# Patient Record
Sex: FEMALE | Race: WHITE | NOT HISPANIC OR LATINO | Employment: FULL TIME | URBAN - METROPOLITAN AREA
[De-identification: names, ages, dates, MRNs, and addresses within clinical notes are randomized per-mention and may not be internally consistent; named-entity substitution may affect disease eponyms.]

---

## 2017-05-18 ENCOUNTER — HOSPITAL ENCOUNTER (OUTPATIENT)
Dept: INFUSION CENTER | Facility: HOSPITAL | Age: 47
Discharge: HOME | End: 2017-05-18
Payer: COMMERCIAL

## 2017-06-07 ENCOUNTER — HOSPITAL ENCOUNTER (OUTPATIENT)
Dept: INFUSION CENTER | Facility: HOSPITAL | Age: 47
Discharge: HOME | End: 2017-06-07
Payer: COMMERCIAL

## 2017-06-07 VITALS
HEART RATE: 88 BPM | WEIGHT: 109.79 LBS | TEMPERATURE: 97.6 F | BODY MASS INDEX: 19.45 KG/M2 | RESPIRATION RATE: 18 BRPM | SYSTOLIC BLOOD PRESSURE: 126 MMHG | HEIGHT: 63 IN | DIASTOLIC BLOOD PRESSURE: 82 MMHG

## 2017-06-07 PROCEDURE — 96365 THER/PROPH/DIAG IV INF INIT: CPT

## 2017-06-07 RX ORDER — SUMATRIPTAN 100 MG/1
100 TABLET, FILM COATED ORAL ONCE AS NEEDED
COMMUNITY
End: 2019-01-23 | Stop reason: SDUPTHER

## 2017-06-07 RX ORDER — ZOLEDRONIC ACID 5 MG/100ML
5 INJECTION, SOLUTION INTRAVENOUS ONCE
Status: COMPLETED | OUTPATIENT
Start: 2017-06-07 | End: 2017-06-07

## 2017-06-07 RX ADMIN — ZOLEDRONIC ACID 5 MG: 0.05 INJECTION, SOLUTION INTRAVENOUS at 13:54

## 2017-06-10 ENCOUNTER — HOSPITAL ENCOUNTER (EMERGENCY)
Facility: HOSPITAL | Age: 47
Discharge: HOME | End: 2017-06-10
Attending: EMERGENCY MEDICINE | Admitting: EMERGENCY MEDICINE
Payer: COMMERCIAL

## 2017-06-10 VITALS
OXYGEN SATURATION: 99 % | DIASTOLIC BLOOD PRESSURE: 69 MMHG | HEART RATE: 69 BPM | SYSTOLIC BLOOD PRESSURE: 141 MMHG | WEIGHT: 109 LBS | BODY MASS INDEX: 20.06 KG/M2 | HEIGHT: 62 IN | RESPIRATION RATE: 16 BRPM | TEMPERATURE: 97.9 F

## 2017-06-10 DIAGNOSIS — T07.XXXA INFECTED INSECT BITES OF MULTIPLE SITES: Primary | ICD-10-CM

## 2017-06-10 DIAGNOSIS — L08.9 INFECTED INSECT BITES OF MULTIPLE SITES: Primary | ICD-10-CM
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
## 2017-06-10 PROCEDURE — 99283 EMERGENCY DEPT VISIT LOW MDM: CPT

## 2017-06-10 RX ORDER — CEPHALEXIN 500 MG/1
500 CAPSULE ORAL 4 TIMES DAILY
Qty: 28 CAPSULE | Refills: 0 | Status: SHIPPED | OUTPATIENT
Start: 2017-06-10 | End: 2017-06-17

## 2017-07-12 ENCOUNTER — TRANSCRIBE ORDERS (OUTPATIENT)
Dept: ADMINISTRATIVE | Facility: HOSPITAL | Age: 47
End: 2017-07-12

## 2017-07-12 DIAGNOSIS — R07.89 OTHER CHEST PAIN: Primary | ICD-10-CM

## 2017-07-12 DIAGNOSIS — I15.9 SECONDARY HYPERTENSION: ICD-10-CM

## 2017-07-14 ENCOUNTER — HOSPITAL ENCOUNTER (OUTPATIENT)
Dept: NON INVASIVE DIAGNOSTICS | Facility: CLINIC | Age: 47
Discharge: HOME | End: 2017-07-14
Payer: COMMERCIAL

## 2017-08-09 ENCOUNTER — HOSPITAL ENCOUNTER (OUTPATIENT)
Dept: NON INVASIVE DIAGNOSTICS | Facility: CLINIC | Age: 47
Discharge: HOME | End: 2017-08-09
Payer: COMMERCIAL

## 2017-08-09 DIAGNOSIS — I15.9 SECONDARY HYPERTENSION: ICD-10-CM

## 2017-08-09 LAB
CHEST PAIN STATEMENT: NORMAL
MAX DIASTOLIC BP: 84 MMHG
MAX HEART RATE: 160 BPM
MAX PREDICTED HEART RATE: 175 BPM
MAX. SYSTOLIC BP: 198 MMHG
PROTOCOL NAME: NORMAL
TARGET HR FORMULA: NORMAL
TEST INDICATION: NORMAL
TIME IN EXERCISE PHASE: 631 S

## 2017-08-09 PROCEDURE — 93017 CV STRESS TEST TRACING ONLY: CPT

## 2017-10-25 ENCOUNTER — TRANSCRIBE ORDERS (OUTPATIENT)
Dept: ADMINISTRATIVE | Facility: HOSPITAL | Age: 47
End: 2017-10-25

## 2017-10-25 DIAGNOSIS — I70.1 ATHEROSCLEROSIS OF RENAL ARTERY (HCC): Primary | ICD-10-CM

## 2017-11-15 ENCOUNTER — HOSPITAL ENCOUNTER (OUTPATIENT)
Dept: NON INVASIVE DIAGNOSTICS | Facility: CLINIC | Age: 47
Discharge: HOME | End: 2017-11-15
Payer: COMMERCIAL

## 2017-11-15 DIAGNOSIS — I70.1 ATHEROSCLEROSIS OF RENAL ARTERY (HCC): ICD-10-CM

## 2017-11-15 PROCEDURE — 93975 VASCULAR STUDY: CPT

## 2018-05-11 ENCOUNTER — TRANSCRIBE ORDERS (OUTPATIENT)
Dept: ADMINISTRATIVE | Facility: HOSPITAL | Age: 48
End: 2018-05-11

## 2018-05-11 ENCOUNTER — HOSPITAL ENCOUNTER (OUTPATIENT)
Dept: CT IMAGING | Facility: HOSPITAL | Age: 48
Discharge: HOME | End: 2018-05-11
Attending: INTERNAL MEDICINE
Payer: COMMERCIAL

## 2018-05-11 DIAGNOSIS — I26.99 IATROGENIC PULMONARY EMBOLISM AND INFARCTION, INITIAL ENCOUNTER (HCC): Primary | ICD-10-CM

## 2018-05-11 DIAGNOSIS — R07.9 CHEST PAIN, UNSPECIFIED TYPE: ICD-10-CM

## 2018-05-11 DIAGNOSIS — T81.718A IATROGENIC PULMONARY EMBOLISM AND INFARCTION, INITIAL ENCOUNTER (HCC): ICD-10-CM

## 2018-05-11 DIAGNOSIS — R06.00 DYSPNEA ON EXERTION: ICD-10-CM

## 2018-05-11 DIAGNOSIS — I26.99 IATROGENIC PULMONARY EMBOLISM AND INFARCTION, INITIAL ENCOUNTER (HCC): ICD-10-CM

## 2018-05-11 DIAGNOSIS — T81.718A IATROGENIC PULMONARY EMBOLISM AND INFARCTION, INITIAL ENCOUNTER (HCC): Primary | ICD-10-CM

## 2018-05-11 PROCEDURE — 71275 CT ANGIOGRAPHY CHEST: CPT

## 2018-05-11 RX ADMIN — IOHEXOL 85 ML: 350 INJECTION, SOLUTION INTRAVENOUS at 17:22

## 2018-07-23 ENCOUNTER — HOSPITAL ENCOUNTER (OUTPATIENT)
Dept: NON INVASIVE DIAGNOSTICS | Facility: CLINIC | Age: 48
Discharge: HOME | End: 2018-07-23
Payer: COMMERCIAL

## 2018-07-23 DIAGNOSIS — R07.9 CHEST PAIN, UNSPECIFIED TYPE: ICD-10-CM

## 2018-07-23 DIAGNOSIS — R06.00 DYSPNEA ON EXERTION: ICD-10-CM

## 2018-07-23 PROCEDURE — 93351 STRESS TTE COMPLETE: CPT | Performed by: INTERNAL MEDICINE

## 2018-07-23 PROCEDURE — 93350 STRESS TTE ONLY: CPT

## 2018-07-24 LAB
CHEST PAIN STATEMENT: NORMAL
MAX DIASTOLIC BP: 88 MMHG
MAX HEART RATE: 181 BPM
MAX PREDICTED HEART RATE: 174 BPM
MAX. SYSTOLIC BP: 138 MMHG
PROTOCOL NAME: NORMAL
REASON FOR TERMINATION: NORMAL
TARGET HR FORMULA: NORMAL
TIME IN EXERCISE PHASE: NORMAL

## 2018-10-01 ENCOUNTER — OFFICE VISIT (OUTPATIENT)
Dept: NEUROLOGY | Facility: CLINIC | Age: 48
End: 2018-10-01
Payer: COMMERCIAL

## 2018-10-01 VITALS
WEIGHT: 104 LBS | HEART RATE: 63 BPM | DIASTOLIC BLOOD PRESSURE: 82 MMHG | BODY MASS INDEX: 19.14 KG/M2 | SYSTOLIC BLOOD PRESSURE: 148 MMHG | HEIGHT: 62 IN

## 2018-10-01 DIAGNOSIS — G40.209 PARTIAL SYMPTOMATIC EPILEPSY WITH COMPLEX PARTIAL SEIZURES, NOT INTRACTABLE, WITHOUT STATUS EPILEPTICUS (HCC): ICD-10-CM

## 2018-10-01 DIAGNOSIS — R51.9 NEW ONSET HEADACHE: ICD-10-CM

## 2018-10-01 DIAGNOSIS — M54.2 CERVICALGIA: ICD-10-CM

## 2018-10-01 DIAGNOSIS — G43.909 MIGRAINE WITHOUT STATUS MIGRAINOSUS, NOT INTRACTABLE, UNSPECIFIED MIGRAINE TYPE: Primary | ICD-10-CM

## 2018-10-01 PROCEDURE — 99205 OFFICE O/P NEW HI 60 MIN: CPT | Performed by: PSYCHIATRY & NEUROLOGY

## 2018-10-01 NOTE — PATIENT INSTRUCTIONS
PLAN:  1 - MRI brain w/wo contrast for new onset of headaches that are different from her prior migraine headaches, worsening for the past 6 months  2 - referral to physical therapy to help manage headaches and musculoskeletal strain  3 - limit the number of extra shifts so that it does not interfere with her sleep and stress  4 - consider referral to an employee assistant program to help with mindfulness and well being, minimize burnout  5 - continue with topiramate 200mg twice a day  6 - blood work CBC, CMP, topiramate level, magnesium level  7 - follow-up in 3 months
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
## 2018-10-01 NOTE — PROGRESS NOTES
Review of Systems    {LimROS-complete:40625}      Review of Systems   Constitutional: Negative  Negative for appetite change and fever  HENT: Negative for hearing loss, tinnitus, trouble swallowing and voice change  Eyes: Negative  Negative for photophobia and pain  Respiratory: Negative  Negative for shortness of breath  Cardiovascular: Negative  Negative for palpitations  Gastrointestinal: Negative  Negative for nausea and vomiting  Endocrine: Negative  Negative for cold intolerance and heat intolerance  Genitourinary: Negative  Negative for dysuria, frequency and urgency  Musculoskeletal: Negative  Negative for myalgias and neck pain  Skin: Negative  Negative for rash  Neurological: Positive for headaches  Negative for dizziness, tremors, seizures, syncope, facial asymmetry, speech difficulty, weakness, light-headedness and numbness  Hematological: Negative  Does not bruise/bleed easily  Psychiatric/Behavioral: Negative  Negative for confusion, hallucinations and sleep disturbance

## 2018-10-02 ENCOUNTER — TELEPHONE (OUTPATIENT)
Dept: NEUROLOGY | Facility: CLINIC | Age: 48
End: 2018-10-02

## 2018-10-02 NOTE — TELEPHONE ENCOUNTER
If her ear feels clogged I recommend that she sees her PCP for evaluation or an ENT doctor for visualization, would recommended hearing test because she had difficulty hearing finger rubs during the office visit  Bending down, may cause feeling lightheaded which is a common sensation  Will await MRI brain study to assess headache and if there is a relationship to the ear problem

## 2018-10-02 NOTE — TELEPHONE ENCOUNTER
Pt states that her right ear feels clogged, "I can't hardly hear anything"  "Could my headache affect my ear problem?"  Pt states that It's been going on for the past 2 months  When she bends down, she feels dizzy/lightheaded                [phone]

## 2018-10-17 ENCOUNTER — HOSPITAL ENCOUNTER (OUTPATIENT)
Dept: RADIOLOGY | Facility: HOSPITAL | Age: 48
Discharge: HOME | End: 2018-10-17
Attending: PSYCHIATRY & NEUROLOGY
Payer: COMMERCIAL

## 2018-10-17 ENCOUNTER — TELEPHONE (OUTPATIENT)
Dept: NEUROLOGY | Facility: CLINIC | Age: 48
End: 2018-10-17

## 2018-10-17 DIAGNOSIS — R51.9 NEW ONSET HEADACHE: ICD-10-CM

## 2018-10-17 LAB
ALBUMIN SERPL-MCNC: 4.5 G/DL (ref 3.6–5.1)
ALBUMIN/GLOB SERPL: 2 (CALC) (ref 1–2.5)
ALP SERPL-CCNC: 53 U/L (ref 33–115)
ALT SERPL-CCNC: 16 U/L (ref 6–29)
AST SERPL-CCNC: 16 U/L (ref 10–35)
BASOPHILS # BLD AUTO: 62 CELLS/UL (ref 0–200)
BASOPHILS NFR BLD AUTO: 1.2 %
BILIRUB SERPL-MCNC: 0.5 MG/DL (ref 0.2–1.2)
BUN SERPL-MCNC: 15 MG/DL (ref 7–25)
BUN/CREAT SERPL: NORMAL (CALC) (ref 6–22)
CALCIUM SERPL-MCNC: 9.3 MG/DL (ref 8.6–10.2)
CHLORIDE SERPL-SCNC: 109 MMOL/L (ref 98–110)
CO2 SERPL-SCNC: 26 MMOL/L (ref 20–32)
CREAT SERPL-MCNC: 0.85 MG/DL (ref 0.5–1.1)
EOSINOPHIL # BLD AUTO: 192 CELLS/UL (ref 15–500)
EOSINOPHIL NFR BLD AUTO: 3.7 %
ERYTHROCYTE [DISTWIDTH] IN BLOOD BY AUTOMATED COUNT: 12.7 % (ref 11–15)
GLOBULIN SER CALC-MCNC: 2.3 G/DL (CALC) (ref 1.9–3.7)
GLUCOSE SERPL-MCNC: 80 MG/DL (ref 65–99)
HCT VFR BLD AUTO: 43.1 % (ref 35–45)
HGB BLD-MCNC: 14.4 G/DL (ref 11.7–15.5)
LYMPHOCYTES # BLD AUTO: 2579 CELLS/UL (ref 850–3900)
LYMPHOCYTES NFR BLD AUTO: 49.6 %
MAGNESIUM RBC-MCNC: 5.4 MG/DL (ref 4–6.4)
MCH RBC QN AUTO: 30 PG (ref 27–33)
MCHC RBC AUTO-ENTMCNC: 33.4 G/DL (ref 32–36)
MCV RBC AUTO: 89.8 FL (ref 80–100)
MONOCYTES # BLD AUTO: 759 CELLS/UL (ref 200–950)
MONOCYTES NFR BLD AUTO: 14.6 %
NEUTROPHILS # BLD AUTO: 1607 CELLS/UL (ref 1500–7800)
NEUTROPHILS NFR BLD AUTO: 30.9 %
PLATELET # BLD AUTO: 280 THOUSAND/UL (ref 140–400)
PMV BLD REES-ECKER: 10.2 FL (ref 7.5–12.5)
POTASSIUM SERPL-SCNC: 4.3 MMOL/L (ref 3.5–5.3)
PROT SERPL-MCNC: 6.8 G/DL (ref 6.1–8.1)
RBC # BLD AUTO: 4.8 MILLION/UL (ref 3.8–5.1)
SL AMB EGFR AFRICAN AMERICAN: 95 ML/MIN/1.73M2
SL AMB EGFR NON AFRICAN AMERICAN: 82 ML/MIN/1.73M2
SODIUM SERPL-SCNC: 141 MMOL/L (ref 135–146)
TOPIRAMATE SERPL-MCNC: 11.5 MCG/ML
WBC # BLD AUTO: 5.2 THOUSAND/UL (ref 3.8–10.8)

## 2018-10-17 PROCEDURE — 70553 MRI BRAIN STEM W/O & W/DYE: CPT

## 2018-10-17 PROCEDURE — A9585 GADOBUTROL INJECTION: HCPCS | Performed by: PSYCHIATRY & NEUROLOGY

## 2018-10-17 RX ADMIN — GADOBUTROL 4 ML: 604.72 INJECTION INTRAVENOUS at 10:19

## 2018-10-17 NOTE — TELEPHONE ENCOUNTER
Pt made aware and verbalized understanding  Will be seeking alt treatment to help with HA  Requested to cancel 1/7/19 follow up

## 2018-10-17 NOTE — TELEPHONE ENCOUNTER
----- Message from Leeanne Wynne RN sent at 10/17/2018  2:34 PM EDT -----      ----- Message -----  From: Israel Flores MD  Sent: 10/17/2018  12:46 PM  To: Leeanne Wynne RN    There is no abnormality in the brain/head to account for new headaches over the right side of the head/frontal region  No neoplasm or abnormal blood flow

## 2018-10-18 ENCOUNTER — TELEPHONE (OUTPATIENT)
Dept: NEUROLOGY | Facility: CLINIC | Age: 48
End: 2018-10-18

## 2018-10-18 NOTE — TELEPHONE ENCOUNTER
Pt states that she had to cancel her appt with dr Allison Alvarado for jan as she would like to see a dr in [facility] office and she states that dr rosales will not be seeing pt at [facility] after jan  Ok to transfer care to dr Lisa Desai?   [phone]

## 2018-10-18 NOTE — TELEPHONE ENCOUNTER
----- Message from Khushi Almeida RN sent at 10/18/2018  8:17 AM EDT -----      ----- Message -----  From: Adalberto Reid MD  Sent: 10/18/2018   7:43 AM  To: Khushi Almeida RN    Blood work for CBC, CMP, magnesium, and topiramate level are normal

## 2018-11-20 ENCOUNTER — APPOINTMENT (EMERGENCY)
Dept: RADIOLOGY | Facility: HOSPITAL | Age: 48
End: 2018-11-20
Payer: COMMERCIAL

## 2018-11-20 ENCOUNTER — HOSPITAL ENCOUNTER (EMERGENCY)
Facility: HOSPITAL | Age: 48
Discharge: HOME | End: 2018-11-20
Attending: EMERGENCY MEDICINE | Admitting: EMERGENCY MEDICINE
Payer: COMMERCIAL

## 2018-11-20 VITALS
DIASTOLIC BLOOD PRESSURE: 74 MMHG | WEIGHT: 110 LBS | BODY MASS INDEX: 20.45 KG/M2 | SYSTOLIC BLOOD PRESSURE: 120 MMHG | TEMPERATURE: 97.4 F | RESPIRATION RATE: 18 BRPM | OXYGEN SATURATION: 96 % | HEART RATE: 72 BPM

## 2018-11-20 DIAGNOSIS — S29.012A STRAIN OF THORACIC PARASPINAL MUSCLES EXCLUDING T1 AND T2 LEVELS, INITIAL ENCOUNTER: Primary | ICD-10-CM

## 2018-11-20 DIAGNOSIS — M25.511 RIGHT SHOULDER PAIN: ICD-10-CM

## 2018-11-20 PROCEDURE — 99283 EMERGENCY DEPT VISIT LOW MDM: CPT

## 2018-11-20 PROCEDURE — 73030 X-RAY EXAM OF SHOULDER: CPT

## 2018-11-20 PROCEDURE — 72070 X-RAY EXAM THORAC SPINE 2VWS: CPT

## 2018-11-20 PROCEDURE — 96372 THER/PROPH/DIAG INJ SC/IM: CPT

## 2018-11-20 RX ORDER — KETOROLAC TROMETHAMINE 30 MG/ML
15 INJECTION, SOLUTION INTRAMUSCULAR; INTRAVENOUS ONCE
Status: COMPLETED | OUTPATIENT
Start: 2018-11-20 | End: 2018-11-20

## 2018-11-20 RX ORDER — NAPROXEN 500 MG/1
500 TABLET ORAL 2 TIMES DAILY WITH MEALS
Qty: 30 TABLET | Refills: 0 | Status: SHIPPED | OUTPATIENT
Start: 2018-11-20 | End: 2019-01-23

## 2018-11-20 RX ORDER — BACLOFEN 10 MG/1
10 TABLET ORAL 3 TIMES DAILY
Qty: 15 TABLET | Refills: 0 | Status: SHIPPED | OUTPATIENT
Start: 2018-11-20 | End: 2019-01-23 | Stop reason: SINTOL

## 2018-11-20 RX ADMIN — KETOROLAC TROMETHAMINE 15 MG: 30 INJECTION, SOLUTION INTRAMUSCULAR at 09:27

## 2018-11-20 NOTE — DISCHARGE INSTRUCTIONS
Muscle Strain   WHAT YOU NEED TO KNOW:   A muscle strain is a twist, pull, or tear of a muscle or tendon  A tendon is a strong elastic tissue that connects a muscle to a bone  Signs of a strained muscle include bruising and swelling over the area, pain with movement, and loss of strength  DISCHARGE INSTRUCTIONS:   Return to the emergency department if:   · You suddenly cannot feel or move your injured muscle  Contact your healthcare provider if:   · Your pain and swelling worsen or do not go away  · You have questions or concerns about your condition or care  Medicines:   · NSAIDs  help decrease swelling and pain or fever  This medicine is available with or without a doctor's order  NSAIDs can cause stomach bleeding or kidney problems in certain people  If you take blood thinner medicine, always ask your healthcare provider if NSAIDs are safe for you  Always read the medicine label and follow directions  · Muscle relaxers  help decrease pain and muscle spasms  · Take your medicine as directed  Contact your healthcare provider if you think your medicine is not helping or if you have side effects  Tell him of her if you are allergic to any medicine  Keep a list of the medicines, vitamins, and herbs you take  Include the amounts, and when and why you take them  Bring the list or the pill bottles to follow-up visits  Carry your medicine list with you in case of an emergency  Follow up with your healthcare provider as directed: Your healthcare provider may suggest that you have a follow-up visit before you go back to your usual activity  Write down your questions so you remember to ask them during your visits  Self-care:   · 3 to 7 days after the injury:  Use Rest, Ice, Compression, and Elevation (RICE) to help stop bruising and decrease pain and swelling  ¨ Rest:  Rest your muscle to allow your injury to heal  When the pain decreases, begin normal, slow movements   For mild and moderate muscle strains, you should rest your muscles for about 2 days  However, if you have a severe muscle strain, you should rest for 10 to 14 days  You may need to use crutches to walk if your muscle strain is in your legs or lower body  ¨ Ice:  Put an ice pack on the injured area  Put a towel between the ice pack and your skin  Do not put the ice pack directly on your skin  You can use a package of frozen peas instead of an ice pack  ¨ Compression:  You may need to wrap an elastic bandage around the area to decrease swelling  It should be tight enough for you to feel support  Do not wrap it too tightly  ¨ Elevation:  Keep the injured muscle raised above your heart if possible  For example if you have a strain of your lower leg muscle, lie down and prop your leg up on pillows  This helps decrease pain and swelling  · 3 to 21 days after the injury:  Start to slowly and regularly exercise your muscle  This will help it heal  If you feel pain, decrease how hard you are exercising  · 1 to 6 weeks after the injury:  Stretch the injured muscle  Hold the stretch for about 30 seconds  Do this 4 times a day  You may stretch the muscle until you feel a slight pull  Stop stretching if you feel pain  · 2 weeks to 6 months after the injury:  The goal of this phase is to return to the activity you were doing before the injury happened, without hurting the muscle again  · 3 weeks to 6 months after the injury:  Keep stretching and strengthening your muscles to avoid injury  Slowly increase the time and distance that you exercise  You may have signs and symptoms of muscle strain 6 months after the injury, even if you do things to help it heal  In this case, you may need surgery on the muscle  © 2017 [address] Information is for End User's use only and may not be sold, redistributed or otherwise used for commercial purposes   All illustrations and images included in CareNotes® are the copyrighted property of [employer]  or Awais Soria  The above information is an  only  It is not intended as medical advice for individual conditions or treatments  Talk to your doctor, nurse or pharmacist before following any medical regimen to see if it is safe and effective for you  Shoulder Pain, Ambulatory Care   GENERAL INFORMATION:   Shoulder pain  is a common problem and can affect your daily activities  Pain can be caused by a problem within your shoulder  Shoulder pain may also be caused by pain that spreads to your shoulder from another part of your body  Seek immediate care for the following symptoms:   · Severe pain    · Inability to move your arm or shoulder    · Numbness or tingling in your shoulder or arm  Treatment for shoulder pain  may include any of the following:  · Acetaminophen  decreases pain and fever  It is available without a doctor's order  Ask how much to take and how often to take it  Follow directions  Acetaminophen can cause liver damage if not taken correctly  · NSAIDs  help decrease swelling and pain or fever  This medicine is available with or without a doctor's order  NSAIDs can cause stomach bleeding or kidney problems in certain people  If you take blood thinner medicine, always ask your healthcare provider if NSAIDs are safe for you  Always read the medicine label and follow directions  · A steroid injection  may help decrease pain and swelling  · Surgery  may be needed for long-term pain and loss of function  Manage your symptoms:   · Apply ice  on your shoulder for 20 to 30 minutes every 2 hours or as directed  Use an ice pack, or put crushed ice in a plastic bag  Cover it with a towel  Ice helps prevent tissue damage and decreases swelling and pain  · Apply heat if ice does not help your symptoms  Apply heat on your shoulder for 20 to 30 minutes every 2 hours for as many days as directed   Heat helps decrease pain and muscle spasms  · Go to physical or occupational therapy as directed  A physical therapist teaches you exercises to help improve movement and strength, and to decrease pain  An occupational therapist teaches you skills to help with your daily activities  Prevent shoulder pain:   · Stretch and strengthen your shoulder  Use proper technique during exercises and sports  · Limit activities as directed  Try to avoid repeated overhead movements  Follow up with your healthcare provider or orthopedist as directed:  Write down your questions so you remember to ask them during your visits  CARE AGREEMENT:   You have the right to help plan your care  Learn about your health condition and how it may be treated  Discuss treatment options with your caregivers to decide what care you want to receive  You always have the right to refuse treatment  The above information is an  only  It is not intended as medical advice for individual conditions or treatments  Talk to your doctor, nurse or pharmacist before following any medical regimen to see if it is safe and effective for you  © 2014 [address] is for End User's use only and may not be sold, redistributed or otherwise used for commercial purposes  All illustrations and images included in CareNotes® are the copyrighted property of [employer]  or Awais Soria

## 2018-11-20 NOTE — ED PROVIDER NOTES
History  Chief Complaint   Patient presents with    Shoulder Pain     Patient reports she is a  and yesterday went to put her bag down and heard a "pop" in her back/right shoulder pain  Patient reports pain with movement  56-year-old female presenting with right-sided thoracic back pain and right shoulder pain starting last night  Patient states that she is getting home from work yesterday and leaning down to put her suitcase and lunch box the ground when she heard a 'pop' in her mid back  She reports that it took her about 15 min to stand up as she had pain  She spoke with her PCP last night who told her to take 600mg ibuprofen which she did with only minimal relief  She reports she woke up this morning with pain now radiating towards R shoulder  She states she did not take anything for pain this morning prior to arrival  She denies any numbness, tingling or previous injury  No trauma or injury  Denies any SOB, CP, abdominal pain, n/v/d, fevers, chills or sweats  Prior to Admission Medications   Prescriptions Last Dose Informant Patient Reported? Taking? B Complex Vitamins (VITAMIN B COMPLEX PO)   Yes No   Sig: Take 1 tablet by mouth daily  Calcium-Magnesium-Vitamin D (CITRACAL CALCIUM+D PO)   Yes No   Sig: Take 1 tablet by mouth daily  Cholecalciferol (VITAMIN D-3 PO)   Yes No   Sig: Take 2,000 Units by mouth daily  Cinnamon 500 MG capsule   Yes No   Sig: Take 1,000 mg by mouth daily  Omega-3 Fatty Acids (FISH OIL) 1,000 mg   Yes No   Sig: Take 1,000 mg by mouth daily  SUMAtriptan (IMITREX) 100 mg tablet   Yes No   Sig: Take 100 mg by mouth once as needed for migraine (takes 1/2 , if not resolved takes the other half )   cetirizine (ZYRTEC ALLERGY) 10 mg tablet   Yes No   Sig: Take 10 mg by mouth daily  cyanocobalamin (VITAMIN B-12) 1,000 mcg tablet   Yes No   Sig: Take 500 mcg by mouth daily     levothyroxine (SYNTHROID) 88 mcg tablet   Yes No   Sig: Take 88 mcg by mouth daily  montelukast (SINGULAIR) 10 mg tablet   Yes No   Sig: Take 10 mg by mouth daily at bedtime  topiramate (TOPAMAX) 200 MG tablet   No No   Sig: Take 1 tablet (200 mg total) by mouth 2 (two) times a day   traMADol (ULTRAM) 50 mg tablet   No No   Sig: Take 1 tablet by mouth every 6 (six) hours as needed for moderate pain      Facility-Administered Medications: None       Past Medical History:   Diagnosis Date    Arthritis     Asthma     Disease of thyroid gland     Osteoporosis     Seizure disorder (HCC)        Past Surgical History:   Procedure Laterality Date    APPENDECTOMY      FRACTURE SURGERY      HYSTERECTOMY      OTHER SURGICAL HISTORY      left foot, rt knee, tonselectomy       Family History   Problem Relation Age of Onset    Alcohol abuse Mother     Migraines Father     Alcohol abuse Maternal Uncle     Seizures Paternal Aunt      I have reviewed and agree with the history as documented  Social History   Substance Use Topics    Smoking status: Former Smoker    Smokeless tobacco: Never Used    Alcohol use Yes        Review of Systems   All other systems reviewed and are negative  Physical Exam  Physical Exam   Constitutional: She is oriented to person, place, and time  She appears well-developed and well-nourished  No distress  Lying down, appears uncomfortable   HENT:   Head: Normocephalic and atraumatic  Eyes: Conjunctivae are normal    EOM grossly intact   Neck: Normal range of motion  Neck supple  No JVD present  Cardiovascular: Normal rate  Pulmonary/Chest: Effort normal    Abdominal: Soft  Musculoskeletal:        Arms:  FROM RUE, radial and ulnar pulses intact RUE, no gross deformity or step off, no ecchymosis or erythema, steady gait, cap refill brisk, strength and sensation grossly intact throughout   Neurological: She is alert and oriented to person, place, and time  Skin: Skin is warm and dry  Capillary refill takes less than 2 seconds  Psychiatric: She has a normal mood and affect  Her behavior is normal    Nursing note and vitals reviewed  Vital Signs  ED Triage Vitals [11/20/18 0908]   Temperature Pulse Respirations Blood Pressure SpO2   (!) 97 4 °F (36 3 °C) 72 18 120/74 96 %      Temp Source Heart Rate Source Patient Position - Orthostatic VS BP Location FiO2 (%)   Tympanic Monitor Sitting Right arm --      Pain Score       8           Vitals:    11/20/18 0908   BP: 120/74   Pulse: 72   Patient Position - Orthostatic VS: Sitting       Visual Acuity      ED Medications  Medications   ketorolac (TORADOL) injection 15 mg (15 mg Intramuscular Given 11/20/18 5914)       Diagnostic Studies  Results Reviewed     None                 XR thoracic spine 2 views    (Results Pending)   XR shoulder 2+ views RIGHT    (Results Pending)              Procedures  Procedures       Phone Contacts  ED Phone Contact    ED Course                               MDM  Number of Diagnoses or Management Options  Diagnosis management comments: 79-year-old female presenting with thoracic back pain and right shoulder pain after him leaning down last night to drop off her bags and hearing a 'pop' in her back, x-ray of thoracic spine and right shoulder were negative for any acute osseous abnormalities, likely a muscle strain/muscle spasm, instructed to f/u with pcp and ortho as needed outpatient    All imaging discussed with patient, strict return to ED precautions discussed  Pt verbalizes understanding and agrees with plan  Pt is stable for discharge    Portions of the record may have been created with voice recognition software  Occasional wrong word or "sound a like" substitutions may have occurred due to the inherent limitations of voice recognition software  Read the chart carefully and recognize, using context, where substitutions have occurred        CritCare Time    Disposition  Final diagnoses:   Strain of thoracic paraspinal muscles excluding T1 and T2 levels, initial encounter   Right shoulder pain     Time reflects when diagnosis was documented in both MDM as applicable and the Disposition within this note     Time User Action Codes Description Comment    11/20/2018 10:17 AM Julius MANN Add [S29 012A] Strain of thoracic paraspinal muscles excluding T1 and T2 levels, initial encounter     11/20/2018 10:17 AM Julius MANN Add [M25 [id number]] Right shoulder pain       ED Disposition     ED Disposition Condition Comment    Discharge  [facility] discharge to home/self care  Condition at discharge: Good        Follow-up Information     Follow up With Specialties Details Why Contact Info Additional Information    Marizol Fiore MD Internal Medicine Schedule an appointment as soon as possible for a visit As needed [address] [facility]  [address] [facility] Orthopedic Surgery Schedule an appointment as soon as possible for a visit As needed [facility] [id number]  [phone] [address] [facility], [address] [place] [address], [id number]          Patient's Medications   Discharge Prescriptions    BACLOFEN 10 MG TABLET    Take 1 tablet (10 mg total) by mouth 3 (three) times a day       Start Date: 11/20/2018End Date: --       Order Dose: 10 mg       Quantity: 15 tablet    Refills: 0    NAPROXEN (NAPROSYN) 500 MG TABLET    Take 1 tablet (500 mg total) by mouth 2 (two) times a day with meals       Start Date: 11/20/2018End Date: --       Order Dose: 500 mg       Quantity: 30 tablet    Refills: 0     No discharge procedures on file      ED Provider  Electronically Signed by           Ashley Moody PA-C  11/20/18 5559

## 2019-01-23 ENCOUNTER — OFFICE VISIT (OUTPATIENT)
Dept: NEUROLOGY | Facility: CLINIC | Age: 49
End: 2019-01-23
Payer: COMMERCIAL

## 2019-01-23 VITALS
HEIGHT: 62 IN | BODY MASS INDEX: 19.41 KG/M2 | HEART RATE: 76 BPM | DIASTOLIC BLOOD PRESSURE: 74 MMHG | SYSTOLIC BLOOD PRESSURE: 118 MMHG | WEIGHT: 105.5 LBS

## 2019-01-23 DIAGNOSIS — G43.909 MIGRAINE WITHOUT STATUS MIGRAINOSUS, NOT INTRACTABLE, UNSPECIFIED MIGRAINE TYPE: Primary | ICD-10-CM

## 2019-01-23 DIAGNOSIS — G40.209 PARTIAL SYMPTOMATIC EPILEPSY WITH COMPLEX PARTIAL SEIZURES, NOT INTRACTABLE, WITHOUT STATUS EPILEPTICUS (HCC): ICD-10-CM

## 2019-01-23 PROCEDURE — 99214 OFFICE O/P EST MOD 30 MIN: CPT | Performed by: PSYCHIATRY & NEUROLOGY

## 2019-01-23 RX ORDER — SUMATRIPTAN 100 MG/1
100 TABLET, FILM COATED ORAL ONCE AS NEEDED
Qty: 2 TABLET | Refills: 2 | Status: SHIPPED | OUTPATIENT
Start: 2019-01-23 | End: 2019-07-22 | Stop reason: SDUPTHER

## 2019-01-23 NOTE — PROGRESS NOTES
Review of Systems   Constitutional: Negative  Negative for appetite change and fever  HENT: Positive for dental problem (TMJ)  Negative for hearing loss, tinnitus, trouble swallowing and voice change  Eyes: Negative  Negative for photophobia and pain  Respiratory: Negative  Negative for shortness of breath  Cardiovascular: Negative  Negative for palpitations  Gastrointestinal: Negative  Negative for nausea and vomiting  Endocrine: Negative  Negative for cold intolerance and heat intolerance  Genitourinary: Negative  Negative for dysuria, frequency and urgency  Musculoskeletal: Positive for neck stiffness  Negative for myalgias and neck pain  Skin: Negative  Negative for rash  Neurological: Positive for headaches (Left side)  Negative for dizziness, tremors, seizures, syncope, facial asymmetry, speech difficulty, weakness, light-headedness and numbness  Hematological: Negative  Does not bruise/bleed easily  Psychiatric/Behavioral: Negative  Negative for confusion, hallucinations and sleep disturbance

## 2019-01-23 NOTE — PROGRESS NOTES
[place] [address] [facility] [address] [facility]  Follow Up Visit    Impression/Plan    Ms Shelby Mclaughlin is a 54 y o  female with well controlled focal epilepsy  Headaches have improved since last visit  Recent MRI brain normal  Exam is normal      Patient Instructions   1  Continue topiramate 200 mg twice daily  2  Return in about 6 months  Diagnoses and all orders for this visit:    Migraine without status migrainosus, not intractable, unspecified migraine type  -     SUMAtriptan (IMITREX) 100 mg tablet; Take 1 tablet (100 mg total) by mouth once as needed for migraine (takes 1/2 , if not resolved takes the other half )  -     topiramate (TOPAMAX) 200 MG tablet; Take 1 tablet (200 mg total) by mouth 2 (two) times a day    Partial symptomatic epilepsy with complex partial seizures, not intractable, without status epilepticus (HCC)  -     topiramate (TOPAMAX) 200 MG tablet; Take 1 tablet (200 mg total) by mouth 2 (two) times a day        Subjective    Eual Lemming is returning to the [place] [address] [facility] for follow up regarding headaches  She was previously followed by Dr Abhi Landis  See his 10/1/2018 note for additional history  Interval Events:   Seizures since last visit: None    In December she stopped coffee and stopped glutten  Migraines involved cervical and bifrontal pain that was disabling  The last one of these was prior to 10/2018  She now gets headaches at times that involve the left temporal region  There are separate headaches involving periorbital region on the right  Current Medications include:  Topiramate 200 mg bid  Sumatriptan 100 mg prn  Medication side effects: None  Medication adherence: Yes    Event/Seizure semiology:  Episodes of heat behind neck and ringing in left ear  A minute to two later she would pass out and then have apparent convulsion  They were happening a few times per month  Occurred from [address] until 1999 when she started topiramate       Special Features  Status epilepticus: no  Self Injury Seizures: no  Precipitating Factors: none    Prior Evaluation:  MRI brain w/ and wo 10/17/2018: normal    History Reviewed: The following were reviewed and updated as appropriate: allergies, current medications, past family history, past medical history, past social history, past surgical history and problem list    Psychiatric History:  None    ROS:  Constitutional: Negative  Negative for appetite change and fever  HENT: Positive for dental problem (TMJ)  Negative for hearing loss, tinnitus, trouble swallowing and voice change  Eyes: Negative  Negative for photophobia and pain  Respiratory: Negative  Negative for shortness of breath  Cardiovascular: Negative  Negative for palpitations  Gastrointestinal: Negative  Negative for nausea and vomiting  Endocrine: Negative  Negative for cold intolerance and heat intolerance  Genitourinary: Negative  Negative for dysuria, frequency and urgency  Musculoskeletal: Positive for neck stiffness  Negative for myalgias and neck pain  Skin: Negative  Negative for rash  Neurological: Positive for headaches (Left side)  Negative for dizziness, tremors, seizures, syncope, facial asymmetry, speech difficulty, weakness, light-headedness and numbness  Hematological: Negative  Does not bruise/bleed easily  Psychiatric/Behavioral: Negative  Negative for confusion, hallucinations and sleep disturbance  ROS reviewed and updated as appropriate  Objective    /74 (BP Location: Left arm, Patient Position: Sitting, Cuff Size: Adult)   Pulse 76   Ht 5' 1 5" (1 562 m)   Wt 47 9 kg (105 lb 8 oz)   BMI 19 61 kg/m²      General Exam  No acute distress  Neurologic Exam  Mental Status:  Alert and oriented x 4  Language: normal fluency and comprehension  Cranial Nerves:  PERRL  VFFTC  EOMI, no nystagums  Face symmetric  No dysarthria  Motor:  No drift  Strength 5/5 throughout    Coordination: Finger to nose intact  DTRs: Normal and symmetric (biceps, patella)  Gait: Normal casual gait

## 2019-02-07 ENCOUNTER — TELEPHONE (OUTPATIENT)
Dept: NEUROLOGY | Facility: CLINIC | Age: 49
End: 2019-02-07

## 2019-02-07 NOTE — TELEPHONE ENCOUNTER
Patient called in stating she has been experiencing terrible pain in left shoulder and neck also radiates down into left buttock  Also having sharp pain above left eye comes on suddenly and lasts about 1-2mins  She states she has been using essential oils and last two nights took Tramadol 0 5 tab which did help some  She states she didn't take Sumatriptan as this was not her typical migraine symptoms  I informed patient that I would make Dr Shahana Haywood aware of the sharp pain above her eye but since symptoms have started only since she started with the left shoulder pain I would recommend she f/u with PCP  Patient states she did call PCP and he stated she has a lump in her neck which is fibrous tissue and that there is nothing he can do about it  She said he recommended that she could f/u with Dr Parker Anderson who she sees for her osteoporosis but she isn't seeing her until March  I informed patient that since this seems to musculoskeletal in nature I don't think that this is necessarily a neurological problem that Dr Shahana Haywood can help with  Please advise if you have any recommendations

## 2019-02-07 NOTE — TELEPHONE ENCOUNTER
Might also try a longer acting NSAID like naproxen (440 mg) if head/neck pain is recurring throughout the day  Take with full glass of water or meal  Up to every 8 hours, but no more than 2 doses in a day  Agree with talking to PCP  If headaches also has nausea, photophobia or other migrainous features then could try Imitrex even though not her typical headache

## 2019-02-08 NOTE — TELEPHONE ENCOUNTER
Patient made aware of below message and recommendation from Dr Joann Reid  Patient verbalized clear understanding

## 2019-02-27 ENCOUNTER — TRANSCRIBE ORDERS (OUTPATIENT)
Dept: ADMINISTRATIVE | Facility: HOSPITAL | Age: 49
End: 2019-02-27

## 2019-02-27 ENCOUNTER — HOSPITAL ENCOUNTER (OUTPATIENT)
Dept: RADIOLOGY | Facility: HOSPITAL | Age: 49
Discharge: HOME | End: 2019-02-27
Payer: COMMERCIAL

## 2019-02-27 DIAGNOSIS — N20.2 CALCULUS OF KIDNEY AND URETER: ICD-10-CM

## 2019-02-27 DIAGNOSIS — N20.2 CALCULUS OF KIDNEY AND URETER: Primary | ICD-10-CM

## 2019-02-27 PROCEDURE — 74176 CT ABD & PELVIS W/O CONTRAST: CPT

## 2019-06-06 ENCOUNTER — HOSPITAL ENCOUNTER (OUTPATIENT)
Dept: INFUSION CENTER | Facility: HOSPITAL | Age: 49
Discharge: HOME | End: 2019-06-06
Payer: COMMERCIAL

## 2019-06-06 VITALS
HEIGHT: 63 IN | HEART RATE: 69 BPM | BODY MASS INDEX: 18.96 KG/M2 | RESPIRATION RATE: 16 BRPM | SYSTOLIC BLOOD PRESSURE: 146 MMHG | DIASTOLIC BLOOD PRESSURE: 88 MMHG | WEIGHT: 107 LBS | TEMPERATURE: 98.9 F

## 2019-06-06 PROCEDURE — 96365 THER/PROPH/DIAG IV INF INIT: CPT

## 2019-06-06 RX ORDER — ZOLEDRONIC ACID 5 MG/100ML
5 INJECTION, SOLUTION INTRAVENOUS ONCE
Status: COMPLETED | OUTPATIENT
Start: 2019-06-06 | End: 2019-06-06

## 2019-06-06 RX ORDER — ZOLEDRONIC ACID 5 MG/100ML
5 INJECTION, SOLUTION INTRAVENOUS ONCE
Status: DISCONTINUED | OUTPATIENT
Start: 2019-06-06 | End: 2019-06-06

## 2019-06-06 RX ADMIN — ZOLEDRONIC ACID 5 MG: 0.05 INJECTION, SOLUTION INTRAVENOUS at 14:35

## 2019-06-06 NOTE — PLAN OF CARE
Problem: Potential for Falls  Goal: Patient will remain free of falls  Description  INTERVENTIONS:  - Assess patient frequently for physical needs  -  Identify cognitive and physical deficits and behaviors that affect risk of falls    -  [place] fall precautions as indicated by assessment   - Educate patient/family on patient safety including physical limitations  - Instruct patient to call for assistance with activity based on assessment  - Modify environment to reduce risk of injury  - Consider OT/PT consult to assist with strengthening/mobility  Outcome: Progressing

## 2019-06-06 NOTE — PROGRESS NOTES
Pt declined to stay for full 30 min observation    Discharged without complaints Double Island Pedicle Flap Text: The defect edges were debeveled with a #15 scalpel blade.  Given the location of the defect, shape of the defect and the proximity to free margins a double island pedicle advancement flap was deemed most appropriate.  Using a sterile surgical marker, an appropriate advancement flap was drawn incorporating the defect, outlining the appropriate donor tissue and placing the expected incisions within the relaxed skin tension lines where possible.    The area thus outlined was incised deep to adipose tissue with a #15 scalpel blade.  The skin margins were undermined to an appropriate distance in all directions around the primary defect and laterally outward around the island pedicle utilizing iris scissors.  There was minimal undermining beneath the pedicle flap.

## 2019-07-22 ENCOUNTER — OFFICE VISIT (OUTPATIENT)
Dept: NEUROLOGY | Facility: CLINIC | Age: 49
End: 2019-07-22
Payer: COMMERCIAL

## 2019-07-22 ENCOUNTER — TELEPHONE (OUTPATIENT)
Dept: NEUROLOGY | Facility: CLINIC | Age: 49
End: 2019-07-22

## 2019-07-22 VITALS
BODY MASS INDEX: 19.56 KG/M2 | HEIGHT: 62 IN | SYSTOLIC BLOOD PRESSURE: 130 MMHG | WEIGHT: 106.3 LBS | DIASTOLIC BLOOD PRESSURE: 80 MMHG

## 2019-07-22 DIAGNOSIS — G40.209 PARTIAL SYMPTOMATIC EPILEPSY WITH COMPLEX PARTIAL SEIZURES, NOT INTRACTABLE, WITHOUT STATUS EPILEPTICUS (HCC): Primary | ICD-10-CM

## 2019-07-22 DIAGNOSIS — G43.909 MIGRAINE WITHOUT STATUS MIGRAINOSUS, NOT INTRACTABLE, UNSPECIFIED MIGRAINE TYPE: ICD-10-CM

## 2019-07-22 DIAGNOSIS — N20.0 NEPHROLITHIASIS: ICD-10-CM

## 2019-07-22 PROCEDURE — 99214 OFFICE O/P EST MOD 30 MIN: CPT | Performed by: PSYCHIATRY & NEUROLOGY

## 2019-07-22 RX ORDER — SUMATRIPTAN 100 MG/1
100 TABLET, FILM COATED ORAL ONCE AS NEEDED
Qty: 2 TABLET | Refills: 2 | Status: SHIPPED | OUTPATIENT
Start: 2019-07-22 | End: 2022-04-28

## 2019-07-22 NOTE — PATIENT INSTRUCTIONS
1  Continue topiramate 200 mg twice daily  2  Let us know if you have seizures or if your headaches worsen  3  Return in about 6 months

## 2019-07-22 NOTE — TELEPHONE ENCOUNTER
Patient was seen by Dr eRza Rodgers today  Dr Reza Rodgers stated she does not need to have her FMLA paperwork filled out again  Her PCP has filled out a set of FMLA and that should be good enough for the patient  Elyssa, please disregard, check out will refund her payment

## 2019-07-22 NOTE — TELEPHONE ENCOUNTER
Patient came in today for an appointment with Dr Jose Manuel Redd  Patient asked to have FMLA form to be filled out by our office  When completed please call patient she would like to  original copies herself  Payment received  Form scanned to chart, original placed in Clinical bin

## 2019-07-22 NOTE — PROGRESS NOTES
Review of Systems   Constitutional: Negative  Negative for appetite change and fever  HENT: Negative  Negative for hearing loss, tinnitus, trouble swallowing and voice change  Eyes: Negative  Negative for photophobia and pain  Respiratory: Negative  Negative for shortness of breath  Cardiovascular: Negative  Negative for palpitations  Gastrointestinal: Positive for nausea  Negative for vomiting  Endocrine: Negative  Negative for cold intolerance and heat intolerance  Genitourinary: Positive for frequency  Negative for dysuria and urgency  Musculoskeletal: Positive for myalgias  Negative for neck pain  Patient states she has arthritis everywhere   Skin: Negative  Negative for rash  Neurological: Positive for dizziness, syncope (Once ), light-headedness and headaches (Have gotten worse )  Negative for tremors, seizures, facial asymmetry, speech difficulty, weakness and numbness  Hematological: Bruises/bleeds easily  Psychiatric/Behavioral: Positive for confusion (Has trouble getting thoughts together at times ) and sleep disturbance (Has trouble falling asleep)  Negative for hallucinations

## 2019-07-22 NOTE — PROGRESS NOTES
[place] [address] [facility]  Follow Up Visit    Impression/Plan    Ms Jenny Louise is a 54 y o  female with well controlled focal epilepsy  Her mild/moderate headaches have increased in frequency some in the last 2 weeks  Migraine headaches stable at about one per month or less  Exam is normal      There was likely nephrolithiasis in 2/2019  Discussed risk of nephrolithiasis related to topiramate  She has had excellent seizure control on topiramate and it has also helped her migraine headaches  She understands the risk of additional kidney stones and wishes to continue on topiramate unchanged  Patient Instructions   1  Continue topiramate 200 mg twice daily  2  Let us know if you have seizures or if your headaches worsen  3  Return in about 6 months  Diagnoses and all orders for this visit:    Partial symptomatic epilepsy with complex partial seizures, not intractable, without status epilepticus (HCC)  -     topiramate (TOPAMAX) 200 MG tablet; Take 1 tablet (200 mg total) by mouth 2 (two) times a day    Migraine without status migrainosus, not intractable, unspecified migraine type  -     topiramate (TOPAMAX) 200 MG tablet; Take 1 tablet (200 mg total) by mouth 2 (two) times a day  -     SUMAtriptan (IMITREX) 100 mg tablet; Take 1 tablet (100 mg total) by mouth once as needed for migraine (takes 1/2 , if not resolved takes the other half )    Nephrolithiasis        Subjective    Jeremiah Rule is returning to the [place] [address] [facility] for follow up regarding headaches  She was previously followed by Dr Joelyn Essex  See his 10/1/2018 note for additional history  Interval Events:   Seizures since last visit: None    Mild/moderate headaches are more frequent over the last 2 weeks  Migraine involves left temple headaches also associated with nausea and photo/phonophobia occur once per month or less  Sumatriptan 1/2 pill less than once per month   She has been working more shifts lately to build up credit to time off in late Aug      She ran over some flares on [address] late at night and then slammed on the breaks  If someone were behind her it would have caused an accident  She can't really explain why she did that  She was completely aware  No loss of time  She was very tired  She went to PCP for flank pain  Given strainer and collected 3 small stones like grains of salt on 2/27/2019 and had CT renal stone that same day  No stones seen on CT  Went to ED the next day and showed them the stones  Was started on Flomax  Saw urology on 3/18/2019 and Urology thought CT was done before stones passed and should have been seen on imaging if they were there  Flomax was stopped  No prior history of kidney stones  No evidence of stones since  Current Medications include:  Topiramate 200 mg bid  Sumatriptan 100 mg prn (half pill)  Medication side effects: None  Medication adherence: Yes    Event/Seizure semiology:  Episodes of heat behind neck and ringing in left ear  A minute to two later she would pass out and then have apparent convulsion  They were happening a few times per month  Occurred from [address] until 1999 when she started topiramate  Special Features  Status epilepticus: no  Self Injury Seizures: no  Precipitating Factors: none    Prior Evaluation:  MRI brain w/ and wo 10/17/2018: normal    History Reviewed: The following were reviewed and updated as appropriate: allergies, current medications, past family history, past medical history, past social history, past surgical history and problem list    Psychiatric History:  None    Social History:     ROS:  Constitutional: Negative  Negative for appetite change and fever  HENT: Negative  Negative for hearing loss, tinnitus, trouble swallowing and voice change  Eyes: Negative  Negative for photophobia and pain  Respiratory: Negative  Negative for shortness of breath  Cardiovascular: Negative  Negative for palpitations  Gastrointestinal: Positive for nausea  Negative for vomiting  Endocrine: Negative  Negative for cold intolerance and heat intolerance  Genitourinary: Positive for frequency  Negative for dysuria and urgency  Musculoskeletal: Positive for myalgias  Negative for neck pain  Patient states she has arthritis everywhere   Skin: Negative  Negative for rash  Neurological: Positive for dizziness, syncope (Once ), light-headedness and headaches (Have gotten worse )  Negative for tremors, seizures, facial asymmetry, speech difficulty, weakness and numbness  Hematological: Bruises/bleeds easily  Psychiatric/Behavioral: Positive for confusion (Has trouble getting thoughts together at times ) and sleep disturbance (Has trouble falling asleep)  Negative for hallucinations  ROS reviewed and updated as appropriate  Objective    /80 (BP Location: Right arm, Patient Position: Sitting, Cuff Size: Standard)   Ht 5' 2" (1 575 m)   Wt 48 2 kg (106 lb 4 8 oz)   BMI 19 44 kg/m²      General Exam  No acute distress  Neurologic Exam  Mental Status:  Alert and oriented x 4  Language: normal fluency and comprehension  Cranial Nerves:  PERRL  VFFTC  EOMI, no nystagums  Face symmetric  No dysarthria  Motor:  No drift  Strength 5/5 throughout  Coordination: Finger to nose intact  Gait: Normal casual gait

## 2019-08-26 ENCOUNTER — DOCUMENTATION (OUTPATIENT)
Dept: NEUROLOGY | Facility: CLINIC | Age: 49
End: 2019-08-26

## 2019-08-26 NOTE — PROGRESS NOTES
Received records request from [facility]  Faxed request to [facility]

## 2019-10-28 NOTE — TELEPHONE ENCOUNTER
Patient called the office and was directed to speak with myself due to her life insurance stating that she was lasy seen in 2004  Per pt's appt  History, was able to confirm her question that last visit was 7/22/2019 with Dr Cayetano Pozo  Pt  Wanted to know what her diagnosis was so I forwarded a in basket message to Dr Cayetano Pozo to call her

## 2019-11-14 ENCOUNTER — HOSPITAL ENCOUNTER (EMERGENCY)
Facility: HOSPITAL | Age: 49
Discharge: HOME | End: 2019-11-15
Attending: EMERGENCY MEDICINE
Payer: COMMERCIAL

## 2019-11-14 ENCOUNTER — APPOINTMENT (EMERGENCY)
Dept: RADIOLOGY | Facility: HOSPITAL | Age: 49
End: 2019-11-14
Payer: COMMERCIAL

## 2019-11-14 VITALS
BODY MASS INDEX: 20.53 KG/M2 | RESPIRATION RATE: 18 BRPM | HEIGHT: 62 IN | TEMPERATURE: 97.6 F | SYSTOLIC BLOOD PRESSURE: 160 MMHG | HEART RATE: 90 BPM | WEIGHT: 111.55 LBS | OXYGEN SATURATION: 99 % | DIASTOLIC BLOOD PRESSURE: 75 MMHG

## 2019-11-14 DIAGNOSIS — R10.84 GENERALIZED ABDOMINAL PAIN: Primary | ICD-10-CM

## 2019-11-14 DIAGNOSIS — R10.32 LEFT LOWER QUADRANT ABDOMINAL PAIN: ICD-10-CM

## 2019-11-14 DIAGNOSIS — R11.0 NAUSEA: ICD-10-CM

## 2019-11-14 LAB
ALBUMIN SERPL BCP-MCNC: 4.7 G/DL (ref 3.5–5)
ALP SERPL-CCNC: 48 U/L (ref 46–116)
ALT SERPL W P-5'-P-CCNC: 26 U/L (ref 12–78)
ANION GAP SERPL CALCULATED.3IONS-SCNC: 9 MMOL/L (ref 4–13)
AST SERPL W P-5'-P-CCNC: 14 U/L (ref 5–45)
BACTERIA UR QL AUTO: ABNORMAL /HPF
BASOPHILS # BLD AUTO: 0.04 THOUSANDS/ΜL (ref 0–0.1)
BASOPHILS NFR BLD AUTO: 1 % (ref 0–1)
BILIRUB SERPL-MCNC: 0.74 MG/DL (ref 0.2–1)
BILIRUB UR QL STRIP: NEGATIVE
BUN SERPL-MCNC: 14 MG/DL (ref 5–25)
CALCIUM SERPL-MCNC: 9.1 MG/DL (ref 8.3–10.1)
CHLORIDE SERPL-SCNC: 107 MMOL/L (ref 100–108)
CLARITY UR: CLEAR
CO2 SERPL-SCNC: 25 MMOL/L (ref 21–32)
COLOR UR: YELLOW
COLOR, POC: NORMAL
CREAT SERPL-MCNC: 0.85 MG/DL (ref 0.6–1.3)
EOSINOPHIL # BLD AUTO: 0.1 THOUSAND/ΜL (ref 0–0.61)
EOSINOPHIL NFR BLD AUTO: 2 % (ref 0–6)
ERYTHROCYTE [DISTWIDTH] IN BLOOD BY AUTOMATED COUNT: 12.6 % (ref 11.6–15.1)
EXT PREG TEST URINE: NEGATIVE
EXT. CONTROL ED NAV: NORMAL
GFR SERPL CREATININE-BSD FRML MDRD: 82 ML/MIN/1.73SQ M
GLUCOSE SERPL-MCNC: 81 MG/DL (ref 65–140)
GLUCOSE UR STRIP-MCNC: NEGATIVE MG/DL
HCT VFR BLD AUTO: 40.1 % (ref 34.8–46.1)
HGB BLD-MCNC: 13.3 G/DL (ref 11.5–15.4)
HGB UR QL STRIP.AUTO: ABNORMAL
HYALINE CASTS #/AREA URNS LPF: ABNORMAL /LPF
IMM GRANULOCYTES # BLD AUTO: 0.01 THOUSAND/UL (ref 0–0.2)
IMM GRANULOCYTES NFR BLD AUTO: 0 % (ref 0–2)
KETONES UR STRIP-MCNC: NEGATIVE MG/DL
LEUKOCYTE ESTERASE UR QL STRIP: ABNORMAL
LIPASE SERPL-CCNC: 205 U/L (ref 73–393)
LYMPHOCYTES # BLD AUTO: 3.06 THOUSANDS/ΜL (ref 0.6–4.47)
LYMPHOCYTES NFR BLD AUTO: 54 % (ref 14–44)
MCH RBC QN AUTO: 29.4 PG (ref 26.8–34.3)
MCHC RBC AUTO-ENTMCNC: 33.2 G/DL (ref 31.4–37.4)
MCV RBC AUTO: 89 FL (ref 82–98)
MONOCYTES # BLD AUTO: 0.78 THOUSAND/ΜL (ref 0.17–1.22)
MONOCYTES NFR BLD AUTO: 14 % (ref 4–12)
NEUTROPHILS # BLD AUTO: 1.65 THOUSANDS/ΜL (ref 1.85–7.62)
NEUTS SEG NFR BLD AUTO: 29 % (ref 43–75)
NITRITE UR QL STRIP: NEGATIVE
NON-SQ EPI CELLS URNS QL MICRO: ABNORMAL /HPF
NRBC BLD AUTO-RTO: 0 /100 WBCS
PH UR STRIP.AUTO: 7 [PH] (ref 4.5–8)
PLATELET # BLD AUTO: 224 THOUSANDS/UL (ref 149–390)
PMV BLD AUTO: 9.8 FL (ref 8.9–12.7)
POTASSIUM SERPL-SCNC: 3.5 MMOL/L (ref 3.5–5.3)
PROT SERPL-MCNC: 7.4 G/DL (ref 6.4–8.2)
PROT UR STRIP-MCNC: NEGATIVE MG/DL
RBC # BLD AUTO: 4.53 MILLION/UL (ref 3.81–5.12)
RBC #/AREA URNS AUTO: ABNORMAL /HPF
SODIUM SERPL-SCNC: 141 MMOL/L (ref 136–145)
SP GR UR STRIP.AUTO: 1.01 (ref 1–1.03)
TROPONIN I SERPL-MCNC: <0.02 NG/ML
UROBILINOGEN UR QL STRIP.AUTO: 0.2 E.U./DL
WBC # BLD AUTO: 5.64 THOUSAND/UL (ref 4.31–10.16)
WBC #/AREA URNS AUTO: ABNORMAL /HPF

## 2019-11-14 PROCEDURE — 83690 ASSAY OF LIPASE: CPT | Performed by: EMERGENCY MEDICINE

## 2019-11-14 PROCEDURE — 80053 COMPREHEN METABOLIC PANEL: CPT | Performed by: EMERGENCY MEDICINE

## 2019-11-14 PROCEDURE — 99285 EMERGENCY DEPT VISIT HI MDM: CPT | Performed by: EMERGENCY MEDICINE

## 2019-11-14 PROCEDURE — 36415 COLL VENOUS BLD VENIPUNCTURE: CPT | Performed by: EMERGENCY MEDICINE

## 2019-11-14 PROCEDURE — 81001 URINALYSIS AUTO W/SCOPE: CPT

## 2019-11-14 PROCEDURE — 74177 CT ABD & PELVIS W/CONTRAST: CPT

## 2019-11-14 PROCEDURE — 81025 URINE PREGNANCY TEST: CPT | Performed by: EMERGENCY MEDICINE

## 2019-11-14 PROCEDURE — 93005 ELECTROCARDIOGRAM TRACING: CPT

## 2019-11-14 PROCEDURE — 96374 THER/PROPH/DIAG INJ IV PUSH: CPT

## 2019-11-14 PROCEDURE — 96361 HYDRATE IV INFUSION ADD-ON: CPT

## 2019-11-14 PROCEDURE — 85025 COMPLETE CBC W/AUTO DIFF WBC: CPT | Performed by: EMERGENCY MEDICINE

## 2019-11-14 PROCEDURE — 84484 ASSAY OF TROPONIN QUANT: CPT | Performed by: EMERGENCY MEDICINE

## 2019-11-14 PROCEDURE — 99285 EMERGENCY DEPT VISIT HI MDM: CPT

## 2019-11-14 RX ORDER — MELATONIN
2000 DAILY
COMMUNITY
End: 2022-04-28

## 2019-11-14 RX ORDER — KETOROLAC TROMETHAMINE 30 MG/ML
15 INJECTION, SOLUTION INTRAMUSCULAR; INTRAVENOUS ONCE
Status: COMPLETED | OUTPATIENT
Start: 2019-11-14 | End: 2019-11-14

## 2019-11-14 RX ORDER — MAGNESIUM HYDROXIDE/ALUMINUM HYDROXICE/SIMETHICONE 120; 1200; 1200 MG/30ML; MG/30ML; MG/30ML
30 SUSPENSION ORAL ONCE
Status: COMPLETED | OUTPATIENT
Start: 2019-11-14 | End: 2019-11-14

## 2019-11-14 RX ORDER — SACCHAROMYCES BOULARDII 250 MG
250 CAPSULE ORAL 2 TIMES DAILY
COMMUNITY
End: 2021-10-18

## 2019-11-14 RX ORDER — LEVOTHYROXINE SODIUM 88 UG/1
88 TABLET ORAL DAILY
COMMUNITY
End: 2019-12-18

## 2019-11-14 RX ORDER — OMEPRAZOLE 20 MG/1
20 CAPSULE, DELAYED RELEASE ORAL DAILY
COMMUNITY
End: 2021-06-28 | Stop reason: ALTCHOICE

## 2019-11-14 RX ORDER — LIDOCAINE HYDROCHLORIDE 20 MG/ML
15 SOLUTION OROPHARYNGEAL ONCE
Status: COMPLETED | OUTPATIENT
Start: 2019-11-14 | End: 2019-11-14

## 2019-11-14 RX ORDER — ACETAMINOPHEN 325 MG/1
975 TABLET ORAL ONCE
Status: COMPLETED | OUTPATIENT
Start: 2019-11-14 | End: 2019-11-14

## 2019-11-14 RX ADMIN — ACETAMINOPHEN 975 MG: 325 TABLET ORAL at 22:46

## 2019-11-14 RX ADMIN — KETOROLAC TROMETHAMINE 15 MG: 30 INJECTION, SOLUTION INTRAMUSCULAR at 22:45

## 2019-11-14 RX ADMIN — ALUMINUM HYDROXIDE, MAGNESIUM HYDROXIDE, AND SIMETHICONE 30 ML: 200; 200; 20 SUSPENSION ORAL at 22:47

## 2019-11-14 RX ADMIN — IOHEXOL 100 ML: 350 INJECTION, SOLUTION INTRAVENOUS at 23:36

## 2019-11-14 RX ADMIN — SODIUM CHLORIDE 1000 ML: 0.9 INJECTION, SOLUTION INTRAVENOUS at 22:44

## 2019-11-14 RX ADMIN — LIDOCAINE HYDROCHLORIDE 15 ML: 20 SOLUTION ORAL; TOPICAL at 22:48

## 2019-11-15 LAB
ATRIAL RATE: 67 BPM
P AXIS: 58 DEGREES
PR INTERVAL: 128 MS
QRS AXIS: 85 DEGREES
QRSD INTERVAL: 72 MS
QT INTERVAL: 388 MS
QTC INTERVAL: 409 MS
T WAVE AXIS: 82 DEGREES
VENTRICULAR RATE: 67 BPM

## 2019-11-15 PROCEDURE — 93010 ELECTROCARDIOGRAM REPORT: CPT | Performed by: INTERNAL MEDICINE

## 2019-11-15 NOTE — DISCHARGE INSTRUCTIONS
Follow-up with your primary care physician and gastroenterologist for repeat evaluation regarding your abdominal pain, keep her appointments as scheduled  Take Tylenol and ibuprofen as needed for pain

## 2019-11-15 NOTE — ED PROVIDER NOTES
History  Chief Complaint   Patient presents with    Abdominal Pain     Pt reports L abdominal pain which radiates to back with N/V/D; pt states stools are dark, pt is scheduled for colonoscopy on Nov 22h      54year-old female appearing older than stated age presenting for evaluation of abdominal pain she says she has had chronically for over a year states that over the past few days it has been worsening  Patient follows with a gastroenterologist in the scheduled later in the month for an EGD and colonoscopy  Patient is concerned she has H pylori per her gastroenterologist states she has had diarrhea for the past few days as well denies any blood in her stool or blood in her urine she denies any dysuria no fevers or chills at home she has nausea but no vomiting she has decreased appetite over the past few days she also states she has palpitations at times but no chest pain or pressure or shortness of breath  History provided by:  Patient   used: No    Abdominal Pain   Pain location:  LLQ and RLQ  Pain quality: aching    Pain radiates to:  Does not radiate  Onset quality:  Gradual  Timing:  Constant  Progression:  Worsening  Chronicity:  Chronic  Relieved by:  Nothing  Worsened by:  Palpation and eating  Associated symptoms: anorexia and vomiting    Associated symptoms: no chest pain, no chills, no constipation, no diarrhea, no dysuria, no fever, no hematuria, no nausea, no shortness of breath and no sore throat        Prior to Admission Medications   Prescriptions Last Dose Informant Patient Reported? Taking?    cholecalciferol (VITAMIN D3) 1,000 units tablet 11/14/2019 at Unknown time Self Yes Yes   Sig: Take 5,000 Units by mouth daily   levothyroxine (SYNTHROID) 88 mcg tablet 11/14/2019 at Unknown time  Yes Yes   Sig: Take 88 mcg by mouth daily   omeprazole (PriLOSEC) 20 mg delayed release capsule More than a month at Unknown time Self Yes No   Sig: Take 20 mg by mouth daily saccharomyces boulardii (FLORASTOR) 250 mg capsule Not Taking at Unknown time  Yes No   Sig: Take 250 mg by mouth 2 (two) times a day   topiramate (TOPAMAX) 200 MG tablet 11/14/2019 at Unknown time  Yes Yes   Sig: Take 200 mg by mouth 2 (two) times a day      Facility-Administered Medications: None       Past Medical History:   Diagnosis Date    Arthritis     Asthma     Disease of thyroid gland     Hypoparathyroidism ([facility] )     Nephrolithiasis 7/22/2019    Osteoporosis     Partial symptompatic epilepsy     Seizure disorder ([facility] )        Past Surgical History:   Procedure Laterality Date    APPENDECTOMY      FRACTURE SURGERY      HYSTERECTOMY      OTHER SURGICAL HISTORY      left foot, rt knee, tonselectomy    SINUS SURGERY      TONSILLECTOMY         Family History   Problem Relation Age of Onset    Alcohol abuse Mother     Migraines Father     Alcohol abuse Maternal Uncle     Seizures Paternal Aunt      I have reviewed and agree with the history as documented  Social History     Tobacco Use    Smoking status: Never Smoker    Smokeless tobacco: Never Used   Substance Use Topics    Alcohol use: Not Currently    Drug use: Never        Review of Systems   Constitutional: Negative for chills and fever  HENT: Negative for sore throat  Eyes: Negative for visual disturbance  Respiratory: Negative for chest tightness, shortness of breath and wheezing  Cardiovascular: Positive for palpitations  Negative for chest pain  Gastrointestinal: Positive for abdominal pain, anorexia and vomiting  Negative for blood in stool, constipation, diarrhea and nausea  Genitourinary: Negative for dysuria and hematuria  Musculoskeletal: Negative for arthralgias and myalgias  Skin: Negative for color change  Neurological: Negative for light-headedness  Hematological: Negative for adenopathy  Psychiatric/Behavioral: Negative for agitation and behavioral problems     All other systems reviewed and are negative  Physical Exam  ED Triage Vitals [11/14/19 2144]   Temperature Pulse Respirations Blood Pressure SpO2   97 6 °F (36 4 °C) 81 18 170/98 98 %      Temp Source Heart Rate Source Patient Position - Orthostatic VS BP Location FiO2 (%)   Oral Monitor Sitting Right arm --      Pain Score       7             Orthostatic Vital Signs  Vitals:    11/14/19 2144 11/14/19 2330   BP: 170/98 160/75   Pulse: 81 90   Patient Position - Orthostatic VS: Sitting Lying       Physical Exam   Constitutional: She is oriented to person, place, and time  She appears well-developed and well-nourished  No distress  HENT:   Head: Normocephalic and atraumatic  Eyes: Conjunctivae and EOM are normal  No scleral icterus  Neck: Normal range of motion  Neck supple  Cardiovascular: Normal rate, regular rhythm and normal heart sounds  No murmur heard  Pulmonary/Chest: Effort normal and breath sounds normal  No respiratory distress  Abdominal: Soft  Bowel sounds are normal  There is tenderness in the right lower quadrant and left lower quadrant  Musculoskeletal: Normal range of motion  Neurological: She is alert and oriented to person, place, and time  Skin: Skin is warm and dry  Psychiatric: She has a normal mood and affect  Her behavior is normal    Nursing note and vitals reviewed        ED Medications  Medications   ketorolac (TORADOL) injection 15 mg (15 mg Intravenous Given 11/14/19 2245)   acetaminophen (TYLENOL) tablet 975 mg (975 mg Oral Given 11/14/19 2246)   sodium chloride 0 9 % bolus 1,000 mL (0 mL Intravenous Stopped 11/14/19 2329)   aluminum-magnesium hydroxide-simethicone (MYLANTA) 200-200-20 mg/5 mL oral suspension 30 mL (30 mL Oral Given 11/14/19 2247)   Lidocaine Viscous HCl (XYLOCAINE) 2 % mucosal solution 15 mL (15 mL Swish & Swallow Given 11/14/19 2248)   iohexol (OMNIPAQUE) 350 MG/ML injection (MULTI-DOSE) 100 mL (100 mL Intravenous Given 11/14/19 2336)       Diagnostic Studies  Results Reviewed Procedure Component Value Units Date/Time    Troponin I [[id number]]  (Normal) Collected:  11/14/19 2229    Lab Status:  Final result Specimen:  Blood from Arm, Right Updated:  11/14/19 2322     Troponin I <0 02 ng/mL     Comprehensive metabolic panel [[id number]] Collected:  11/14/19 2229    Lab Status:  Final result Specimen:  Blood from Arm, Right Updated:  11/14/19 2310     Sodium 141 mmol/L      Potassium 3 5 mmol/L      Chloride 107 mmol/L      CO2 25 mmol/L      ANION GAP 9 mmol/L      BUN 14 mg/dL      Creatinine 0 85 mg/dL      Glucose 81 mg/dL      Calcium 9 1 mg/dL      AST 14 U/L      ALT 26 U/L      Alkaline Phosphatase 48 U/L      Total Protein 7 4 g/dL      Albumin 4 7 g/dL      Total Bilirubin 0 74 mg/dL      eGFR 82 ml/min/1 73sq m     Narrative:       [place] guidelines for Chronic Kidney Disease (CKD):     Stage 1 with normal or high GFR (GFR > 90 mL/min/1 73 square meters)    Stage 2 Mild CKD (GFR = 60-89 mL/min/1 73 square meters)    Stage 3A Moderate CKD (GFR = 45-59 mL/min/1 73 square meters)    Stage 3B Moderate CKD (GFR = 30-44 mL/min/1 73 square meters)    Stage 4 Severe CKD (GFR = 15-29 mL/min/1 73 square meters)    Stage 5 End Stage CKD (GFR <15 mL/min/1 73 square meters)  Note: GFR calculation is accurate only with a steady state creatinine    Lipase [[id number]]  (Normal) Collected:  11/14/19 2229    Lab Status:  Final result Specimen:  Blood from Arm, Right Updated:  11/14/19 2310     Lipase 205 u/L     Urine Microscopic [[id number]]  (Abnormal) Collected:  11/14/19 2226    Lab Status:  Final result Specimen:  Urine, Clean Catch Updated:  11/14/19 2254     RBC, UA None Seen /hpf      WBC, UA 2-4 /hpf      Epithelial Cells None Seen /hpf      Bacteria, UA None Seen /hpf      Hyaline Casts, UA None Seen /lpf     CBC and differential [[id number]]  (Abnormal) Collected:  11/14/19 2229    Lab Status:  Final result Specimen:  Blood from Arm, Right Updated: 11/14/19 2248     WBC 5 64 Thousand/uL      RBC 4 53 Million/uL      Hemoglobin 13 3 g/dL      Hematocrit 40 1 %      MCV 89 fL      MCH 29 4 pg      MCHC 33 2 g/dL      RDW 12 6 %      MPV 9 8 fL      Platelets 644 Thousands/uL      nRBC 0 /100 WBCs      Neutrophils Relative 29 %      Immat GRANS % 0 %      Lymphocytes Relative 54 %      Monocytes Relative 14 %      Eosinophils Relative 2 %      Basophils Relative 1 %      Neutrophils Absolute 1 65 Thousands/µL      Immature Grans Absolute 0 01 Thousand/uL      Lymphocytes Absolute 3 06 Thousands/µL      Monocytes Absolute 0 78 Thousand/µL      Eosinophils Absolute 0 10 Thousand/µL      Basophils Absolute 0 04 Thousands/µL     POCT pregnancy, urine [[id number]]  (Normal) Resulted:  11/14/19 2230    Lab Status:  Final result Updated:  11/14/19 2230     EXT PREG TEST UR (Ref: Negative) Negative     Control Valid    POCT urinalysis dipstick [[id number]]  (Normal) Resulted:  11/14/19 2229    Lab Status:  Final result Specimen:  Urine Updated:  11/14/19 2230     Color, UA see chart    Urine Macroscopic, POC [[id number]]  (Abnormal) Collected:  11/14/19 2226    Lab Status:  Final result Specimen:  Urine Updated:  11/14/19 2227     Color, UA Yellow     Clarity, UA Clear     pH, UA 7 0     Leukocytes, UA Moderate     Nitrite, UA Negative     Protein, UA Negative mg/dl      Glucose, UA Negative mg/dl      Ketones, UA Negative mg/dl      Urobilinogen, UA 0 2 E U /dl      Bilirubin, UA Negative     Blood, UA Trace     Specific Tabiona, UA 1 010    Narrative:       CLINITEK RESULT                 CT abdomen pelvis with contrast   Final Result by Jey Winter DO (11/14 9682)      Fluid-filled loops of small bowel which may represent enteritis versus ileus  Normal-appearing proximal portion of the appendix  The distal tip is not discretely visualized  Clinical correlation is recommended                              Workstation performed: [id number] Procedures  Procedures        ED Course  ED Course as of Nov 17 1556   Thu Nov 14, 2019   2247 PREGNANCY TEST URINE: Negative             MDM  Number of Diagnoses or Management Options  Generalized abdominal pain: new and requires workup  Left lower quadrant abdominal pain: new and requires workup  Nausea: new and requires workup  Diagnosis management comments: 27-year-old female presenting with chronic abdominal pain worsening over the past few days, will obtain abdominal in cardiac laboratories due to patient's symptomatology as well as CT scan abdomen pelvis  Patient signed out to evening team, Dr Weston Leger  Patient's CT scan unremarkable for any acute pathology  Patient gave return precautions and follow-up information  Amount and/or Complexity of Data Reviewed  Clinical lab tests: ordered and reviewed  Tests in the radiology section of CPT®: ordered and reviewed  Tests in the medicine section of CPT®: ordered and reviewed  Review and summarize past medical records: yes        Disposition  Final diagnoses:   Generalized abdominal pain   Nausea   Left lower quadrant abdominal pain     Time reflects when diagnosis was documented in both MDM as applicable and the Disposition within this note     Time User Action Codes Description Comment    11/14/2019 11:18 PM Evelyn Murillo Add [R10 84] Generalized abdominal pain     11/14/2019 11:18 PM Dewaine Holstein [R11 0] Nausea     11/15/2019 12:03 AM Sherida Hammans Add [R10 32] Left lower quadrant abdominal pain       ED Disposition     ED Disposition Condition Date/Time Comment    Discharge Stable Fri Nov 15, 2019 12:03 AM Tammie Rowley discharge to home/self care              Follow-up Information     Follow up With Specialties Details Why Contact Info Additional Information    Yani Walton MD Internal Medicine Schedule an appointment as soon as possible for a visit   Juana LAWRENCE  [address] [place] [address]  [phone]       [facility] [place] Emergency Department Emergency Medicine  If symptoms worsen [address]  [phone]  ED, [address] [place] [address], [facility]   [phone]          Discharge Medication List as of 11/14/2019 11:19 PM      CONTINUE these medications which have NOT CHANGED    Details   cholecalciferol (VITAMIN D3) 1,000 units tablet Take 5,000 Units by mouth daily, Historical Med      levothyroxine (SYNTHROID) 88 mcg tablet Take 88 mcg by mouth daily, Historical Med      omeprazole (PriLOSEC) 20 mg delayed release capsule Take 20 mg by mouth daily, Historical Med      saccharomyces boulardii (FLORASTOR) 250 mg capsule Take 250 mg by mouth 2 (two) times a day, Historical Med      topiramate (TOPAMAX) 200 MG tablet Take 200 mg by mouth 2 (two) times a day, Historical Med           No discharge procedures on file  ED Provider  Attending physically available and evaluated Sophie Adams I managed the patient along with the ED Attending      Electronically Signed by         Florence Acuna MD  11/17/19 8728

## 2019-11-15 NOTE — ED ATTENDING ATTESTATION
11/14/2019  ICarito MD, saw and evaluated the patient  I have discussed the patient with the resident/non-physician practitioner and agree with the resident's/non-physician practitioner's findings, Plan of Care, and MDM as documented in the resident's/non-physician practitioner's note, except where noted  All available labs and Radiology studies were reviewed  I was present for key portions of any procedure(s) performed by the resident/non-physician practitioner and I was immediately available to provide assistance  At this point I agree with the current assessment done in the Emergency Department  I have conducted an independent evaluation of this patient a history and physical is as follows:    53 y/o F with 1 yr abd pain, has GI appt for scope on 11/25, now presenting with significantly worsening pain over past 3 days along with nausea and diarrhea  Has had some dark stool as well  No dysuria  On exam awake and alert in NAD  Heart RRR, no M/R/G  Lungs CTA/B  Abd soft/ ND  There is LUQ and LLQ tenderness with no rebound or guarding  Plan labs, imaging, treatment as indicated      ED Course         Critical Care Time  Procedures

## 2019-12-17 ENCOUNTER — ANESTHESIA EVENT (OUTPATIENT)
Dept: GASTROENTEROLOGY | Facility: HOSPITAL | Age: 49
End: 2019-12-17

## 2019-12-18 ENCOUNTER — ANESTHESIA (OUTPATIENT)
Dept: GASTROENTEROLOGY | Facility: HOSPITAL | Age: 49
End: 2019-12-18

## 2019-12-18 ENCOUNTER — HOSPITAL ENCOUNTER (OUTPATIENT)
Dept: GASTROENTEROLOGY | Facility: HOSPITAL | Age: 49
Setting detail: OUTPATIENT SURGERY
Discharge: HOME | End: 2019-12-18
Attending: INTERNAL MEDICINE | Admitting: INTERNAL MEDICINE
Payer: COMMERCIAL

## 2019-12-18 VITALS
BODY MASS INDEX: 20.43 KG/M2 | SYSTOLIC BLOOD PRESSURE: 121 MMHG | WEIGHT: 111 LBS | TEMPERATURE: 97.1 F | OXYGEN SATURATION: 99 % | DIASTOLIC BLOOD PRESSURE: 84 MMHG | RESPIRATION RATE: 18 BRPM | HEIGHT: 62 IN | HEART RATE: 87 BPM

## 2019-12-18 DIAGNOSIS — K62.5 RECTAL BLEED: ICD-10-CM

## 2019-12-18 PROCEDURE — 88305 TISSUE EXAM BY PATHOLOGIST: CPT | Performed by: PATHOLOGY

## 2019-12-18 RX ORDER — GLYCOPYRROLATE 0.2 MG/ML
INJECTION INTRAMUSCULAR; INTRAVENOUS AS NEEDED
Status: DISCONTINUED | OUTPATIENT
Start: 2019-12-18 | End: 2019-12-18 | Stop reason: SURG

## 2019-12-18 RX ORDER — LIDOCAINE HYDROCHLORIDE 10 MG/ML
INJECTION, SOLUTION EPIDURAL; INFILTRATION; INTRACAUDAL; PERINEURAL AS NEEDED
Status: DISCONTINUED | OUTPATIENT
Start: 2019-12-18 | End: 2019-12-18 | Stop reason: SURG

## 2019-12-18 RX ORDER — PROPOFOL 10 MG/ML
INJECTION, EMULSION INTRAVENOUS AS NEEDED
Status: DISCONTINUED | OUTPATIENT
Start: 2019-12-18 | End: 2019-12-18 | Stop reason: SURG

## 2019-12-18 RX ORDER — PROPOFOL 10 MG/ML
INJECTION, EMULSION INTRAVENOUS CONTINUOUS PRN
Status: DISCONTINUED | OUTPATIENT
Start: 2019-12-18 | End: 2019-12-18 | Stop reason: SURG

## 2019-12-18 RX ORDER — SODIUM CHLORIDE 9 MG/ML
INJECTION, SOLUTION INTRAVENOUS CONTINUOUS PRN
Status: DISCONTINUED | OUTPATIENT
Start: 2019-12-18 | End: 2019-12-18 | Stop reason: SURG

## 2019-12-18 RX ADMIN — PROPOFOL 50 MG: 10 INJECTION, EMULSION INTRAVENOUS at 09:35

## 2019-12-18 RX ADMIN — PROPOFOL 30 MG: 10 INJECTION, EMULSION INTRAVENOUS at 09:38

## 2019-12-18 RX ADMIN — LIDOCAINE HYDROCHLORIDE 50 MG: 10 INJECTION, SOLUTION EPIDURAL; INFILTRATION; INTRACAUDAL; PERINEURAL at 09:35

## 2019-12-18 RX ADMIN — GLYCOPYRROLATE 0.2 MG: 0.2 INJECTION, SOLUTION INTRAMUSCULAR; INTRAVENOUS at 09:35

## 2019-12-18 RX ADMIN — SODIUM CHLORIDE: 9 INJECTION, SOLUTION INTRAVENOUS at 09:00

## 2019-12-18 RX ADMIN — PROPOFOL 120 MCG/KG/MIN: 10 INJECTION, EMULSION INTRAVENOUS at 09:35

## 2019-12-18 NOTE — H&P
H&P EXAM - Outpatient Endoscopy   Hans Norman 54 y o  female MRN: [id number]    [facility] GI LAB PRE/POST   Encounter: [id number]        Impression:  Rectal bleed dyspepsia    Plan:  EGD colonoscopy    Chief Complaint:  Intermittent rectal bleeding and chronic dyspepsia    Physical Exam:  Alert   Chest:  Clear   Heart:  Regular

## 2019-12-18 NOTE — ANESTHESIA POSTPROCEDURE EVALUATION
Post-Op Assessment Note    CV Status:  Stable    Pain management: adequate     Mental Status:  Alert and awake   Hydration Status:  Euvolemic   PONV Controlled:  Controlled   Airway Patency:  Patent   Post Op Vitals Reviewed: Yes      Staff: CRNA           BP   109/71   Temp     Pulse  71   Resp   14   SpO2   %

## 2019-12-18 NOTE — ANESTHESIA PREPROCEDURE EVALUATION
Review of Systems/Medical History  Patient summary reviewed  Chart reviewed  No history of anesthetic complications     Cardiovascular  Negative cardio ROS Exercise tolerance (METS): >4,  No hypertension ,    Pulmonary  Asthma , seasonal/exercise induced ,        GI/Hepatic  Negative GI/hepatic ROS   Bowel prep       Kidney stones,        Endo/Other  History of thyroid disease , hypothyroidism,      GYN  Negative gynecology ROS   Hysterectomy,        Hematology  Negative hematology ROS      Musculoskeletal    Arthritis     Neurology  Seizures well controlled,  Headaches,   Comment: Left partial complex seizure, last seizure 20 years ago  Psychology   Negative psychology ROS              Physical Exam    Airway    Mallampati score: I  TM Distance: >3 FB  Neck ROM: full     Dental   No notable dental hx     Cardiovascular  Comment: Negative ROS, Rhythm: regular, Rate: normal, Cardiovascular exam normal    Pulmonary  Pulmonary exam normal Breath sounds clear to auscultation,     Other Findings        Anesthesia Plan  ASA Score- 2     Anesthesia Type- general with ASA Monitors  Additional Monitors:   Airway Plan:         Plan Factors-    Induction- intravenous  Postoperative Plan-     Informed Consent- Anesthetic plan and risks discussed with patient  I personally reviewed this patient with the CRNA  Discussed and agreed on the Anesthesia Plan with the CRNA  Tyree Madden

## 2020-01-06 ENCOUNTER — TELEPHONE (OUTPATIENT)
Dept: UROLOGY | Facility: MEDICAL CENTER | Age: 50
End: 2020-01-06

## 2020-01-06 NOTE — TELEPHONE ENCOUNTER
Can someone assist in scheduling her for the [place] location? She is looking for 1/14/2020 due to being a   Please assist and advise so I can reach out to her or maybe a nurse can reach out to her for scheduling

## 2020-01-06 NOTE — TELEPHONE ENCOUNTER
This is a new patient and having back flank pain and pain on left side by groin  Please call patient at [phone]  Patient is a  and said when she had bloodwork in November her red blood was 5 188          Complaint/diagnosis:  New pt and back flank pain and pain left side by groin   Insurance  [employer] open access  History of Cancer  No   Previous Urologist  [facility]   Outside testing/where  Urine at [facility]  what kind of test  Cat scan at [facility] 11/14/19  Records requested/where  no  Preferred Location   [place] either one in [place]

## 2020-01-14 ENCOUNTER — OFFICE VISIT (OUTPATIENT)
Dept: UROLOGY | Facility: CLINIC | Age: 50
End: 2020-01-14
Payer: COMMERCIAL

## 2020-01-14 VITALS
BODY MASS INDEX: 19.51 KG/M2 | DIASTOLIC BLOOD PRESSURE: 62 MMHG | HEIGHT: 62 IN | WEIGHT: 106 LBS | SYSTOLIC BLOOD PRESSURE: 110 MMHG | HEART RATE: 75 BPM

## 2020-01-14 DIAGNOSIS — R10.9 FLANK PAIN: Primary | ICD-10-CM

## 2020-01-14 LAB
SL AMB  POCT GLUCOSE, UA: NORMAL
SL AMB LEUKOCYTE ESTERASE,UA: NORMAL
SL AMB POCT BILIRUBIN,UA: NORMAL
SL AMB POCT BLOOD,UA: NORMAL
SL AMB POCT CLARITY,UA: CLEAR
SL AMB POCT COLOR,UA: YELLOW
SL AMB POCT KETONES,UA: NORMAL
SL AMB POCT NITRITE,UA: NORMAL
SL AMB POCT PH,UA: 5
SL AMB POCT SPECIFIC GRAVITY,UA: 1.01
SL AMB POCT URINE PROTEIN: NORMAL
SL AMB POCT UROBILINOGEN: NORMAL

## 2020-01-14 PROCEDURE — 81002 URINALYSIS NONAUTO W/O SCOPE: CPT | Performed by: PHYSICIAN ASSISTANT

## 2020-01-14 PROCEDURE — 99203 OFFICE O/P NEW LOW 30 MIN: CPT | Performed by: PHYSICIAN ASSISTANT

## 2020-01-14 NOTE — PROGRESS NOTES
UROLOGY CONSULTATION NOTE     Patient Identifiers: Duran Torres (MRN: [id number])  Service Requesting Consultation: Supriya Shelby MD  Service Providing Consultation:  Urology, Sepideh Patel PA-C  Consults  Date of Service: 1/14/2020    Reason for Consultation:   Flank pain    History of Present Illness:     Duran Torres is a 52 y o  Female who is a  is referred by Dr Belle Phillip for evaluation of flank pain  She saw  [facility] last year for the same complaint  Her CT scan does not show any stones and her urinalysis shows no signs of infection  She complains of pain in her low back on the left radiating to her left hip and groin  She went to the emergency room at [facility] in November and the results were the same  A CT scan shows no evidence of an obstructing stone  There is no hydronephrosis or mass  Urinalysis in the office today is clear  She is a nonsmoker and has 1 child  Past Medical, Past Surgical History:     Past Medical History:   Diagnosis Date    Arthritis     Asthma     Disease of thyroid gland     [facility]roidism [facility] [address] )     Nephrolithiasis 7/22/2019    Osteoporosis     Partial symptompatic epilepsy     Seizure disorder ([facility] [address] )    :    Past Surgical History:   Procedure Laterality Date    APPENDECTOMY      FRACTURE SURGERY      HYSTERECTOMY      OTHER SURGICAL HISTORY      left foot, rt knee, tonselectomy    SINUS SURGERY      TONSILLECTOMY     :    Medications, Allergies:     Current Outpatient Medications:     B Complex Vitamins (VITAMIN B COMPLEX PO), Take 1 tablet by mouth daily  , Disp: , Rfl:     Calcium-Magnesium-Vitamin D (CITRACAL CALCIUM+D PO), Take 1 tablet by mouth daily  , Disp: , Rfl:     cholecalciferol (VITAMIN D3) 1,000 units tablet, Take 5,000 Units by mouth daily, Disp: , Rfl:     levothyroxine (SYNTHROID) 88 mcg tablet, Take 88 mcg by mouth daily  , Disp: , Rfl:     omeprazole (PriLOSEC) 20 mg delayed release capsule, Take 20 mg by mouth daily, Disp: , Rfl:     saccharomyces boulardii (FLORASTOR) 250 mg capsule, Take 250 mg by mouth 2 (two) times a day, Disp: , Rfl:     SUMAtriptan (IMITREX) 100 mg tablet, Take 1 tablet (100 mg total) by mouth once as needed for migraine (takes 1/2 , if not resolved takes the other half ), Disp: 2 tablet, Rfl: 2    topiramate (TOPAMAX) 200 MG tablet, Take 1 tablet (200 mg total) by mouth 2 (two) times a day, Disp: 180 tablet, Rfl: 3    topiramate (TOPAMAX) 200 MG tablet, Take 200 mg by mouth 2 (two) times a day, Disp: , Rfl:     Allergies: Allergies   Allergen Reactions    Morphine Other (See Comments)    Codeine Itching    Codeine     Morphine And Related     Sulfa Antibiotics    :    Social and Family History:   Social History:   Social History     Tobacco Use    Smoking status: Never Smoker    Smokeless tobacco: Never Used   Substance Use Topics    Alcohol use: Not Currently    Drug use: Never     Comment: social        Social History     Tobacco Use   Smoking Status Never Smoker   Smokeless Tobacco Never Used       Family History:  Family History   Problem Relation Age of Onset    Alcohol abuse Mother     Migraines Father     Alcohol abuse Maternal Uncle     Seizures Paternal Aunt    :     Review of Systems:     General: Fever, chills, or night sweats: negative  Cardiac: Negative for chest pain  Pulmonary: Negative for shortness of breath  Gastrointestinal: Abdominal pain negative  Nausea, vomiting, or diarrhea negative,  Genitourinary: See HPI above  Patient does not have hematuria  All other systems queried were negative  Physical Exam:   General: Patient is pleasant and in NAD  Awake and alert  /62 (BP Location: Left arm, Patient Position: Sitting, Cuff Size: Adult)   Pulse 75   Ht 5' 2" (1 575 m)   Wt 48 1 kg (106 lb)   BMI 19 39 kg/m²   Cardiac: Peripheral edema: negative  Pulmonary: Non-labored breathing  Abdomen: Soft, non-tender, non-distended  No surgical scars  No masses, tenderness, hernias noted  Genitourinary: Negative CVA tenderness, negative suprapubic tenderness  Labs:     Lab Results   Component Value Date    HGB 13 3 11/14/2019    HCT 40 1 11/14/2019    WBC 5 64 11/14/2019     11/14/2019   ]    Lab Results   Component Value Date    K 3 5 11/14/2019     11/14/2019    CO2 25 11/14/2019    BUN 14 11/14/2019    CREATININE 0 85 11/14/2019    CALCIUM 9 1 11/14/2019   ]    Imaging:   I personally reviewed the images and report of the following studies, and reviewed them with the patient:  CT ABDOMEN AND PELVIS WITH IV CONTRAST     IMPRESSION:     Fluid-filled loops of small bowel which may represent enteritis versus ileus  Normal-appearing proximal portion of the appendix  The distal tip is not discretely visualized  Clinical correlation is recommended         ASSESSMENT:       1  Left flank and hip pain- most likely orthopedic    PLAN:     - I explained to the patient I do not see any urologic cause for her discomfort  - she seems satisfied with that explanation and will check with her family doctor and start at square one  Thank you for allowing me to participate in this patients care  Please do not hesitate to call with any additional questions    Daylin Arrington PA-C

## 2020-01-17 ENCOUNTER — TELEPHONE (OUTPATIENT)
Dept: NEUROLOGY | Facility: CLINIC | Age: 50
End: 2020-01-17

## 2020-01-21 ENCOUNTER — OFFICE VISIT (OUTPATIENT)
Dept: NEUROLOGY | Facility: CLINIC | Age: 50
End: 2020-01-21
Payer: COMMERCIAL

## 2020-01-21 VITALS
HEIGHT: 62 IN | HEART RATE: 66 BPM | BODY MASS INDEX: 19.06 KG/M2 | WEIGHT: 103.6 LBS | DIASTOLIC BLOOD PRESSURE: 76 MMHG | SYSTOLIC BLOOD PRESSURE: 110 MMHG

## 2020-01-21 DIAGNOSIS — G40.209 PARTIAL SYMPTOMATIC EPILEPSY WITH COMPLEX PARTIAL SEIZURES, NOT INTRACTABLE, WITHOUT STATUS EPILEPTICUS (HCC): Primary | ICD-10-CM

## 2020-01-21 DIAGNOSIS — M54.16 CHRONIC LEFT LUMBAR RADICULOPATHY: ICD-10-CM

## 2020-01-21 DIAGNOSIS — G43.909 MIGRAINE WITHOUT STATUS MIGRAINOSUS, NOT INTRACTABLE, UNSPECIFIED MIGRAINE TYPE: ICD-10-CM

## 2020-01-21 PROCEDURE — 99214 OFFICE O/P EST MOD 30 MIN: CPT | Performed by: PSYCHIATRY & NEUROLOGY

## 2020-01-21 NOTE — PROGRESS NOTES
Review of Systems   Constitutional: Positive for fatigue  Negative for appetite change and fever  HENT: Negative  Negative for hearing loss, tinnitus, trouble swallowing and voice change  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    No seizures   Eyes: Negative  Negative for photophobia and pain  Respiratory: Negative  Negative for shortness of breath  Cardiovascular: Negative  Negative for palpitations  Gastrointestinal: Negative  Negative for nausea and vomiting  Endocrine: Negative  Negative for cold intolerance and heat intolerance  Genitourinary: Negative  Negative for dysuria, frequency and urgency  Musculoskeletal: Negative  Negative for myalgias and neck pain  Skin: Negative  Negative for rash  Neurological: Negative  Negative for dizziness, tremors, seizures, syncope, facial asymmetry, speech difficulty, weakness, light-headedness, numbness and headaches  Hematological: Negative  Does not bruise/bleed easily  Psychiatric/Behavioral: Positive for sleep disturbance  Negative for confusion and hallucinations  The patient is nervous/anxious

## 2020-01-21 NOTE — PATIENT INSTRUCTIONS
1  Continue topiramate 200 mg twice daily  2  Schedule physical therapy  Talk to your Primary Care Provider if your back or hip pain worsens  3  Return in about one year

## 2020-01-22 ENCOUNTER — EVALUATION (OUTPATIENT)
Dept: PHYSICAL THERAPY | Age: 50
End: 2020-01-22
Payer: COMMERCIAL

## 2020-01-22 DIAGNOSIS — M54.16 CHRONIC LEFT LUMBAR RADICULOPATHY: Primary | ICD-10-CM

## 2020-01-22 PROCEDURE — 97161 PT EVAL LOW COMPLEX 20 MIN: CPT | Performed by: PHYSICAL THERAPIST

## 2020-01-22 NOTE — PROGRESS NOTES
PT Evaluation     Today's date: 2020  Patient name: Fiorella Baer  : 1970  MRN: [id number]  Referring provider: Kenney Mckenzie MD  Dx:   Encounter Diagnosis     ICD-10-CM    1  Chronic left lumbar radiculopathy M54 16 Ambulatory referral to Physical Therapy                  Assessment  Assessment details: Pt presents to PT with pain in L hip and lumbar spine  Pt has L foot pain that has altered gait and is likely contributing to pain proximally  On evaluation, pt has pain with all movements, both passive and active, and was unable to tolerate specific testing  Pt's pain appears consistent with lateral musculature pain as opposed to hip joint pain  Pt will benefit from skilled PT in order to improve LE strength and ROM and establish a HEP     Impairments: abnormal coordination, abnormal gait, abnormal muscle firing, abnormal muscle tone, abnormal or restricted ROM, abnormal movement, activity intolerance, impaired physical strength, lacks appropriate home exercise program, pain with function and poor body mechanics    Goals  In 4 weeks pt will:  -Be independent with phase I of HEP  -Improve LE strength by 1/2 grade  -Improve lumbar ROM by 25%    By discharge pt will:  -Be independent with Phase II of HEP  -Improve LE strength by 1 grade  -Demonstrate full active lumbar ROM      Plan  Patient would benefit from: skilled physical therapy  Planned modality interventions: TENS, thermotherapy: hydrocollator packs and cryotherapy  Planned therapy interventions: abdominal trunk stabilization, joint mobilization, manual therapy, neuromuscular re-education, balance, patient education, therapeutic activities, therapeutic exercise, home exercise program, functional ROM exercises, body mechanics training and balance/weight bearing training  Frequency: 1x week  Duration in weeks: 4  Plan of Care beginning date: 2020  Plan of Care expiration date: 2020        Subjective Evaluation    History of Present Illness  Mechanism of injury: Pt reports to pt with cc of pain in L hip with ADLs, including working as   Pt has extensive medical history including seizures as well as chronic lumbar pain which she received injections for roughly 10 years ago  Pt's hip pain began roughly 2 years ago, around the time she developed foot pain and had to change her gait as a result of pain  Pain  Current pain rating: 3  At best pain rating: 3  At worst pain ratin    Patient Goals  Patient goals for therapy: decreased pain, improved balance, increased motion, increased strength, independence with ADLs/IADLs and return to sport/leisure activities          Objective     Active Range of Motion     Additional Active Range of Motion Details  Lumbar ROM as % of normal ROM    Flex:75  Ext:25  R ROT:75  L ROT:75      Strength/Myotome Testing     Left Hip   Planes of Motion   Flexion: 3+  Abduction: 3+    Right Hip   Planes of Motion   Flexion: 4  Abduction: 3+    Left Knee   Extension: 4    Right Knee   Extension: 4    Tests     Left Hip   Positive ANTELMO and FADIR  Negative piriformis and SI compression                Precautions: History of siezures      Manual                                                                                   Exercise Diary                                                                                                                                                                                                                                                                                      Modalities

## 2020-01-23 ENCOUNTER — OFFICE VISIT (OUTPATIENT)
Dept: PHYSICAL THERAPY | Age: 50
End: 2020-01-23
Payer: COMMERCIAL

## 2020-01-23 DIAGNOSIS — M54.16 CHRONIC LEFT LUMBAR RADICULOPATHY: Primary | ICD-10-CM

## 2020-01-23 PROCEDURE — 97110 THERAPEUTIC EXERCISES: CPT | Performed by: PHYSICAL THERAPIST

## 2020-01-23 PROCEDURE — 97140 MANUAL THERAPY 1/> REGIONS: CPT | Performed by: PHYSICAL THERAPIST

## 2020-01-23 PROCEDURE — 97112 NEUROMUSCULAR REEDUCATION: CPT | Performed by: PHYSICAL THERAPIST

## 2020-01-23 NOTE — PROGRESS NOTES
Daily Note     Today's date: 2020  Patient name: Kerline Lyle  : 1970  MRN: [id number]  Referring provider: Chente Mullen MD  Dx:   Encounter Diagnosis     ICD-10-CM    1  Chronic left lumbar radiculopathy M54 16            Pt called and stated she will not be returning to PT after speaking with insurance company  Subjective: Pt reports pain in lateral hip      Objective: See treatment diary below      Assessment: Tolerated treatment fair  Patient would benefit from continued PT and pt had less pain with ambulation following padding in L shoe  Plan: Continue per plan of care  Progress treatment as tolerated         Precautions: History of siezures      Manual              Lateral quad STM ALLEN                                                                    Exercise Diary              TM 10'            TA contraction 5"x20            Single leg fall out x10 ea                                                                                                                                                                                                                                             Modalities

## 2020-01-23 NOTE — LETTER
2020    Alvarez Randall, [address] [place] [address]    Patient: Jese Abad   YOB: 1970   Date of Visit: 2020     Encounter Diagnosis     ICD-10-CM    1  Chronic left lumbar radiculopathy M54 16        Dear Dr Taj Barton: Thank you for your recent referral of Jese Abad  Please review the attached evaluation summary from Cinthya's recent visit  Please verify that you agree with the plan of care by signing the attached order  If you have any questions or concerns, please do not hesitate to call  I sincerely appreciate the opportunity to share in the care of one of your patients and hope to have another opportunity to work with you in the near future  Sincerely,    Marianela Rosario PT      Referring Provider:      I certify that I have read the below Plan of Care and certify the need for these services furnished under this plan of treatment while under my care  Alvarez Randall MD  [address] [place] [address]  VIA In Basket          Daily Note     Today's date: 2020  Patient name: Jese Abad  : 1970  MRN: [id number]  Referring provider: Alexey Lawrence MD  Dx:   Encounter Diagnosis     ICD-10-CM    1  Chronic left lumbar radiculopathy M54 16            Pt called and stated she will not be returning to PT after speaking with insurance company  Subjective: Pt reports pain in lateral hip      Objective: See treatment diary below      Assessment: Tolerated treatment fair  Patient would benefit from continued PT and pt had less pain with ambulation following padding in L shoe  Plan: Continue per plan of care  Progress treatment as tolerated         Precautions: History of siezures      Manual              Lateral quad STM ALLEN                                                                    Exercise Diary              TM 10'            TA contraction 5"x20            Single leg fall out x10 ea                                                                                                                                                                                                                                             Modalities

## 2020-02-05 ENCOUNTER — TRANSCRIBE ORDERS (OUTPATIENT)
Dept: PHYSICAL THERAPY | Age: 50
End: 2020-02-05

## 2020-02-19 ENCOUNTER — TRANSCRIBE ORDERS (OUTPATIENT)
Dept: PHYSICAL THERAPY | Age: 50
End: 2020-02-19

## 2020-02-19 DIAGNOSIS — M54.16 LUMBAR RADICULOPATHY: Primary | ICD-10-CM

## 2020-03-23 ENCOUNTER — NURSE TRIAGE (OUTPATIENT)
Dept: OTHER | Facility: OTHER | Age: 50
End: 2020-03-23

## 2020-03-24 NOTE — TELEPHONE ENCOUNTER
Regarding: Shortness of breath/cough [place]  ----- Message from Chencho Ware sent at 3/23/2020  8:24 PM EDT -----  "My temperature is 99 5    I have a dry cough and some shortness of breath  "

## 2020-03-24 NOTE — TELEPHONE ENCOUNTER
Reason for Disposition   Cough    Additional Information   Negative: Severe difficulty breathing (e g , struggling for each breath, speaks in single words)   Negative: Bluish (or gray) lips or face now   Negative: [1] Rapid onset of cough AND [2] has hives   Negative: Coughing started suddenly after medicine, an allergic food or bee sting   Negative: [1] Difficulty breathing AND [2] exposure to flames, smoke, or fumes   Negative: [1] Stridor AND [2] difficulty breathing   Negative: Sounds like a life-threatening emergency to the triager   Negative: Choked on object of food that could be caught in the throat   Negative: Chest pain is main symptom   Negative: [1] Previous asthma attacks AND [2] this feels like asthma attack   Negative: Cough lasts > 3 weeks   Negative: Wet (productive) cough (i e , white-yellow, yellow, green, or ibrahima colored sputum)   Negative: Chest pain  (Exception: MILD central chest pain, present only when coughing)   Negative: Difficulty breathing   Negative: Patient sounds very sick or weak to the triager   Negative: [1] Coughed up blood AND [2] > 1 tablespoon (15 ml) (Exception: blood-tinged sputum)   Negative: Fever > 103 F (39 4 C)   Negative: [1] Fever > 101 F (38 3 C) AND [2] age > 61   Negative: [1] Fever > 100 0 F (37 8 C) AND [2] bedridden (e g , nursing home patient, CVA, chronic illness, recovering from surgery)   Negative: [1] Fever > 100 0 F (37 8 C) AND [2] diabetes mellitus or weak immune system (e g , HIV positive, cancer chemo, splenectomy, organ transplant, chronic steroids)   Negative:  Wheezing is present   Negative: [1] Ankle swelling AND [2] swelling is increasing   Negative: SEVERE coughing spells (e g , whooping sound after coughing, vomiting after coughing)   Negative: [1] Continuous (nonstop) coughing interferes with work or school AND [2] no improvement using cough treatment per protocol   Negative: Fever present > 3 days (72 hours)   Negative: [1] Fever returns after gone for over 24 hours AND [2] symptoms worse or not improved   Negative: [1] Using nasal washes and pain medicine > 24 hours AND [2] sinus pain (around cheekbone or eye) persists   Negative: Earache is present   Negative: Cough has been present for > 3 weeks   Negative: [1] Nasal discharge AND [2] present > 10 days   Negative: [1] Coughed up blood-tinged sputum AND [2] more than once   Negative: [1] Patient also has allergy symptoms (e g , itchy eyes, clear nasal discharge, postnasal drip) AND [2] they are acting up   Negative: Taking an ACE Inhibitor medication  (e g , benazepril/LOTENSIN, captopril/CAPOTEN, enalapril/VASOTEC, lisinopril/ZESTRIL)   Negative: Exposure to TB (Tuberculosis)   Negative: Cough with cold symptoms (e g , runny nose, postnasal drip, throat clearing)    Answer Assessment - Initial Assessment Questions  1  ONSET: "When did the cough begin?"       Today, after a yoga class that involved a lot of breathing exercizing    2  SEVERITY:   "How bad is the cough today?"   Pt stated that the cough has improved with resting    3  RESPIRATORY DISTRESS: "Describe your breathing "   Pt denies sob at this time        4  FEVER:   "Do you have a fever?"   No  98 9 now     5  HEMOPTYSIS:   No    6  TREATMENT:   "What have you done so far to treat the cough?"   Rest    7  CARDIAC HISTORY:   "Do you have any history of heart disease?"  No    bp was elevated at her last office visit on 3/10/2020  PCP aware    8  LUNG HISTORY:   "Do you have any history of lung disease?"  No    9  PE RISK FACTORS:   "Do you have a history of blood clots?"   No  recent major surgery - endoscopy, upper and lower in dec    10  OTHER SYMPTOMS:   "Do you have any other symptoms? Throat irritation    11  PREGNANCY:   hysto 2004    12  TRAVEL:   "Have you traveled out of the country in the last month?"   No  Pt is a  for [employer]   Pt stated that she has flown with people who have traveled recently to [address] [place] [address] stated that she was sick 2/26/2020 - 3/2/2020 w/ tracheitis   Feels that the yoga class / breathing exercizes likely caused her to cough more than expected,    Protocols used: COUGH - ACUTE NON-PRODUCTIVE-ADULT-

## 2020-04-08 ENCOUNTER — TELEMEDICINE (OUTPATIENT)
Dept: GASTROENTEROLOGY | Facility: AMBULARY SURGERY CENTER | Age: 50
End: 2020-04-08
Payer: COMMERCIAL

## 2020-04-08 DIAGNOSIS — R19.7 DIARRHEA, UNSPECIFIED TYPE: Primary | ICD-10-CM

## 2020-04-08 PROCEDURE — 99213 OFFICE O/P EST LOW 20 MIN: CPT | Performed by: INTERNAL MEDICINE

## 2020-04-22 ENCOUNTER — TELEPHONE (OUTPATIENT)
Dept: NEUROLOGY | Facility: CLINIC | Age: 50
End: 2020-04-22

## 2020-09-14 ENCOUNTER — TELEPHONE (OUTPATIENT)
Dept: NEUROLOGY | Facility: CLINIC | Age: 50
End: 2020-09-14

## 2020-09-14 NOTE — TELEPHONE ENCOUNTER
Patient tearful on the phone  She stated she works for [employer] and received a notice that she will be furloughed as of Sep 30  She is requesting to speak to Dr Deb Gruber  She stated that if she is furloughed she doesn't know if she'll have any insurance coverage and she needs to have a back up plan if she cannot afford her medication soon       [phone]

## 2020-09-15 NOTE — TELEPHONE ENCOUNTER
Both her topiramate and sumatriptan appear to be available using a [employer] discount for $10-$15 per month  I'm hopeful that she will not have a problem maintaining a supply of her medication even if she does lose her insurance

## 2020-09-16 NOTE — TELEPHONE ENCOUNTER
Detailed message left for patient informing her of previous  Requested a call back if further questions

## 2020-10-16 ENCOUNTER — TELEPHONE (OUTPATIENT)
Dept: INTERNAL MEDICINE CLINIC | Facility: CLINIC | Age: 50
End: 2020-10-16

## 2020-10-20 ENCOUNTER — TELEMEDICINE (OUTPATIENT)
Dept: INTERNAL MEDICINE CLINIC | Facility: CLINIC | Age: 50
End: 2020-10-20
Payer: COMMERCIAL

## 2020-10-20 DIAGNOSIS — M54.2 CERVICALGIA: ICD-10-CM

## 2020-10-20 DIAGNOSIS — M54.42 ACUTE LEFT-SIDED LOW BACK PAIN WITH LEFT-SIDED SCIATICA: Primary | ICD-10-CM

## 2020-10-20 DIAGNOSIS — G40.209 PARTIAL SYMPTOMATIC EPILEPSY WITH COMPLEX PARTIAL SEIZURES, NOT INTRACTABLE, WITHOUT STATUS EPILEPTICUS (HCC): ICD-10-CM

## 2020-10-20 PROCEDURE — 99213 OFFICE O/P EST LOW 20 MIN: CPT | Performed by: INTERNAL MEDICINE

## 2020-10-20 RX ORDER — METAXALONE 800 MG/1
800 TABLET ORAL 3 TIMES DAILY PRN
Qty: 15 TABLET | Refills: 0 | Status: SHIPPED | OUTPATIENT
Start: 2020-10-20 | End: 2021-04-23 | Stop reason: SDUPTHER

## 2020-11-29 ENCOUNTER — APPOINTMENT (EMERGENCY)
Dept: RADIOLOGY | Facility: HOSPITAL | Age: 50
End: 2020-11-29
Payer: COMMERCIAL

## 2020-11-29 ENCOUNTER — HOSPITAL ENCOUNTER (EMERGENCY)
Facility: HOSPITAL | Age: 50
Discharge: HOME | End: 2020-11-29
Attending: EMERGENCY MEDICINE
Payer: COMMERCIAL

## 2020-11-29 VITALS
OXYGEN SATURATION: 96 % | HEIGHT: 62 IN | WEIGHT: 110 LBS | DIASTOLIC BLOOD PRESSURE: 80 MMHG | BODY MASS INDEX: 20.24 KG/M2 | RESPIRATION RATE: 18 BRPM | SYSTOLIC BLOOD PRESSURE: 160 MMHG | HEART RATE: 90 BPM | TEMPERATURE: 98 F

## 2020-11-29 DIAGNOSIS — S29.019A THORACIC MYOFASCIAL STRAIN, INITIAL ENCOUNTER: Primary | ICD-10-CM

## 2020-11-29 PROCEDURE — 99284 EMERGENCY DEPT VISIT MOD MDM: CPT | Performed by: EMERGENCY MEDICINE

## 2020-11-29 PROCEDURE — 99283 EMERGENCY DEPT VISIT LOW MDM: CPT

## 2020-11-29 PROCEDURE — 72070 X-RAY EXAM THORAC SPINE 2VWS: CPT

## 2020-11-29 RX ORDER — TRAMADOL HYDROCHLORIDE 50 MG/1
50 TABLET ORAL ONCE
Status: COMPLETED | OUTPATIENT
Start: 2020-11-29 | End: 2020-11-29

## 2020-11-29 RX ORDER — TRAMADOL HYDROCHLORIDE 50 MG/1
50 TABLET ORAL EVERY 6 HOURS PRN
Qty: 15 TABLET | Refills: 0 | Status: SHIPPED | OUTPATIENT
Start: 2020-11-29 | End: 2021-10-18

## 2020-11-29 RX ADMIN — TRAMADOL HYDROCHLORIDE 50 MG: 50 TABLET ORAL at 13:16

## 2020-12-01 ENCOUNTER — TELEPHONE (OUTPATIENT)
Dept: INTERNAL MEDICINE CLINIC | Facility: CLINIC | Age: 50
End: 2020-12-01

## 2020-12-08 ENCOUNTER — OFFICE VISIT (OUTPATIENT)
Dept: INTERNAL MEDICINE CLINIC | Facility: CLINIC | Age: 50
End: 2020-12-08
Payer: COMMERCIAL

## 2020-12-08 VITALS
HEIGHT: 62 IN | TEMPERATURE: 97.8 F | WEIGHT: 108 LBS | DIASTOLIC BLOOD PRESSURE: 95 MMHG | OXYGEN SATURATION: 98 % | SYSTOLIC BLOOD PRESSURE: 140 MMHG | BODY MASS INDEX: 19.88 KG/M2 | HEART RATE: 75 BPM

## 2020-12-08 DIAGNOSIS — N20.0 NEPHROLITHIASIS: ICD-10-CM

## 2020-12-08 DIAGNOSIS — N10 ACUTE PYELONEPHRITIS: ICD-10-CM

## 2020-12-08 DIAGNOSIS — M54.50 ACUTE RIGHT-SIDED LOW BACK PAIN WITHOUT SCIATICA: Primary | ICD-10-CM

## 2020-12-08 PROCEDURE — 3008F BODY MASS INDEX DOCD: CPT | Performed by: INTERNAL MEDICINE

## 2020-12-08 PROCEDURE — 1036F TOBACCO NON-USER: CPT | Performed by: INTERNAL MEDICINE

## 2020-12-08 PROCEDURE — 99213 OFFICE O/P EST LOW 20 MIN: CPT | Performed by: INTERNAL MEDICINE

## 2020-12-08 RX ORDER — CEPHALEXIN 500 MG/1
500 CAPSULE ORAL EVERY 6 HOURS SCHEDULED
Qty: 20 CAPSULE | Refills: 0 | Status: SHIPPED | OUTPATIENT
Start: 2020-12-08 | End: 2020-12-13

## 2020-12-10 LAB — COLOR UR: NORMAL

## 2020-12-15 DIAGNOSIS — N20.0 NEPHROLITHIASIS: Primary | ICD-10-CM

## 2020-12-15 DIAGNOSIS — M54.50 ACUTE RIGHT-SIDED LOW BACK PAIN WITHOUT SCIATICA: ICD-10-CM

## 2020-12-23 ENCOUNTER — TELEPHONE (OUTPATIENT)
Dept: ADMINISTRATIVE | Facility: OTHER | Age: 50
End: 2020-12-23

## 2020-12-23 ENCOUNTER — OFFICE VISIT (OUTPATIENT)
Dept: INTERNAL MEDICINE CLINIC | Facility: CLINIC | Age: 50
End: 2020-12-23
Payer: COMMERCIAL

## 2020-12-23 ENCOUNTER — TELEPHONE (OUTPATIENT)
Dept: INTERNAL MEDICINE CLINIC | Facility: CLINIC | Age: 50
End: 2020-12-23

## 2020-12-23 VITALS
RESPIRATION RATE: 14 BRPM | WEIGHT: 112.2 LBS | BODY MASS INDEX: 20.65 KG/M2 | OXYGEN SATURATION: 97 % | HEIGHT: 62 IN | HEART RATE: 84 BPM | DIASTOLIC BLOOD PRESSURE: 98 MMHG | SYSTOLIC BLOOD PRESSURE: 132 MMHG

## 2020-12-23 DIAGNOSIS — E03.9 HYPOTHYROIDISM, UNSPECIFIED TYPE: ICD-10-CM

## 2020-12-23 DIAGNOSIS — R10.9 FLANK PAIN: Primary | ICD-10-CM

## 2020-12-23 DIAGNOSIS — G43.909 MIGRAINE WITHOUT STATUS MIGRAINOSUS, NOT INTRACTABLE, UNSPECIFIED MIGRAINE TYPE: ICD-10-CM

## 2020-12-23 DIAGNOSIS — M54.16 CHRONIC LEFT LUMBAR RADICULOPATHY: ICD-10-CM

## 2020-12-23 DIAGNOSIS — R00.2 PALPITATIONS: Primary | ICD-10-CM

## 2020-12-23 DIAGNOSIS — N20.0 NEPHROLITHIASIS: ICD-10-CM

## 2020-12-23 DIAGNOSIS — R53.83 FATIGUE, UNSPECIFIED TYPE: ICD-10-CM

## 2020-12-23 DIAGNOSIS — G40.209 PARTIAL SYMPTOMATIC EPILEPSY WITH COMPLEX PARTIAL SEIZURES, NOT INTRACTABLE, WITHOUT STATUS EPILEPTICUS (HCC): ICD-10-CM

## 2020-12-23 DIAGNOSIS — Z13.6 SCREENING FOR CARDIOVASCULAR CONDITION: ICD-10-CM

## 2020-12-23 LAB
BILIRUB UR QL STRIP: NEGATIVE
CLARITY UR: CLEAR
COLOR UR: YELLOW
GLUCOSE UR STRIP-MCNC: NEGATIVE MG/DL
HGB UR QL STRIP.AUTO: NEGATIVE
KETONES UR STRIP-MCNC: NEGATIVE MG/DL
LEUKOCYTE ESTERASE UR QL STRIP: NEGATIVE
NITRITE UR QL STRIP: NEGATIVE
PH UR STRIP.AUTO: 7 [PH]
PROT UR STRIP-MCNC: NEGATIVE MG/DL
SL AMB  POCT GLUCOSE, UA: NORMAL
SL AMB LEUKOCYTE ESTERASE,UA: NORMAL
SL AMB POCT BILIRUBIN,UA: NORMAL
SL AMB POCT BLOOD,UA: NORMAL
SL AMB POCT CLARITY,UA: CLEAR
SL AMB POCT COLOR,UA: NORMAL
SL AMB POCT KETONES,UA: NORMAL
SL AMB POCT NITRITE,UA: NORMAL
SL AMB POCT PH,UA: 7
SL AMB POCT SPECIFIC GRAVITY,UA: 1.01
SL AMB POCT URINE PROTEIN: NORMAL
SL AMB POCT UROBILINOGEN: 3.5
SP GR UR STRIP.AUTO: 1.01 (ref 1–1.03)
UROBILINOGEN UR QL STRIP.AUTO: 0.2 E.U./DL

## 2020-12-23 PROCEDURE — 1036F TOBACCO NON-USER: CPT | Performed by: INTERNAL MEDICINE

## 2020-12-23 PROCEDURE — 99204 OFFICE O/P NEW MOD 45 MIN: CPT | Performed by: INTERNAL MEDICINE

## 2020-12-23 PROCEDURE — 3008F BODY MASS INDEX DOCD: CPT | Performed by: INTERNAL MEDICINE

## 2020-12-23 PROCEDURE — 87086 URINE CULTURE/COLONY COUNT: CPT | Performed by: INTERNAL MEDICINE

## 2020-12-23 PROCEDURE — 81003 URINALYSIS AUTO W/O SCOPE: CPT | Performed by: INTERNAL MEDICINE

## 2020-12-23 PROCEDURE — 81002 URINALYSIS NONAUTO W/O SCOPE: CPT | Performed by: INTERNAL MEDICINE

## 2020-12-23 RX ORDER — ZINC GLUCONATE 50 MG
50 TABLET ORAL DAILY PRN
COMMUNITY
End: 2022-04-28

## 2020-12-23 RX ORDER — DIPHENOXYLATE HYDROCHLORIDE AND ATROPINE SULFATE 2.5; .025 MG/1; MG/1
1 TABLET ORAL DAILY
COMMUNITY
End: 2021-10-08

## 2020-12-24 ENCOUNTER — HOSPITAL ENCOUNTER (OUTPATIENT)
Dept: RADIOLOGY | Facility: HOSPITAL | Age: 50
Discharge: HOME | End: 2020-12-24
Payer: COMMERCIAL

## 2020-12-24 DIAGNOSIS — R10.9 FLANK PAIN: ICD-10-CM

## 2020-12-24 PROCEDURE — 74176 CT ABD & PELVIS W/O CONTRAST: CPT

## 2020-12-24 PROCEDURE — G1004 CDSM NDSC: HCPCS

## 2020-12-25 LAB — BACTERIA UR CULT: NORMAL

## 2020-12-26 PROBLEM — Z13.6 SCREENING FOR CARDIOVASCULAR CONDITION: Status: ACTIVE | Noted: 2020-12-26

## 2020-12-26 PROBLEM — R10.9 FLANK PAIN: Status: ACTIVE | Noted: 2020-12-26

## 2020-12-26 PROBLEM — R53.83 FATIGUE: Status: ACTIVE | Noted: 2020-12-26

## 2020-12-30 LAB
25(OH)D3 SERPL-MCNC: 40 NG/ML (ref 30–100)
ALBUMIN SERPL-MCNC: 4.7 G/DL (ref 3.6–5.1)
ALBUMIN/GLOB SERPL: 1.9 (CALC) (ref 1–2.5)
ALP SERPL-CCNC: 56 U/L (ref 31–125)
ALT SERPL-CCNC: 16 U/L (ref 6–29)
AST SERPL-CCNC: 16 U/L (ref 10–35)
BASOPHILS # BLD AUTO: 39 CELLS/UL (ref 0–200)
BASOPHILS NFR BLD AUTO: 0.8 %
BILIRUB SERPL-MCNC: 0.8 MG/DL (ref 0.2–1.2)
BUN SERPL-MCNC: 10 MG/DL (ref 7–25)
BUN/CREAT SERPL: NORMAL (CALC) (ref 6–22)
CALCIUM SERPL-MCNC: 9.5 MG/DL (ref 8.6–10.2)
CHLORIDE SERPL-SCNC: 107 MMOL/L (ref 98–110)
CHOLEST SERPL-MCNC: 273 MG/DL
CHOLEST/HDLC SERPL: 3.7 (CALC)
CO2 SERPL-SCNC: 26 MMOL/L (ref 20–32)
CREAT SERPL-MCNC: 0.85 MG/DL (ref 0.5–1.1)
EOSINOPHIL # BLD AUTO: 132 CELLS/UL (ref 15–500)
EOSINOPHIL NFR BLD AUTO: 2.7 %
ERYTHROCYTE [DISTWIDTH] IN BLOOD BY AUTOMATED COUNT: 12.8 % (ref 11–15)
GLOBULIN SER CALC-MCNC: 2.5 G/DL (CALC) (ref 1.9–3.7)
GLUCOSE SERPL-MCNC: 89 MG/DL (ref 65–99)
HCT VFR BLD AUTO: 45.5 % (ref 35–45)
HDLC SERPL-MCNC: 73 MG/DL
HGB BLD-MCNC: 15 G/DL (ref 11.7–15.5)
LDLC SERPL CALC-MCNC: 172 MG/DL (CALC)
LYMPHOCYTES # BLD AUTO: 2626 CELLS/UL (ref 850–3900)
LYMPHOCYTES NFR BLD AUTO: 53.6 %
MCH RBC QN AUTO: 29.9 PG (ref 27–33)
MCHC RBC AUTO-ENTMCNC: 33 G/DL (ref 32–36)
MCV RBC AUTO: 90.8 FL (ref 80–100)
MONOCYTES # BLD AUTO: 632 CELLS/UL (ref 200–950)
MONOCYTES NFR BLD AUTO: 12.9 %
NEUTROPHILS # BLD AUTO: 1470 CELLS/UL (ref 1500–7800)
NEUTROPHILS NFR BLD AUTO: 30 %
NONHDLC SERPL-MCNC: 200 MG/DL (CALC)
PLATELET # BLD AUTO: 274 THOUSAND/UL (ref 140–400)
PMV BLD REES-ECKER: 10 FL (ref 7.5–12.5)
POTASSIUM SERPL-SCNC: 4 MMOL/L (ref 3.5–5.3)
PROT SERPL-MCNC: 7.2 G/DL (ref 6.1–8.1)
RBC # BLD AUTO: 5.01 MILLION/UL (ref 3.8–5.1)
SL AMB EGFR AFRICAN AMERICAN: 94 ML/MIN/1.73M2
SL AMB EGFR NON AFRICAN AMERICAN: 81 ML/MIN/1.73M2
SODIUM SERPL-SCNC: 140 MMOL/L (ref 135–146)
TRIGL SERPL-MCNC: 140 MG/DL
TSH SERPL-ACNC: 0.32 MIU/L
VIT B12 SERPL-MCNC: 996 PG/ML (ref 200–1100)
WBC # BLD AUTO: 4.9 THOUSAND/UL (ref 3.8–10.8)

## 2021-01-03 DIAGNOSIS — Z13.0 SCREENING, ANEMIA, DEFICIENCY, IRON: Primary | ICD-10-CM

## 2021-01-05 ENCOUNTER — TELEPHONE (OUTPATIENT)
Dept: INTERNAL MEDICINE CLINIC | Facility: CLINIC | Age: 51
End: 2021-01-05

## 2021-01-18 ENCOUNTER — TELEPHONE (OUTPATIENT)
Dept: NEUROLOGY | Facility: CLINIC | Age: 51
End: 2021-01-18

## 2021-01-22 DIAGNOSIS — G40.209 PARTIAL SYMPTOMATIC EPILEPSY WITH COMPLEX PARTIAL SEIZURES, NOT INTRACTABLE, WITHOUT STATUS EPILEPTICUS (HCC): ICD-10-CM

## 2021-01-22 DIAGNOSIS — G43.909 MIGRAINE WITHOUT STATUS MIGRAINOSUS, NOT INTRACTABLE, UNSPECIFIED MIGRAINE TYPE: ICD-10-CM

## 2021-01-26 ENCOUNTER — TELEPHONE (OUTPATIENT)
Dept: INTERNAL MEDICINE CLINIC | Facility: CLINIC | Age: 51
End: 2021-01-26

## 2021-01-27 DIAGNOSIS — Z13.1 SCREENING FOR DIABETES MELLITUS: Primary | ICD-10-CM

## 2021-03-01 ENCOUNTER — OFFICE VISIT (OUTPATIENT)
Dept: INTERNAL MEDICINE CLINIC | Facility: CLINIC | Age: 51
End: 2021-03-01
Payer: COMMERCIAL

## 2021-03-01 DIAGNOSIS — J01.10 ACUTE NON-RECURRENT FRONTAL SINUSITIS: ICD-10-CM

## 2021-03-01 DIAGNOSIS — E78.49 OTHER HYPERLIPIDEMIA: ICD-10-CM

## 2021-03-01 DIAGNOSIS — E03.9 HYPOTHYROIDISM, UNSPECIFIED TYPE: ICD-10-CM

## 2021-03-01 DIAGNOSIS — Z13.1 SCREENING FOR DIABETES MELLITUS: ICD-10-CM

## 2021-03-01 DIAGNOSIS — Z12.4 SCREENING FOR CERVICAL CANCER: ICD-10-CM

## 2021-03-01 DIAGNOSIS — Z13.6 SCREENING FOR CARDIOVASCULAR CONDITION: ICD-10-CM

## 2021-03-01 DIAGNOSIS — R79.89 ABNORMAL CBC: ICD-10-CM

## 2021-03-01 DIAGNOSIS — J01.10 ACUTE NON-RECURRENT FRONTAL SINUSITIS: Primary | ICD-10-CM

## 2021-03-01 PROBLEM — E55.9 VITAMIN D DEFICIENCY: Status: ACTIVE | Noted: 2021-03-01

## 2021-03-01 PROCEDURE — 3725F SCREEN DEPRESSION PERFORMED: CPT | Performed by: INTERNAL MEDICINE

## 2021-03-01 PROCEDURE — U0003 INFECTIOUS AGENT DETECTION BY NUCLEIC ACID (DNA OR RNA); SEVERE ACUTE RESPIRATORY SYNDROME CORONAVIRUS 2 (SARS-COV-2) (CORONAVIRUS DISEASE [COVID-19]), AMPLIFIED PROBE TECHNIQUE, MAKING USE OF HIGH THROUGHPUT TECHNOLOGIES AS DESCRIBED BY CMS-2020-01-R: HCPCS | Performed by: INTERNAL MEDICINE

## 2021-03-01 PROCEDURE — 99214 OFFICE O/P EST MOD 30 MIN: CPT | Performed by: INTERNAL MEDICINE

## 2021-03-01 PROCEDURE — U0005 INFEC AGEN DETEC AMPLI PROBE: HCPCS | Performed by: INTERNAL MEDICINE

## 2021-03-01 PROCEDURE — 1036F TOBACCO NON-USER: CPT | Performed by: INTERNAL MEDICINE

## 2021-03-01 RX ORDER — MOMETASONE FUROATE 50 UG/1
2 SPRAY, METERED NASAL DAILY
Qty: 1 ACT | Refills: 0 | Status: SHIPPED | OUTPATIENT
Start: 2021-03-01 | End: 2021-03-23

## 2021-03-01 RX ORDER — AMOXICILLIN 500 MG/1
500 CAPSULE ORAL EVERY 8 HOURS SCHEDULED
Qty: 30 CAPSULE | Refills: 0 | Status: SHIPPED | OUTPATIENT
Start: 2021-03-01 | End: 2021-03-11

## 2021-03-01 RX ORDER — FLUCONAZOLE 150 MG/1
150 TABLET ORAL ONCE
Qty: 1 TABLET | Refills: 0 | Status: SHIPPED | OUTPATIENT
Start: 2021-03-01 | End: 2021-03-01

## 2021-03-01 NOTE — PROGRESS NOTES
Virtual Regular Visit      Assessment/Plan:    Problem List Items Addressed This Visit        Endocrine    Hypothyroidism      Slightly low TSH will recheck a 3rd generation TSH may need to consider reducing the dose         Relevant Orders    TSH, 3rd generation       Respiratory    Acute non-recurrent frontal sinusitis - Primary      Patient would like to 1st tried nasal spray if symptoms not improved then she will start the antibiotics Rx for amoxicillin 500 mg t i d  times 10 days, Nasonex 2 sprays each nostril once a day for next 7-10 days discontinue if any nasal bleeding plenty of fluids, rest low likelihood of COVID but will check  Nasal swab quarantine until results available         Relevant Medications    amoxicillin (AMOXIL) 500 mg capsule    mometasone (NASONEX) 50 mcg/act nasal spray    fluconazole (DIFLUCAN) 150 mg tablet    Other Relevant Orders    Novel Coronavirus (Covid-19),PCR [facility]       Other    Screening for cardiovascular condition    Relevant Orders    Basic metabolic panel    Lipid Panel with Direct LDL reflex    Other hyperlipidemia      Recommend a low-cholesterol diet check a lipid profile           Other Visit Diagnoses     Screening for HIV (human immunodeficiency virus)        Screening for cervical cancer        Screening for diabetes mellitus        Relevant Orders    Hemoglobin A1C    Abnormal CBC        Relevant Orders    CBC (Includes Diff/Plt) (Refl)         RTO in 4 months call if any problems       Reason for visit is   Chief Complaint   Patient presents with    Virtual Brief Visit     sinus congestion, post nasal drip    Virtual Regular Visit        Encounter provider Sana Moran DO    Provider located at [address] [place] [id number]      Recent Visits  No visits were found meeting these conditions     Showing recent visits within past 7 days and meeting all other requirements     Today's Visits  Date Type Provider Dept   03/01/21 Office Visit Isabelle Fowler, [address] today's visits and meeting all other requirements     Future Appointments  No visits were found meeting these conditions  Showing future appointments within next 150 days and meeting all other requirements        The patient was identified by name and date of birth  Dot Bunch was informed that this is a telemedicine visit and that the visit is being conducted through [facility] and patient was informed that this is a secure, HIPAA-compliant platform  She agrees to proceed     My office door was closed  No one else was in the room  She acknowledged consent and understanding of privacy and security of the video platform  The patient has agreed to participate and understands they can discontinue the visit at any time  Phone call  Patient is aware this is a billable service  Subjective  Dot Bunch is a 54 y o  female  Follow-up examination  HPI  54-year old female coming in for a follow up office visit regarding sinusitis, hypothyroidism, abnormal CBC and hyperlipidemia; The patient reports me compliant taking medications without untoward side effects the  The patient is here to review his medical condition, update me on the medical condition and the patient reports me no hospitalizations and no ER visits   Here to review laboratories Worse at night breath stick , nasal congestion , sneezing  Daily blows the nose continual , clear , yesterday ha sinus above the eyes  Stomach upset yesterday,  Decrease appetite ,  No cough just started the flonase (slight blood)    Past Medical History:   Diagnosis Date    Arthritis     Asthma     Disease of thyroid gland     Hypoparathyroidism ([facility] [address] )     Nephrolithiasis 7/22/2019    Osteoporosis     Partial symptompatic epilepsy     Seizure disorder [facility])        Past Surgical History:   Procedure Laterality Date    APPENDECTOMY      FRACTURE SURGERY  HYSTERECTOMY      OTHER SURGICAL HISTORY      left foot, rt knee, tonselectomy    SINUS SURGERY      TONSILLECTOMY         Current Outpatient Medications   Medication Sig Dispense Refill    B Complex Vitamins (VITAMIN B COMPLEX PO) Take 1 tablet by mouth daily   Calcium-Magnesium-Vitamin D (CITRACAL CALCIUM+D PO) Take 1 tablet by mouth daily   cholecalciferol (VITAMIN D3) 1,000 units tablet Take 2,000 Units by mouth daily       levothyroxine (SYNTHROID) 88 mcg tablet Take 88 mcg by mouth daily   metaxalone (SKELAXIN) 800 mg tablet Take 1 tablet (800 mg total) by mouth 3 (three) times a day as needed for muscle spasms 15 tablet 0    multivitamin (THERAGRAN) TABS Take 1 tablet by mouth daily      omeprazole (PriLOSEC) 20 mg delayed release capsule Take 20 mg by mouth daily      saccharomyces boulardii (FLORASTOR) 250 mg capsule Take 250 mg by mouth 2 (two) times a day      SUMAtriptan (IMITREX) 100 mg tablet Take 1 tablet (100 mg total) by mouth once as needed for migraine (takes 1/2 , if not resolved takes the other half ) 2 tablet 2    topiramate (TOPAMAX) 200 MG tablet TAKE 1 TABLET TWICE A  tablet 2    traMADol (ULTRAM) 50 mg tablet Take 1 tablet (50 mg total) by mouth every 6 (six) hours as needed for moderate pain 15 tablet 0    zinc gluconate 50 mg tablet Take 50 mg by mouth daily as needed      amoxicillin (AMOXIL) 500 mg capsule Take 1 capsule (500 mg total) by mouth every 8 (eight) hours for 10 days 30 capsule 0    fluconazole (DIFLUCAN) 150 mg tablet Take 1 tablet (150 mg total) by mouth once for 1 dose 1 tablet 0    mometasone (NASONEX) 50 mcg/act nasal spray 2 sprays into each nostril daily 1 Act 0     No current facility-administered medications for this visit           Allergies   Allergen Reactions    Morphine Other (See Comments)    Codeine     Morphine And Related     Sulfa Antibiotics     Codeine Itching       Review of Systems   Constitutional: Negative for chills and fever  HENT: Positive for congestion, postnasal drip and sinus pain  Respiratory: Negative for cough  Cardiovascular: Negative for chest pain  Video Exam    There were no vitals filed for this visit  Physical Exam     I spent 25 minutes with patient today in which greater than 50% of the time was spent in counseling/coordination of care regarding Treatment of sinusitis, review of laboratories and tests,      VIRTUAL VISIT DISCLAIMER    Pau Orourke acknowledges that she has consented to an online visit or consultation  She understands that the online visit is based solely on information provided by her, and that, in the absence of a face-to-face physical evaluation by the physician, the diagnosis she receives is both limited and provisional in terms of accuracy and completeness  This is not intended to replace a full medical face-to-face evaluation by the physician  aPu Orourke understands and accepts these terms

## 2021-03-02 LAB — SARS-COV-2 RNA RESP QL NAA+PROBE: NEGATIVE

## 2021-03-02 NOTE — ASSESSMENT & PLAN NOTE
Patient would like to 1st tried nasal spray if symptoms not improved then she will start the antibiotics Rx for amoxicillin 500 mg t i d  times 10 days, Nasonex 2 sprays each nostril once a day for next 7-10 days discontinue if any nasal bleeding plenty of fluids, rest low likelihood of COVID but will check  Nasal swab quarantine until results available

## 2021-03-09 ENCOUNTER — TELEPHONE (OUTPATIENT)
Dept: INTERNAL MEDICINE CLINIC | Facility: CLINIC | Age: 51
End: 2021-03-09

## 2021-03-09 NOTE — TELEPHONE ENCOUNTER
Pt calling in to let you know she went and had her blood work done this AM at 730am - 7:28pm was her last sip of water and she was up 7 times until she got up 7am to urinate, she kept a log  Then between 730am and 930am with 2 cups of water and a half of cup of coffee she went 4 times    says she goes non-stop all the time - no discomfort when she goes   pale yellow to clear urine  Goes through 2 1/2 gallons of water 3 days, wondering if the lab work she just had will show why she urinates so much?  Please advise, ty

## 2021-03-10 DIAGNOSIS — R79.89 LOW TSH LEVEL: Primary | ICD-10-CM

## 2021-03-10 DIAGNOSIS — R35.0 URINE FREQUENCY: Primary | ICD-10-CM

## 2021-03-10 LAB
BASOPHILS # BLD AUTO: 52 CELLS/UL (ref 0–200)
BASOPHILS NFR BLD AUTO: 1 %
BUN SERPL-MCNC: 15 MG/DL (ref 7–25)
BUN/CREAT SERPL: NORMAL (CALC) (ref 6–22)
CALCIUM SERPL-MCNC: 9.7 MG/DL (ref 8.6–10.2)
CHLORIDE SERPL-SCNC: 104 MMOL/L (ref 98–110)
CHOLEST SERPL-MCNC: 266 MG/DL
CHOLEST/HDLC SERPL: 3.5 (CALC)
CO2 SERPL-SCNC: 29 MMOL/L (ref 20–32)
CREAT SERPL-MCNC: 0.78 MG/DL (ref 0.5–1.1)
EOSINOPHIL # BLD AUTO: 109 CELLS/UL (ref 15–500)
EOSINOPHIL NFR BLD AUTO: 2.1 %
ERYTHROCYTE [DISTWIDTH] IN BLOOD BY AUTOMATED COUNT: 12.3 % (ref 11–15)
GLUCOSE SERPL-MCNC: 95 MG/DL (ref 65–99)
HBA1C MFR BLD: 4.8 % OF TOTAL HGB
HCT VFR BLD AUTO: 46.8 % (ref 35–45)
HDLC SERPL-MCNC: 75 MG/DL
HGB BLD-MCNC: 15.6 G/DL (ref 11.7–15.5)
LDLC SERPL CALC-MCNC: 171 MG/DL (CALC)
LYMPHOCYTES # BLD AUTO: 2610 CELLS/UL (ref 850–3900)
LYMPHOCYTES NFR BLD AUTO: 50.2 %
MCH RBC QN AUTO: 29.9 PG (ref 27–33)
MCHC RBC AUTO-ENTMCNC: 33.3 G/DL (ref 32–36)
MCV RBC AUTO: 89.7 FL (ref 80–100)
MONOCYTES # BLD AUTO: 822 CELLS/UL (ref 200–950)
MONOCYTES NFR BLD AUTO: 15.8 %
NEUTROPHILS # BLD AUTO: 1607 CELLS/UL (ref 1500–7800)
NEUTROPHILS NFR BLD AUTO: 30.9 %
NONHDLC SERPL-MCNC: 191 MG/DL (CALC)
PLATELET # BLD AUTO: 281 THOUSAND/UL (ref 140–400)
PMV BLD REES-ECKER: 9.9 FL (ref 7.5–12.5)
POTASSIUM SERPL-SCNC: 4.2 MMOL/L (ref 3.5–5.3)
RBC # BLD AUTO: 5.22 MILLION/UL (ref 3.8–5.1)
SL AMB EGFR AFRICAN AMERICAN: 103 ML/MIN/1.73M2
SL AMB EGFR NON AFRICAN AMERICAN: 89 ML/MIN/1.73M2
SODIUM SERPL-SCNC: 139 MMOL/L (ref 135–146)
TRIGL SERPL-MCNC: 89 MG/DL
TSH SERPL-ACNC: 0.12 MIU/L
WBC # BLD AUTO: 5.2 THOUSAND/UL (ref 3.8–10.8)

## 2021-03-10 NOTE — TELEPHONE ENCOUNTER
Spoke to patient and advised  She would like to have a UA done with the additional thyroid studies  I will add to chart

## 2021-03-10 NOTE — TELEPHONE ENCOUNTER
I do not think so, next step please have patient see urology Dr Dian Lowry,  As she had a urinalysis No complaints

## 2021-03-10 NOTE — TELEPHONE ENCOUNTER
It does show a low TSH please have the patient recheck ultrasensitive TSH, free T4, T SI, order is in the chart set up a follow-up in the next week with me or nurse  Practitioner Shaniqua

## 2021-03-15 DIAGNOSIS — E03.9 HYPOTHYROIDISM, UNSPECIFIED TYPE: Primary | ICD-10-CM

## 2021-03-15 LAB
APPEARANCE UR: CLEAR
BACTERIA UR QL AUTO: NORMAL /HPF
BILIRUB UR QL STRIP: NEGATIVE
COLOR UR: YELLOW
FERRITIN SERPL-MCNC: 30 NG/ML (ref 16–232)
GLUCOSE UR QL STRIP: NEGATIVE
HGB UR QL STRIP: NEGATIVE
HYALINE CASTS #/AREA URNS LPF: NORMAL /LPF
IRON SERPL-MCNC: 114 MCG/DL (ref 40–190)
KETONES UR QL STRIP: NEGATIVE
LEUKOCYTE ESTERASE UR QL STRIP: NEGATIVE
NITRITE UR QL STRIP: NEGATIVE
PH UR STRIP: 7.5 [PH] (ref 5–8)
PROT UR QL STRIP: NEGATIVE
RBC #/AREA URNS HPF: NORMAL /HPF
SP GR UR STRIP: 1.01 (ref 1–1.03)
SQUAMOUS #/AREA URNS HPF: NORMAL /HPF
T4 FREE SERPL-MCNC: 1.2 NG/DL (ref 0.8–1.8)
TSH SERPL-ACNC: 0.09 MIU/L
TSI SER-ACNC: <89 % BASELINE
WBC #/AREA URNS HPF: NORMAL /HPF

## 2021-03-23 DIAGNOSIS — J01.10 ACUTE NON-RECURRENT FRONTAL SINUSITIS: ICD-10-CM

## 2021-03-23 RX ORDER — MOMETASONE FUROATE 50 UG/1
SPRAY, METERED NASAL
Qty: 1 G | Refills: 1 | Status: SHIPPED | OUTPATIENT
Start: 2021-03-23 | End: 2021-04-07

## 2021-04-06 DIAGNOSIS — E03.9 HYPOTHYROIDISM, UNSPECIFIED TYPE: Primary | ICD-10-CM

## 2021-04-06 RX ORDER — LEVOTHYROXINE SODIUM 75 MCG
75 TABLET ORAL DAILY
Qty: 30 TABLET | Refills: 0 | Status: SHIPPED | OUTPATIENT
Start: 2021-04-06 | End: 2021-04-25 | Stop reason: SDUPTHER

## 2021-04-06 NOTE — PROGRESS NOTES
Patient's Lab: [facility]    NP O[facility]- Menopausal with Recurrent UTI's with Blood in Urine    Recent Labs: 3/11/21 UA and Cultures: Normal  Last yearly 7/2010 [facility]  HX: Hyperthyroid    On-set of symptoms: Since December 2020 when she went to the ED    What are your symptoms today?: Just a slight headache believed to be allergies    Have you used any vaginal creams lately?: No    Not sexually active since 2012 only with toys that she threw out out of fear that it was exacerbating her problems    Do you currently have blood in your urine?: Not to her knowledge    Anything that makes is better or worse?: Haven't found anything yet     - Date/Year of LMP: 2004 when she had her hysterectomy    Pt  States she drinks plenty of water about 3 gallons  To the point where she feels cramping because she thinks she may even be drinking too much  Vaginal burning after shower  She uses ivory soap to clean       Patient does not have a cervix but is interested in labs for STI's    Patient also reports "pimple like" protrusions on her labia from time to time

## 2021-04-07 DIAGNOSIS — J01.10 ACUTE NON-RECURRENT FRONTAL SINUSITIS: ICD-10-CM

## 2021-04-07 RX ORDER — MOMETASONE FUROATE 50 UG/1
SPRAY, METERED NASAL
Qty: 1 ACT | Refills: 1 | Status: SHIPPED | OUTPATIENT
Start: 2021-04-07 | End: 2021-05-02

## 2021-04-09 ENCOUNTER — OFFICE VISIT (OUTPATIENT)
Dept: OBGYN CLINIC | Facility: CLINIC | Age: 51
End: 2021-04-09
Payer: COMMERCIAL

## 2021-04-09 VITALS — SYSTOLIC BLOOD PRESSURE: 120 MMHG | BODY MASS INDEX: 21.29 KG/M2 | WEIGHT: 116.4 LBS | DIASTOLIC BLOOD PRESSURE: 80 MMHG

## 2021-04-09 DIAGNOSIS — R35.0 URINARY FREQUENCY: Primary | ICD-10-CM

## 2021-04-09 DIAGNOSIS — N95.2 VAGINAL ATROPHY: ICD-10-CM

## 2021-04-09 PROCEDURE — 99202 OFFICE O/P NEW SF 15 MIN: CPT | Performed by: NURSE PRACTITIONER

## 2021-04-09 RX ORDER — FLUCONAZOLE 150 MG/1
TABLET ORAL
COMMUNITY
Start: 2021-03-01 | End: 2021-10-18

## 2021-04-09 RX ORDER — CETIRIZINE HYDROCHLORIDE 10 MG/1
10 TABLET ORAL DAILY
COMMUNITY
End: 2021-11-18

## 2021-04-09 RX ORDER — LEVOTHYROXINE SODIUM 0.07 MG/1
75 TABLET ORAL
COMMUNITY
End: 2021-10-01

## 2021-04-09 RX ORDER — ESTRADIOL 0.1 MG/G
CREAM VAGINAL
Qty: 42.5 G | Refills: 1 | Status: SHIPPED | OUTPATIENT
Start: 2021-04-09 | End: 2021-10-08

## 2021-04-09 RX ORDER — METHOCARBAMOL 500 MG/1
TABLET, FILM COATED ORAL
COMMUNITY
End: 2021-06-28 | Stop reason: ALTCHOICE

## 2021-04-09 RX ORDER — LIDOCAINE 4 G/G
PATCH TOPICAL
COMMUNITY
End: 2021-11-18

## 2021-04-09 NOTE — PATIENT INSTRUCTIONS
Instructed on vulvar care  Bathe daily with white dove bar soap  Rinse well and pat dry  Discontinue use of conditioner on vulva  Use estrace as prescribed  Discussed benefits, risks and alternatives  Return to office in 6 months for vaginal atrophy evaluation and 2 months for annual exam    Masturbation, in particular with a vibrator is fine to continue  Avoid excessive pressure to avoid trauma to tissue  It does keep the vagina patent  Continue adequate hydration  Complete UA CS  Activate mychart

## 2021-04-09 NOTE — PROGRESS NOTES
Assessment/Plan:  Instructed on vulvar care  Bathe daily with white dove bar soap  Rinse well and pat dry  Discontinue use of conditioner on vulva  Use estrace as prescribed  Discussed benefits, risks and alternatives  Return to office in 6 months for vaginal atrophy evaluation and 2 months for annual exam    Masturbation, in particular with a vibrator is fine to continue  Avoid excessive pressure to avoid trauma to tissue  It does keep the vagina patent  Continue adequate hydration  Complete UA CS  Activate mychart  Diagnoses and all orders for this visit:    Urinary frequency  -     Urinalysis with microscopic; Future  -     Urine culture; Future    Vaginal atrophy  -     estradiol (ESTRACE) 0 1 mg/g vaginal cream; Use 0 5 gm daily x 2 weeks then 0 5 gm twice weekly    Other orders  -     Diclofenac Sodium (Voltaren) 1 %; Voltaren 1 % topical gel   Apply 2 gram to affected area(s) by topical route 4 times per day  -     fluconazole (DIFLUCAN) 150 mg tablet; TAKE 1 TABLET BY MOUTH AS ONE DOSE  -     Lidocaine 4 % PTCH; Aspercreme (lidocaine) 4 % topical patch   as needed  -     methocarbamol (ROBAXIN) 500 mg tablet; methocarbamol 500 mg tablet  -     levothyroxine (Synthroid) 88 mcg tablet; Synthroid 88 mcg tablet   TAKE 1 TABLET DAILY BRAND SYNTHROID ONLY  -     cetirizine (ZyrTEC) 10 mg tablet; Take 10 mg by mouth daily          Subjective:      Patient ID: Willy Lennox is a 54 y o  female  Willy Lennox is a 54 y o  female who is here today a a new pateint for a problem visit  Last gyn exam was 2011 in another health system  Here due to frequent UTI's and vulvar irritation  C/o 2 diagnosed UTI's in the last year but suspects she's had many others  C/o frequent uretrhal burning with her first void post showering  She bathes with ivory bar soap and denies scrubbing vulva  She uses hair conditioner to shave her vulva  Denies urinary urgency, dysuria but does admit to frequency  Drinks at least 64 oz of water daily  Hysterectomy in 2004 for benign reasons  Miroslava Greer is not sexually active with a partner and had  masturbated once montlhy until recently  She discontinued due to thinking it was compounding her health concerns          The following portions of the patient's history were reviewed and updated as appropriate: allergies, current medications, past family history, past medical history, past social history, past surgical history and problem list     Review of Systems   Constitutional: Negative  Gastrointestinal: Negative for abdominal pain, constipation, diarrhea, nausea and vomiting  Genitourinary: Positive for frequency  Negative for decreased urine volume, difficulty urinating, dysuria, enuresis, genital sores, pelvic pain, urgency, vaginal bleeding, vaginal discharge and vaginal pain  Musculoskeletal: Negative for arthralgias, back pain and myalgias  Skin: Negative  Hematological: Negative for adenopathy  Psychiatric/Behavioral: Negative  All other systems reviewed and are negative  Objective:      /80 (BP Location: Right arm, Patient Position: Sitting, Cuff Size: Standard)   Wt 52 8 kg (116 lb 6 4 oz)   BMI 21 29 kg/m²          Physical Exam  Vitals signs and nursing note reviewed  Constitutional:       Appearance: Normal appearance  She is well-developed  Genitourinary:     Exam position: Lithotomy position  Labia:         Right: No rash, tenderness, lesion or injury  Left: No rash, tenderness, lesion or injury  Urethra: No prolapse, urethral pain, urethral swelling or urethral lesion  Vagina: No signs of injury and foreign body  Erythema and tenderness present  No vaginal discharge, bleeding, lesions or prolapsed vaginal walls  Uterus: Absent  Adnexa:         Right: No mass, tenderness or fullness  Left: No mass, tenderness or fullness  Rectum: No external hemorrhoid        Comments: Significant vulvovaginal atrophy  Discomfort with pelvic exam including digital    Urethra exposed  Musculoskeletal: Normal range of motion  Lymphadenopathy:      Lower Body: No right inguinal adenopathy  No left inguinal adenopathy  Skin:     General: Skin is warm and dry  Neurological:      Mental Status: She is alert and oriented to person, place, and time  Psychiatric:         Mood and Affect: Mood normal          Thought Content:  Thought content normal

## 2021-04-10 ENCOUNTER — IMMUNIZATIONS (OUTPATIENT)
Dept: FAMILY MEDICINE CLINIC | Facility: HOSPITAL | Age: 51
End: 2021-04-10

## 2021-04-10 DIAGNOSIS — Z23 ENCOUNTER FOR IMMUNIZATION: Primary | ICD-10-CM

## 2021-04-10 PROCEDURE — 91300 SARS-COV-2 / COVID-19 MRNA VACCINE (PFIZER-BIONTECH) 30 MCG: CPT

## 2021-04-10 PROCEDURE — 0001A SARS-COV-2 / COVID-19 MRNA VACCINE (PFIZER-BIONTECH) 30 MCG: CPT

## 2021-04-13 ENCOUNTER — TELEPHONE (OUTPATIENT)
Dept: NEUROLOGY | Facility: CLINIC | Age: 51
End: 2021-04-13

## 2021-04-13 NOTE — TELEPHONE ENCOUNTER
ADD ON - spoke with patient offered sooner appt - pt is scheduled with Dr Joann Reid in [facility] 4/14 @ 1p - insurance is still in force

## 2021-04-14 ENCOUNTER — OFFICE VISIT (OUTPATIENT)
Dept: NEUROLOGY | Facility: CLINIC | Age: 51
End: 2021-04-14
Payer: COMMERCIAL

## 2021-04-14 VITALS
WEIGHT: 117 LBS | HEIGHT: 62 IN | DIASTOLIC BLOOD PRESSURE: 90 MMHG | SYSTOLIC BLOOD PRESSURE: 140 MMHG | HEART RATE: 72 BPM | BODY MASS INDEX: 21.53 KG/M2

## 2021-04-14 DIAGNOSIS — G43.909 MIGRAINE WITHOUT STATUS MIGRAINOSUS, NOT INTRACTABLE, UNSPECIFIED MIGRAINE TYPE: ICD-10-CM

## 2021-04-14 DIAGNOSIS — M54.30 SCIATICA: ICD-10-CM

## 2021-04-14 DIAGNOSIS — G40.209 PARTIAL SYMPTOMATIC EPILEPSY WITH COMPLEX PARTIAL SEIZURES, NOT INTRACTABLE, WITHOUT STATUS EPILEPTICUS (HCC): Primary | ICD-10-CM

## 2021-04-14 LAB
APPEARANCE UR: CLEAR
BACTERIA UR QL AUTO: NORMAL /HPF
BILIRUB UR QL STRIP: NEGATIVE
COLOR UR: YELLOW
GLUCOSE UR QL STRIP: NEGATIVE
HGB UR QL STRIP: NEGATIVE
HYALINE CASTS #/AREA URNS LPF: NORMAL /LPF
KETONES UR QL STRIP: NEGATIVE
LEUKOCYTE ESTERASE UR QL STRIP: NEGATIVE
NITRITE UR QL STRIP: NEGATIVE
PH UR STRIP: 7 [PH] (ref 5–8)
PROT UR QL STRIP: NEGATIVE
RBC #/AREA URNS HPF: NORMAL /HPF
SP GR UR STRIP: 1.01 (ref 1–1.03)
SQUAMOUS #/AREA URNS HPF: NORMAL /HPF
TSH SERPL-ACNC: 1.2 MIU/L
WBC #/AREA URNS HPF: NORMAL /HPF

## 2021-04-14 PROCEDURE — 99214 OFFICE O/P EST MOD 30 MIN: CPT | Performed by: PSYCHIATRY & NEUROLOGY

## 2021-04-14 NOTE — PROGRESS NOTES
[place] [address] [facility]  Follow Up Visit    Impression/Plan    Ms Miguel Angel Neumann is a 54 y o  female with well controlled focal epilepsy  Her mild/moderate headaches have improved  Exam is normal       There was likely nephrolithiasis in 2/2019  Previously we discussed risk of nephrolithiasis related to topiramate  She has had excellent seizure control on topiramate and it has also helped her migraine headaches  She understands the risk of additional kidney stones and wishes to continue on topiramate unchanged  There is left leg pain, sometimes radiates from the lumbar region  L/T MRI done in 7/2020  No weakness, sensory changes or reflex asymmetry on exam  Discussed retrying PT  Miagraines well controlled  Patient Instructions   1  Continue topiramate 200 mg twice daily  2  Consider restarting PT  3  Return in one year to see [facility] [address] [place] [address] and all orders for this visit:    Partial symptomatic epilepsy with complex partial seizures, not intractable, without status epilepticus ([address] )    Migraine without status migrainosus, not intractable, unspecified migraine type    Sciatica        Subjective    Yolette Swannors is returning to the [place] [address] [facility] for follow up  Interval Events:   Seizures since last visit: None    No events concerning for seizure  Got her first vaccine dose  Returning to work in May  Has left ankle and foot aching and cramping  Also sometimes pain goes from lower back/hip and radiates all the way down the leg  Sometimes right as well  T spine MRI at [facility] in 7/2020 showed broad based disc protrusion at T12/L1 without compression of conus medullaris  L spine also done, but she doesn't have the report today  She did 2 days of PT, but the sessions didn't go well so she didn't go back       Current Medications include:  Topiramate 200 mg bid  Sumatriptan 100 mg prn (half pill)  Medication side effects: None  Medication adherence: Yes     Event/Seizure semiology:  Episodes of heat behind neck and ringing in left ear  A minute to two later she would pass out and then have apparent convulsion  They were happening a few times per month  Occurred from [address] until [address] when she started topiramate       Special Features  Status epilepticus: no  Self Injury Seizures: no  Precipitating Factors: none     Prior Evaluation:  MRI brain w/ and wo 10/17/2018: normal     History Reviewed: The following were reviewed and updated as appropriate: allergies, current medications, past family history, past medical history, past social history, past surgical history and problem list     Psychiatric History:  None     Social History:     ROS:  Constitutional: Negative  Negative for appetite change and fever  HENT: Negative  Negative for hearing loss, tinnitus, trouble swallowing and voice change  Eyes: Negative  Negative for photophobia and pain  Respiratory: Negative  Negative for shortness of breath  Cardiovascular: Negative  Negative for palpitations  Gastrointestinal: Negative  Negative for nausea and vomiting  Endocrine: Negative  Negative for cold intolerance  Genitourinary: Negative  Negative for dysuria, frequency and urgency  Musculoskeletal: Negative  Negative for myalgias and neck pain  Skin: Negative  Negative for rash  Neurological: Positive for numbness (Feet)  Negative for dizziness, tremors, seizures, syncope, facial asymmetry, speech difficulty, weakness, light-headedness and headaches  Hematological: Negative  Does not bruise/bleed easily  Psychiatric/Behavioral: Negative  Negative for confusion, hallucinations and sleep disturbance  ROS reviewed and updated as appropriate          Objective    /90 (BP Location: Left arm, Patient Position: Sitting, Cuff Size: Standard)   Pulse 72   Ht 5' 2" (1 575 m)   Wt 53 1 kg (117 lb)   BMI 21 40 kg/m²      General Exam  No acute distress  Neurologic Exam  Mental Status:  Alert and oriented x 3  Language: normal fluency and comprehension  Cranial Nerves:  No dysarthria  Motor: Strength 5/5 throughout  5/5 dorsiflexion, eversion, inversion in BLEs  DTRs: Normal and symmetric ( patella, achilles)  Sensation: proprioception, vibration and light touch intact in the lowers  Gait: Normal casual gait  Normal Romberg

## 2021-04-14 NOTE — PATIENT INSTRUCTIONS
1  Continue topiramate 200 mg twice daily  2  Consider restarting PT  3  Return in one year to see [facility], [address] [place] [address]

## 2021-04-14 NOTE — PROGRESS NOTES
Review of Systems   Constitutional: Negative  Negative for appetite change and fever  HENT: Negative  Negative for hearing loss, tinnitus, trouble swallowing and voice change  Eyes: Negative  Negative for photophobia and pain  Respiratory: Negative  Negative for shortness of breath  Cardiovascular: Negative  Negative for palpitations  Gastrointestinal: Negative  Negative for nausea and vomiting  Endocrine: Negative  Negative for cold intolerance  Genitourinary: Negative  Negative for dysuria, frequency and urgency  Musculoskeletal: Negative  Negative for myalgias and neck pain  Skin: Negative  Negative for rash  Neurological: Positive for numbness (Feet)  Negative for dizziness, tremors, seizures, syncope, facial asymmetry, speech difficulty, weakness, light-headedness and headaches  Hematological: Negative  Does not bruise/bleed easily  Psychiatric/Behavioral: Negative  Negative for confusion, hallucinations and sleep disturbance

## 2021-04-15 ENCOUNTER — OFFICE VISIT (OUTPATIENT)
Dept: INTERNAL MEDICINE CLINIC | Facility: CLINIC | Age: 51
End: 2021-04-15
Payer: COMMERCIAL

## 2021-04-15 ENCOUNTER — TELEPHONE (OUTPATIENT)
Dept: OBGYN CLINIC | Facility: CLINIC | Age: 51
End: 2021-04-15

## 2021-04-15 VITALS
SYSTOLIC BLOOD PRESSURE: 124 MMHG | WEIGHT: 117.2 LBS | HEIGHT: 62 IN | HEART RATE: 70 BPM | RESPIRATION RATE: 16 BRPM | OXYGEN SATURATION: 97 % | BODY MASS INDEX: 21.57 KG/M2 | DIASTOLIC BLOOD PRESSURE: 82 MMHG

## 2021-04-15 DIAGNOSIS — B19.20 HEPATITIS C VIRUS INFECTION WITHOUT HEPATIC COMA, UNSPECIFIED CHRONICITY: ICD-10-CM

## 2021-04-15 DIAGNOSIS — Z12.4 SCREENING FOR CERVICAL CANCER: ICD-10-CM

## 2021-04-15 DIAGNOSIS — Z00.00 ANNUAL PHYSICAL EXAM: Primary | ICD-10-CM

## 2021-04-15 DIAGNOSIS — Z11.4 SCREENING FOR HIV (HUMAN IMMUNODEFICIENCY VIRUS): ICD-10-CM

## 2021-04-15 DIAGNOSIS — R25.2 MUSCLE CRAMPS: ICD-10-CM

## 2021-04-15 DIAGNOSIS — R35.0 FREQUENCY OF URINATION: ICD-10-CM

## 2021-04-15 PROCEDURE — 99386 PREV VISIT NEW AGE 40-64: CPT | Performed by: INTERNAL MEDICINE

## 2021-04-15 PROCEDURE — 99214 OFFICE O/P EST MOD 30 MIN: CPT | Performed by: INTERNAL MEDICINE

## 2021-04-15 RX ORDER — OMEGA-3-ACID ETHYL ESTERS 1 G/1
2 CAPSULE, LIQUID FILLED ORAL DAILY
Qty: 90 CAPSULE | Refills: 0 | Status: CANCELLED | OUTPATIENT
Start: 2021-04-15

## 2021-04-15 NOTE — PROGRESS NOTES
Assessment/Plan:    Muscle cramps  Patient has a sesmoid bone growth over the first right metatarsal  She has also been having worsening cramps  · PT consult   · Foot specialist      Annual physical exam  Overall, patient is doing well  Has been exercising and eating a healthy diet  No concerns raised on annual physical portion of the visit  Frequency of urination  Will obtain urine osm, serum osm and urine sodium  Patient doesn't have any symptoms  Hepatitis C virus infection without hepatic coma  Patient has a history of Hep C vaccine that has been treated  · Patient will obtain immunization records from her previous PCP  · Will obtain hepatitis panel  · Will obtain U/s and AFP for [place] [address]  screening in patient with h/o hep c      Diagnoses and all orders for this visit:    Annual physical exam    Screening for HIV (human immunodeficiency virus)  -     HIV 1/2 Antigen/Antibody (4th Generation) w Reflex [facility]; Future    Screening for cervical cancer    Muscle cramps  -     Ambulatory referral to Physical Therapy; Future  -     Ambulatory referral to Orthopedic Surgery; Future    Frequency of urination  -     Osmolality-"If this is regarding a toxic alcohol, STOP  Test is not routinely indicated  Please consult medical  on call for further guidance "; Future  -     Osmolality, urine  -     Sodium, urine, random    Hepatitis C virus infection without hepatic coma, unspecified chronicity  -     Hepatitis C RNA, quantitative, PCR; Future  -     Chronic Hepatitis Panel; Future  -     US liver; Future  -     AFP tumor marker; Future    Other orders  -     Cancel: HIV 1/2 Antigen/Antibody (4th Generation) w Reflex [facility]; Future  -     Cancel: Ambulatory referral to Obstetrics / Gynecology; Future  -     Cancel: omega-3-acid ethyl esters (LOVAZA) 1 g capsule; Take 2 capsules (2 g total) by mouth daily        Subjective:      Patient ID: Mayda Barth is a 54 y o  female      Here today for annual physical and for complaint of urinary frequency  Patient has been having increased urinary frequency over the past 2 years ( 16-17 per day)  She states that she usually has to wake up several times during the night to use the bathroom  Patient has been drinking about 1 gallon of water every day  Urinary Frequency   This is a chronic problem  The current episode started more than 1 year ago  The problem occurs every urination  The problem has been unchanged  The quality of the pain is described as burning  The pain is at a severity of 10/10  There has been no fever  Associated symptoms include frequency  Her past medical history is significant for kidney stones  The following portions of the patient's history were reviewed and updated as appropriate: allergies, current medications, past family history, past medical history, past social history, past surgical history and problem list           Objective:      /82   Pulse 70   Resp 16   Ht 5' 2" (1 575 m)   Wt 53 2 kg (117 lb 3 2 oz)   SpO2 97%   BMI 21 44 kg/m²        Physical Exam  Vitals signs reviewed  Constitutional:       General: She is not in acute distress  Appearance: Normal appearance  HENT:      Head: Normocephalic and atraumatic  Nose: No congestion or rhinorrhea  Mouth/Throat:      Mouth: Mucous membranes are moist       Pharynx: Oropharynx is clear  No oropharyngeal exudate or posterior oropharyngeal erythema  Eyes:      Conjunctiva/sclera: Conjunctivae normal       Pupils: Pupils are equal, round, and reactive to light  Cardiovascular:      Rate and Rhythm: Normal rate and regular rhythm  Pulses: Normal pulses  Heart sounds: Normal heart sounds  No murmur  No gallop  Pulmonary:      Effort: Pulmonary effort is normal  No respiratory distress  Breath sounds: No wheezing or rales  Abdominal:      General: Abdomen is flat  Bowel sounds are normal  There is no distension  Palpations: Abdomen is soft  Tenderness: There is no abdominal tenderness  Musculoskeletal:         General: No swelling, tenderness or deformity  Right lower leg: No edema  Left lower leg: No edema  Skin:     General: Skin is warm and dry  Capillary Refill: Capillary refill takes less than 2 seconds  Coloration: Skin is not pale  Findings: No bruising, erythema or rash  Neurological:      General: No focal deficit present  Mental Status: She is alert and oriented to person, place, and time  Cranial Nerves: No cranial nerve deficit  Motor: No weakness  Psychiatric:         Mood and Affect: Mood normal          Behavior: Behavior normal          Nutrition Assessment and Intervention:     Online resources such as NutritionFacts  [employer], plantstrong com, pcrm  org, or similar provided to patient    Nutrition-related book recommendations provided to patient    Nutrition-related movie recommendations provided to patient    Nutrition patient handout reviewed with patient

## 2021-04-15 NOTE — TELEPHONE ENCOUNTER
----- Message from Bryn Martini, [address] Johnson sent at 4/15/2021 11:29 AM EDT -----  Urinalysis and urine culture results reviewed and within normal limits  No infection

## 2021-04-15 NOTE — TELEPHONE ENCOUNTER
Spoke to patient and made her aware that her recent UA and cultures came back normal and if she is experiencing any further issues to give us a call

## 2021-04-15 NOTE — PROGRESS NOTES
[facility] [place] [facility]    NAME: Mayda Barth  AGE: 54 y o  SEX: female  : 1970     DATE: 4/15/2021     Assessment and Plan:     Problem List Items Addressed This Visit     None      Visit Diagnoses     Muscle cramps    -  Primary    Screening for HIV (human immunodeficiency virus)        Screening for cervical cancer        Annual physical exam        Frequency of urination              Immunizations and preventive care screenings were discussed with patient today  Appropriate education was printed on patient's after visit summary  Counseling:  Alcohol/drug use: discussed moderation in alcohol intake, the recommendations for healthy alcohol use, and avoidance of illicit drug use  Dental Health: discussed importance of regular tooth brushing, flossing, and dental visits  Injury prevention: discussed safety/seat belts, safety helmets, smoke detectors, carbon dioxide detectors, and smoking near bedding or upholstery  · Exercise: the importance of regular exercise/physical activity was discussed  Recommend exercise 3-5 times per week for at least 30 minutes  No follow-ups on file  Chief Complaint:     Chief Complaint   Patient presents with    Annual Exam      History of Present Illness:     Adult Annual Physical   Patient here for a comprehensive physical exam  The patient reports problems - urinanry frequency, muscle spasms  Diet and Physical Activity  · Diet/Nutrition: limited junk food  · Exercise: no formal exercise  Depression Screening  PHQ-9 Depression Screening    PHQ-9:   Frequency of the following problems over the past two weeks:           General Health  · Sleep: gets 7-8 hours of sleep on average  · Hearing: normal - bilateral   · Vision: goes for regular eye exams  · Dental: regular dental visits         /GYN Health  · Patient is: postmenopausal/ hysterectomy   · Last menstrual period: 2004  · Contraceptive method: hysterectomy   Review of Systems:     Review of Systems   Constitutional: Negative for activity change, appetite change and chills  HENT: Negative for rhinorrhea, sinus pain and sore throat  Eyes: Negative for visual disturbance  Respiratory: Negative for cough, chest tightness and shortness of breath  Cardiovascular: Negative for chest pain and palpitations  Gastrointestinal: Negative for abdominal distention and abdominal pain  Endocrine: Negative for polyphagia  Genitourinary: Positive for frequency  Negative for dysuria, enuresis and menstrual problem  Musculoskeletal: Negative for joint swelling  Skin: Negative for pallor and rash  Neurological: Negative for dizziness, facial asymmetry, light-headedness and headaches  Psychiatric/Behavioral: Negative         Past Medical History:     Past Medical History:   Diagnosis Date    Arthritis     Asthma     Disease of thyroid gland     Hypoparathyroidism ([facility] [address] )     Nephrolithiasis 7/22/2019    Osteoporosis     Partial symptompatic epilepsy     Seizure disorder [facility])       Past Surgical History:     Past Surgical History:   Procedure Laterality Date    APPENDECTOMY      FRACTURE SURGERY      HYSTERECTOMY      OTHER SURGICAL HISTORY      left foot, rt knee, tonselectomy    SINUS SURGERY      TONSILLECTOMY        Social History:        Social History     Socioeconomic History    Marital status: Single     Spouse name: None    Number of children: None    Years of education: None    Highest education level: None   Occupational History    None   Social Needs    Financial resource strain: None    Food insecurity     Worry: None     Inability: None    Transportation needs     Medical: None     Non-medical: None   Tobacco Use    Smoking status: Never Smoker    Smokeless tobacco: Never Used   Substance and Sexual Activity    Alcohol use: Not Currently    Drug use: Not Currently     Types: Cocaine Comment: social    Sexual activity: Not Currently   Lifestyle    Physical activity     Days per week: None     Minutes per session: None    Stress: None   Relationships    Social connections     Talks on phone: None     Gets together: None     Attends Jew service: None     Active member of club or organization: None     Attends meetings of clubs or organizations: None     Relationship status: None    Intimate partner violence     Fear of current or ex partner: None     Emotionally abused: None     Physically abused: None     Forced sexual activity: None   Other Topics Concern    None   Social History Narrative    ** Merged History Encounter **           Family History:     Family History   Problem Relation Age of Onset    Alcohol abuse Mother     Migraines Father     Alcohol abuse Maternal Uncle     Seizures Paternal Aunt       Current Medications:     Current Outpatient Medications   Medication Sig Dispense Refill    B Complex Vitamins (VITAMIN B COMPLEX PO) Take 1 tablet by mouth daily   Calcium-Magnesium-Vitamin D (CITRACAL CALCIUM+D PO) Take 1 tablet by mouth daily   cetirizine (ZyrTEC) 10 mg tablet Take 10 mg by mouth daily      cholecalciferol (VITAMIN D3) 1,000 units tablet Take 2,000 Units by mouth daily       Diclofenac Sodium (Voltaren) 1 % Voltaren 1 % topical gel   Apply 2 gram to affected area(s) by topical route 4 times per day      estradiol (ESTRACE) 0 1 mg/g vaginal cream Use 0 5 gm daily x 2 weeks then 0 5 gm twice weekly 42 5 g 1    fluconazole (DIFLUCAN) 150 mg tablet TAKE 1 TABLET BY MOUTH AS ONE DOSE      levothyroxine (SYNTHROID) 88 mcg tablet Take 88 mcg by mouth daily   levothyroxine (Synthroid) 88 mcg tablet Synthroid 88 mcg tablet   TAKE 1 TABLET DAILY BRAND SYNTHROID ONLY        Lidocaine 4 % PTCH Aspercreme (lidocaine) 4 % topical patch   as needed      metaxalone (SKELAXIN) 800 mg tablet Take 1 tablet (800 mg total) by mouth 3 (three) times a day as needed for muscle spasms (Patient taking differently: Take 800 mg by mouth 3 (three) times a day as needed for muscle spasms Takes a half as needed at a time) 15 tablet 0    methocarbamol (ROBAXIN) 500 mg tablet methocarbamol 500 mg tablet      mometasone (NASONEX) 50 mcg/act nasal spray SPRAY 2 SPRAYS INTO EACH NOSTRIL EVERY DAY 1 Act 1    multivitamin (THERAGRAN) TABS Take 1 tablet by mouth daily      omeprazole (PriLOSEC) 20 mg delayed release capsule Take 20 mg by mouth daily      saccharomyces boulardii (FLORASTOR) 250 mg capsule Take 250 mg by mouth 2 (two) times a day      SUMAtriptan (IMITREX) 100 mg tablet Take 1 tablet (100 mg total) by mouth once as needed for migraine (takes 1/2 , if not resolved takes the other half ) 2 tablet 2    Synthroid 75 MCG tablet Take 1 tablet (75 mcg total) by mouth daily 30 tablet 0    topiramate (TOPAMAX) 200 MG tablet TAKE 1 TABLET TWICE A  tablet 2    traMADol (ULTRAM) 50 mg tablet Take 1 tablet (50 mg total) by mouth every 6 (six) hours as needed for moderate pain (Patient taking differently: Take 50 mg by mouth every 6 (six) hours as needed for moderate pain Patient takes a half as needed) 15 tablet 0    zinc gluconate 50 mg tablet Take 50 mg by mouth daily as needed       No current facility-administered medications for this visit  Allergies: Allergies   Allergen Reactions    Morphine Other (See Comments)    Codeine     Morphine And Related     Sulfa Antibiotics     Codeine Itching      Physical Exam:     /82   Pulse 70   Resp 16   Ht 5' 2" (1 575 m)   Wt 53 2 kg (117 lb 3 2 oz)   SpO2 97%   BMI 21 44 kg/m²     Physical Exam  Vitals signs and nursing note reviewed  Constitutional:       General: She is not in acute distress  Appearance: She is well-developed  HENT:      Head: Normocephalic and atraumatic  Eyes:      Conjunctiva/sclera: Conjunctivae normal    Neck:      Musculoskeletal: Neck supple  Cardiovascular:      Rate and Rhythm: Normal rate and regular rhythm  Heart sounds: No murmur  Pulmonary:      Effort: Pulmonary effort is normal  No respiratory distress  Breath sounds: Normal breath sounds  Abdominal:      Palpations: Abdomen is soft  Tenderness: There is no abdominal tenderness  Skin:     General: Skin is warm and dry  Neurological:      General: No focal deficit present  Mental Status: She is alert and oriented to person, place, and time     Psychiatric:         Mood and Affect: Mood normal          Behavior: Behavior normal           MD Marlon LaroseH[id number]

## 2021-04-16 NOTE — ASSESSMENT & PLAN NOTE
Patient has a sesmoid bone growth over the first right metatarsal  She has also been having worsening cramps       · PT consult   · Foot specialist

## 2021-04-16 NOTE — ASSESSMENT & PLAN NOTE
Overall, patient is doing well  Has been exercising and eating a healthy diet  No concerns raised on annual physical portion of the visit

## 2021-04-16 NOTE — ASSESSMENT & PLAN NOTE
Patient has a history of Hep C vaccine that has been treated       · Patient will obtain immunization records from her previous PCP  · Will obtain hepatitis panel  · Will obtain U/s and AFP for [facility] [address]  screening in patient with h/o hep c

## 2021-04-21 ENCOUNTER — HOSPITAL ENCOUNTER (OUTPATIENT)
Dept: RADIOLOGY | Facility: HOSPITAL | Age: 51
Discharge: HOME | End: 2021-04-21
Payer: COMMERCIAL

## 2021-04-21 ENCOUNTER — APPOINTMENT (OUTPATIENT)
Dept: LAB | Facility: HOSPITAL | Age: 51
End: 2021-04-21
Payer: COMMERCIAL

## 2021-04-21 ENCOUNTER — TRANSCRIBE ORDERS (OUTPATIENT)
Dept: LAB | Facility: HOSPITAL | Age: 51
End: 2021-04-21

## 2021-04-21 DIAGNOSIS — B19.20 HEPATITIS C VIRUS INFECTION WITHOUT HEPATIC COMA, UNSPECIFIED CHRONICITY: ICD-10-CM

## 2021-04-21 DIAGNOSIS — R79.89 LOW TSH LEVEL: ICD-10-CM

## 2021-04-21 DIAGNOSIS — R35.0 FREQUENCY OF URINATION: ICD-10-CM

## 2021-04-21 DIAGNOSIS — E03.9 HYPOTHYROIDISM, UNSPECIFIED TYPE: ICD-10-CM

## 2021-04-21 DIAGNOSIS — Z13.1 SCREENING FOR DIABETES MELLITUS: ICD-10-CM

## 2021-04-21 DIAGNOSIS — Z13.0 SCREENING, ANEMIA, DEFICIENCY, IRON: ICD-10-CM

## 2021-04-21 DIAGNOSIS — Z11.4 SCREENING FOR HIV (HUMAN IMMUNODEFICIENCY VIRUS): ICD-10-CM

## 2021-04-21 DIAGNOSIS — R79.89 ABNORMAL CBC: ICD-10-CM

## 2021-04-21 DIAGNOSIS — Z13.6 SCREENING FOR CARDIOVASCULAR CONDITION: ICD-10-CM

## 2021-04-21 DIAGNOSIS — R35.0 URINARY FREQUENCY: ICD-10-CM

## 2021-04-21 DIAGNOSIS — R53.83 FATIGUE, UNSPECIFIED TYPE: ICD-10-CM

## 2021-04-21 LAB
AFP-TM SERPL-MCNC: 3.2 NG/ML (ref 0.5–8)
HBV CORE AB SER QL: ABNORMAL
HBV CORE IGM SER QL: ABNORMAL
HBV SURFACE AG SER QL: ABNORMAL
HCV AB SER QL: ABNORMAL
OSMOLALITY UR/SERPL-RTO: 291 MMOL/KG (ref 282–298)
OSMOLALITY UR: 660 MMOL/KG
SODIUM 24H UR-SCNC: 31 MOL/L

## 2021-04-21 PROCEDURE — 83930 ASSAY OF BLOOD OSMOLALITY: CPT

## 2021-04-21 PROCEDURE — 83935 ASSAY OF URINE OSMOLALITY: CPT | Performed by: INTERNAL MEDICINE

## 2021-04-21 PROCEDURE — 86803 HEPATITIS C AB TEST: CPT

## 2021-04-21 PROCEDURE — 84300 ASSAY OF URINE SODIUM: CPT | Performed by: INTERNAL MEDICINE

## 2021-04-21 PROCEDURE — 87389 HIV-1 AG W/HIV-1&-2 AB AG IA: CPT

## 2021-04-21 PROCEDURE — 82105 ALPHA-FETOPROTEIN SERUM: CPT

## 2021-04-21 PROCEDURE — 36415 COLL VENOUS BLD VENIPUNCTURE: CPT

## 2021-04-21 PROCEDURE — 86704 HEP B CORE ANTIBODY TOTAL: CPT

## 2021-04-21 PROCEDURE — 86705 HEP B CORE ANTIBODY IGM: CPT

## 2021-04-21 PROCEDURE — 76705 ECHO EXAM OF ABDOMEN: CPT

## 2021-04-21 PROCEDURE — 87522 HEPATITIS C REVRS TRNSCRPJ: CPT

## 2021-04-21 PROCEDURE — 87340 HEPATITIS B SURFACE AG IA: CPT

## 2021-04-22 LAB
HCV RNA SERPL NAA+PROBE-ACNC: NORMAL IU/ML
HIV 1+2 AB+HIV1 P24 AG SERPL QL IA: NORMAL
TEST INFORMATION: NORMAL

## 2021-04-23 ENCOUNTER — OFFICE VISIT (OUTPATIENT)
Dept: INTERNAL MEDICINE CLINIC | Facility: CLINIC | Age: 51
End: 2021-04-23
Payer: COMMERCIAL

## 2021-04-23 VITALS
OXYGEN SATURATION: 99 % | HEART RATE: 85 BPM | DIASTOLIC BLOOD PRESSURE: 78 MMHG | WEIGHT: 117 LBS | TEMPERATURE: 97.8 F | BODY MASS INDEX: 21.53 KG/M2 | HEIGHT: 62 IN | SYSTOLIC BLOOD PRESSURE: 128 MMHG

## 2021-04-23 DIAGNOSIS — M54.42 ACUTE LEFT-SIDED LOW BACK PAIN WITH LEFT-SIDED SCIATICA: ICD-10-CM

## 2021-04-23 DIAGNOSIS — R06.83 SNORING: ICD-10-CM

## 2021-04-23 DIAGNOSIS — Z12.4 SCREENING FOR CERVICAL CANCER: Primary | ICD-10-CM

## 2021-04-23 DIAGNOSIS — B19.20 HEPATITIS C VIRUS INFECTION WITHOUT HEPATIC COMA, UNSPECIFIED CHRONICITY: ICD-10-CM

## 2021-04-23 PROCEDURE — 3008F BODY MASS INDEX DOCD: CPT | Performed by: INTERNAL MEDICINE

## 2021-04-23 PROCEDURE — 1036F TOBACCO NON-USER: CPT | Performed by: INTERNAL MEDICINE

## 2021-04-23 PROCEDURE — 99213 OFFICE O/P EST LOW 20 MIN: CPT | Performed by: INTERNAL MEDICINE

## 2021-04-23 RX ORDER — METAXALONE 800 MG/1
800 TABLET ORAL DAILY PRN
Qty: 15 TABLET | Refills: 0 | Status: SHIPPED | OUTPATIENT
Start: 2021-04-23 | End: 2021-10-08

## 2021-04-23 NOTE — PROGRESS NOTES
Assessment/Plan:    Hepatitis C virus infection without hepatic coma    Hep C antibody is positive but her hepatitis C viral load RNA is negative this indicates a previous infection that was treated today I did counseled patient about these findings and have reassured the patient  Snoring    Suspect mild sleep apnea I have asked her to record her sleeping also will have the patient go for a home sleep study         Problem List Items Addressed This Visit        Digestive    Hepatitis C virus infection without hepatic coma       Hep C antibody is positive but her hepatitis C viral load RNA is negative this indicates a previous infection that was treated today I did counseled patient about these findings and have reassured the patient  Relevant Orders    Hepatitis A antibody, IgM    Hep B Surface Ab, Ql       Other    Screening for cervical cancer - Primary    Snoring       Suspect mild sleep apnea I have asked her to record her sleeping also will have the patient go for a home sleep study         Relevant Orders    Home Study      Other Visit Diagnoses     Acute left-sided low back pain with left-sided sciatica        Relevant Medications    metaxalone (SKELAXIN) 800 mg tablet            Subjective:      Patient ID: Ariela Wood is a 54 y o  female  HPI  54-year old female coming in for a follow up office visit regarding history of hepatitis-C, snoring; The patient reports me compliant taking medications without untoward side effects the  The patient is here to review his medical condition, update me on the medical condition and the patient reports me no hospitalizations and no ER visits   She is here to review her laboratories in detail she is very concerned because of the hepatitis C antibody positive but her viral load is negative she has had previous treatment in the past with interferon today we did  her in detail that these laboratories means that she had a past infection and currently she is negative  She also reports m2 times woke 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     The following portions of the patient's history were reviewed and updated as appropriate: allergies, current medications, past family history, past medical history, past social history, past surgical history and problem list     Review of Systems   Constitutional: Positive for fatigue  Negative for activity change, appetite change and unexpected weight change  Snoring   Respiratory: Negative for cough and shortness of breath  Cardiovascular: Negative for chest pain  Gastrointestinal: Negative for abdominal pain, diarrhea, nausea and vomiting  Objective:    No follow-ups on file  Procedure: Us Liver    Result Date: 4/24/2021  Narrative: RIGHT UPPER QUADRANT ULTRASOUND INDICATION:     B19 20: Unspecified viral hepatitis C without hepatic coma  COMPARISON:  Renal stone CT 12/24/2020  CT abdomen and pelvis 11/14/2019 TECHNIQUE:   Real-time ultrasound of the right upper quadrant was performed with a curvilinear transducer with both volumetric sweeps and still imaging techniques  FINDINGS: PANCREAS:  Visualized portions of the pancreas are within normal limits  AORTA AND IVC:  Visualized portions are normal for patient age  LIVER: Size:  Within normal range  The liver measures 14 2 cm in the midclavicular line  Contour:  Surface contour is smooth  Parenchyma:  Echogenicity and echotexture are within normal limits  No evidence of suspicious mass  Limited imaging of the main portal vein shows it to be patent and hepatopetal   BILIARY: 5 mm polyp without vascularity or comet tail artifact    No intrahepatic biliary dilatation  CBD measures 5 mm  No choledocholithiasis  KIDNEY: Right kidney measures 9 6 cm  Within normal limits  ASCITES:   None  Impression: Gallbladder polyp   Workstation performed: [id number]         Allergies   Allergen Reactions    Morphine Other (See Comments)    Codeine     Morphine And Related     Sulfa Antibiotics     Codeine Itching       Past Medical History:   Diagnosis Date    Arthritis     Asthma     Disease of thyroid gland     [facility]ar[facility]roidism [facility] [address] [facility]     Nephrolithiasis 7/22/2019    Osteoporosis     Partial symptompatic epilepsy     Seizure disorder ([facility] [address] )      Past Surgical History:   Procedure Laterality Date    APPENDECTOMY      FRACTURE SURGERY      HYSTERECTOMY      OTHER SURGICAL HISTORY      left foot, rt knee, tonselectomy    SINUS SURGERY      TONSILLECTOMY       Current Outpatient Medications on File Prior to Visit   Medication Sig Dispense Refill    B Complex Vitamins (VITAMIN B COMPLEX PO) Take 1 tablet by mouth daily   Calcium-Magnesium-Vitamin D (CITRACAL CALCIUM+D PO) Take 1 tablet by mouth daily        cetirizine (ZyrTEC) 10 mg tablet Take 10 mg by mouth daily      cholecalciferol (VITAMIN D3) 1,000 units tablet Take 2,000 Units by mouth daily       estradiol (ESTRACE) 0 1 mg/g vaginal cream Use 0 5 gm daily x 2 weeks then 0 5 gm twice weekly 42 5 g 1    Lidocaine 4 % PTCH Aspercreme (lidocaine) 4 % topical patch   as needed      mometasone (NASONEX) 50 mcg/act nasal spray SPRAY 2 SPRAYS INTO EACH NOSTRIL EVERY DAY 1 Act 1    multivitamin (THERAGRAN) TABS Take 1 tablet by mouth daily      SUMAtriptan (IMITREX) 100 mg tablet Take 1 tablet (100 mg total) by mouth once as needed for migraine (takes 1/2 , if not resolved takes the other half ) 2 tablet 2    Synthroid 75 MCG tablet Take 1 tablet (75 mcg total) by mouth daily 30 tablet 0    topiramate (TOPAMAX) 200 MG tablet TAKE 1 TABLET TWICE A  tablet 2    zinc gluconate 50 mg tablet Take 50 mg by mouth daily as needed      Diclofenac Sodium (Voltaren) 1 % Voltaren 1 % topical gel   Apply 2 gram to affected area(s) by topical route 4 times per day      fluconazole (DIFLUCAN) 150 mg tablet TAKE 1 TABLET BY MOUTH AS ONE DOSE      levothyroxine (SYNTHROID) 88 mcg tablet Take 88 mcg by mouth daily   levothyroxine (Synthroid) 88 mcg tablet 75 mcg       methocarbamol (ROBAXIN) 500 mg tablet methocarbamol 500 mg tablet      omeprazole (PriLOSEC) 20 mg delayed release capsule Take 20 mg by mouth daily      saccharomyces boulardii (FLORASTOR) 250 mg capsule Take 250 mg by mouth 2 (two) times a day      traMADol (ULTRAM) 50 mg tablet Take 1 tablet (50 mg total) by mouth every 6 (six) hours as needed for moderate pain (Patient not taking: Reported on 4/23/2021) 15 tablet 0     No current facility-administered medications on file prior to visit        Family History   Problem Relation Age of Onset    Alcohol abuse Mother     Migraines Father     Alcohol abuse Maternal Uncle     Seizures Paternal Aunt      Social History     Socioeconomic History    Marital status: Single     Spouse name: Not on file    Number of children: Not on file    Years of education: Not on file    Highest education level: Not on file   Occupational History    Not on file   Social Needs    Financial resource strain: Not on file    Food insecurity     Worry: Not on file     Inability: Not on file   [employer] needs     Medical: Not on file     Non-medical: Not on file   Tobacco Use    Smoking status: Never Smoker    Smokeless tobacco: Never Used   Substance and Sexual Activity    Alcohol use: Not Currently    Drug use: Not Currently     Types: Cocaine     Comment: social    Sexual activity: Not Currently   Lifestyle    Physical activity     Days per week: Not on file     Minutes per session: Not on file    Stress: Not on file   Relationships    Social connections     Talks on phone: Not on file     Gets together: Not on file     Attends Congregational service: Not on file     Active member of club or organization: Not on file     Attends meetings of clubs or organizations: Not on file     Relationship status: Not on file    Intimate partner violence     Fear of current or ex partner: Not on file     Emotionally abused: Not on file     Physically abused: Not on file     Forced sexual activity: Not on file   Other Topics Concern    Not on file   Social History Narrative    ** Merged History Encounter **          Vitals:    04/23/21 1540   BP: 128/78   Pulse: 85   Temp: 97 8 °F (36 6 °C)   TempSrc: Temporal   SpO2: 99%   Weight: 53 1 kg (117 lb)   Height: 5' 2" (1 575 m)     Results for orders placed or performed in visit on 04/21/21   Osmolality-"If this is regarding a toxic alcohol, STOP  Test is not routinely indicated  Please consult medical  on call for further guidance "   Result Value Ref Range    Osmolality Serum 291 282 - 298 mmol/KG   HIV 1/2 Antigen/Antibody (4th Generation) w Reflex [facility]   Result Value Ref Range    HIV-1/HIV-2 Ab Non-Reactive Non-Reactive   Hepatitis C RNA, quantitative, PCR   Result Value Ref Range    HCV PCR Quantitative HCV Not Detected IU/mL    Test Information Comment    Chronic Hepatitis Panel   Result Value Ref Range    Hepatitis B Surface Ag Non-reactive Non-reactive, NonReactive - Confirmed    Hepatitis C Ab High Reactive (A) Non-reactive    Hep B C IgM Non-reactive Non-reactive    Hep B Core Total Ab Non-reactive Non-reactive   AFP tumor marker   Result Value Ref Range    AFP TUMOR MARKER 3 2 0 5 - 8 ng/mL     Weight (last 2 days)     Date/Time   Weight    04/23/21 1540   53 1 (117)            Body mass index is 21 4 kg/m²  BP      Temp      Pulse     Resp      SpO2        Vitals:    04/23/21 1540   Weight: 53 1 kg (117 lb)     Vitals:    04/23/21 1540   Weight: 53 1 kg (117 lb)       /78   Pulse 85   Temp 97 8 °F (36 6 °C) (Temporal)   Ht 5' 2" (1 575 m)   Wt 53 1 kg (117 lb)   SpO2 99%   BMI 21 40 kg/m²          Physical Exam  Constitutional:       Appearance: She is well-developed  HENT:      Head: Normocephalic  Eyes:      General: No scleral icterus  Right eye: No discharge           Left eye: No discharge  Conjunctiva/sclera: Conjunctivae normal       Pupils: Pupils are equal, round, and reactive to light  Neck:      Musculoskeletal: Neck supple  Cardiovascular:      Rate and Rhythm: Normal rate and regular rhythm  Heart sounds: Normal heart sounds  No murmur  No friction rub  No gallop  Pulmonary:      Effort: No respiratory distress  Breath sounds: Normal breath sounds  No wheezing or rales  Abdominal:      General: Bowel sounds are normal  There is no distension  Palpations: Abdomen is soft  There is no mass  Tenderness: There is no abdominal tenderness  There is no guarding or rebound  Musculoskeletal:         General: No deformity  Lymphadenopathy:      Cervical: No cervical adenopathy  Neurological:      Mental Status: She is alert        Coordination: Coordination normal

## 2021-04-25 DIAGNOSIS — E03.9 HYPOTHYROIDISM, UNSPECIFIED TYPE: ICD-10-CM

## 2021-04-25 PROBLEM — R06.83 SNORING: Status: ACTIVE | Noted: 2021-04-25

## 2021-04-25 NOTE — ASSESSMENT & PLAN NOTE
Hep C antibody is positive but her hepatitis C viral load RNA is negative this indicates a previous infection that was treated today I did counseled patient about these findings and have reassured the patient

## 2021-04-25 NOTE — ASSESSMENT & PLAN NOTE
Suspect mild sleep apnea I have asked her to record her sleeping also will have the patient go for a home sleep study

## 2021-04-26 DIAGNOSIS — K82.4 GALLBLADDER POLYP: Primary | ICD-10-CM

## 2021-04-26 RX ORDER — LEVOTHYROXINE SODIUM 75 MCG
75 TABLET ORAL DAILY
Qty: 30 TABLET | Refills: 0 | Status: SHIPPED | OUTPATIENT
Start: 2021-04-26 | End: 2021-05-02

## 2021-04-27 ENCOUNTER — TELEPHONE (OUTPATIENT)
Dept: GASTROENTEROLOGY | Facility: AMBULARY SURGERY CENTER | Age: 51
End: 2021-04-27

## 2021-04-27 NOTE — TELEPHONE ENCOUNTER
Patients GI provider:  Dr Melgoza Right    Number to return call: (   [phone]    Reason for call: Pt calling to get a sooner  appt w/ dr Valente Burgos for gallbladder polyp    Scheduled procedure/appointment date if applicable: Apt/procedure  NA

## 2021-04-27 NOTE — TELEPHONE ENCOUNTER
Spoke with patient in regards to scheduling an appointment with Dr Radha Burgos sooner in July  Scheduled patient for July with Dr Radha Burgos in [facility]  Patient wanted me to send a message to Dr Radha Burgos in regards to her gallbladder polyp  She has seen her family doctor and they have changed her diet here and there  Patient would like Dr Kanwal Calderon opinion on her issue  Patient also stated that she has to wait until July for in office due to starting new jobs      Please advise

## 2021-04-27 NOTE — TELEPHONE ENCOUNTER
Please advise, her ultrasound from 6 days ago showed a 5 mm gallbladder polyp; I would reassure her that gallbladder polyps generally do not cause symptoms, and hers is relatively small - we may recommend surveillance in 6 months to year to ensure stability of size, but otherwise it has no concerning features  Will of course still see her in the office to discuss her condition and her symptoms, and answer any questions she has  Thank you

## 2021-04-27 NOTE — TELEPHONE ENCOUNTER
We haven't seen her for a year, we'll need to see her to examine and discuss in more detail, I would not be comfortable starting new medications or treatments before doing so  If the nausea is persistent she can see/discuss with her PCP about prescribing an antiemetic such as zofran if needed    Thanks

## 2021-04-27 NOTE — TELEPHONE ENCOUNTER
Spoke with pt, aware gallbladder polyp should not cause symptoms  Pt states in the morning after she takes her thyroid medication she is extremely nauseous  Pt also states her abdominal pain has reduced since changing her diet to help her cholesterol but still has not gone away  Please advise  Thank you!

## 2021-04-28 DIAGNOSIS — R10.13 DYSPEPSIA: Primary | ICD-10-CM

## 2021-04-28 RX ORDER — FAMOTIDINE 20 MG/1
20 TABLET, FILM COATED ORAL 2 TIMES DAILY
Qty: 60 TABLET | Refills: 5 | Status: SHIPPED | OUTPATIENT
Start: 2021-04-28 | End: 2022-04-28

## 2021-04-28 NOTE — TELEPHONE ENCOUNTER
Spoke with pt, I advised her to talk to her pcp about antiemetic medications  She stated her pcp wanted her to see GI about her symptoms  Pt said she has to watch everything she eats because she gets nauseous  Can someone call her directly  Please advise  Thank you!

## 2021-04-28 NOTE — TELEPHONE ENCOUNTER
I spoke with patient for 17 minutes about her symptoms and medical history, discussed about her previous endoscopic examinations, recent ultrasound, history of thyroid issues  Would recommend starting Pepcid 20 mg once or twice daily and continuing this for at least the next 3 or 4 weeks, she can let us know after that point if this is not helpful for her symptoms, we can adjust therapy if needed at that time; otherwise if there is improvement she can continue this until her upcoming office visit where we can discuss her symptoms and any potential further workup

## 2021-05-01 DIAGNOSIS — E03.9 HYPOTHYROIDISM, UNSPECIFIED TYPE: ICD-10-CM

## 2021-05-01 DIAGNOSIS — J01.10 ACUTE NON-RECURRENT FRONTAL SINUSITIS: ICD-10-CM

## 2021-05-02 ENCOUNTER — IMMUNIZATIONS (OUTPATIENT)
Dept: FAMILY MEDICINE CLINIC | Facility: HOSPITAL | Age: 51
End: 2021-05-02

## 2021-05-02 DIAGNOSIS — Z23 ENCOUNTER FOR IMMUNIZATION: Primary | ICD-10-CM

## 2021-05-02 DIAGNOSIS — J01.10 ACUTE NON-RECURRENT FRONTAL SINUSITIS: ICD-10-CM

## 2021-05-02 PROCEDURE — 0002A SARS-COV-2 / COVID-19 MRNA VACCINE (PFIZER-BIONTECH) 30 MCG: CPT

## 2021-05-02 PROCEDURE — 91300 SARS-COV-2 / COVID-19 MRNA VACCINE (PFIZER-BIONTECH) 30 MCG: CPT

## 2021-05-02 RX ORDER — MOMETASONE FUROATE 50 UG/1
SPRAY, METERED NASAL
Qty: 1 G | Refills: 1 | Status: SHIPPED | OUTPATIENT
Start: 2021-05-02 | End: 2021-05-03

## 2021-05-02 RX ORDER — LEVOTHYROXINE SODIUM 75 MCG
TABLET ORAL
Qty: 30 TABLET | Refills: 0 | Status: SHIPPED | OUTPATIENT
Start: 2021-05-02 | End: 2021-05-24

## 2021-05-03 DIAGNOSIS — J01.10 ACUTE NON-RECURRENT FRONTAL SINUSITIS: ICD-10-CM

## 2021-05-03 RX ORDER — MOMETASONE FUROATE 50 UG/1
SPRAY, METERED NASAL
Qty: 3 G | Refills: 1 | Status: SHIPPED | OUTPATIENT
Start: 2021-05-03 | End: 2021-10-08

## 2021-05-03 RX ORDER — MOMETASONE FUROATE 50 UG/1
SPRAY, METERED NASAL
Qty: 1 G | Refills: 1 | Status: SHIPPED | OUTPATIENT
Start: 2021-05-03 | End: 2021-05-03

## 2021-05-24 DIAGNOSIS — E03.9 HYPOTHYROIDISM, UNSPECIFIED TYPE: ICD-10-CM

## 2021-05-24 RX ORDER — LEVOTHYROXINE SODIUM 75 MCG
TABLET ORAL
Qty: 30 TABLET | Refills: 0 | Status: SHIPPED | OUTPATIENT
Start: 2021-05-24 | End: 2021-06-25

## 2021-05-26 ENCOUNTER — TELEPHONE (OUTPATIENT)
Dept: INTERNAL MEDICINE CLINIC | Facility: CLINIC | Age: 51
End: 2021-05-26

## 2021-05-26 NOTE — TELEPHONE ENCOUNTER
Pt calling in stating every time while she is brushing her teeth then brushES her tongue she full on vomits    isn't sure if you want her to follow up with gastro about this?  Says it might be attributed to her working 7 days a week since 4/28 - also says it takes her a long time to eat  (tried eating 2 hardboiled egg whites and a yogurt) Please advise, ty

## 2021-06-03 ENCOUNTER — TELEPHONE (OUTPATIENT)
Dept: SLEEP CENTER | Facility: CLINIC | Age: 51
End: 2021-06-03

## 2021-06-03 NOTE — TELEPHONE ENCOUNTER
----- Message from Saad Alvarado MD sent at 5/7/2021  9:29 AM EDT -----  approved  ----- Message -----  From: Asia Townsend  Sent: 5/7/2021   8:27 AM EDT  To: Sleep Medicine [facility] Provider    This sleep study needs approval      If approved please sign and return to clerical pool  If denied please include reasons why  Also provide alternative testing if warranted  Please sign and return to clerical pool

## 2021-06-07 ENCOUNTER — TELEPHONE (OUTPATIENT)
Dept: INTERNAL MEDICINE CLINIC | Facility: CLINIC | Age: 51
End: 2021-06-07

## 2021-06-07 NOTE — TELEPHONE ENCOUNTER
Pt called, asking for a doctors note she did not go to work due to her stomach issues May 30 & May 31st  Would like a note to excuse her  Please advise

## 2021-06-21 ENCOUNTER — TELEPHONE (OUTPATIENT)
Dept: INTERNAL MEDICINE CLINIC | Facility: CLINIC | Age: 51
End: 2021-06-21

## 2021-06-21 NOTE — TELEPHONE ENCOUNTER
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

## 2021-06-25 DIAGNOSIS — E03.9 HYPOTHYROIDISM, UNSPECIFIED TYPE: ICD-10-CM

## 2021-06-25 LAB
25(OH)D3 SERPL-MCNC: 43 NG/ML (ref 30–100)
ALBUMIN SERPL-MCNC: 4.4 G/DL (ref 3.6–5.1)
ALBUMIN/GLOB SERPL: 2 (CALC) (ref 1–2.5)
ALP SERPL-CCNC: 54 U/L (ref 31–125)
ALT SERPL-CCNC: 17 U/L (ref 6–29)
AST SERPL-CCNC: 19 U/L (ref 10–35)
BASOPHILS # BLD AUTO: 61 CELLS/UL (ref 0–200)
BASOPHILS NFR BLD AUTO: 1.2 %
BILIRUB SERPL-MCNC: 0.5 MG/DL (ref 0.2–1.2)
BUN SERPL-MCNC: 13 MG/DL (ref 7–25)
BUN/CREAT SERPL: NORMAL (CALC) (ref 6–22)
CALCIUM SERPL-MCNC: 9.5 MG/DL (ref 8.6–10.2)
CHLORIDE SERPL-SCNC: 106 MMOL/L (ref 98–110)
CHOLEST SERPL-MCNC: 214 MG/DL
CHOLEST/HDLC SERPL: 3.3 (CALC)
CO2 SERPL-SCNC: 24 MMOL/L (ref 20–32)
CREAT SERPL-MCNC: 0.79 MG/DL (ref 0.5–1.1)
EOSINOPHIL # BLD AUTO: 184 CELLS/UL (ref 15–500)
EOSINOPHIL NFR BLD AUTO: 3.6 %
ERYTHROCYTE [DISTWIDTH] IN BLOOD BY AUTOMATED COUNT: 12.7 % (ref 11–15)
FERRITIN SERPL-MCNC: 24 NG/ML (ref 16–232)
GLOBULIN SER CALC-MCNC: 2.2 G/DL (CALC) (ref 1.9–3.7)
GLUCOSE SERPL-MCNC: 84 MG/DL (ref 65–99)
HAV IGM SERPL QL IA: NORMAL
HBA1C MFR BLD: 4.7 % OF TOTAL HGB
HBV SURFACE AB SER QL IA: REACTIVE
HCT VFR BLD AUTO: 42.9 % (ref 35–45)
HDLC SERPL-MCNC: 64 MG/DL
HGB BLD-MCNC: 14.6 G/DL (ref 11.7–15.5)
IRON SERPL-MCNC: 75 MCG/DL (ref 40–190)
LDLC SERPL CALC-MCNC: 131 MG/DL (CALC)
LYMPHOCYTES # BLD AUTO: 1994 CELLS/UL (ref 850–3900)
LYMPHOCYTES NFR BLD AUTO: 39.1 %
MCH RBC QN AUTO: 30.7 PG (ref 27–33)
MCHC RBC AUTO-ENTMCNC: 34 G/DL (ref 32–36)
MCV RBC AUTO: 90.1 FL (ref 80–100)
MONOCYTES # BLD AUTO: 867 CELLS/UL (ref 200–950)
MONOCYTES NFR BLD AUTO: 17 %
NEUTROPHILS # BLD AUTO: 1994 CELLS/UL (ref 1500–7800)
NEUTROPHILS NFR BLD AUTO: 39.1 %
NONHDLC SERPL-MCNC: 150 MG/DL (CALC)
PLATELET # BLD AUTO: 256 THOUSAND/UL (ref 140–400)
PMV BLD REES-ECKER: 10.1 FL (ref 7.5–12.5)
POTASSIUM SERPL-SCNC: 4.5 MMOL/L (ref 3.5–5.3)
PROT SERPL-MCNC: 6.6 G/DL (ref 6.1–8.1)
RBC # BLD AUTO: 4.76 MILLION/UL (ref 3.8–5.1)
SL AMB EGFR AFRICAN AMERICAN: 102 ML/MIN/1.73M2
SL AMB EGFR NON AFRICAN AMERICAN: 88 ML/MIN/1.73M2
SODIUM SERPL-SCNC: 138 MMOL/L (ref 135–146)
T4 FREE SERPL-MCNC: 1.3 NG/DL (ref 0.8–1.8)
TRIGL SERPL-MCNC: 93 MG/DL
TSH SERPL-ACNC: 0.73 MIU/L
TSI SER-ACNC: <89 % BASELINE
VIT B12 SERPL-MCNC: 595 PG/ML (ref 200–1100)
WBC # BLD AUTO: 5.1 THOUSAND/UL (ref 3.8–10.8)

## 2021-06-25 RX ORDER — LEVOTHYROXINE SODIUM 75 MCG
TABLET ORAL
Qty: 30 TABLET | Refills: 0 | Status: SHIPPED | OUTPATIENT
Start: 2021-06-25 | End: 2021-06-28

## 2021-06-28 DIAGNOSIS — E03.9 HYPOTHYROIDISM, UNSPECIFIED TYPE: ICD-10-CM

## 2021-06-28 RX ORDER — LEVOTHYROXINE SODIUM 75 MCG
TABLET ORAL
Qty: 90 TABLET | Refills: 1 | Status: SHIPPED | OUTPATIENT
Start: 2021-06-28 | End: 2021-09-29

## 2021-07-01 ENCOUNTER — OFFICE VISIT (OUTPATIENT)
Dept: INTERNAL MEDICINE CLINIC | Facility: CLINIC | Age: 51
End: 2021-07-01
Payer: COMMERCIAL

## 2021-07-01 VITALS
WEIGHT: 106.8 LBS | BODY MASS INDEX: 19.65 KG/M2 | HEIGHT: 62 IN | HEART RATE: 58 BPM | SYSTOLIC BLOOD PRESSURE: 119 MMHG | OXYGEN SATURATION: 99 % | DIASTOLIC BLOOD PRESSURE: 72 MMHG | RESPIRATION RATE: 16 BRPM

## 2021-07-01 DIAGNOSIS — R10.13 EPIGASTRIC PAIN: ICD-10-CM

## 2021-07-01 DIAGNOSIS — M77.42 METATARSALGIA OF LEFT FOOT: Primary | ICD-10-CM

## 2021-07-01 DIAGNOSIS — K90.9 STEATORRHEA: ICD-10-CM

## 2021-07-01 PROCEDURE — 3008F BODY MASS INDEX DOCD: CPT | Performed by: INTERNAL MEDICINE

## 2021-07-01 PROCEDURE — 99214 OFFICE O/P EST MOD 30 MIN: CPT | Performed by: INTERNAL MEDICINE

## 2021-07-01 PROCEDURE — 1036F TOBACCO NON-USER: CPT | Performed by: INTERNAL MEDICINE

## 2021-07-01 NOTE — LETTER
July 1, 2021     Patient: Kaylynn Odom   YOB: 1970   Date of Visit: 7/1/2021       To Whom it May Concern:    Mumtaz Luevano is under my professional care  She was seen in my office on 7/1/2021  If you have any questions or concerns, please don't hesitate to call           Sincerely,          Massiel Cesar MD        CC: No Recipients

## 2021-07-01 NOTE — PROGRESS NOTES
Assessment/Plan:    Metatarsalgia of left foot  History of recurrent left foot pain  Pain  below the metatarsal head the 1st toe  Patient has been inverting the foot to avoid the pressure in this area  There is no history of trauma, however history of prior foot surgery  Patient has also noted a calf spasms, however not tender to palpation, Wells criteria 0  Mild improvement with OTC pain medications, multiple changes of footwear  Possible etiologies:  Metatarsalgia of left foot versus stress fracture    Plan  X-ray of the foot  Podiatry ambulatory referral provided      Epigastric pain  History of epigastric pain  Previously on Pepcid treatment with partial relief of symptoms  Patient keeps a diet avoiding irritative foods  Previously evaluated by GI, EGD normal as was normal biopsy    Plan  GI evaluation      Steatorrhea  · Multiple episodes of greasy stools difficult to flush  · History of gallbladder polyp (<1cm) recently found of ultrasound of liver seems to be a new finding when compared with previous CT scan of abdomen  · (-) weight loss , diarrhea (-) alcohol consumption, pancreatitis  · Patient denied any consumption of high fatty foods  · Associated symptoms epigastric pain  · Possible etiologies bill acid deficiency, celiac disease, giardiasis    Plan  Fecal fat ordered  GI ambulatory referral      Diagnoses and all orders for this visit:    Metatarsalgia of left foot  -     XR foot 3+ vw left; Future  -     Ambulatory referral to Podiatry; Future    Steatorrhea  -     Ambulatory referral to Gastroenterology; Future  -     Fecal fat, qualitative; Future    Epigastric pain  -     Ambulatory referral to Gastroenterology; Future    Other orders  -     Cancel: Ambulatory referral to Obstetrics / Gynecology; Future        Subjective:      Patient ID: Nancy Cochran is a 54 y o  female  Patient is a pleasant 53-year-old lady comes to the office for evaluation of left foot pain    Patient mentioned symptomatology has been going on for more than a year, particularly expressing plantar pain that has been making difficult all to ambulate in a daily basis despite changing footwear, pain is described as achy, 9/10 in intensity associated with some calf spasms, minimal to no improvement with OTC pain medications  Patient denied any history of trauma, however she has had foot surgery in the past   During my encounter patient also endorses significant epigastralgia well as multiple episodes of steatorrhea shortly after any consumption of foods  Patient denied any  diarrhea, fever chills, alcohol consumption  The following portions of the patient's history were reviewed and updated as appropriate: allergies, current medications, past family history, past medical history, past social history, past surgical history and problem list     Review of Systems   Gastrointestinal:        Epigastric pain, steatorrhea   Musculoskeletal: Positive for arthralgias (Left foot pain)  All other systems reviewed and are negative  Objective:      /72   Pulse 58   Resp 16   Ht 5' 2" (1 575 m)   Wt 48 4 kg (106 lb 12 8 oz)   SpO2 99%   BMI 19 53 kg/m²        Physical Exam  Vitals and nursing note reviewed  Constitutional:       General: She is not in acute distress  Appearance: She is normal weight  She is not toxic-appearing  HENT:      Head: Normocephalic and atraumatic  Right Ear: Tympanic membrane normal  There is no impacted cerumen  Left Ear: Tympanic membrane normal  There is no impacted cerumen  Nose: Nose normal  No congestion or rhinorrhea  Mouth/Throat:      Mouth: Mucous membranes are moist       Pharynx: Oropharynx is clear  No oropharyngeal exudate or posterior oropharyngeal erythema  Eyes:      General:         Right eye: No discharge  Left eye: No discharge  Extraocular Movements: Extraocular movements intact        Conjunctiva/sclera: Conjunctivae normal  Pupils: Pupils are equal, round, and reactive to light  Cardiovascular:      Rate and Rhythm: Normal rate  Pulses: Normal pulses  Heart sounds: No murmur heard  No gallop  Pulmonary:      Effort: Pulmonary effort is normal  No respiratory distress  Breath sounds: No wheezing or rales  Chest:      Chest wall: No tenderness  Abdominal:      General: Bowel sounds are normal  There is no distension  Palpations: Abdomen is soft  Tenderness: There is abdominal tenderness (Epigastric)  Musculoskeletal:         General: No swelling or tenderness  Normal range of motion  Cervical back: Normal range of motion  No rigidity  Comments: Left foot:  Integrity of the skin is intact, pes valgus?, no changes in coloration, distal pulses preserved, capillary refill 2-3, range of motion is preserved, pain elicited with the palpation below the head of the 1st metatarsal toe, Achillis tendon reflex preserved  Lymphadenopathy:      Cervical: No cervical adenopathy  Skin:     General: Skin is warm  Neurological:      Mental Status: She is alert and oriented to person, place, and time  Psychiatric:         Mood and Affect: Mood normal          Behavior: Behavior normal          Thought Content:  Thought content normal          Judgment: Judgment normal

## 2021-07-01 NOTE — ASSESSMENT & PLAN NOTE
· Multiple episodes of greasy stools difficult to flush  · History of gallbladder polyp (<1cm) recently found of ultrasound of liver seems to be a new finding when compared with previous CT scan of abdomen  · (-) weight loss , diarrhea (-) alcohol consumption, pancreatitis  · Patient denied any consumption of high fatty foods  · Associated symptoms epigastric pain  · Possible etiologies bill acid deficiency, celiac disease, giardiasis    Plan  Fecal fat ordered  GI ambulatory referral

## 2021-07-01 NOTE — ASSESSMENT & PLAN NOTE
History of recurrent left foot pain  Pain  below the metatarsal head the 1st toe  Patient has been inverting the foot to avoid the pressure in this area  There is no history of trauma, however history of prior foot surgery  Patient has also noted a calf spasms, however not tender to palpation, Wells criteria 0  Mild improvement with OTC pain medications, multiple changes of footwear  Possible etiologies:  Metatarsalgia of left foot versus stress fracture    Plan  X-ray of the foot  Podiatry ambulatory referral provided

## 2021-07-12 ENCOUNTER — HOSPITAL ENCOUNTER (OUTPATIENT)
Dept: RADIOLOGY | Facility: HOSPITAL | Age: 51
Discharge: HOME | End: 2021-07-12
Payer: COMMERCIAL

## 2021-07-12 DIAGNOSIS — M77.42 METATARSALGIA OF LEFT FOOT: ICD-10-CM

## 2021-07-12 PROCEDURE — 73630 X-RAY EXAM OF FOOT: CPT

## 2021-07-12 NOTE — TELEPHONE ENCOUNTER
Call returned to patient, scheduled 1/14/2020 per her request at [facility]  Caller: Behzad Guzman    Relationship: Self    Best call back number:[phone]    What is the best time to reach you: ANY TIME    Who are you requesting to speak with (clinical staff, provider,  specific staff member): DR BERNABE    Do you know the name of the person who called: SELF    What was the call regarding: PATIENT CALLED CHECKING TO SEE IF DR BERNABE RECEIVED PAPERWORK FROM THE  GROUP FOR SOME SPECIAL SHOES FOR HIS FEET WITH NEUROPATHY? ALSO HIS NEUROPATHY ON LEFT FOOT IS GETTING WORSE AND THE PAIN IS INCREASING AND WOULD LIKE TO TALK TO DR BERNABE ABOUT IT. ALSO HE WOULD LIKE TO KNOW IF THERE IS A SPECIALIST FOR THE NEUROPATHY AND WHO DR BERNABE WOULD RECOMMEND.     Do you require a callback: YES

## 2021-07-14 ENCOUNTER — TELEPHONE (OUTPATIENT)
Dept: INTERNAL MEDICINE CLINIC | Facility: CLINIC | Age: 51
End: 2021-07-14

## 2021-07-14 NOTE — TELEPHONE ENCOUNTER
Evangelista pt:    Patient called in for her results of xray?  I called the reading room they completed the report would you be able to review so we may give patient her results

## 2021-07-26 ENCOUNTER — TELEPHONE (OUTPATIENT)
Dept: INTERNAL MEDICINE CLINIC | Facility: CLINIC | Age: 51
End: 2021-07-26

## 2021-07-26 ENCOUNTER — DOCUMENTATION (OUTPATIENT)
Dept: INTERNAL MEDICINE CLINIC | Facility: CLINIC | Age: 51
End: 2021-07-26

## 2021-07-26 NOTE — TELEPHONE ENCOUNTER
Lm for patient to call back  Following up about question from billing department for 3/1/21 sinus infection virtual   (initially was f/u but was changed to same day virtual)

## 2021-08-06 ENCOUNTER — TELEPHONE (OUTPATIENT)
Dept: OBGYN CLINIC | Facility: HOSPITAL | Age: 51
End: 2021-08-06

## 2021-08-06 ENCOUNTER — OFFICE VISIT (OUTPATIENT)
Dept: OBGYN CLINIC | Facility: CLINIC | Age: 51
End: 2021-08-06
Payer: COMMERCIAL

## 2021-08-06 VITALS
DIASTOLIC BLOOD PRESSURE: 96 MMHG | SYSTOLIC BLOOD PRESSURE: 164 MMHG | HEART RATE: 66 BPM | BODY MASS INDEX: 19.14 KG/M2 | HEIGHT: 62 IN | WEIGHT: 104 LBS

## 2021-08-06 DIAGNOSIS — M89.9 SESAMOID PAIN: Primary | ICD-10-CM

## 2021-08-06 DIAGNOSIS — M20.22 HALLUX RIGIDUS OF LEFT FOOT: ICD-10-CM

## 2021-08-06 PROCEDURE — 3008F BODY MASS INDEX DOCD: CPT | Performed by: ORTHOPAEDIC SURGERY

## 2021-08-06 PROCEDURE — 1036F TOBACCO NON-USER: CPT | Performed by: ORTHOPAEDIC SURGERY

## 2021-08-06 PROCEDURE — 99203 OFFICE O/P NEW LOW 30 MIN: CPT | Performed by: ORTHOPAEDIC SURGERY

## 2021-08-06 NOTE — TELEPHONE ENCOUNTER
Patient will need a note for work that she will need to wear a sneaker type supportive shoe that will fit her orthotic  Patient would like to drop off [facility] paperwork for as she is a  for [employer]

## 2021-08-06 NOTE — TELEPHONE ENCOUNTER
Patient is confused about PT RX  She states that no PT was discussed at appt  She states orthoptics was only discussed and if she needs separate PT she would like a call back from the Doctor directly

## 2021-08-06 NOTE — PATIENT INSTRUCTIONS
You have two problems  You have a painful tibial sesamoid bone with an overlying callus and you also have arthritis in your big toe MTP joint  The treatment for both of these things includes supportive and cushioned shoes  The treatment for the sesamoid includes custom orthotics  You may require an MTP fusion if the nonoperative treatments fail and at the same time we may need to remove you tibial sesamoid bone  [employer], [employer], [employer] are good brands but I recommend going to a dedicate shoe store (not [employer] or [employer] ) At these types of stores, they have experts that can fit you for shoes appropriate for your foot problem  [employer]  [address] [place] EDWINA [place] Precious Warner 76 [facility], [address]    's 30 [facility], [address] [place], [address] [place] [address]    Daniel shoes   [address]  [facility] 36, 1600 [facility] [place]  [address]     [employer]  1101 [facility] [address], [facility] [place], [address]  [address] [place] [address], [address] [place] [address]    [employer]  [place], [place]      Hallux Rigidus     What is hallux rigidus? Hallux rigidus is arthritis of the joint at the base of the big toe  It is the most common arthritic condition of the foot, affecting 1 in 40 people over the age of 48  Hallux rigidus tends to affect females more often than males and typically develops in adults over the age of 27  What are the symptoms of hallux rigidus? Most patients present with a complaint of pain in the big toe joint while active, especially when pushing off to walk  Often, there also is swelling around the big toe joint or difficulty moving and bending the toe   A bump, like a bunion (hallux valgus) or bone spur, can develop on top of the big toe joint and be aggravated by rubbing against the inside of a shoe  What causes hallux rigidus? The cause of hallux rigidus is not known; however, several risks factors have been identified  Risk factors include a long or elevated first foot bone (metatarsal) or other differences in foot anatomy, prior injury to the big toe, and family history  These can lead to excessive wear of the joint, which in turn leads to arthritis  Anatomy   The big toe joint is called the hallux metatarsal phalangeal or MTP joint  This joint connects the head of the first foot bone (metatarsal) with the base of the first toe bone (proximal phalanx) and the two tiny bones (sesamoids) underneath the metatarsal  Usually the greatest area of wear is at the top of the joint  Location of MTP joint      How is hallux rigidus diagnosed? In many cases, the diagnosis of hallux rigidus can be made by physical examination alone  Your doctor examine the MTP joint to see how much you are able to move and where the pain occurs  The doctor also will check your foot for evidence of bone spurs  X-rays may be performed to identify the extent of joint degeneration and to show the location and size of bone spurs  These X-rays are best taken with you standing and putting weight on your foot  MRI and CT scans are rarely necessary  How is hallux rigidus treated? Non-surgical Treatment  Non-surgical management is always the first line treatment for hallux rigidus  A physician may suggest pain relievers and anti-inflammatory medicines and ice or heat packs to reduce pain  Platelet-rich plasma injections and similar injections into the joint are promising but vary in effectiveness  Changes in footwear may also be suggested  Avoiding thin-soled shoes or higher-heeled shoes can minimize the compression at the top of the joint  Shoes with a stiff sole, curved sole (rocker bottom), or both may also minimize joint pain   You may also benefit from shoe inserts as well as arch supports (orthoses) that limit motion at the MTP joint  A list of shoe stores in the area is included below where you can be fit for quality supportive shoes by an expert  We recommend a [employer] orthotic (Pictured below and available on [employer]  com typing in the name to the left )       Although these treatments may help decrease the symptoms, they do not stop the condition from progressing  Surgical Treatment   If pain persists after the above non-surgical treatments, surgical treatments will be considered  The type of surgery would be determined by the extent of arthritis and deformity of the toe  Bone Spur Removal (Cheilectomy)  For mild to moderate damage, removing some bone and the bone spur on top of the metatarsal and proximal phalanx can be sufficient  This procedure is called a cheilectomy  Removing the bone spur allows more room for the toe to bend up and alleviates pain caused when pushing off the toe  This procedure also can be combined with other bone cuts that change the position of the big toe and further relieve pressure at the top of the joint  The advantages of this procedure are that it maintains stability and motion, and preserves the joint itself  Cheilectomy     Joint Fusion (Arthrodesis)  Advanced stages of hallux rigidus with severe joint damage are often treated by "welding" the big toe joint  This procedure is called arthrodesis or joint fusion  In this procedure, the damaged cartilage is removed and the two bones are fixed together with screws and/or plates to enable them to grow together  The main advantage of this procedure is that it is a permanent correction to reduce pain  The major disadvantage is that it restricts movement of the big toe, although most patients can still be active       Joint Resurfacing (Interpositional Arthroplasty)  For the patient with moderate to severe hallux rigidus who wants to avoid loss of motion, an interpositional arthroplasty may be an option  This procedure consists of taking away some of the damaged bone (similar to a cheilectomy) and placing a spacer between the two bones to minimize contact on either side of the joint  Interpositional arthroplasty is primarily performed in two ways  In one technique, a piece of soft tissue is used as the spacer in an attempt to resurface the joint  This soft tissue can come from your foot, another part of your body, or even prepared cadaver tissue  The operation does preserve some motion but is not as predictable for pain relief  Alternatively, another technique has been developed that uses a synthetic cartilage implant plug made out of polyvinyl alcohol as the spacer  The advantages of this procedure are that it requires less bone to be removed and it is much easier to convert to fusion if it fails  It also has shown to be as effective as fusion in relieving pain, while preserving motion of the joint  This is a newer procedure; however, current studies have demonstrated good results that appear likely to hold up over time  Joint Replacement (Arthroplasty)  Arthroplasty, or replacing one or both sides of the joint with metal or plastic parts, is intriguing due to the success of these procedures in the knee and hip  While there are studies that support this technique with particular implants, orthopaedic surgeons are cautious to recommend it at this time due to reports of higher complication rates and unpredictable short- and long-term results  Researchers continue to study this technique and types of implants  Consult with a foot and ankle orthopaedic surgeon for more information  How long is recovery after surgery? Recovery depends upon the type of surgery performed  For cheilectomy and interpositional arthroplasty, most surgeons recommend wearing a hard-soled sandal and allowing weightbearing as tolerated for about two weeks before a gradual return to normal footwear   For a fusion or procedure that cuts the bone, the foot may be immobilized with a cast for four to eight weeks, and limited weightbearing may be allowed with crutches for two to three months  You can expect some foot swelling, stiffness, and aching for several months after the procedure, depending on your level of activity  After recovery, most patients are able to exercise, run,and wear most shoes comfortably  Patients may still find stiff-soled, rocker bottom shoes more comfortable for exercise  What are the potential complications? When surgery is warranted, the typical risks of operation apply, including scarring, infection, and failure to relieve symptoms  However, these risks are very infrequent with the above procedures unless there are other factors such as cigarette use or a poor immune system  Frequently Asked Questions  What type of activity is allowed after fusion surgery? Most patients are able to return to their usual level of activity, although running and jumping will be more difficult for some patients  Wearing a heel higher than one and a half inches may be less comfortable  Stores which Specialize in [address] [place] [address] are good brands but I recommend going to a dedicate shoe store (not [facility] [place] or [employer] ) At these types of stores, they have experts that can fit you for shoes appropriate for your foot problem  [facility]  2700 [facility], [address]  [facility], 1600 [facility]  1100 [facility], [facility]5 [place]     [employer]  1101 [facility] [address], [place], [address] [place] [address] [place] [address]

## 2021-08-06 NOTE — TELEPHONE ENCOUNTER
We can provide her with a note that permits her to wear a sneaker while at work, but no disability paperwork necessary  This is a condition that has been present for years so not something that would require FMLA unless she were to need a surgery  As far as I am aware, flight attendants already wear sneakers  But the note is in

## 2021-08-06 NOTE — PROGRESS NOTES
VERONICA Gann  Attending, Orthopaedic Surgery  Foot and [address] [facility] [address] [facility]      ORTHOPAEDIC FOOT AND ANKLE CLINIC VISIT     Assessment:     Encounter Diagnoses   Name Primary?  Sesamoid pain Yes    Hallux rigidus of left foot             Plan:   · The patient verbalized understanding of exam findings and treatment plan  We engaged in the shared decision-making process and treatment options were discussed at length with the patient  Surgical and conservative management discussed today along with risks and benefits  · The patient has hallux rigidus as well as tibial sesamoiditis with associated callus  · WBAT LLE  · Provided recommendations for shoe stores to be fitted for supportive shoes  · Provided referral for custom orthotics  · NSAIDs and Tylenol as needed   · Follow up in 3 months   Return in about 3 months (around 11/6/2021)  History of Present Illness:   Chief Complaint:   Chief Complaint   Patient presents with    Left Foot - Pain     Lorna Schuler is a 54 y o  female who is being seen for left foot pain, Patient has a history of previous bunion correction at [facility] a few years ago, sine that time she has had persistent pain to the fall of her foot overlying the 1st and second metatarsal heads minimal radiating and described as sharp and moderate to severe  Patient denies numbness, tingling or radicular pain  Denies history of neuropathy  Patient does not smoke, does not have diabetes and does not take blood thinners  Patient denies family history of anesthesia complications and has not had any complications with anesthesia       Pain/symptom timing:  Worse during the day when active  Pain/symptom context:  Worse with activites and work  Pain/symptom modifying factors:  Rest makes better, activities make worse  Pain/symptom associated signs/symptoms: none    Prior treatment   · NSAIDsYes   · Injections No   · Bracing/Orthotics No    · Physical Therapy No     Orthopedic Surgical History:   As below/above    Past Medical, Surgical and Social History:  Past Medical History:  has a past medical history of Arthritis, Asthma, Disease of thyroid gland, Hypoparathyroidism ([facility] [address] ), Nephrolithiasis (7/22/2019), Osteoporosis, Partial symptompatic epilepsy, and Seizure disorder ([facility] )  Problem List: does not have any pertinent problems on file  Past Surgical History:  has a past surgical history that includes Appendectomy; Other surgical history; Tonsillectomy; Hysterectomy; Fracture surgery; and Sinus surgery  Family History: family history includes Alcohol abuse in her maternal uncle and mother; Migraines in her father; Seizures in her paternal aunt  Social History:  reports that she has never smoked  She has never used smokeless tobacco  She reports previous alcohol use  She reports previous drug use  Drug: Cocaine  Current Medications: has a current medication list which includes the following prescription(s): b complex vitamins, calcium-magnesium-vitamin d, cetirizine, cholecalciferol, diclofenac sodium, estradiol, famotidine, fluconazole, levothyroxine, levothyroxine, lidocaine, metaxalone, mometasone, multivitamin, saccharomyces boulardii, sumatriptan, synthroid, topiramate, tramadol, and zinc gluconate  Allergies: is allergic to morphine, codeine, morphine and related, sulfa antibiotics, and codeine       Review of Systems:  General- denies fever/chills  HEENT- denies hearing loss or sore throat  Eyes- denies eye pain or visual disturbances, denies red eyes  Respiratory- denies cough or SOB  Cardio- denies chest pain or palpitations  GI- denies abdominal pain  Endocrine- denies urinary frequency  Urinary- denies pain with urination  Musculoskeletal- Negative except noted above  Skin- denies rashes or wounds  Neurological- denies dizziness or headache  Psychiatric- denies anxiety or difficulty concentrating    Physical Exam:   There were no vitals taken for this visit  General/Constitutional: No apparent distress: well-nourished and well developed  Eyes: normal ocular motion  Cardio: RRR, Normal S1S2, No m/r/g  Lymphatic: No appreciable lymphadenopathy  Respiratory: Non-labored breathing, CTA b/l no w/c/r  Vascular: No edema, swelling or tenderness, except as noted in detailed exam   Integumentary: No impressive skin lesions present, except as noted in detailed exam   Neuro: No ataxia or tremors noted  Psych: Normal mood and affect, oriented to person, place and time  Appropriate affect  Musculoskeletal: Normal, except as noted in detailed exam and in HPI  Examination    Left    Gait Normal and Antalgic   Musculoskeletal Tender to palpation at the 1st and second metatarsal heads    Skin Normal with well healed incisions, callus overlying the 1st metatarsal head  Nails Normal    Range of Motion  20 degrees dorsiflexion, 40 degrees plantarflexion  Subtalar motion: normal    Stability Stable    Muscle Strength 5/5 tibialis anterior  5/5 gastrocnemius-soleus  5/5 posterior tibialis  5/5 peroneal/eversion strength  5/5 EHL  5/5 FHL    Neurologic Normal    Sensation Intact to light touch throughout sural, saphenous, superficial peroneal, deep peroneal and medial/lateral plantar nerve distributions  [facility] [address] 07 filament (10g) testing  deferred  Cardiovascular Brisk capillary refill < 2 seconds,intact DP and PT pulses    Special Tests None      Imaging Studies:   3 views of the left foot were taken, reviewed and interpreted independently that demonstrate proper position of her implanted hardware, there is some arthritic changes to the 1st MTP joint  Reviewed by me personally  Thurmond Jones Lachman, MD  Foot & Ankle Surgery   Department of [address] [place] [address]      I personally performed the service  Thurmond Jones Lachman, MD

## 2021-08-06 NOTE — TELEPHONE ENCOUNTER
Dr Lachman    Patient states her OVN do not match what was discussed in the office  She's very confused about who she is supposed to see for the orthotics, she said its a gentleman but doesn't have any info  She said she was never told she has to go to PT either  She is very frustrated because there was a lot not explained to her during her visit  During the call she figured it out  No callback is needed

## 2021-08-06 NOTE — TELEPHONE ENCOUNTER
The PT order is for the orthotic   The physical therapist is the person who makes and fits the orthotic

## 2021-08-12 ENCOUNTER — TELEPHONE (OUTPATIENT)
Dept: INTERNAL MEDICINE CLINIC | Facility: CLINIC | Age: 51
End: 2021-08-12

## 2021-08-13 ENCOUNTER — OFFICE VISIT (OUTPATIENT)
Dept: GASTROENTEROLOGY | Facility: CLINIC | Age: 51
End: 2021-08-13
Payer: COMMERCIAL

## 2021-08-13 VITALS
BODY MASS INDEX: 19.88 KG/M2 | HEIGHT: 62 IN | HEART RATE: 79 BPM | DIASTOLIC BLOOD PRESSURE: 72 MMHG | WEIGHT: 108 LBS | SYSTOLIC BLOOD PRESSURE: 124 MMHG | TEMPERATURE: 97.6 F

## 2021-08-13 DIAGNOSIS — R19.7 DIARRHEA, UNSPECIFIED TYPE: Primary | ICD-10-CM

## 2021-08-13 PROCEDURE — 1036F TOBACCO NON-USER: CPT | Performed by: INTERNAL MEDICINE

## 2021-08-13 PROCEDURE — 3008F BODY MASS INDEX DOCD: CPT | Performed by: INTERNAL MEDICINE

## 2021-08-13 PROCEDURE — 99213 OFFICE O/P EST LOW 20 MIN: CPT | Performed by: INTERNAL MEDICINE

## 2021-08-13 NOTE — LETTER
August 13, 2021     Jovanni Adri Lawson [facility] [address]    Patient: Lalitha Davis   YOB: 1970   Date of Visit: 8/13/2021       Dear Dr Sandra Arellano: Thank you for referring Stephen Fernandez to me for evaluation  Below are my notes for this consultation  If you have questions, please do not hesitate to call me  I look forward to following your patient along with you  Sincerely,        Hardik Casey MD        CC: No Recipients  Hardik Casey MD  8/13/2021  4:54 PM  Sign when Signing Visit  [facility]) [facility]  Lalitha Davis 54 y o  female MRN: [id number]            Assessment & Plan:     42-year-old female with mildly irregular stools somewhat unformed, reflux  1  GERD:  Relatively well controlled, patient does not have celiac but she does better on a gluten free diet   -continue famotidine once a day, take 2nd dose as needed   - discussed with her that she seems to be restricted by her diet, would recommend liberalizing her diet as tolerated    2  Unformed stools: Had endoscopy colonoscopy 2019 relatively unremarkable   - recommended trial of fiber, she notes that she became constipated with a trial of Metamucil  -can consider other fiber supplements, generally speaking unformed stools if she is having daily  Bowel movements can be monitored   - will have the patient follow up in 1 year as needed          Angelica Tirado was seen today for abdominal pain and soft stools  Diagnoses and all orders for this visit:    Diarrhea, unspecified type            _____________________________________________________________        CC: Follow-up    HPI:  Lalitha Davis is a 54 y  o female who is here for  Follow-up  This is a 42-year-old female, seen about a year ago when she initially presented with loose stools which occurred after likely infectious enteritis, she has a history of somewhat soft stools, occasional dyspeptic symptoms and occasional reflux  Recently call with epigastric pain was recommended to take famotidine 20 milligrams twice daily, she acknowledges she only takes this once a day  She has tried to modify her diet, restricting fats, spicy foods  She takes a 2nd dose only if needed  Her bowel movements are fairly regular, she does note that there is somewhat unformed at times but denies any melena or rectal bleeding  Recently had a salad had red pizza noticed red stools for 2 days afterwards  She notes occasional right lower quadrant left lower quadrant pain, this occurred once when getting up to urinate, notes that most of this is her joints  ROS:  The remainder of the ROS was negative except for the pertinent positives mentioned in HPI  Allergies: Morphine, Codeine, Morphine and related, Sulfa antibiotics, and Codeine    Medications:   Current Outpatient Medications:     B Complex Vitamins (VITAMIN B COMPLEX PO), Take 1 tablet by mouth daily  , Disp: , Rfl:     Calcium-Magnesium-Vitamin D (CITRACAL CALCIUM+D PO), Take 1 tablet by mouth daily  , Disp: , Rfl:     cetirizine (ZyrTEC) 10 mg tablet, Take 10 mg by mouth daily, Disp: , Rfl:     cholecalciferol (VITAMIN D3) 1,000 units tablet, Take 2,000 Units by mouth daily , Disp: , Rfl:     Diclofenac Sodium (Voltaren) 1 %, Voltaren 1 % topical gel  Apply 2 gram to affected area(s) by topical route 4 times per day, Disp: , Rfl:     ELDERBERRY PO, Take by mouth Daily every morning, Disp: , Rfl:     famotidine (PEPCID) 20 mg tablet, Take 1 tablet (20 mg total) by mouth 2 (two) times a day (Patient taking differently: Take 20 mg by mouth daily ), Disp: 60 tablet, Rfl: 5    fluconazole (DIFLUCAN) 150 mg tablet, As needed, Disp: , Rfl:     Lidocaine 4 % PTCH, Aspercreme (lidocaine) 4 % topical patch  as needed, Disp: , Rfl:     metaxalone (SKELAXIN) 800 mg tablet, Take 1 tablet (800 mg total) by mouth daily as needed for muscle spasms, Disp: 15 tablet, Rfl: 0    mometasone (NASONEX) 50 mcg/act nasal spray, SPRAY 2 SPRAYS INTO EACH NOSTRIL EVERY DAY, Disp: 3 g, Rfl: 1    Multiple Vitamins-Minerals (ELDERCAPS PO), Take by mouth, Disp: , Rfl:     saccharomyces boulardii (FLORASTOR) 250 mg capsule, Take 250 mg by mouth 2 (two) times a day Align in the morning, Disp: , Rfl:     SUMAtriptan (IMITREX) 100 mg tablet, Take 1 tablet (100 mg total) by mouth once as needed for migraine (takes 1/2 , if not resolved takes the other half ), Disp: 2 tablet, Rfl: 2    Synthroid 75 MCG tablet, TAKE 1 TABLET BY MOUTH EVERY DAY, Disp: 90 tablet, Rfl: 1    topiramate (TOPAMAX) 200 MG tablet, TAKE 1 TABLET TWICE A DAY, Disp: 180 tablet, Rfl: 2    zinc gluconate 50 mg tablet, Take 50 mg by mouth daily as needed, Disp: , Rfl:     estradiol (ESTRACE) 0 1 mg/g vaginal cream, Use 0 5 gm daily x 2 weeks then 0 5 gm twice weekly (Patient not taking: Reported on 8/13/2021), Disp: 42 5 g, Rfl: 1    levothyroxine (SYNTHROID) 88 mcg tablet, Take 88 mcg by mouth daily   (Patient not taking: Reported on 8/13/2021), Disp: , Rfl:     levothyroxine (Synthroid) 88 mcg tablet, 75 mcg  (Patient not taking: Reported on 8/13/2021), Disp: , Rfl:     multivitamin (THERAGRAN) TABS, Take 1 tablet by mouth daily (Patient not taking: Reported on 8/13/2021), Disp: , Rfl:     traMADol (ULTRAM) 50 mg tablet, Take 1 tablet (50 mg total) by mouth every 6 (six) hours as needed for moderate pain (Patient not taking: Reported on 4/23/2021), Disp: 15 tablet, Rfl: 0    Past Medical History:   Diagnosis Date    Arthritis     Asthma     Disease of thyroid gland     [facility]opar[facility]roidism ([facility] [address] )     Nephrolithiasis 7/22/2019    Osteoporosis     Partial symptompatic epilepsy     Seizure disorder ([facility] [address] )        Past Surgical History:   Procedure Laterality Date    APPENDECTOMY      FRACTURE SURGERY      HYSTERECTOMY      OTHER SURGICAL HISTORY      left foot, rt knee, tonselectomy    SINUS SURGERY      TONSILLECTOMY         Family History   Problem Relation Age of Onset    Alcohol abuse Mother     RUSSELL disease Mother    Sergio Whitfield Boles's esophagus Mother     Migraines Father     Pancreatic cancer Father     Alcohol abuse Maternal Uncle     Seizures Paternal Aunt         reports that she has never smoked  She has never used smokeless tobacco  She reports previous alcohol use  She reports previous drug use  Drug: Cocaine        Physical Exam:    /72 (BP Location: Right arm, Patient Position: Sitting, Cuff Size: Standard)   Pulse 79   Temp 97 6 °F (36 4 °C)   Ht 5' 2" (1 575 m)   Wt 49 kg (108 lb)   BMI 19 75 kg/m²     Gen: wn/wd, NAD , healthy-appearing female  HEENT: anicteric, MMM, no cervical LAD  CVS: RRR, no m/r/g  CHEST: CTA b/l  ABD: +BS, soft, NT,ND, no hepatosplenomegaly  EXT: no c/c/e  NEURO: aaox3  SKIN: NO rashes

## 2021-08-13 NOTE — PROGRESS NOTES
[facility]  Lauren Bee 54 y o  female MRN: [id number]            Assessment & Plan:     29-year-old female with mildly irregular stools somewhat unformed, reflux  1  GERD:  Relatively well controlled, patient does not have celiac but she does better on a gluten free diet   -continue famotidine once a day, take 2nd dose as needed   - discussed with her that she seems to be restricted by her diet, would recommend liberalizing her diet as tolerated    2  Unformed stools: Had endoscopy colonoscopy 2019 relatively unremarkable   - recommended trial of fiber, she notes that she became constipated with a trial of Metamucil  -can consider other fiber supplements, generally speaking unformed stools if she is having daily  Bowel movements can be monitored   - will have the patient follow up in 1 year as needed          Juan Lang was seen today for abdominal pain and soft stools  Diagnoses and all orders for this visit:    Diarrhea, unspecified type            _____________________________________________________________        CC: Follow-up    HPI:  Lauren Bee is a 54 y  o female who is here for  Follow-up  This is a 29-year-old female, seen about a year ago when she initially presented with loose stools which occurred after likely infectious enteritis, she has a history of somewhat soft stools, occasional dyspeptic symptoms and occasional reflux  Recently call with epigastric pain was recommended to take famotidine 20 milligrams twice daily, she acknowledges she only takes this once a day  She has tried to modify her diet, restricting fats, spicy foods  She takes a 2nd dose only if needed  Her bowel movements are fairly regular, she does note that there is somewhat unformed at times but denies any melena or rectal bleeding  Recently had a salad had red pizza noticed red stools for 2 days afterwards        She notes occasional right lower quadrant left lower quadrant pain, this occurred once when getting up to urinate, notes that most of this is her joints  ROS:  The remainder of the ROS was negative except for the pertinent positives mentioned in HPI  Allergies: Morphine, Codeine, Morphine and related, Sulfa antibiotics, and Codeine    Medications:   Current Outpatient Medications:     B Complex Vitamins (VITAMIN B COMPLEX PO), Take 1 tablet by mouth daily  , Disp: , Rfl:     Calcium-Magnesium-Vitamin D (CITRACAL CALCIUM+D PO), Take 1 tablet by mouth daily  , Disp: , Rfl:     cetirizine (ZyrTEC) 10 mg tablet, Take 10 mg by mouth daily, Disp: , Rfl:     cholecalciferol (VITAMIN D3) 1,000 units tablet, Take 2,000 Units by mouth daily , Disp: , Rfl:     Diclofenac Sodium (Voltaren) 1 %, Voltaren 1 % topical gel  Apply 2 gram to affected area(s) by topical route 4 times per day, Disp: , Rfl:     ELDERBERRY PO, Take by mouth Daily every morning, Disp: , Rfl:     famotidine (PEPCID) 20 mg tablet, Take 1 tablet (20 mg total) by mouth 2 (two) times a day (Patient taking differently: Take 20 mg by mouth daily ), Disp: 60 tablet, Rfl: 5    fluconazole (DIFLUCAN) 150 mg tablet, As needed, Disp: , Rfl:     Lidocaine 4 % PTCH, Aspercreme (lidocaine) 4 % topical patch  as needed, Disp: , Rfl:     metaxalone (SKELAXIN) 800 mg tablet, Take 1 tablet (800 mg total) by mouth daily as needed for muscle spasms, Disp: 15 tablet, Rfl: 0    mometasone (NASONEX) 50 mcg/act nasal spray, SPRAY 2 SPRAYS INTO EACH NOSTRIL EVERY DAY, Disp: 3 g, Rfl: 1    Multiple Vitamins-Minerals (ELDERCAPS PO), Take by mouth, Disp: , Rfl:     saccharomyces boulardii (FLORASTOR) 250 mg capsule, Take 250 mg by mouth 2 (two) times a day Align in the morning, Disp: , Rfl:     SUMAtriptan (IMITREX) 100 mg tablet, Take 1 tablet (100 mg total) by mouth once as needed for migraine (takes 1/2 , if not resolved takes the other half ), Disp: 2 tablet, Rfl: 2    Synthroid 75 MCG tablet, TAKE 1 TABLET BY MOUTH EVERY DAY, Disp: 90 tablet, Rfl: 1    topiramate (TOPAMAX) 200 MG tablet, TAKE 1 TABLET TWICE A DAY, Disp: 180 tablet, Rfl: 2    zinc gluconate 50 mg tablet, Take 50 mg by mouth daily as needed, Disp: , Rfl:     estradiol (ESTRACE) 0 1 mg/g vaginal cream, Use 0 5 gm daily x 2 weeks then 0 5 gm twice weekly (Patient not taking: Reported on 8/13/2021), Disp: 42 5 g, Rfl: 1    levothyroxine (SYNTHROID) 88 mcg tablet, Take 88 mcg by mouth daily  (Patient not taking: Reported on 8/13/2021), Disp: , Rfl:     levothyroxine (Synthroid) 88 mcg tablet, 75 mcg  (Patient not taking: Reported on 8/13/2021), Disp: , Rfl:     multivitamin (THERAGRAN) TABS, Take 1 tablet by mouth daily (Patient not taking: Reported on 8/13/2021), Disp: , Rfl:     traMADol (ULTRAM) 50 mg tablet, Take 1 tablet (50 mg total) by mouth every 6 (six) hours as needed for moderate pain (Patient not taking: Reported on 4/23/2021), Disp: 15 tablet, Rfl: 0    Past Medical History:   Diagnosis Date    Arthritis     Asthma     Disease of thyroid gland     [facility]oparathyroidism ([facility] [address] )     Nephrolithiasis 7/22/2019    Osteoporosis     Partial symptompatic epilepsy     Seizure disorder (HCC)        Past Surgical History:   Procedure Laterality Date    APPENDECTOMY      FRACTURE SURGERY      HYSTERECTOMY      OTHER SURGICAL HISTORY      left foot, rt knee, tonselectomy    SINUS SURGERY      TONSILLECTOMY         Family History   Problem Relation Age of Onset    Alcohol abuse Mother     RUSSELL disease Mother     Boles's esophagus Mother     Migraines Father     Pancreatic cancer Father     Alcohol abuse Maternal Uncle     Seizures Paternal Aunt         reports that she has never smoked  She has never used smokeless tobacco  She reports previous alcohol use  She reports previous drug use  Drug: Cocaine        Physical Exam:    /72 (BP Location: Right arm, Patient Position: Sitting, Cuff Size: Standard)   Pulse 79   Temp 97 6 °F (36 4 °C)   Ht 5' 2" (1 575 m)   Wt 49 kg (108 lb)   BMI 19 75 kg/m²     Gen: wn/wd, NAD , healthy-appearing female  HEENT: anicteric, MMM, no cervical LAD  CVS: RRR, no m/r/g  CHEST: CTA b/l  ABD: +BS, soft, NT,ND, no hepatosplenomegaly  EXT: no c/c/e  NEURO: aaox3  SKIN: NO rashes

## 2021-09-15 ENCOUNTER — EVALUATION (OUTPATIENT)
Dept: PHYSICAL THERAPY | Facility: CLINIC | Age: 51
End: 2021-09-15
Payer: COMMERCIAL

## 2021-09-15 ENCOUNTER — TELEPHONE (OUTPATIENT)
Dept: OBGYN CLINIC | Facility: CLINIC | Age: 51
End: 2021-09-15

## 2021-09-15 DIAGNOSIS — M20.22 HALLUX RIGIDUS OF LEFT FOOT: ICD-10-CM

## 2021-09-15 DIAGNOSIS — M25.80 SESAMOIDITIS: Primary | ICD-10-CM

## 2021-09-15 PROCEDURE — 97760 ORTHOTIC MGMT&TRAING 1ST ENC: CPT | Performed by: PHYSICAL THERAPIST

## 2021-09-15 PROCEDURE — 97161 PT EVAL LOW COMPLEX 20 MIN: CPT | Performed by: PHYSICAL THERAPIST

## 2021-09-15 NOTE — TELEPHONE ENCOUNTER
Dr/Lachman,       She called in wanting to advise she had her appt with PT today for her orthotics  They stated it going to take about 2-4 weeks for her to received orthotics  Patient wants to know if she should push out her follow appt in Nov 11/5 or should she keep it?        #: [phone]

## 2021-09-15 NOTE — PROGRESS NOTES
PT Evaluation     Today's date: 9/15/2021  Patient name: Ciara Vora  : 1970  MRN: [id number]  Referring provider: Bernarda Haywood MD  Dx:   Encounter Diagnosis     ICD-10-CM    1  Sesamoiditis  M25 80    2  Hallux rigidus of left foot  M20 22                   Assessment/Plan  Pt should benefit from custom orthotics  Goals:  Fit orthotics  IP in break in period  Assure good shoe fit    Return for one more visit in 2-4 weeks to fit orthotics  Subjective Evaluation    History of Present Illness  Mechanism of injury: Pt presents for custom orthotics due to chronic L foot pain  She states that she has been limping for about 2 yrs due to pain in ball of foot    Typical daily footwear: sneakers  Significant difficulty walking barefoot  Hx of bunionectomy L  Pain  At worst pain ratin    Treatments  Previous treatment: chiropractic        Objective  Antalgic gait  taniya prominence noted plantar aspect of 1st MTP  Spurring noted dorsum of R 1st MTP jt    Stands with L forefoot supinated, avoiding 1st MTP pressure on floor  Antalgic gait    R pes planus  Avoids full WB L; unable to assess RCSP    Ankle PROM wnl, tight gastroc  Hallux DF 60 deg L    TTP plantar aspect of L 1st MTP, MLA     Scanned for custom orthotics     Precautions: none      Manuals                                                                 Neuro Re-Ed                                                                                                        Ther Ex                                                                                                                     Ther Activity                                       Gait Training                                       Modalities

## 2021-09-29 DIAGNOSIS — E03.9 HYPOTHYROIDISM, UNSPECIFIED TYPE: ICD-10-CM

## 2021-09-29 RX ORDER — LEVOTHYROXINE SODIUM 75 MCG
TABLET ORAL
Qty: 90 TABLET | Refills: 1 | Status: SHIPPED | OUTPATIENT
Start: 2021-09-29 | End: 2021-10-01

## 2021-09-30 LAB — TSH SERPL-ACNC: 3.77 MIU/L

## 2021-10-01 ENCOUNTER — PATIENT OUTREACH (OUTPATIENT)
Dept: INTERNAL MEDICINE CLINIC | Facility: CLINIC | Age: 51
End: 2021-10-01

## 2021-10-01 ENCOUNTER — OFFICE VISIT (OUTPATIENT)
Dept: INTERNAL MEDICINE CLINIC | Facility: CLINIC | Age: 51
End: 2021-10-01
Payer: COMMERCIAL

## 2021-10-01 VITALS
HEIGHT: 62 IN | OXYGEN SATURATION: 98 % | WEIGHT: 112.2 LBS | DIASTOLIC BLOOD PRESSURE: 88 MMHG | BODY MASS INDEX: 20.65 KG/M2 | SYSTOLIC BLOOD PRESSURE: 138 MMHG | HEART RATE: 62 BPM

## 2021-10-01 DIAGNOSIS — R53.83 OTHER FATIGUE: Primary | ICD-10-CM

## 2021-10-01 DIAGNOSIS — Z60.9 HIGH RISK SOCIAL SITUATION: ICD-10-CM

## 2021-10-01 DIAGNOSIS — E03.9 HYPOTHYROIDISM, UNSPECIFIED TYPE: ICD-10-CM

## 2021-10-01 DIAGNOSIS — Z78.9 NEED FOR FOLLOW-UP BY SOCIAL WORKER: Primary | ICD-10-CM

## 2021-10-01 PROCEDURE — 99214 OFFICE O/P EST MOD 30 MIN: CPT | Performed by: INTERNAL MEDICINE

## 2021-10-01 RX ORDER — LEVOTHYROXINE SODIUM 0.07 MG/1
TABLET ORAL
COMMUNITY
End: 2022-03-25

## 2021-10-01 SDOH — SOCIAL STABILITY - SOCIAL INSECURITY: PROBLEM RELATED TO SOCIAL ENVIRONMENT, UNSPECIFIED: Z60.9

## 2021-10-03 PROBLEM — Z60.9 HIGH RISK SOCIAL SITUATION: Status: ACTIVE | Noted: 2021-10-03

## 2021-10-05 ENCOUNTER — TELEPHONE (OUTPATIENT)
Dept: OBGYN CLINIC | Facility: HOSPITAL | Age: 51
End: 2021-10-05

## 2021-10-06 ENCOUNTER — TELEPHONE (OUTPATIENT)
Dept: INTERNAL MEDICINE CLINIC | Facility: CLINIC | Age: 51
End: 2021-10-06

## 2021-10-07 ENCOUNTER — TELEPHONE (OUTPATIENT)
Dept: NEUROLOGY | Facility: CLINIC | Age: 51
End: 2021-10-07

## 2021-10-12 ENCOUNTER — TELEPHONE (OUTPATIENT)
Dept: NEUROLOGY | Facility: CLINIC | Age: 51
End: 2021-10-12

## 2021-10-18 ENCOUNTER — TELEPHONE (OUTPATIENT)
Dept: INTERNAL MEDICINE CLINIC | Facility: CLINIC | Age: 51
End: 2021-10-18

## 2021-10-18 ENCOUNTER — HOSPITAL ENCOUNTER (OUTPATIENT)
Dept: CT IMAGING | Facility: HOSPITAL | Age: 51
Discharge: HOME | End: 2021-10-18
Payer: COMMERCIAL

## 2021-10-18 ENCOUNTER — OFFICE VISIT (OUTPATIENT)
Dept: INTERNAL MEDICINE CLINIC | Facility: CLINIC | Age: 51
End: 2021-10-18
Payer: COMMERCIAL

## 2021-10-18 VITALS
BODY MASS INDEX: 20.54 KG/M2 | WEIGHT: 111.6 LBS | SYSTOLIC BLOOD PRESSURE: 144 MMHG | OXYGEN SATURATION: 100 % | HEART RATE: 75 BPM | HEIGHT: 62 IN | DIASTOLIC BLOOD PRESSURE: 98 MMHG

## 2021-10-18 DIAGNOSIS — R10.9 LEFT FLANK PAIN: ICD-10-CM

## 2021-10-18 DIAGNOSIS — R10.9 LEFT FLANK PAIN: Primary | ICD-10-CM

## 2021-10-18 LAB
BILIRUB UR QL STRIP: NEGATIVE
CLARITY UR: CLEAR
COLOR UR: YELLOW
GLUCOSE UR STRIP-MCNC: NEGATIVE MG/DL
HGB UR QL STRIP.AUTO: NEGATIVE
KETONES UR STRIP-MCNC: NEGATIVE MG/DL
LEUKOCYTE ESTERASE UR QL STRIP: NEGATIVE
NITRITE UR QL STRIP: NEGATIVE
PH UR STRIP.AUTO: 7 [PH]
PROT UR STRIP-MCNC: NEGATIVE MG/DL
SL AMB  POCT GLUCOSE, UA: ABNORMAL
SL AMB LEUKOCYTE ESTERASE,UA: ABNORMAL
SL AMB POCT BILIRUBIN,UA: ABNORMAL
SL AMB POCT BLOOD,UA: ABNORMAL
SL AMB POCT CLARITY,UA: CLEAR
SL AMB POCT COLOR,UA: YELLOW
SL AMB POCT KETONES,UA: ABNORMAL
SL AMB POCT NITRITE,UA: ABNORMAL
SL AMB POCT PH,UA: 7.5
SL AMB POCT SPECIFIC GRAVITY,UA: 1.01
SL AMB POCT URINE PROTEIN: ABNORMAL
SL AMB POCT UROBILINOGEN: ABNORMAL
SP GR UR STRIP.AUTO: 1.01 (ref 1–1.03)
UROBILINOGEN UR QL STRIP.AUTO: 0.2 E.U./DL

## 2021-10-18 PROCEDURE — 74176 CT ABD & PELVIS W/O CONTRAST: CPT

## 2021-10-18 PROCEDURE — 81002 URINALYSIS NONAUTO W/O SCOPE: CPT | Performed by: NURSE PRACTITIONER

## 2021-10-18 PROCEDURE — 99213 OFFICE O/P EST LOW 20 MIN: CPT | Performed by: NURSE PRACTITIONER

## 2021-10-18 PROCEDURE — 81003 URINALYSIS AUTO W/O SCOPE: CPT | Performed by: NURSE PRACTITIONER

## 2021-10-19 LAB
25(OH)D3 SERPL-MCNC: 35 NG/ML (ref 30–100)
ANA SER QL IF: NEGATIVE
B BURGDOR AB SER IA-ACNC: <0.9 INDEX
BASOPHILS # BLD AUTO: 72 CELLS/UL (ref 0–200)
BASOPHILS NFR BLD AUTO: 1.8 %
EOSINOPHIL # BLD AUTO: 200 CELLS/UL (ref 15–500)
EOSINOPHIL NFR BLD AUTO: 5 %
ERYTHROCYTE [DISTWIDTH] IN BLOOD BY AUTOMATED COUNT: 12.6 % (ref 11–15)
FERRITIN SERPL-MCNC: 45 NG/ML (ref 16–232)
HCT VFR BLD AUTO: 43.4 % (ref 35–45)
HGB BLD-MCNC: 14.3 G/DL (ref 11.7–15.5)
LYMPHOCYTES # BLD AUTO: 1720 CELLS/UL (ref 850–3900)
LYMPHOCYTES NFR BLD AUTO: 43 %
MCH RBC QN AUTO: 29.5 PG (ref 27–33)
MCHC RBC AUTO-ENTMCNC: 32.9 G/DL (ref 32–36)
MCV RBC AUTO: 89.7 FL (ref 80–100)
MONOCYTES # BLD AUTO: 632 CELLS/UL (ref 200–950)
MONOCYTES NFR BLD AUTO: 15.8 %
NEUTROPHILS # BLD AUTO: 1376 CELLS/UL (ref 1500–7800)
NEUTROPHILS NFR BLD AUTO: 34.4 %
PLATELET # BLD AUTO: 287 THOUSAND/UL (ref 140–400)
PMV BLD REES-ECKER: 9.9 FL (ref 7.5–12.5)
RBC # BLD AUTO: 4.84 MILLION/UL (ref 3.8–5.1)
VIT B12 SERPL-MCNC: 637 PG/ML (ref 200–1100)
WBC # BLD AUTO: 4 THOUSAND/UL (ref 3.8–10.8)

## 2021-10-21 ENCOUNTER — TELEPHONE (OUTPATIENT)
Dept: OTHER | Facility: OTHER | Age: 51
End: 2021-10-21

## 2021-10-22 ENCOUNTER — PATIENT OUTREACH (OUTPATIENT)
Dept: INTERNAL MEDICINE CLINIC | Facility: CLINIC | Age: 51
End: 2021-10-22

## 2021-10-22 ENCOUNTER — OFFICE VISIT (OUTPATIENT)
Dept: UROLOGY | Facility: AMBULATORY SURGERY CENTER | Age: 51
End: 2021-10-22
Payer: COMMERCIAL

## 2021-10-22 VITALS
BODY MASS INDEX: 20.24 KG/M2 | SYSTOLIC BLOOD PRESSURE: 112 MMHG | HEART RATE: 63 BPM | DIASTOLIC BLOOD PRESSURE: 70 MMHG | WEIGHT: 110 LBS | HEIGHT: 62 IN

## 2021-10-22 DIAGNOSIS — R10.9 LEFT FLANK PAIN: ICD-10-CM

## 2021-10-22 DIAGNOSIS — R35.0 URINARY FREQUENCY: Primary | ICD-10-CM

## 2021-10-22 DIAGNOSIS — R10.9 FLANK PAIN: ICD-10-CM

## 2021-10-22 DIAGNOSIS — R39.9 UTI SYMPTOMS: ICD-10-CM

## 2021-10-22 LAB
BACTERIA UR QL AUTO: ABNORMAL /HPF
BASOPHILS # BLD AUTO: 48 CELLS/UL (ref 0–200)
BASOPHILS NFR BLD AUTO: 1 %
BILIRUB UR QL STRIP: NEGATIVE
CLARITY UR: CLEAR
COLOR UR: YELLOW
EOSINOPHIL # BLD AUTO: 230 CELLS/UL (ref 15–500)
EOSINOPHIL NFR BLD AUTO: 4.8 %
ERYTHROCYTE [DISTWIDTH] IN BLOOD BY AUTOMATED COUNT: 12.8 % (ref 11–15)
GLUCOSE UR STRIP-MCNC: NEGATIVE MG/DL
HCT VFR BLD AUTO: 42.9 % (ref 35–45)
HGB BLD-MCNC: 14.3 G/DL (ref 11.7–15.5)
HGB UR QL STRIP.AUTO: ABNORMAL
HYALINE CASTS #/AREA URNS LPF: ABNORMAL /LPF
KETONES UR STRIP-MCNC: NEGATIVE MG/DL
LEUKOCYTE ESTERASE UR QL STRIP: NEGATIVE
LYMPHOCYTES # BLD AUTO: 2093 CELLS/UL (ref 850–3900)
LYMPHOCYTES NFR BLD AUTO: 43.6 %
MCH RBC QN AUTO: 30.4 PG (ref 27–33)
MCHC RBC AUTO-ENTMCNC: 33.3 G/DL (ref 32–36)
MCV RBC AUTO: 91.1 FL (ref 80–100)
MONOCYTES # BLD AUTO: 845 CELLS/UL (ref 200–950)
MONOCYTES NFR BLD AUTO: 17.6 %
NEUTROPHILS # BLD AUTO: 1584 CELLS/UL (ref 1500–7800)
NEUTROPHILS NFR BLD AUTO: 33 %
NITRITE UR QL STRIP: NEGATIVE
NON-SQ EPI CELLS URNS QL MICRO: ABNORMAL /HPF
PH UR STRIP.AUTO: 6.5 [PH]
PLATELET # BLD AUTO: 270 THOUSAND/UL (ref 140–400)
PMV BLD REES-ECKER: 10.1 FL (ref 7.5–12.5)
POST-VOID RESIDUAL VOLUME, ML POC: 289 ML
PROT UR STRIP-MCNC: NEGATIVE MG/DL
RBC # BLD AUTO: 4.71 MILLION/UL (ref 3.8–5.1)
RBC #/AREA URNS AUTO: ABNORMAL /HPF
SP GR UR STRIP.AUTO: 1 (ref 1–1.03)
UROBILINOGEN UR QL STRIP.AUTO: 0.2 E.U./DL
WBC # BLD AUTO: 4.8 THOUSAND/UL (ref 3.8–10.8)
WBC #/AREA URNS AUTO: ABNORMAL /HPF

## 2021-10-22 PROCEDURE — 51798 US URINE CAPACITY MEASURE: CPT | Performed by: NURSE PRACTITIONER

## 2021-10-22 PROCEDURE — 1036F TOBACCO NON-USER: CPT | Performed by: NURSE PRACTITIONER

## 2021-10-22 PROCEDURE — 99213 OFFICE O/P EST LOW 20 MIN: CPT | Performed by: NURSE PRACTITIONER

## 2021-10-22 PROCEDURE — 87086 URINE CULTURE/COLONY COUNT: CPT | Performed by: NURSE PRACTITIONER

## 2021-10-22 PROCEDURE — 3008F BODY MASS INDEX DOCD: CPT | Performed by: NURSE PRACTITIONER

## 2021-10-22 PROCEDURE — 81001 URINALYSIS AUTO W/SCOPE: CPT | Performed by: NURSE PRACTITIONER

## 2021-10-22 RX ORDER — CEPHALEXIN 500 MG/1
500 CAPSULE ORAL EVERY 12 HOURS SCHEDULED
Qty: 10 CAPSULE | Refills: 0 | Status: SHIPPED | OUTPATIENT
Start: 2021-10-22 | End: 2021-10-27

## 2021-10-23 LAB — BACTERIA UR CULT: NORMAL

## 2021-10-25 ENCOUNTER — TELEPHONE (OUTPATIENT)
Dept: UROLOGY | Facility: AMBULATORY SURGERY CENTER | Age: 51
End: 2021-10-25

## 2021-10-26 ENCOUNTER — HOSPITAL ENCOUNTER (OUTPATIENT)
Dept: RADIOLOGY | Age: 51
Discharge: HOME | End: 2021-10-26
Payer: COMMERCIAL

## 2021-10-26 DIAGNOSIS — R39.9 UTI SYMPTOMS: ICD-10-CM

## 2021-10-26 PROCEDURE — 76770 US EXAM ABDO BACK WALL COMP: CPT

## 2021-11-02 ENCOUNTER — OFFICE VISIT (OUTPATIENT)
Dept: PHYSICAL THERAPY | Facility: CLINIC | Age: 51
End: 2021-11-02
Payer: COMMERCIAL

## 2021-11-02 DIAGNOSIS — M25.80 SESAMOIDITIS: Primary | ICD-10-CM

## 2021-11-02 DIAGNOSIS — M20.22 HALLUX RIGIDUS OF LEFT FOOT: ICD-10-CM

## 2021-11-02 PROCEDURE — L3010 FOOT LONGITUDINAL ARCH SUPPO: HCPCS | Performed by: PHYSICAL THERAPIST

## 2021-11-08 ENCOUNTER — PATIENT OUTREACH (OUTPATIENT)
Dept: INTERNAL MEDICINE CLINIC | Facility: CLINIC | Age: 51
End: 2021-11-08

## 2021-11-09 ENCOUNTER — SOCIAL WORK (OUTPATIENT)
Dept: BEHAVIORAL/MENTAL HEALTH CLINIC | Facility: CLINIC | Age: 51
End: 2021-11-09
Payer: COMMERCIAL

## 2021-11-09 DIAGNOSIS — F43.9 STRESS: Primary | ICD-10-CM

## 2021-11-09 PROCEDURE — 90834 PSYTX W PT 45 MINUTES: CPT | Performed by: SOCIAL WORKER

## 2021-11-16 ENCOUNTER — PATIENT OUTREACH (OUTPATIENT)
Dept: INTERNAL MEDICINE CLINIC | Facility: CLINIC | Age: 51
End: 2021-11-16

## 2021-11-18 ENCOUNTER — OFFICE VISIT (OUTPATIENT)
Dept: INTERNAL MEDICINE CLINIC | Facility: CLINIC | Age: 51
End: 2021-11-18
Payer: COMMERCIAL

## 2021-11-18 VITALS
BODY MASS INDEX: 20.59 KG/M2 | OXYGEN SATURATION: 99 % | SYSTOLIC BLOOD PRESSURE: 138 MMHG | DIASTOLIC BLOOD PRESSURE: 88 MMHG | HEART RATE: 72 BPM | WEIGHT: 112.6 LBS

## 2021-11-18 DIAGNOSIS — R30.0 DYSURIA: ICD-10-CM

## 2021-11-18 DIAGNOSIS — R35.0 FREQUENT URINATION: Primary | ICD-10-CM

## 2021-11-18 DIAGNOSIS — Z12.4 SCREENING FOR CERVICAL CANCER: ICD-10-CM

## 2021-11-18 LAB
BILIRUB UR QL STRIP: NEGATIVE
CLARITY UR: CLEAR
COLOR UR: YELLOW
GLUCOSE UR STRIP-MCNC: NEGATIVE MG/DL
HGB UR QL STRIP.AUTO: NEGATIVE
KETONES UR STRIP-MCNC: NEGATIVE MG/DL
LEUKOCYTE ESTERASE UR QL STRIP: NEGATIVE
NITRITE UR QL STRIP: NEGATIVE
PH UR STRIP.AUTO: 7.5 [PH]
PROT UR STRIP-MCNC: NEGATIVE MG/DL
SP GR UR STRIP.AUTO: 1.01 (ref 1–1.03)
UROBILINOGEN UR QL STRIP.AUTO: 0.2 E.U./DL

## 2021-11-18 PROCEDURE — 81002 URINALYSIS NONAUTO W/O SCOPE: CPT | Performed by: NURSE PRACTITIONER

## 2021-11-18 PROCEDURE — 81003 URINALYSIS AUTO W/O SCOPE: CPT | Performed by: NURSE PRACTITIONER

## 2021-11-18 PROCEDURE — 99213 OFFICE O/P EST LOW 20 MIN: CPT | Performed by: NURSE PRACTITIONER

## 2021-11-18 RX ORDER — NITROFURANTOIN 25; 75 MG/1; MG/1
100 CAPSULE ORAL 2 TIMES DAILY
Qty: 10 CAPSULE | Refills: 0 | Status: SHIPPED | OUTPATIENT
Start: 2021-11-18 | End: 2021-11-23

## 2021-11-19 ENCOUNTER — PATIENT OUTREACH (OUTPATIENT)
Dept: INTERNAL MEDICINE CLINIC | Facility: CLINIC | Age: 51
End: 2021-11-19

## 2021-11-22 ENCOUNTER — PATIENT OUTREACH (OUTPATIENT)
Dept: INTERNAL MEDICINE CLINIC | Facility: CLINIC | Age: 51
End: 2021-11-22

## 2021-11-23 ENCOUNTER — PATIENT OUTREACH (OUTPATIENT)
Dept: INTERNAL MEDICINE CLINIC | Facility: CLINIC | Age: 51
End: 2021-11-23

## 2021-11-26 ENCOUNTER — PATIENT OUTREACH (OUTPATIENT)
Dept: INTERNAL MEDICINE CLINIC | Facility: CLINIC | Age: 51
End: 2021-11-26

## 2021-11-26 ENCOUNTER — TELEPHONE (OUTPATIENT)
Dept: INTERNAL MEDICINE CLINIC | Facility: CLINIC | Age: 51
End: 2021-11-26

## 2021-11-26 PROBLEM — R30.0 DYSURIA: Status: ACTIVE | Noted: 2021-11-26

## 2021-11-26 LAB
SL AMB  POCT GLUCOSE, UA: NORMAL
SL AMB LEUKOCYTE ESTERASE,UA: NORMAL
SL AMB POCT BILIRUBIN,UA: NORMAL
SL AMB POCT BLOOD,UA: NORMAL
SL AMB POCT CLARITY,UA: CLEAR
SL AMB POCT COLOR,UA: YELLOW
SL AMB POCT KETONES,UA: NORMAL
SL AMB POCT NITRITE,UA: NORMAL
SL AMB POCT PH,UA: NORMAL
SL AMB POCT SPECIFIC GRAVITY,UA: NORMAL
SL AMB POCT URINE PROTEIN: NORMAL
SL AMB POCT UROBILINOGEN: NORMAL

## 2021-12-01 DIAGNOSIS — G40.209 PARTIAL SYMPTOMATIC EPILEPSY WITH COMPLEX PARTIAL SEIZURES, NOT INTRACTABLE, WITHOUT STATUS EPILEPTICUS (HCC): ICD-10-CM

## 2021-12-01 DIAGNOSIS — G43.909 MIGRAINE WITHOUT STATUS MIGRAINOSUS, NOT INTRACTABLE, UNSPECIFIED MIGRAINE TYPE: ICD-10-CM

## 2021-12-06 ENCOUNTER — PATIENT OUTREACH (OUTPATIENT)
Dept: INTERNAL MEDICINE CLINIC | Facility: CLINIC | Age: 51
End: 2021-12-06

## 2021-12-06 ENCOUNTER — APPOINTMENT (EMERGENCY)
Dept: RADIOLOGY | Facility: HOSPITAL | Age: 51
End: 2021-12-06
Payer: OTHER MISCELLANEOUS

## 2021-12-06 ENCOUNTER — HOSPITAL ENCOUNTER (EMERGENCY)
Facility: HOSPITAL | Age: 51
Discharge: HOME | End: 2021-12-06
Attending: EMERGENCY MEDICINE | Admitting: EMERGENCY MEDICINE
Payer: OTHER MISCELLANEOUS

## 2021-12-06 VITALS
OXYGEN SATURATION: 96 % | TEMPERATURE: 98.6 F | WEIGHT: 114.64 LBS | HEART RATE: 88 BPM | SYSTOLIC BLOOD PRESSURE: 177 MMHG | BODY MASS INDEX: 21.1 KG/M2 | RESPIRATION RATE: 16 BRPM | DIASTOLIC BLOOD PRESSURE: 99 MMHG | HEIGHT: 62 IN

## 2021-12-06 DIAGNOSIS — M25.531 RIGHT WRIST PAIN: Primary | ICD-10-CM

## 2021-12-06 DIAGNOSIS — S63.501A SPRAIN OF RIGHT WRIST, INITIAL ENCOUNTER: ICD-10-CM

## 2021-12-06 PROCEDURE — 99283 EMERGENCY DEPT VISIT LOW MDM: CPT

## 2021-12-06 PROCEDURE — 99284 EMERGENCY DEPT VISIT MOD MDM: CPT | Performed by: EMERGENCY MEDICINE

## 2021-12-06 PROCEDURE — 73110 X-RAY EXAM OF WRIST: CPT

## 2021-12-06 RX ORDER — ACETAMINOPHEN 325 MG/1
650 TABLET ORAL ONCE
Status: COMPLETED | OUTPATIENT
Start: 2021-12-06 | End: 2021-12-06

## 2021-12-06 RX ADMIN — ACETAMINOPHEN 650 MG: 325 TABLET, FILM COATED ORAL at 15:00

## 2021-12-09 ENCOUNTER — PATIENT OUTREACH (OUTPATIENT)
Dept: INTERNAL MEDICINE CLINIC | Facility: CLINIC | Age: 51
End: 2021-12-09

## 2021-12-12 ENCOUNTER — TELEPHONE (OUTPATIENT)
Dept: OBGYN CLINIC | Facility: HOSPITAL | Age: 51
End: 2021-12-12

## 2021-12-15 ENCOUNTER — OFFICE VISIT (OUTPATIENT)
Dept: OBGYN CLINIC | Facility: HOSPITAL | Age: 51
End: 2021-12-15
Attending: EMERGENCY MEDICINE
Payer: OTHER MISCELLANEOUS

## 2021-12-15 VITALS
SYSTOLIC BLOOD PRESSURE: 138 MMHG | HEART RATE: 71 BPM | HEIGHT: 62 IN | BODY MASS INDEX: 20.61 KG/M2 | DIASTOLIC BLOOD PRESSURE: 93 MMHG | WEIGHT: 112 LBS

## 2021-12-15 DIAGNOSIS — R29.898 RIGHT HAND WEAKNESS: ICD-10-CM

## 2021-12-15 DIAGNOSIS — M25.531 RIGHT WRIST PAIN: ICD-10-CM

## 2021-12-15 DIAGNOSIS — S63.501A WRIST SPRAIN, RIGHT, INITIAL ENCOUNTER: Primary | ICD-10-CM

## 2021-12-15 PROCEDURE — 99213 OFFICE O/P EST LOW 20 MIN: CPT | Performed by: PHYSICIAN ASSISTANT

## 2021-12-15 PROCEDURE — 3008F BODY MASS INDEX DOCD: CPT | Performed by: NURSE PRACTITIONER

## 2021-12-16 ENCOUNTER — TELEPHONE (OUTPATIENT)
Dept: OBGYN CLINIC | Facility: HOSPITAL | Age: 51
End: 2021-12-16

## 2021-12-17 ENCOUNTER — PATIENT OUTREACH (OUTPATIENT)
Dept: INTERNAL MEDICINE CLINIC | Facility: CLINIC | Age: 51
End: 2021-12-17

## 2021-12-23 ENCOUNTER — PATIENT OUTREACH (OUTPATIENT)
Dept: INTERNAL MEDICINE CLINIC | Facility: CLINIC | Age: 51
End: 2021-12-23

## 2022-01-07 ENCOUNTER — TELEPHONE (OUTPATIENT)
Dept: OTHER | Facility: OTHER | Age: 52
End: 2022-01-07

## 2022-01-07 ENCOUNTER — TELEPHONE (OUTPATIENT)
Dept: NEUROLOGY | Facility: CLINIC | Age: 52
End: 2022-01-07

## 2022-01-07 ENCOUNTER — OFFICE VISIT (OUTPATIENT)
Dept: OBGYN CLINIC | Facility: OTHER | Age: 52
End: 2022-01-07
Payer: OTHER MISCELLANEOUS

## 2022-01-07 VITALS
DIASTOLIC BLOOD PRESSURE: 91 MMHG | HEART RATE: 68 BPM | WEIGHT: 116 LBS | SYSTOLIC BLOOD PRESSURE: 123 MMHG | HEIGHT: 62 IN | BODY MASS INDEX: 21.35 KG/M2

## 2022-01-07 DIAGNOSIS — M25.531 PAIN IN RIGHT WRIST: Primary | ICD-10-CM

## 2022-01-07 PROCEDURE — 99213 OFFICE O/P EST LOW 20 MIN: CPT | Performed by: ORTHOPAEDIC SURGERY

## 2022-01-07 NOTE — TELEPHONE ENCOUNTER
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

## 2022-01-07 NOTE — TELEPHONE ENCOUNTER
Patient called this morning unable to make her 8am appointment due to snow and not being plowed out yet  Patient has appointment due to [facility] comp, and wanted to see when she could next be seen

## 2022-01-07 NOTE — PROGRESS NOTES
Chief Complaint: right wrist pain    HPI:    Michael Loya is a 54year old Female who presents today for new evaluation and treatment of right wrist pain referred by Brain Ropp MARILIN    Injury related: Yes, fall on an outstretched hand on 12/1/2021    Description of symptoms:  Patient presents with ongoing right wrist pain after fall to an outstretched hand  Date of injury 12/01/2021  She has an [employer] for noted a lined and states while walking out of the employee balls she had a fall landing onto her right hand  She does not recall tripping for slipping  She states she began having dorsal wrist pain thereafter  She was seen by [facility] and had x-rays which showed no acute fracture and she was placed in a wrist splint  She states pain persisted so she went to [facility] for further evaluation, there she was referred to Dr Jovana Fong, she had another x-ray which showed no fracture and she was placed in a wrist brace and advised to follow-up with sports medicine  Summary of treatment to-date:  Physical therapy with mild improvement  Bracing: wrist brace    I have personally reviewed pertinent films in PACS      Patient Active Problem List   Diagnosis    Hypothyroidism    Migraine headache    Partial symptomatic epilepsy with complex partial seizures, not intractable, without status epilepticus ([facility] [address] )    New onset headache    Cervicalgia    Nephrolithiasis    Chronic left lumbar radiculopathy    Diarrhea    Seizures (HCC)    Left flank pain    Fatigue    Screening for cardiovascular condition    Other hyperlipidemia    Vitamin D deficiency    Acute non-recurrent frontal sinusitis    Frequent urination    Hepatitis C virus infection without hepatic coma    Annual physical exam    Muscle cramps    Screening for HIV (human immunodeficiency virus)    Screening for cervical cancer    Snoring    Metatarsalgia of left foot    Epigastric pain    Steatorrhea    High risk social situation    Dysuria        Current Outpatient Medications on File Prior to Visit   Medication Sig Dispense Refill    ELDERBERRY PO Take by mouth Daily every morning      famotidine (PEPCID) 20 mg tablet Take 1 tablet (20 mg total) by mouth 2 (two) times a day (Patient taking differently: Take 20 mg by mouth daily ) 60 tablet 5    levothyroxine (Synthroid) 75 mcg tablet Synthroid 75 mcg tablet   TAKE 1 TABLET BY MOUTH EVERY DAY      topiramate (TOPAMAX) 200 MG tablet Take 1 tablet by mouth twice per day  180 tablet 3    Calcium-Magnesium-Vitamin D (CITRACAL CALCIUM+D PO) Take 1 tablet by mouth daily  (Patient not taking: Reported on 11/18/2021 )      cholecalciferol (VITAMIN D3) 1,000 units tablet Take 2,000 Units by mouth daily  (Patient not taking: Reported on 11/18/2021 )      SUMAtriptan (IMITREX) 100 mg tablet Take 1 tablet (100 mg total) by mouth once as needed for migraine (takes 1/2 , if not resolved takes the other half ) (Patient not taking: Reported on 10/18/2021) 2 tablet 2    zinc gluconate 50 mg tablet Take 50 mg by mouth daily as needed (Patient not taking: Reported on 11/18/2021 )       No current facility-administered medications on file prior to visit  Allergies   Allergen Reactions    Morphine Other (See Comments)    Codeine Hives    Morphine And Related     Codeine Itching    Sulfa Antibiotics Rash        Tobacco Use: Low Risk     Smoking Tobacco Use: Never Smoker    Smokeless Tobacco Use: Never Used        Social Determinants of Health     Tobacco Use: Low Risk     Smoking Tobacco Use: Never Smoker    Smokeless Tobacco Use: Never Used   Alcohol Use: Not on file   Financial Resource Strain: Not on file   Food Insecurity: No Food Insecurity    Worried About Running Out of Food in the Last Year: Never true    Mindy of Food in the Last Year: Never true   Transportation Needs: No Transportation Needs    Lack of Transportation (Medical):  No    Lack of Transportation (Non-Medical): No   Physical Activity: Not on file   Stress: Not on file   Social Connections: Not on file   Intimate Partner Violence: Not on file   Depression: Not at risk    PHQ-2 Score: 0   Housing Stability: Not on file               Review of Systems   Constitutional: Negative for chills and fever  HENT: Negative for ear pain and sore throat  Eyes: Negative for pain and visual disturbance  Respiratory: Negative for cough and shortness of breath  Cardiovascular: Negative for chest pain and palpitations  Gastrointestinal: Negative for abdominal pain and vomiting  Genitourinary: Negative for dysuria and hematuria  Musculoskeletal: Negative for arthralgias and back pain  Skin: Negative for color change and rash  Neurological: Negative for seizures and syncope  All other systems reviewed and are negative  Body mass index is 21 22 kg/m²  Physical Exam  Vitals and nursing note reviewed  Constitutional:       General: She is not in acute distress  Appearance: She is well-developed  HENT:      Head: Normocephalic and atraumatic  Eyes:      Conjunctiva/sclera: Conjunctivae normal    Cardiovascular:      Rate and Rhythm: Normal rate and regular rhythm  Heart sounds: No murmur heard  Pulmonary:      Effort: Pulmonary effort is normal  No respiratory distress  Breath sounds: Normal breath sounds  Abdominal:      Palpations: Abdomen is soft  Tenderness: There is no abdominal tenderness  Musculoskeletal:      Cervical back: Neck supple  Skin:     General: Skin is warm and dry  Neurological:      Mental Status: She is alert  Ortho Exam:    Body part: right wrist    Inspection:  No deformity or swelling noted  Palpation:  Mild tenderness to palpation at volar scaphoid tubercle  Nontender over distal radioulnar joint  Range of motion:  Full wrist extension, volar flexion reproduces dorsal wrist pain    There is increased discomfort with resisted wrist volar flexion which reproduces patient's symptoms  Palpable click and volar surface of metacarpal phalangeal joint of the thumb with wrist flexion and extension  Special Tests:  Negative piano key sign  No discomfort on scapholunate Yeboah test   Negative TFCC load and right  Negative Tinel's test    Distal Neurovascular Status: Intact, Yes    Procedures       Assessment:     Diagnosis ICD-10-CM Associated Orders   1  Pain in right wrist  M25 531 MRI wrist right wo contrast        Plan:    Discussed clinical findings and exam with Eddie Biswas  We also reviewed the radiologic imaging  She does appear to have a trigger thumb on exam but this does not appear to be the cause of her wrist pain  Differentials include wrist flexor tendinitis versus occult scaphoid fracture  Will obtain an MRI for further evaluation of her injury  She may return to work with a wrist brace  Will follow up with her after MRI is obtained  Follow-Up:    After MRI        Portions of the record may have been created with voice recognition software  Occasional wrong word or "sound alike" substitutions may have occurred due to the inherent limitations of voice recognition software  Please review the chart carefully and recognize, using context, where substitutions/typographical errors may have occurred

## 2022-01-07 NOTE — LETTER
January 7, 2022     Patient: Mayda Barth   YOB: 1970   Date of Visit: 1/7/2022       To Whom it May Concern:    Susan Segura is under my professional care  She was seen in my office on 1/7/2022  She may return to work with wrist brace on  If you have any questions or concerns, please don't hesitate to call  Sincerely,          Eve Fine MD        CC: Barak Louise

## 2022-01-20 ENCOUNTER — PATIENT OUTREACH (OUTPATIENT)
Dept: INTERNAL MEDICINE CLINIC | Facility: CLINIC | Age: 52
End: 2022-01-20

## 2022-01-20 NOTE — PROGRESS NOTES
Outreached patient to check status  Patient stated she received notification through [address] stating she is due for Annual Well Visit with PCP on 4/15/2022  Patient has PCP appt scheduled for 4/8/2022  Encouraged patient to call PCP office to confirm date of appt/insurance coverage for Annual Well Visit  Patient stated she is doing well  She has upcoming Ortho appt on 1/25 to discuss wrist and results from xrays  Patient would like to return to work but states certain tasks still give her discomfort  Patient will discuss this with Ortho  Patient has been avoiding her neighbor, which is working for her  Patient had appt with Dr May Chavis; has cut out meat and carbonated drinks from diet  Since this change, her overactive bladder issues have resolved  Patient has been eating nuts and plant-based meat occasionally  No other needs at this time  Will route to [facility] and close case at this time

## 2022-01-25 ENCOUNTER — TELEPHONE (OUTPATIENT)
Dept: OBGYN CLINIC | Facility: HOSPITAL | Age: 52
End: 2022-01-25

## 2022-01-25 ENCOUNTER — OFFICE VISIT (OUTPATIENT)
Dept: OBGYN CLINIC | Facility: OTHER | Age: 52
End: 2022-01-25
Payer: OTHER MISCELLANEOUS

## 2022-01-25 VITALS
BODY MASS INDEX: 21.94 KG/M2 | WEIGHT: 119.2 LBS | HEART RATE: 86 BPM | SYSTOLIC BLOOD PRESSURE: 134 MMHG | HEIGHT: 62 IN | DIASTOLIC BLOOD PRESSURE: 85 MMHG

## 2022-01-25 DIAGNOSIS — S63.501D WRIST SPRAIN, RIGHT, SUBSEQUENT ENCOUNTER: Primary | ICD-10-CM

## 2022-01-25 PROCEDURE — 99213 OFFICE O/P EST LOW 20 MIN: CPT | Performed by: ORTHOPAEDIC SURGERY

## 2022-01-25 NOTE — TELEPHONE ENCOUNTER
Steven Kramer is calling from [place] for the [address] from today's visit    Faxed, as requested to [phone]

## 2022-01-25 NOTE — LETTER
January 25, 2022     Patient: Yolette Mi   YOB: 1970   Date of Visit: 1/25/2022       To Whom it May Concern:    Kindra Foots is under my professional care  She was seen in my office on 1/25/2022  She may return to work on 01/25/2022 without restriction       If you have any questions or concerns, please don't hesitate to call  Sincerely,          Ryan Espana MD        CC: Raul Gomez   [facility]

## 2022-01-25 NOTE — PROGRESS NOTES
Chief Complaint: right wrist pain follow up    HPI:    Miroslava Greer is a 50year old Female who presents today for follow-up of right wrist pain    Description of symptoms:  Patient was earlier seen in this regard on 1/7/2022  Has a history of fall on right outstretched hand on 12/1/2021  Had persistent pain of the right wrist on clinical assessment despite physical therapy and wearing a wrist brace  Hence, an MRI of the right wrist was obtained  This was performed externally on 1/13/2022 and has not revealed any acute osseous or soft tissue injury of the right wrist     Today, the patient reports that her right wrist pain has significantly improved and she only has some mild intermittent discomfort in a generalized fashion in the right wrist after doing repetitive wrist motion  Denies any new injury  Patient Active Problem List   Diagnosis    Hypothyroidism    Migraine headache    Partial symptomatic epilepsy with complex partial seizures, not intractable, without status epilepticus ([facility] )    New onset headache    Cervicalgia    Nephrolithiasis    Chronic left lumbar radiculopathy    Diarrhea    Seizures (HCC)    Left flank pain    Fatigue    Screening for cardiovascular condition    Other hyperlipidemia    Vitamin D deficiency    Acute non-recurrent frontal sinusitis    Frequent urination    Hepatitis C virus infection without hepatic coma    Annual physical exam    Muscle cramps    Screening for HIV (human immunodeficiency virus)    Screening for cervical cancer    Snoring    Metatarsalgia of left foot    Epigastric pain    Steatorrhea    High risk social situation    Dysuria        Current Outpatient Medications on File Prior to Visit   Medication Sig Dispense Refill    Calcium-Magnesium-Vitamin D (CITRACAL CALCIUM+D PO) Take 1 tablet by mouth daily   (Patient not taking: Reported on 11/18/2021 )      cholecalciferol (VITAMIN D3) 1,000 units tablet Take 2,000 Units by mouth daily  (Patient not taking: Reported on 11/18/2021 )      ELDERBERRY PO Take by mouth Daily every morning      famotidine (PEPCID) 20 mg tablet Take 1 tablet (20 mg total) by mouth 2 (two) times a day (Patient taking differently: Take 20 mg by mouth daily ) 60 tablet 5    levothyroxine (Synthroid) 75 mcg tablet Synthroid 75 mcg tablet   TAKE 1 TABLET BY MOUTH EVERY DAY      SUMAtriptan (IMITREX) 100 mg tablet Take 1 tablet (100 mg total) by mouth once as needed for migraine (takes 1/2 , if not resolved takes the other half ) (Patient not taking: Reported on 10/18/2021) 2 tablet 2    topiramate (TOPAMAX) 200 MG tablet Take 1 tablet by mouth twice per day  180 tablet 3    zinc gluconate 50 mg tablet Take 50 mg by mouth daily as needed (Patient not taking: Reported on 11/18/2021 )       No current facility-administered medications on file prior to visit  Allergies   Allergen Reactions    Morphine Other (See Comments)    Codeine Hives    Morphine And Related     Codeine Itching    Sulfa Antibiotics Rash        Tobacco Use: Low Risk     Smoking Tobacco Use: Never Smoker    Smokeless Tobacco Use: Never Used        Social Determinants of Health     Tobacco Use: Low Risk     Smoking Tobacco Use: Never Smoker    Smokeless Tobacco Use: Never Used   Alcohol Use: Not on file   Financial Resource Strain: Not on file   Food Insecurity: No Food Insecurity    Worried About Running Out of Food in the Last Year: Never true    Mindy of Food in the Last Year: Never true   Transportation Needs: No Transportation Needs    Lack of Transportation (Medical): No    Lack of Transportation (Non-Medical): No   Physical Activity: Not on file   Stress: Not on file   Social Connections: Not on file   Intimate Partner Violence: Not on file   Depression: Not at risk    PHQ-2 Score: 0   Housing Stability: Not on file               Review of Systems   Constitutional: Negative  HENT: Negative  Eyes: Negative  Respiratory: Negative  Cardiovascular: Negative  Gastrointestinal: Negative  Endocrine: Negative  Genitourinary: Negative  Skin: Negative  Allergic/Immunologic: Negative  Neurological: Negative  Hematological: Negative  Psychiatric/Behavioral: Negative  There is no height or weight on file to calculate BMI  Physical Exam  Vitals and nursing note reviewed  Constitutional:       General: She is not in acute distress  Appearance: She is well-developed  HENT:      Head: Normocephalic and atraumatic  Eyes:      Conjunctiva/sclera: Conjunctivae normal    Cardiovascular:      Rate and Rhythm: Normal rate and regular rhythm  Heart sounds: No murmur heard  Pulmonary:      Effort: Pulmonary effort is normal  No respiratory distress  Breath sounds: Normal breath sounds  Abdominal:      Palpations: Abdomen is soft  Tenderness: There is no abdominal tenderness  Musculoskeletal:      Cervical back: Neck supple  Skin:     General: Skin is warm and dry  Neurological:      Mental Status: She is alert  Ortho Exam:     Body part: right wrist    Inspection:  No swelling or deformity  No atrophy  Palpation:  No areas of bony or joint line tenderness  Range of motion:  Full range of right wrist motion in all directions without discomfort      Special Tests:  Negative TFCC compression test   Negative piano key sign  No discomfort over the anatomical snuffbox  Negative Finkelstein's  Good right hand  strength  Distal Neurovascular Status: Intact, Yes    Procedures       Assessment:     Diagnosis ICD-10-CM Associated Orders   1  Wrist sprain, right, subsequent encounter  S63 [id number]         Plan:    Explained my current clinical findings and reviewed right wrist MRI findings with Radha Storey today  This does not reveal any acute osseous or soft tissue injury    I have advised her to continue with home-based right wrist range of motion and strengthening exercises  She may return back to all her work duties at this time  Follow-up as needed  Portions of the record may have been created with voice recognition software  Occasional wrong word or "sound alike" substitutions may have occurred due to the inherent limitations of voice recognition software  Please review the chart carefully and recognize, using context, where substitutions/typographical errors may have occurred

## 2022-02-03 ENCOUNTER — TELEPHONE (OUTPATIENT)
Dept: NEUROLOGY | Facility: CLINIC | Age: 52
End: 2022-02-03

## 2022-03-14 ENCOUNTER — TELEPHONE (OUTPATIENT)
Dept: INTERNAL MEDICINE CLINIC | Facility: CLINIC | Age: 52
End: 2022-03-14

## 2022-03-14 NOTE — TELEPHONE ENCOUNTER
Patient is going to have labs done tomorrow morning and asked if you would order TSH, 3rd generation, T4, free? She noticed weight gain and increased tiredness  She has many changes going on, she quit eating meat in December       Once orders in she asked that we fax to [address] [place] [address] at [facility]   ([phone])

## 2022-03-21 LAB
T4 FREE SERPL-MCNC: 1.2 NG/DL (ref 0.8–1.8)
TSH SERPL-ACNC: 2.06 MIU/L

## 2022-03-25 DIAGNOSIS — E03.9 HYPOTHYROIDISM, UNSPECIFIED: ICD-10-CM

## 2022-03-25 RX ORDER — LEVOTHYROXINE SODIUM 75 MCG
TABLET ORAL
Qty: 90 TABLET | Refills: 1 | Status: SHIPPED | OUTPATIENT
Start: 2022-03-25 | End: 2022-04-28

## 2022-04-02 ENCOUNTER — RA CDI HCC (OUTPATIENT)
Dept: OTHER | Facility: HOSPITAL | Age: 52
End: 2022-04-02

## 2022-04-03 NOTE — PROGRESS NOTES
[facility]  coding opportunities       Chart reviewed, no opportunity found: CHART REVIEWED, NO OPPORTUNITY FOUND        Patients Insurance        Commercial Insurance: [employer]

## 2022-04-08 ENCOUNTER — OFFICE VISIT (OUTPATIENT)
Dept: INTERNAL MEDICINE CLINIC | Facility: CLINIC | Age: 52
End: 2022-04-08
Payer: COMMERCIAL

## 2022-04-08 VITALS
BODY MASS INDEX: 21.77 KG/M2 | OXYGEN SATURATION: 98 % | SYSTOLIC BLOOD PRESSURE: 132 MMHG | HEART RATE: 66 BPM | DIASTOLIC BLOOD PRESSURE: 90 MMHG | WEIGHT: 119 LBS | TEMPERATURE: 97.2 F

## 2022-04-08 DIAGNOSIS — R23.8 EASY BRUISING: ICD-10-CM

## 2022-04-08 DIAGNOSIS — Z12.31 ENCOUNTER FOR SCREENING MAMMOGRAM FOR BREAST CANCER: ICD-10-CM

## 2022-04-08 DIAGNOSIS — G43.909 MIGRAINE WITHOUT STATUS MIGRAINOSUS, NOT INTRACTABLE, UNSPECIFIED MIGRAINE TYPE: ICD-10-CM

## 2022-04-08 DIAGNOSIS — Z12.11 SCREENING FOR MALIGNANT NEOPLASM OF COLON: ICD-10-CM

## 2022-04-08 DIAGNOSIS — R07.89 XIPHOIDALGIA: ICD-10-CM

## 2022-04-08 DIAGNOSIS — E78.49 OTHER HYPERLIPIDEMIA: ICD-10-CM

## 2022-04-08 DIAGNOSIS — E03.9 HYPOTHYROIDISM, UNSPECIFIED TYPE: Primary | ICD-10-CM

## 2022-04-08 DIAGNOSIS — I87.2 VENOUS INSUFFICIENCY: ICD-10-CM

## 2022-04-08 DIAGNOSIS — Z12.4 SCREENING FOR CERVICAL CANCER: ICD-10-CM

## 2022-04-08 PROBLEM — R23.3 EASY BRUISING: Status: ACTIVE | Noted: 2022-04-08

## 2022-04-08 PROCEDURE — 99214 OFFICE O/P EST MOD 30 MIN: CPT | Performed by: INTERNAL MEDICINE

## 2022-04-08 PROCEDURE — 3725F SCREEN DEPRESSION PERFORMED: CPT | Performed by: INTERNAL MEDICINE

## 2022-04-08 NOTE — ASSESSMENT & PLAN NOTE
1 migraine will be talking to neuro and they will be completeing fmla and on  The topamax , patient continue follow-up with Neurology

## 2022-04-08 NOTE — ASSESSMENT & PLAN NOTE
Using synthroid; Hypothyroidism controlled the patient is currently euthyroid I will be ordering a TSH prior to the next office visit and the patient will continue with current medical regiment; we will continue to monitor the patient's progress

## 2022-04-08 NOTE — PROGRESS NOTES
Assessment/Plan:    Hypothyroidism  Using synthroid; Hypothyroidism controlled the patient is currently euthyroid I will be ordering a TSH prior to the next office visit and the patient will continue with current medical regiment; we will continue to monitor the patient's progress  Migraine headache  1 migraine will be talking to neuro and they will be completeing fmla and on  The topamax , patient continue follow-up with Neurology    Other hyperlipidemia  Hyperlipidemia-low-cholesterol diet continue monitor lipids    Easy bruising  Leg bruising and cramping spontaneous bruising will check venous reflux study, CBC PT/APTT patient to get a prescription for compression stockings through her workplace    Xiphoidalgia  Warm compress, stretching, rest symptoms are minimal if any change worse or symptoms not marina please notify me         Problem List Items Addressed This Visit        Endocrine    Hypothyroidism - Primary     Using synthroid; Hypothyroidism controlled the patient is currently euthyroid I will be ordering a TSH prior to the next office visit and the patient will continue with current medical regiment; we will continue to monitor the patient's progress           Relevant Orders    TSH, 3rd generation       Cardiovascular and Mediastinum    Migraine headache     1 migraine will be talking to neuro and they will be completeing fmla and on  The topamax , patient continue follow-up with Neurology         Venous insufficiency    Relevant Orders    VAS reflux lower limb venous duplex study with reflux assessment, complete bilateral       Musculoskeletal and Integument    Xiphoidalgia     Warm compress, stretching, rest symptoms are minimal if any change worse or symptoms not marina please notify me            Other    Other hyperlipidemia     Hyperlipidemia-low-cholesterol diet continue monitor lipids         Relevant Orders    Lipid Panel with Direct LDL reflex    Screening for cervical cancer    Relevant Orders    Ambulatory referral to Obstetrics / Gynecology    Easy bruising     Leg bruising and cramping spontaneous bruising will check venous reflux study, CBC PT/APTT patient to get a prescription for compression stockings through her workplace         Relevant Orders    CBC (Includes Diff/Plt) (Refl)    Protime-INR    APTT      Other Visit Diagnoses     Encounter for screening mammogram for breast cancer        Relevant Orders    Mammo screening bilateral w 3d & cad    Screening for malignant neoplasm of colon        Relevant Orders    Ambulatory Referral to Gastroenterology          RTO in 3-4 months call if any problems work no provided viral sinusitis resolved  Subjective:      Patient ID: Danielle Vicente is a 50 y o  female  HPI 57-year old female coming in for a follow up office visit regarding easy bruising, venous insufficiency, migraine, hyperlipidemia, hypothyroidism and viral sinusitis; The patient reports me compliant taking medications without untoward side effects the  The patient is here to review his medical condition, update me on the medical condition and the patient reports me no hospitalizations and no ER visits  Needs a work note had a sinus infection earlier in the week that did resolve with over-the-counter medicines 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    she is now on a balance diet ,  Left side sinus pressure st, needs a work note  She using the oils and dutera drops and mucinex and sx improved  The pt report stress and not sleeping lead to triggering migraine the next day  Also reports me mild pain at the level of the xiphoid worse with rotation twisting and turning she does do heavy lifting during the day her symptoms are mild   the patient me painful bruising intermittent bilateral lower extremities that she notices no injury or the day eventually go away but the 1 on the lateral aspect of her right lower extremity continues to reoccur    The following portions of the patient's history were reviewed and updated as appropriate: allergies, current medications, past family history, past medical history, past social history, past surgical history and problem list     Review of Systems   Constitutional: Negative for activity change, appetite change and unexpected weight change  HENT: Negative for congestion and postnasal drip  Eyes: Negative for visual disturbance  Respiratory: Negative for cough and shortness of breath  Cardiovascular: Negative for chest pain  Gastrointestinal: Negative for abdominal pain, diarrhea, nausea and vomiting  Neurological: Negative for dizziness, light-headedness and headaches  Hematological: Negative for adenopathy  Objective:    No follow-ups on file  No results found  Allergies   Allergen Reactions    Morphine Other (See Comments)    Codeine Hives    Morphine And Related     Codeine Itching    Sulfa Antibiotics Rash       Past Medical History:   Diagnosis Date    Arthritis     Asthma     Disease of thyroid gland     [facility]     Nephrolithiasis 7/22/2019    Osteoporosis     Partial symptompatic epilepsy     [facility]      Past Surgical History:   Procedure Laterality Date    APPENDECTOMY      FRACTURE SURGERY      HYSTERECTOMY      OTHER SURGICAL HISTORY      left foot, rt knee, tonselectomy    SINUS SURGERY      TONSILLECTOMY       Current Outpatient Medications on File Prior to Visit   Medication Sig Dispense Refill    ELDERBERRY PO Take by mouth Daily every morning      Synthroid 75 MCG tablet TAKE 1 TABLET BY MOUTH EVERY DAY 90 tablet 1    topiramate (TOPAMAX) 200 MG tablet Take 1 tablet by mouth twice per day  180 tablet 3    Calcium-Magnesium-Vitamin D (CITRACAL CALCIUM+D PO) Take 1 tablet by mouth daily   (Patient not taking: Reported on 11/18/2021 )      cholecalciferol (VITAMIN D3) 1,000 units tablet Take 2,000 Units by mouth daily  (Patient not taking: Reported on 11/18/2021 )      famotidine (PEPCID) 20 mg tablet Take 1 tablet (20 mg total) by mouth 2 (two) times a day (Patient not taking: Reported on 4/8/2022 ) 60 tablet 5    SUMAtriptan (IMITREX) 100 mg tablet Take 1 tablet (100 mg total) by mouth once as needed for migraine (takes 1/2 , if not resolved takes the other half ) (Patient not taking: Reported on 10/18/2021) 2 tablet 2    zinc gluconate 50 mg tablet Take 50 mg by mouth daily as needed (Patient not taking: Reported on 11/18/2021 )       No current facility-administered medications on file prior to visit  Family History   Problem Relation Age of Onset    Alcohol abuse Mother     RUSSELL disease Mother    [facility]'s esophagus Mother     Migraines Father     Pancreatic cancer Father     Alcohol abuse Maternal Uncle     Seizures Paternal Aunt      Social History     Socioeconomic History    Marital status: Single     Spouse name: Not on file    Number of children: Not on file    Years of education: Not on file    Highest education level: Not on file   Occupational History    Not on file   Tobacco Use    Smoking status: Never Smoker    Smokeless tobacco: Never Used   Vaping Use    Vaping Use: Never used   Substance and Sexual Activity    Alcohol use: Not Currently    Drug use: Not Currently     Comment: social    Sexual activity: Not Currently   Other Topics Concern    Not on file   Social History Narrative    ** Merged History Encounter **          Social Determinants of Health     Financial Resource Strain: Not on file   Food Insecurity: No Food Insecurity    Worried About Running Out of Food in the Last Year: Never true    Mindy of Food in the Last Year: Never true   Transportation Needs: No Transportation Needs    Lack of Transportation (Medical): No    Lack of Transportation (Non-Medical):  No   Physical Activity: Not on file   Stress: Not on file   Social Connections: Not on file Intimate Partner Violence: Not on file   Housing Stability: Not on file     Vitals:    04/08/22 0932   BP: 132/90   BP Location: Left arm   Patient Position: Sitting   Cuff Size: Adult   Pulse: 66   Temp: (!) 97 2 °F (36 2 °C)   SpO2: 98%   Weight: 54 kg (119 lb)     Results for orders placed or performed in visit on 03/21/22   T4, free   Result Value Ref Range    Free t4 1 2 0 8 - 1 8 ng/dL   TSH, 3rd generation   Result Value Ref Range    TSH 2 06 mIU/L     Weight (last 2 days)     Date/Time Weight    04/08/22 0932 54 (119)        Body mass index is 21 77 kg/m²  /90 (04/08/22 0932)    Temp (!) 97 2 °F (36 2 °C) (04/08/22 0932)    Pulse 66 (04/08/22 0932)   Resp      SpO2 98 % (04/08/22 0932)      Vitals:    04/08/22 0932   Weight: 54 kg (119 lb)     Vitals:    04/08/22 0932   Weight: 54 kg (119 lb)       /90 (BP Location: Left arm, Patient Position: Sitting, Cuff Size: Adult)   Pulse 66   Temp (!) 97 2 °F (36 2 °C)   Wt 54 kg (119 lb)   SpO2 98%   BMI 21 77 kg/m²          Physical Exam  Constitutional:       Appearance: She is well-developed  HENT:      Head: Normocephalic  Eyes:      General: No scleral icterus  Right eye: No discharge  Left eye: No discharge  Conjunctiva/sclera: Conjunctivae normal       Pupils: Pupils are equal, round, and reactive to light  Cardiovascular:      Rate and Rhythm: Normal rate and regular rhythm  Heart sounds: Normal heart sounds  No murmur heard  No friction rub  No gallop  Pulmonary:      Effort: No respiratory distress  Breath sounds: Normal breath sounds  No wheezing or rales  Abdominal:      General: Bowel sounds are normal  There is no distension  Palpations: Abdomen is soft  There is no mass  Tenderness: There is no abdominal tenderness  There is no guarding or rebound  Musculoskeletal:         General: No deformity  Cervical back: Neck supple     Lymphadenopathy:      Cervical: No cervical adenopathy  Neurological:      Mental Status: She is alert        Coordination: Coordination normal        [facility] in the room during exam mild tenderness over the xiphoid reproducible minimal bruising bilateral legs spider veins noted S pulses 2/2

## 2022-04-08 NOTE — LETTER
[facility] [place] [address]  Dept: [phone]    April 8, 2022    Patient: Lina Tsang  YOB: 1970    Lina Tsang was seen and evaluated at our [facility]  Was out of work 4/7 return 4/9 for medical reason    Sincerely,    Femi Cassidy DO

## 2022-04-08 NOTE — ASSESSMENT & PLAN NOTE
Warm compress, stretching, rest symptoms are minimal if any change worse or symptoms not marina please notify me

## 2022-04-08 NOTE — ASSESSMENT & PLAN NOTE
Leg bruising and cramping spontaneous bruising will check venous reflux study, CBC PT/APTT patient to get a prescription for compression stockings through her workplace

## 2022-04-12 ENCOUNTER — TELEPHONE (OUTPATIENT)
Dept: INTERNAL MEDICINE CLINIC | Facility: CLINIC | Age: 52
End: 2022-04-12

## 2022-04-12 NOTE — TELEPHONE ENCOUNTER
Patient is unable to see Shaniqua tomorrow, she did schedule a virtual on Thursday  The patient has a loss of appetite  She did not eat dinner last night  She did drink a glass of pickle juice  Today she drank 1/2 a glass of pickle juice and was able to eat a cup of vegetable juice

## 2022-04-12 NOTE — TELEPHONE ENCOUNTER
Pt calling in stating she was in to see you 4/8 and told you how she wasn't feel well but was feeling better when she came in to see you - she's calling in today stating she doesn't feel well again and feels that it's sinus related  She feels a pain in her eye, and a pain in her back   States she doesn't do well with pills which is why she didn't ask for anything on the 8th but wants to know your recommendations

## 2022-04-13 NOTE — TELEPHONE ENCOUNTER
Spoke with pt and advised  Confirmed understanding  She states she had to work today because she is unable to miss any more work  She also mentioned she has a headache near her left temple and she is dehydrated  She will start drinking more fluids once home as she is driving there now  She also said that the back of her neck has been hurting for 1 5 weeks and it sometimes feels like it "pops" when she turns her head certain ways  She is unsure if this is what is causing the headache  She states she has a hx of migraines  She says her headache is slightly worse today, but she believes it is because she didn't sleep well last night  Reminded pt of Dr Naidu's instructions that she should go to ER if her symptoms worsen  Pt confirmed that she understood these instructions

## 2022-04-13 NOTE — TELEPHONE ENCOUNTER
Called pt and was sent immediately to voicemail  LM with Dr Naidu's instructions and asked pt to CB to confirm she received message

## 2022-04-14 ENCOUNTER — TELEMEDICINE (OUTPATIENT)
Dept: INTERNAL MEDICINE CLINIC | Facility: CLINIC | Age: 52
End: 2022-04-14
Payer: COMMERCIAL

## 2022-04-14 DIAGNOSIS — J30.89 ENVIRONMENTAL AND SEASONAL ALLERGIES: Primary | ICD-10-CM

## 2022-04-14 PROCEDURE — 99213 OFFICE O/P EST LOW 20 MIN: CPT | Performed by: INTERNAL MEDICINE

## 2022-04-14 RX ORDER — FEXOFENADINE HCL 180 MG/1
180 TABLET ORAL DAILY
Qty: 15 TABLET | Refills: 0 | Status: SHIPPED | OUTPATIENT
Start: 2022-04-14 | End: 2022-06-17

## 2022-04-15 NOTE — ASSESSMENT & PLAN NOTE
-history of frontal headaches pressure type, postnasal drip, mild sore throat  -denies any fever chills GI disturbances  -currently on therapy with Zyrtec plus Mucinex plus essential oils achieving slowly positive clinical response    Plan  Recommendations of continue oral hydration  Tylenol p r n    Allegra initiated

## 2022-04-15 NOTE — PROGRESS NOTES
Assessment/Plan:    Acute non-recurrent frontal sinusitis  -history of frontal headaches pressure type, postnasal drip, mild sore throat  -denies any fever chills GI disturbances  -currently on therapy with Zyrtec plus Mucinex plus essential oils achieving slowly positive clinical response    Plan  Recommendations of continue oral hydration  Tylenol p r n  Allegra initiated       Diagnoses and all orders for this visit:    Environmental and seasonal allergies  -     fexofenadine (ALLEGRA) 180 MG tablet; Take 1 tablet (180 mg total) by mouth daily for 15 days        Subjective:      Patient ID: Alexx Warren is a 50 y o  female  Mrs Rosetta Mon is a pleasant 82-year-old female with past medical history hypothyroidism call to the office for evaluation of headaches,  sinus pressure, sore throat, postnasal drip of approximately 1 week ago  Mrs Galileo Dow  attributes improvement of her symptoms to her current therapy with essential oils, however she continues to exhibit postnasal drip and rinorrhea despite treatment therapy with zyrtec and mucinex      The following portions of the patient's history were reviewed and updated as appropriate: allergies, current medications, past family history, past medical history, past social history, past surgical history and problem list     Review of Systems   HENT: Positive for postnasal drip and rhinorrhea  All other systems reviewed and are negative  Objective: There were no vitals taken for this visit  Physical Exam  Constitutional:       General: She is not in acute distress  Appearance: Normal appearance  She is not ill-appearing or toxic-appearing  Neurological:      Mental Status: She is alert  rest of physical exam limited due to the nature of the encounter  Nutrition Assessment and Intervention:     Reviewed food recall journal      Other interventions: Recommendations plan based diet provided      Physical Activity Assessment and Intervention:    Activity journal reviewed      Other interventions: Recommendation of sustaining at physical activity to 30 minutes daily for an overall positive cardiovascular health provided

## 2022-04-18 ENCOUNTER — HOSPITAL ENCOUNTER (OUTPATIENT)
Dept: NON INVASIVE DIAGNOSTICS | Facility: HOSPITAL | Age: 52
Discharge: HOME | End: 2022-04-18
Payer: COMMERCIAL

## 2022-04-18 DIAGNOSIS — I87.2 VENOUS INSUFFICIENCY: ICD-10-CM

## 2022-04-18 LAB
APTT PPP: 25 SEC (ref 23–32)
BASOPHILS # BLD AUTO: 51 CELLS/UL (ref 0–200)
BASOPHILS NFR BLD AUTO: 1.3 %
CHOLEST SERPL-MCNC: 226 MG/DL
CHOLEST/HDLC SERPL: 3.1 (CALC)
EOSINOPHIL # BLD AUTO: 148 CELLS/UL (ref 15–500)
EOSINOPHIL NFR BLD AUTO: 3.8 %
ERYTHROCYTE [DISTWIDTH] IN BLOOD BY AUTOMATED COUNT: 12.4 % (ref 11–15)
HCT VFR BLD AUTO: 44.3 % (ref 35–45)
HDLC SERPL-MCNC: 72 MG/DL
HGB BLD-MCNC: 15.1 G/DL (ref 11.7–15.5)
INR PPP: 1
LDLC SERPL CALC-MCNC: 140 MG/DL (CALC)
LYMPHOCYTES # BLD AUTO: 1638 CELLS/UL (ref 850–3900)
LYMPHOCYTES NFR BLD AUTO: 42 %
MCH RBC QN AUTO: 30.1 PG (ref 27–33)
MCHC RBC AUTO-ENTMCNC: 34.1 G/DL (ref 32–36)
MCV RBC AUTO: 88.4 FL (ref 80–100)
MONOCYTES # BLD AUTO: 686 CELLS/UL (ref 200–950)
MONOCYTES NFR BLD AUTO: 17.6 %
NEUTROPHILS # BLD AUTO: 1377 CELLS/UL (ref 1500–7800)
NEUTROPHILS NFR BLD AUTO: 35.3 %
NONHDLC SERPL-MCNC: 154 MG/DL (CALC)
PLATELET # BLD AUTO: 302 THOUSAND/UL (ref 140–400)
PMV BLD REES-ECKER: 10 FL (ref 7.5–12.5)
PROTHROMBIN TIME: 9.8 SEC (ref 9–11.5)
RBC # BLD AUTO: 5.01 MILLION/UL (ref 3.8–5.1)
TRIGL SERPL-MCNC: 58 MG/DL
TSH SERPL-ACNC: 0.34 MIU/L
WBC # BLD AUTO: 3.9 THOUSAND/UL (ref 3.8–10.8)

## 2022-04-18 PROCEDURE — 93970 EXTREMITY STUDY: CPT

## 2022-04-19 PROCEDURE — 93970 EXTREMITY STUDY: CPT | Performed by: SURGERY

## 2022-04-20 ENCOUNTER — TELEPHONE (OUTPATIENT)
Dept: INTERNAL MEDICINE CLINIC | Facility: CLINIC | Age: 52
End: 2022-04-20

## 2022-04-20 ENCOUNTER — TELEPHONE (OUTPATIENT)
Dept: HEMATOLOGY ONCOLOGY | Facility: CLINIC | Age: 52
End: 2022-04-20

## 2022-04-20 DIAGNOSIS — E03.9 HYPOTHYROIDISM, UNSPECIFIED TYPE: Primary | ICD-10-CM

## 2022-04-20 RX ORDER — LEVOTHYROXINE SODIUM 50 MCG
50 TABLET ORAL DAILY
Qty: 90 TABLET | Refills: 1 | Status: SHIPPED | OUTPATIENT
Start: 2022-04-20

## 2022-04-20 NOTE — TELEPHONE ENCOUNTER
New Patient Intake Form   Patient Details:    Britton Mendoza  1970    Appointment Information   Who is calling to schedule? Patient   If not self, what is the caller's name? Please put name of [facility] nurse as well  DID YOU CONFIRM INSURANCE WITH PATIENT? yes   Referring provider Mace Gitelman, DO   What is the diagnosis? Venous insufficiency     Is there a confirmed tissue diagnosis? No   Is there a biopsy ordered or pending? Please specify dates   n/a     Is patient aware of diagnosis? Yes   Have you had any imaging or labs done? If yes, where? (If imaging done outside of [facility], please remind patient to bring a disk ) Yes  04/18     If imaging done at outside facility, did you instruct patient to obtain discs and bring to visit? n/a   Have you been seen by another Oncologist/Hematologist?  If so, who and where? no   Are the records in [facility] or Care Everywhere? yes   Does the patient have records at another facility/hospital? no   If yes, Name of facility, city and state where facility is located  Did you instruct patient to have records faxed to [facility] and provide rightfax number? n/a   Preferred [place]   Is the patient willing to be seen by another provider? (This is for breast patients only) n/a     Did you send new patient paperwork?   Email or mail? no   Miscellaneous Information: appt made for 04/28

## 2022-04-20 NOTE — TELEPHONE ENCOUNTER
Patient wants Dr Maximilian Sheth to know that she only has 75 mcg of the Synthroid not the decreased amount of 50 mcg tablet  Please order new script      Pharmacy: [id number] [address]   [facility], [address] [place] [id number]

## 2022-04-22 ENCOUNTER — TELEPHONE (OUTPATIENT)
Dept: INTERNAL MEDICINE CLINIC | Facility: CLINIC | Age: 52
End: 2022-04-22

## 2022-04-22 NOTE — TELEPHONE ENCOUNTER
Patient called in she is trying to get [facility] and they need to know what size she should be wearing and if they should be stockings up to her knee or full stockings? Please clarify so we can let patient know then [facility] will fax orders to be signed

## 2022-04-25 ENCOUNTER — TELEPHONE (OUTPATIENT)
Dept: INTERNAL MEDICINE CLINIC | Facility: CLINIC | Age: 52
End: 2022-04-25

## 2022-04-25 ENCOUNTER — HOSPITAL ENCOUNTER (EMERGENCY)
Facility: HOSPITAL | Age: 52
Discharge: HOME | End: 2022-04-25
Attending: EMERGENCY MEDICINE | Admitting: EMERGENCY MEDICINE
Payer: COMMERCIAL

## 2022-04-25 VITALS
HEART RATE: 71 BPM | DIASTOLIC BLOOD PRESSURE: 91 MMHG | TEMPERATURE: 98.1 F | OXYGEN SATURATION: 100 % | RESPIRATION RATE: 18 BRPM | SYSTOLIC BLOOD PRESSURE: 160 MMHG

## 2022-04-25 DIAGNOSIS — G44.209 TENSION HEADACHE: Primary | ICD-10-CM

## 2022-04-25 PROCEDURE — 99284 EMERGENCY DEPT VISIT MOD MDM: CPT | Performed by: EMERGENCY MEDICINE

## 2022-04-25 PROCEDURE — 99283 EMERGENCY DEPT VISIT LOW MDM: CPT

## 2022-04-25 PROCEDURE — 96372 THER/PROPH/DIAG INJ SC/IM: CPT

## 2022-04-25 RX ORDER — KETOROLAC TROMETHAMINE 30 MG/ML
15 INJECTION, SOLUTION INTRAMUSCULAR; INTRAVENOUS ONCE
Status: COMPLETED | OUTPATIENT
Start: 2022-04-25 | End: 2022-04-25

## 2022-04-25 RX ADMIN — KETOROLAC TROMETHAMINE 15 MG: 30 INJECTION, SOLUTION INTRAMUSCULAR at 21:16

## 2022-04-25 NOTE — TELEPHONE ENCOUNTER
This phone call was triaged to me  Pt states that she has been having migraines in addition to shortness of breath, as well as heart palpitations  She states her migraine started Sunday night after she finished work  Pt denies nausea/vomiting  The pt mentioned she works as a  and upon opening and closing cabinet doors as well as overhead compartments, she developed some shortness of breath  Pt says her SOB is mostly with exertion and she is not having difficulty breathing currently  Pt states she has had a very sore neck as well  She rates this pain a 7/10  Pt also states she is having visual disturbances as she believes her peripheral vision is not as wide as usual      Advised pt she should go immediately to the ER to be evaluated  Pt was agreeable  Pt informed me she was currently driving  I advised pt she should pull over and call 9-1-1 to take her to the ER  Advised pt she should not be driving due to the visual disturbances  Pt states she wants to try calling a friend to pick her up first before calling 9-1-1  Advised pt again she should call for an ambulance  Confirmed understanding

## 2022-04-25 NOTE — Clinical Note
Miguelendy Saab was seen and treated in our emergency department on 4/25/2022  Diagnosis: tension headache    Brian Krishnan  may return to work on return date  She may return on this date: 05/02/2022         If you have any questions or concerns, please don't hesitate to call        Theron Gale MD    ______________________________           _______________          _______________  [facility]                              Date                                Time

## 2022-04-25 NOTE — Clinical Note
Waldemar Riedel was seen and treated in our emergency department on 4/25/2022  Diagnosis: tension headache    Erin Thomas  may return to work on return date  She may return on this date: 05/02/2022         If you have any questions or concerns, please don't hesitate to call        Manuel Biggs MD    ______________________________           _______________          _______________  [facility]                              Date                                Time

## 2022-04-26 NOTE — DISCHARGE INSTRUCTIONS
You were seen in the ED for tension headache  Return to the ED for any worsening symptoms or new symptoms  Follow up with your primary care doctor as soon as possible

## 2022-04-26 NOTE — ED ATTENDING ATTESTATION
4/25/2022  I, Ashlie Cameron MD, saw and evaluated the patient  I have discussed the patient with the resident/non-physician practitioner and agree with the resident's/non-physician practitioner's findings, Plan of Care, and MDM as documented in the resident's/non-physician practitioner's note, except where noted  All available labs and Radiology studies were reviewed  I was present for key portions of any procedure(s) performed by the resident/non-physician practitioner and I was immediately available to provide assistance  At this point I agree with the current assessment done in the Emergency Department  I have conducted an independent evaluation of this patient a history and physical is as follows:   The patient has a 3 day history of right-sided neck pain that seems worse if she turns her head to the left or if she flexes her neck she noticed it was gradual in onset 3 days ago while she was at work he does a lot a lifting overhead  The patient has no complaints of visual changes no focal weakness no difficulty with speech no difficulty with gait  Patient occasionally gets left-sided migraine headaches  No fever no chills  EXAM:   Const:   well appearing   NAD     HEENT:  NCAT    sclera anicteric conjunctiva pink   throat clear, MMM    Neck:   supple  no meningismus tenderness is noted over the right paracervical musculature  pain seems worse if she flexes her neck no midline tenderness no jvd   no bruits    Lungs:   clear  CW non-tender   No creiptation  Heart:   RRR no m/g/r  Normal pulses  Abd:   soft nt nd pos bs   Ext:    normal nontender  No edema  Neruo:   CN 2 -12 intact  motor intact 5/5 sensory intact cerebellar intact       Gait normal    IMPRESSION:  Musculoskeletal neck pain  PLAN:  Symptomatic treatment  Work note    ED Course         Critical Care Time  Procedures

## 2022-04-26 NOTE — ED PROVIDER NOTES
History  Chief Complaint   Patient presents with    Neck Pain     neck pain and headache for 3 days  c/o lightheadedness  having palpitations with movement for the last couple of days  hasn't been able to fill her thyroid medicine  53-year-old female patient with history of seizure disorder on Topamax, migraines, hypothyroid presenting with neck and head ache onset a month ago worsening in the past couple days  Patient states that she has had a left frontal headache that has been intermittent  States 3-4 days ago she had some stiffness in her neck  States she is able to still move her neck however has intermittent pain when she rotates  Patient states that she sometimes feels a pop in her neck  States pain worsens throughout the day  Denies chest pain, abdominal pain, nausea, vomiting, diarrhea, photophobia, urinary symptoms  Patient states that this feels different from when she had viral meningitis and migraines  Patient has been taking Tylenol with improvement of symptoms  Prior to Admission Medications   Prescriptions Last Dose Informant Patient Reported? Taking? Calcium-Magnesium-Vitamin D (CITRACAL CALCIUM+D PO)  Self Yes No   Sig: Take 1 tablet by mouth daily     Patient not taking: Reported on 11/18/2021    ELDERBERRY PO  Self Yes No   Sig: Take by mouth Daily every morning   SUMAtriptan (IMITREX) 100 mg tablet  Self No No   Sig: Take 1 tablet (100 mg total) by mouth once as needed for migraine (takes 1/2 , if not resolved takes the other half )   Patient not taking: Reported on 10/18/2021   Synthroid 50 MCG tablet   No No   Sig: Take 1 tablet (50 mcg total) by mouth daily   Synthroid 75 MCG tablet   No No   Sig: TAKE 1 TABLET BY MOUTH EVERY DAY   cholecalciferol (VITAMIN D3) 1,000 units tablet  Self Yes No   Sig: Take 2,000 Units by mouth daily    Patient not taking: Reported on 11/18/2021    famotidine (PEPCID) 20 mg tablet  Self No No   Sig: Take 1 tablet (20 mg total) by mouth 2 (two) times a day   Patient not taking: Reported on 4/8/2022    fexofenadine (ALLEGRA) 180 MG tablet   No No   Sig: Take 1 tablet (180 mg total) by mouth daily for 15 days   topiramate (TOPAMAX) 200 MG tablet   No No   Sig: Take 1 tablet by mouth twice per day  zinc gluconate 50 mg tablet  Self Yes No   Sig: Take 50 mg by mouth daily as needed   Patient not taking: Reported on 11/18/2021       Facility-Administered Medications: None       Past Medical History:   Diagnosis Date    Arthritis     Asthma     Disease of thyroid gland     Hypoparathyroidism ([address] )     Nephrolithiasis 7/22/2019    Osteoporosis     Partial symptompatic epilepsy     Seizure disorder (HCC)        Past Surgical History:   Procedure Laterality Date    APPENDECTOMY      FRACTURE SURGERY      HYSTERECTOMY      OTHER SURGICAL HISTORY      left foot, rt knee, tonselectomy    SINUS SURGERY      TONSILLECTOMY         Family History   Problem Relation Age of Onset    Alcohol abuse Mother     RUSSELL disease Mother    [facility] Boles's esophagus Mother     Migraines Father     Pancreatic cancer Father     Alcohol abuse Maternal Uncle     Seizures Paternal Aunt      I have reviewed and agree with the history as documented  E-Cigarette/Vaping    E-Cigarette Use Never User      E-Cigarette/Vaping Substances    Nicotine No     THC No     CBD No     Flavoring No      Social History     Tobacco Use    Smoking status: Never Smoker    Smokeless tobacco: Never Used   Vaping Use    Vaping Use: Never used   Substance Use Topics    Alcohol use: Not Currently    Drug use: Not Currently     Comment: social        Review of Systems   Constitutional: Negative for chills, fatigue and fever  HENT: Negative for congestion, rhinorrhea and sore throat  Eyes: Negative for pain and visual disturbance  Respiratory: Negative for cough, chest tightness and shortness of breath  Cardiovascular: Negative for chest pain and leg swelling  Gastrointestinal: Negative for abdominal distention, abdominal pain, diarrhea, nausea and vomiting  Genitourinary: Negative for difficulty urinating and dysuria  Musculoskeletal: Positive for neck pain  Negative for arthralgias, back pain and myalgias  Skin: Negative for rash and wound  Neurological: Positive for headaches  Negative for dizziness and weakness  All other systems reviewed and are negative  Physical Exam  ED Triage Vitals [04/25/22 1742]   Temperature Pulse Respirations Blood Pressure SpO2   97 8 °F (36 6 °C) 79 20 (!) 182/121 98 %      Temp Source Heart Rate Source Patient Position - Orthostatic VS BP Location FiO2 (%)   Temporal Monitor Sitting Left arm --      Pain Score       6             Orthostatic Vital Signs  Vitals:    04/25/22 1742 04/25/22 2013   BP: (!) 182/121 160/91   Pulse: 79 71   Patient Position - Orthostatic VS: Sitting Lying       Physical Exam  Vitals reviewed  Constitutional:       Appearance: Normal appearance  HENT:      Head: Normocephalic and atraumatic  Nose: Nose normal       Mouth/Throat:      Mouth: Mucous membranes are moist       Pharynx: Oropharynx is clear  Eyes:      Extraocular Movements: Extraocular movements intact  Conjunctiva/sclera: Conjunctivae normal    Cardiovascular:      Rate and Rhythm: Normal rate and regular rhythm  Pulses: Normal pulses  Heart sounds: Normal heart sounds  Pulmonary:      Effort: Pulmonary effort is normal       Breath sounds: Normal breath sounds  Abdominal:      General: Bowel sounds are normal       Palpations: Abdomen is soft  Tenderness: There is no abdominal tenderness  Musculoskeletal:         General: Normal range of motion  Cervical back: Normal range of motion  No rigidity or tenderness  Skin:     General: Skin is warm and dry  Neurological:      General: No focal deficit present  Mental Status: She is alert and oriented to person, place, and time   Mental status is at baseline  Cranial Nerves: No cranial nerve deficit  Sensory: No sensory deficit  Motor: No weakness  Coordination: Coordination normal          ED Medications  Medications   ketorolac (TORADOL) injection 15 mg (15 mg Intramuscular Given 4/25/22 2116)       Diagnostic Studies  Results Reviewed     None                 No orders to display         Procedures  Procedures      ED Course                                       MDM  Number of Diagnoses or Management Options  Tension headache  Diagnosis management comments: 82-year-old female patient with history of seizures, migraines presenting with neck pain and headache onset a month worsening the past 3 days  Patient states that her neck feels stiff  On exam, patient does not have any neck rigidity, no nuchal rigidity  Patient has no tenderness to neck  Likely musculoskeletal/stress related  Patient was treated with Toradol with improvement of her symptoms  Stable for discharge with return precautions given  Follow up with PCP  Disposition  Final diagnoses:   Tension headache     Time reflects when diagnosis was documented in both MDM as applicable and the Disposition within this note     Time User Action Codes Description Comment    4/25/2022  9:18 PM Zeusradha Busbybrii [facility] Add [Z90 572] Tension headache       ED Disposition     ED Disposition Condition Date/Time Comment    Discharge Stable Mon Apr 25, 2022  9:18 PM Twyla Montanez discharge to home/self care  Follow-up Information     Follow up With Specialties Details Why [address]  [facility], DO Internal Medicine Schedule an appointment as soon as possible for a visit   [id number]  [phone]            Discharge Medication List as of 4/25/2022  9:19 PM      CONTINUE these medications which have NOT CHANGED    Details   Calcium-Magnesium-Vitamin D (CITRACAL CALCIUM+D PO) Take 1 tablet by mouth daily  , Historical Med cholecalciferol (VITAMIN D3) 1,000 units tablet Take 2,000 Units by mouth daily , Historical Med      ELDERBERRY PO Take by mouth Daily every morning, Historical Med      famotidine (PEPCID) 20 mg tablet Take 1 tablet (20 mg total) by mouth 2 (two) times a day, Starting Wed 4/28/2021, Normal      fexofenadine (ALLEGRA) 180 MG tablet Take 1 tablet (180 mg total) by mouth daily for 15 days, Starting Thu 4/14/2022, Until Fri 4/29/2022, Normal      SUMAtriptan (IMITREX) 100 mg tablet Take 1 tablet (100 mg total) by mouth once as needed for migraine (takes 1/2 , if not resolved takes the other half ), Starting Mon 7/22/2019, Normal      !! Synthroid 50 MCG tablet Take 1 tablet (50 mcg total) by mouth daily, Starting Wed 4/20/2022, Normal      !! Synthroid 75 MCG tablet TAKE 1 TABLET BY MOUTH EVERY DAY, Normal      topiramate (TOPAMAX) 200 MG tablet Take 1 tablet by mouth twice per day , Normal      zinc gluconate 50 mg tablet Take 50 mg by mouth daily as needed, Historical Med       !! - Potential duplicate medications found  Please discuss with provider  No discharge procedures on file  PDMP Review     None           ED Provider  Attending physically available and evaluated Hannah Timbo  I managed the patient along with the ED Attending      Electronically Signed by         Bozena Richard MD  04/26/22 3310

## 2022-04-27 ENCOUNTER — TELEPHONE (OUTPATIENT)
Dept: HEMATOLOGY ONCOLOGY | Facility: CLINIC | Age: 52
End: 2022-04-27

## 2022-04-27 NOTE — TELEPHONE ENCOUNTER
Appointment Confirmation (to confirm pre existing appointments - ONLY)     Appointment with  Dr Nemesio Gomes   Appointment date & time 4/28/22 at 9:40   Location [place]   Patient verbilized Understanding Yes

## 2022-04-28 ENCOUNTER — CONSULT (OUTPATIENT)
Dept: HEMATOLOGY ONCOLOGY | Facility: CLINIC | Age: 52
End: 2022-04-28
Payer: COMMERCIAL

## 2022-04-28 VITALS
OXYGEN SATURATION: 97 % | WEIGHT: 121 LBS | TEMPERATURE: 97 F | HEART RATE: 68 BPM | RESPIRATION RATE: 17 BRPM | SYSTOLIC BLOOD PRESSURE: 122 MMHG | BODY MASS INDEX: 22.26 KG/M2 | HEIGHT: 62 IN | DIASTOLIC BLOOD PRESSURE: 82 MMHG

## 2022-04-28 DIAGNOSIS — R53.83 FATIGUE, UNSPECIFIED TYPE: Primary | ICD-10-CM

## 2022-04-28 PROBLEM — R19.7 DIARRHEA: Status: RESOLVED | Noted: 2020-04-08 | Resolved: 2022-04-28

## 2022-04-28 PROBLEM — K90.9 STEATORRHEA: Status: RESOLVED | Noted: 2021-07-01 | Resolved: 2022-04-28

## 2022-04-28 PROCEDURE — 1036F TOBACCO NON-USER: CPT | Performed by: INTERNAL MEDICINE

## 2022-04-28 PROCEDURE — 99204 OFFICE O/P NEW MOD 45 MIN: CPT | Performed by: INTERNAL MEDICINE

## 2022-04-28 PROCEDURE — 3008F BODY MASS INDEX DOCD: CPT | Performed by: INTERNAL MEDICINE
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
## 2022-05-05 ENCOUNTER — TELEPHONE (OUTPATIENT)
Dept: NEUROLOGY | Facility: CLINIC | Age: 52
End: 2022-05-05

## 2022-05-05 NOTE — TELEPHONE ENCOUNTER
Patient calling to notify she has worsening symptoms of  left foot cramping, vomiting, fever, back and neck pain  Patient actually vomiting while on phone  Notes BP was very high when last evaluated in ED on 4/25/2022 and now symptoms are far worse than before  Advised patient to return to ED if that is the case as symptoms noted are not related to seizure activity

## 2022-05-06 ENCOUNTER — TELEPHONE (OUTPATIENT)
Dept: INTERNAL MEDICINE CLINIC | Facility: CLINIC | Age: 52
End: 2022-05-06

## 2022-05-06 NOTE — TELEPHONE ENCOUNTER
[facility]  She tested positive for [facility]  They did call the patient and gave her instructions on how to proceed  Patient just wanted to let you know        YANETH

## 2022-05-16 ENCOUNTER — TELEPHONE (OUTPATIENT)
Dept: INTERNAL MEDICINE CLINIC | Facility: CLINIC | Age: 52
End: 2022-05-16

## 2022-05-16 NOTE — TELEPHONE ENCOUNTER
Patient is still getting over Covid 19 and is still coughing & needs a new work note stating that she can't go back to work yet  Patient is feeling better except for her cough  Patient doesn't want to go back to work with a cough    Would you like to do a Virtual?    Please Advise  cb- [phone]

## 2022-05-20 ENCOUNTER — TELEPHONE (OUTPATIENT)
Dept: NEUROLOGY | Facility: CLINIC | Age: 52
End: 2022-05-20

## 2022-05-26 ENCOUNTER — TELEPHONE (OUTPATIENT)
Dept: VASCULAR SURGERY | Facility: CLINIC | Age: 52
End: 2022-05-26

## 2022-05-26 NOTE — TELEPHONE ENCOUNTER
6/6/22 OV with Dr Maris Sy cancelled due to provider not being avail  , left message to possibly offer an OV with Dr Epi Pina on 6/1/22

## 2022-06-03 NOTE — TELEPHONE ENCOUNTER
Pt called back and left a message on the clinical line  It was regarding rescheduling this appt  Routed to the Call Center to contact pt

## 2022-06-09 ENCOUNTER — TELEPHONE (OUTPATIENT)
Dept: NEUROLOGY | Facility: CLINIC | Age: 52
End: 2022-06-09

## 2022-06-09 DIAGNOSIS — G43.909 MIGRAINE WITHOUT STATUS MIGRAINOSUS, NOT INTRACTABLE, UNSPECIFIED MIGRAINE TYPE: Primary | ICD-10-CM

## 2022-06-09 RX ORDER — PROCHLORPERAZINE MALEATE 10 MG
TABLET ORAL
Qty: 20 TABLET | Refills: 3 | Status: SHIPPED | OUTPATIENT
Start: 2022-06-09

## 2022-06-09 NOTE — TELEPHONE ENCOUNTER
pt left vm on Tuesday at 2:26pm stating that she is out of her migraine med  she had imitrex 100mg and this works but wanted to talk to dr Alcala Danger as when she takes imitrex, it knocks her out for pretty much the whole day  states that she had maxalt before but that really didn't help and asking for other options to try  H[address] and Left a message on pt's answering machine for a call back    Any recommendations at this time?

## 2022-06-09 NOTE — TELEPHONE ENCOUNTER
I think she take sumatriptan 50 mg prn (half a 100 mg pill) and that has historically knocked her out  Could try prochlorperazine 10 mg prn as an alterative  Rx sent

## 2022-06-17 ENCOUNTER — OFFICE VISIT (OUTPATIENT)
Dept: INTERNAL MEDICINE CLINIC | Facility: CLINIC | Age: 52
End: 2022-06-17
Payer: COMMERCIAL

## 2022-06-17 VITALS
TEMPERATURE: 97.8 F | HEIGHT: 62 IN | RESPIRATION RATE: 14 BRPM | SYSTOLIC BLOOD PRESSURE: 132 MMHG | DIASTOLIC BLOOD PRESSURE: 84 MMHG | OXYGEN SATURATION: 98 % | BODY MASS INDEX: 21.71 KG/M2 | HEART RATE: 68 BPM | WEIGHT: 118 LBS

## 2022-06-17 DIAGNOSIS — R30.0 DYSURIA: Primary | ICD-10-CM

## 2022-06-17 LAB
BACTERIA UR QL AUTO: ABNORMAL /HPF
BILIRUB UR QL STRIP: NEGATIVE
CLARITY UR: CLEAR
COLOR UR: ABNORMAL
GLUCOSE UR STRIP-MCNC: NEGATIVE MG/DL
HGB UR QL STRIP.AUTO: NEGATIVE
KETONES UR STRIP-MCNC: NEGATIVE MG/DL
LEUKOCYTE ESTERASE UR QL STRIP: ABNORMAL
MUCOUS THREADS UR QL AUTO: ABNORMAL
NITRITE UR QL STRIP: NEGATIVE
NON-SQ EPI CELLS URNS QL MICRO: ABNORMAL /HPF
PH UR STRIP.AUTO: 7.5 [PH]
PROT UR STRIP-MCNC: NEGATIVE MG/DL
RBC #/AREA URNS AUTO: ABNORMAL /HPF
SL AMB  POCT GLUCOSE, UA: ABNORMAL
SL AMB LEUKOCYTE ESTERASE,UA: ABNORMAL
SL AMB POCT BILIRUBIN,UA: ABNORMAL
SL AMB POCT BLOOD,UA: ABNORMAL
SL AMB POCT CLARITY,UA: ABNORMAL
SL AMB POCT COLOR,UA: YELLOW
SL AMB POCT KETONES,UA: ABNORMAL
SL AMB POCT NITRITE,UA: ABNORMAL
SL AMB POCT PH,UA: 7.5
SL AMB POCT SPECIFIC GRAVITY,UA: 1.01
SL AMB POCT URINE PROTEIN: ABNORMAL
SL AMB POCT UROBILINOGEN: ABNORMAL
SP GR UR STRIP.AUTO: 1.01 (ref 1–1.03)
UROBILINOGEN UR STRIP-ACNC: <2 MG/DL
WBC #/AREA URNS AUTO: ABNORMAL /HPF

## 2022-06-17 PROCEDURE — 87086 URINE CULTURE/COLONY COUNT: CPT | Performed by: NURSE PRACTITIONER

## 2022-06-17 PROCEDURE — 99213 OFFICE O/P EST LOW 20 MIN: CPT | Performed by: NURSE PRACTITIONER

## 2022-06-17 PROCEDURE — 81001 URINALYSIS AUTO W/SCOPE: CPT | Performed by: NURSE PRACTITIONER

## 2022-06-17 PROCEDURE — 3008F BODY MASS INDEX DOCD: CPT | Performed by: INTERNAL MEDICINE

## 2022-06-17 PROCEDURE — 87077 CULTURE AEROBIC IDENTIFY: CPT | Performed by: NURSE PRACTITIONER

## 2022-06-17 PROCEDURE — 81002 URINALYSIS NONAUTO W/O SCOPE: CPT | Performed by: NURSE PRACTITIONER

## 2022-06-17 PROCEDURE — 87186 SC STD MICRODIL/AGAR DIL: CPT | Performed by: NURSE PRACTITIONER

## 2022-06-17 RX ORDER — NITROFURANTOIN 25; 75 MG/1; MG/1
100 CAPSULE ORAL 2 TIMES DAILY
Qty: 10 CAPSULE | Refills: 0 | Status: SHIPPED | OUTPATIENT
Start: 2022-06-17 | End: 2022-06-22

## 2022-06-17 NOTE — PROGRESS NOTES
Assessment/Plan:    Dysuria  Trace leuk and cloudy urine  Start antibiotics  Urine culture sent       Diagnoses and all orders for this visit:    Dysuria  -     POCT urine dip  -     nitrofurantoin (MACROBID) 100 mg capsule; Take 1 capsule (100 mg total) by mouth 2 (two) times a day for 5 days  -     UA w Reflex to Microscopic w Reflex to Culture - Clinic Collect          Subjective:      Patient ID: Mallorie Mayers is a 50 y o  female  Patient co burning during urination and low abdominal pressure  No fever or chills  No blood in the urine  She is prone to UTI      The following portions of the patient's history were reviewed and updated as appropriate: allergies, current medications, past family history, past medical history, past social history, past surgical history and problem list     Review of Systems   Constitutional: Negative  HENT: Negative  Eyes: Negative  Respiratory: Negative  Cardiovascular: Negative  Gastrointestinal: Negative  Genitourinary: Positive for dysuria, frequency and urgency  Musculoskeletal: Negative  Neurological: Negative  Objective:      /84   Pulse 68   Temp 97 8 °F (36 6 °C)   Resp 14   Ht 5' 2" (1 575 m)   Wt 53 5 kg (118 lb)   SpO2 98%   BMI 21 58 kg/m²          Physical Exam  Vitals and nursing note reviewed  Constitutional:       Appearance: She is well-developed  HENT:      Head: Normocephalic and atraumatic  Right Ear: External ear normal       Left Ear: External ear normal       Nose: Nose normal    Eyes:      Pupils: Pupils are equal, round, and reactive to light  Musculoskeletal:         General: Normal range of motion  Cervical back: Neck supple  Skin:     General: Skin is warm and dry  Neurological:      Mental Status: She is alert and oriented to person, place, and time

## 2022-06-20 LAB — BACTERIA UR CULT: ABNORMAL

## 2022-06-27 ENCOUNTER — OFFICE VISIT (OUTPATIENT)
Dept: URGENT CARE | Age: 52
End: 2022-06-27
Payer: COMMERCIAL

## 2022-06-27 ENCOUNTER — TELEMEDICINE (OUTPATIENT)
Dept: INTERNAL MEDICINE CLINIC | Facility: CLINIC | Age: 52
End: 2022-06-27
Payer: COMMERCIAL

## 2022-06-27 ENCOUNTER — TELEPHONE (OUTPATIENT)
Dept: INTERNAL MEDICINE CLINIC | Facility: CLINIC | Age: 52
End: 2022-06-27

## 2022-06-27 ENCOUNTER — APPOINTMENT (OUTPATIENT)
Dept: RADIOLOGY | Age: 52
End: 2022-06-27
Payer: COMMERCIAL

## 2022-06-27 VITALS
HEART RATE: 80 BPM | DIASTOLIC BLOOD PRESSURE: 84 MMHG | SYSTOLIC BLOOD PRESSURE: 146 MMHG | RESPIRATION RATE: 20 BRPM | TEMPERATURE: 97.7 F | OXYGEN SATURATION: 99 %

## 2022-06-27 DIAGNOSIS — R06.2 WHEEZING: ICD-10-CM

## 2022-06-27 DIAGNOSIS — H66.91 RIGHT OTITIS MEDIA, UNSPECIFIED OTITIS MEDIA TYPE: ICD-10-CM

## 2022-06-27 DIAGNOSIS — E03.9 HYPOTHYROIDISM, UNSPECIFIED TYPE: ICD-10-CM

## 2022-06-27 DIAGNOSIS — J40 BRONCHITIS: ICD-10-CM

## 2022-06-27 DIAGNOSIS — R93.89 ABNORMAL CXR: ICD-10-CM

## 2022-06-27 DIAGNOSIS — R93.89 ABNORMAL CXR: Primary | ICD-10-CM

## 2022-06-27 DIAGNOSIS — R05.9 COUGH: Primary | ICD-10-CM

## 2022-06-27 PROCEDURE — 1036F TOBACCO NON-USER: CPT | Performed by: INTERNAL MEDICINE

## 2022-06-27 PROCEDURE — G0382 LEV 3 HOSP TYPE B ED VISIT: HCPCS | Performed by: NURSE PRACTITIONER

## 2022-06-27 PROCEDURE — 99213 OFFICE O/P EST LOW 20 MIN: CPT | Performed by: INTERNAL MEDICINE

## 2022-06-27 PROCEDURE — 71046 X-RAY EXAM CHEST 2 VIEWS: CPT

## 2022-06-27 RX ORDER — LEVALBUTEROL TARTRATE 45 UG/1
1-2 AEROSOL, METERED ORAL EVERY 4 HOURS PRN
Qty: 15 G | Refills: 0 | Status: SHIPPED | OUTPATIENT
Start: 2022-06-27

## 2022-06-27 RX ORDER — AZITHROMYCIN 250 MG/1
TABLET, FILM COATED ORAL
Qty: 6 TABLET | Refills: 0 | Status: SHIPPED | OUTPATIENT
Start: 2022-06-27 | End: 2022-07-01

## 2022-06-27 RX ORDER — BENZONATATE 200 MG/1
200 CAPSULE ORAL 3 TIMES DAILY PRN
Qty: 20 CAPSULE | Refills: 0 | Status: SHIPPED | OUTPATIENT
Start: 2022-06-27

## 2022-06-27 NOTE — PROGRESS NOTES
[facility] [employer] [facility]        NAME: Hans Norman is a 50 y o  female  : 1970    MRN: [id number]  DATE: 2022  TIME: 1:27 PM    Assessment and Plan   Cough [R05 9]  1  Cough  XR chest pa & lateral    benzonatate (TESSALON) 200 MG capsule   2  Right otitis media, unspecified otitis media type  azithromycin (ZITHROMAX) 250 mg tablet         Patient Instructions     Take meds as directed  Antibiotics for ear infection and cough med  mucinex for mucus  F/u with PCP post antibiotics for irrigation of cerumen cerumen in R ear  Follow up with PCP in 3-5 days  Proceed to  ER if symptoms worsen  Chief Complaint     Chief Complaint   Patient presents with    Fever     Sore throat, diarrhea, non productive cough, poor appetite, sinus pressure, since Sat, had covid in May, cough worsening since May         History of Present Illness       HPI   Reports symptom onset x 1 week ago  Cough, nasal congestion, sinus pressure and sore throat  Dry cough mostly  When with mucus, it is yellowish  Right sided ear pain about 1 week ago after getting off the plane  Temp yesterday at max was 101 6 degrees  Using delsym and tylenol  No relief  Covid vaccinated x 2  Covid positive in May 5th 2022  Sick contacts include grand kids  Review of Systems   Review of Systems   Constitutional: Positive for appetite change  HENT: Positive for congestion, rhinorrhea and sinus pressure  Respiratory: Positive for cough  Negative for chest tightness, shortness of breath and wheezing  Gastrointestinal: Positive for diarrhea (last episode yesterday)  Negative for abdominal pain and vomiting           Current Medications       Current Outpatient Medications:     azithromycin (ZITHROMAX) 250 mg tablet, Take 2 tablets today then 1 tablet daily x 4 days, Disp: 6 tablet, Rfl: 0    benzonatate (TESSALON) 200 MG capsule, Take 1 capsule (200 mg total) by mouth 3 (three) times a day as needed for cough, Disp: 20 capsule, Rfl: 0   ELDERBERRY PO, Take by mouth Daily every morning, Disp: , Rfl:     prochlorperazine (COMPAZINE) 10 mg tablet, 1 tab at onset of migraine, can repeat in 8 hours, can take with triptan/NSAID, Disp: 20 tablet, Rfl: 3    Synthroid 50 MCG tablet, Take 1 tablet (50 mcg total) by mouth daily, Disp: 90 tablet, Rfl: 1    topiramate (TOPAMAX) 200 MG tablet, Take 1 tablet by mouth twice per day , Disp: 180 tablet, Rfl: 3    fexofenadine (ALLEGRA) 180 MG tablet, Take 1 tablet (180 mg total) by mouth daily for 15 days (Patient not taking: Reported on 6/17/2022), Disp: 15 tablet, Rfl: 0    Current Allergies     Allergies as of 06/27/2022 - Reviewed 06/27/2022   Allergen Reaction Noted    Morphine Other (See Comments) 07/22/2019    Codeine Hives 11/14/2019    Morphine and related  11/14/2019    Codeine Itching 10/06/2016    Sulfa antibiotics Rash 05/24/2016            The following portions of the patient's history were reviewed and updated as appropriate: allergies, current medications, past family history, past medical history, past social history, past surgical history and problem list      Past Medical History:   Diagnosis Date    Arthritis     Asthma     Disease of thyroid gland     [facility] [address] [facility]     Nephrolithiasis 7/22/2019    Osteoporosis     Partial symptompatic epilepsy     Seizure disorder ([facility] [address] )        Past Surgical History:   Procedure Laterality Date    APPENDECTOMY      FRACTURE SURGERY      HYSTERECTOMY      OTHER SURGICAL HISTORY      left foot, rt knee, tonselectomy    SINUS SURGERY      TONSILLECTOMY         Family History   Problem Relation Age of Onset    Alcohol abuse Mother     RUSSELL disease Mother     Boles's esophagus Mother     Migraines Father     Pancreatic cancer Father     Alcohol abuse Maternal Uncle     Seizures Paternal Aunt          Medications have been verified          Objective   /84   Pulse 80   Temp 97 7 °F (36 5 °C)   Resp 20   SpO2 99%   No LMP recorded  Patient is postmenopausal        Physical Exam     Physical Exam  Constitutional:       Appearance: She is not ill-appearing or diaphoretic  HENT:      Right Ear: There is impacted cerumen (also erythema and slight bulging of the TM)  Left Ear: There is no impacted cerumen  Nose: Rhinorrhea present  Mouth/Throat:      Mouth: Mucous membranes are moist       Pharynx: No posterior oropharyngeal erythema  Cardiovascular:      Rate and Rhythm: Regular rhythm  Heart sounds: Normal heart sounds  Pulmonary:      Effort: Pulmonary effort is normal       Breath sounds: Normal breath sounds  No wheezing

## 2022-06-27 NOTE — LETTER
June 27, 2022     Patient: Michael Vela  YOB: 1970  Date of Visit: 6/27/2022      To Whom it May Concern:    Yakelin Mckeon is under my professional care  Lisa Valenzuela was seen in my office on 6/27/2022  Lisa Valenzuela may return to work on 6/30/22  If you have any questions or concerns, please don't hesitate to call           Sincerely,          Sana Moran DO        CC: No Recipients

## 2022-06-27 NOTE — TELEPHONE ENCOUNTER
Pt is scheduled for a virtual visit at 4 but would like to come over just to be swabbed for strep and COVID earlier, please advise if we can add pt to be swabbed

## 2022-06-28 NOTE — ASSESSMENT & PLAN NOTE
Patient did go to urgent care center chest x-ray reviewed only showing hyperinflated lungs likely not COPD but will complete a workup including PFT to further evaluate; she will start Kristy Riling at nighttime use Mucinex or in the day plenty of fluids rest she may return to work on the 06/30 call if any change, worse or symptoms not marina patient did appear to have a tight cough he should discontinue delsym and only use Tessalon at nighttime Rx for Xopenex HFA 1-2 puffs every 4-6 hours p r n  wheezing and tightness she does have a history of palpitations with albuterol RTO as scheduled call if any change, worse or symptoms not marina I did ask her to complete a home COVID test and positive please contact me
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
## 2022-06-29 ENCOUNTER — TELEPHONE (OUTPATIENT)
Dept: INTERNAL MEDICINE CLINIC | Facility: CLINIC | Age: 52
End: 2022-06-29

## 2022-06-29 NOTE — TELEPHONE ENCOUNTER
Patient is asking for a note for work  She would like to return on Saturday, 7/2          Please advise

## 2022-07-05 ENCOUNTER — OFFICE VISIT (OUTPATIENT)
Dept: NEUROLOGY | Facility: CLINIC | Age: 52
End: 2022-07-05
Payer: COMMERCIAL

## 2022-07-05 VITALS
OXYGEN SATURATION: 98 % | SYSTOLIC BLOOD PRESSURE: 122 MMHG | HEIGHT: 62 IN | TEMPERATURE: 97.2 F | DIASTOLIC BLOOD PRESSURE: 82 MMHG | BODY MASS INDEX: 21.71 KG/M2 | HEART RATE: 66 BPM | WEIGHT: 118 LBS

## 2022-07-05 DIAGNOSIS — G40.209 PARTIAL SYMPTOMATIC EPILEPSY WITH COMPLEX PARTIAL SEIZURES, NOT INTRACTABLE, WITHOUT STATUS EPILEPTICUS (HCC): ICD-10-CM

## 2022-07-05 DIAGNOSIS — G43.909 MIGRAINE WITHOUT STATUS MIGRAINOSUS, NOT INTRACTABLE, UNSPECIFIED MIGRAINE TYPE: ICD-10-CM

## 2022-07-05 PROCEDURE — 99214 OFFICE O/P EST MOD 30 MIN: CPT | Performed by: PSYCHIATRY & NEUROLOGY

## 2022-07-05 RX ORDER — GUAIFENESIN 600 MG/1
1200 TABLET, EXTENDED RELEASE ORAL AS NEEDED
COMMUNITY

## 2022-07-05 NOTE — PROGRESS NOTES
[place] [address] [facility] [address] [facility]  Follow Up Visit    Impression/Plan    Ms Yury Jacobs is a 50 y o  female with well controlled focal epilepsy       There was likely nephrolithiasis in 2/2019  Previously we discussed risk of nephrolithiasis related to topiramate   She has had excellent seizure control on topiramate and it has also helped her migraine headaches   She understands the risk of additional kidney stones and wishes to continue on topiramate unchanged       Headaches worse since COVID infection in early May and recent bronchitis and otitis media  Also experiencing occasional vertigo/unsteadiness likely related to recent viral infection  Patient Instructions   1  Let us know if headaches worsen or do not improve  2  Continue topiramate 200 mg twice daily  3  Let us know if there are seizures  4  Return in one year  Diagnoses and all orders for this visit:    Migraine without status migrainosus, not intractable, unspecified migraine type  -     topiramate (TOPAMAX) 200 MG tablet; Take 1 tablet by mouth twice per day  Partial symptomatic epilepsy with complex partial seizures, not intractable, without status epilepticus (HCC)  -     topiramate (TOPAMAX) 200 MG tablet; Take 1 tablet by mouth twice per day  Other orders  -     guaiFENesin (MUCINEX) 600 mg 12 hr tablet; Take 1,200 mg by mouth if needed for cough        Subjective    Yo Marie is returning to the [place] [address] [facility] for follow up  Interval Events:   Seizures since last visit: None    COVID infection in early May  Multiple headaches since early May  Also experiencing episodes of imbalance, drifts to the left and feels dizzy  Diagnosed with otitis media and bronchitis more recently  Treated with z-rufus and mucinex  Has called out of work 3 times the last 2 months  Plans to work tomorrow  Still has intermittent cough   Trying to get in with the ENT practice she saw in the past  Current Medications include:  Topiramate 200 mg bid  Compazine prn  Medication side effects: None  Medication adherence: Yes     Wasn't tolerating sumatriptan 50 mg   Event/Seizure semiology:  Episodes of heat behind neck and ringing in left ear  A minute to two later she would pass out and then have apparent convulsion  They were happening a few times per month  Occurred from [address] until [address] when she started topiramate       Special Features  Status epilepticus: no  Self Injury Seizures: no  Precipitating Factors: none     Prior Evaluation:  MRI brain w/ and wo 10/17/2018: normal     History Reviewed: The following were reviewed and updated as appropriate: allergies, current medications, past family history, past medical history, past social history, past surgical history and problem list     Psychiatric History:  None     Social History:              Objective    /82 (BP Location: Left arm, Patient Position: Sitting, Cuff Size: Standard)   Pulse 66   Temp (!) 97 2 °F (36 2 °C) (Temporal)   Ht 5' 2" (1 575 m)   Wt 53 5 kg (118 lb)   SpO2 98%   BMI 21 58 kg/m²      General Exam  No acute distress  Neurologic Exam  Mental Status:  Alert and oriented x 3  Spells world in reverse correctly  Language: normal fluency and comprehension  Cranial Nerves:  VFFTC  EOMI, no nystagums  Face symmetric  No dysarthria  Motor:  No drift  Strength 5/5 throughout  Normal tone  Coordination: Finger to nose intact  DTRs: Normal and symmetric (patella)  Gait: Normal casual gait  Sway with Romberg, but maintains stance  ROS:    Review of Systems   Constitutional: Positive for fatigue  Negative for appetite change and fever  HENT: Negative  Negative for hearing loss, tinnitus, trouble swallowing and voice change  Eyes: Negative  Negative for photophobia and pain  Respiratory: Negative  Negative for shortness of breath  Cardiovascular: Negative  Negative for palpitations  Gastrointestinal: Positive for nausea and vomiting  Endocrine: Negative  Negative for cold intolerance  Genitourinary: Negative  Negative for dysuria, frequency and urgency  Musculoskeletal: Negative  Negative for myalgias and neck pain  Skin: Negative  Negative for rash  Neurological: Positive for dizziness, weakness and headaches  Negative for tremors, seizures, syncope, facial asymmetry, speech difficulty, light-headedness and numbness  Hematological: Negative  Does not bruise/bleed easily  Psychiatric/Behavioral: Negative  Negative for confusion, hallucinations and sleep disturbance  ROS reviewed and updated as appropriate

## 2022-07-05 NOTE — PATIENT INSTRUCTIONS
Let us know if headaches worsen or do not improve  Continue topiramate 200 mg twice daily  Let us know if there are seizures  Return in one year

## 2022-07-11 ENCOUNTER — OFFICE VISIT (OUTPATIENT)
Dept: VASCULAR SURGERY | Facility: CLINIC | Age: 52
End: 2022-07-11
Payer: COMMERCIAL

## 2022-07-11 VITALS
WEIGHT: 118 LBS | SYSTOLIC BLOOD PRESSURE: 124 MMHG | HEART RATE: 72 BPM | DIASTOLIC BLOOD PRESSURE: 82 MMHG | HEIGHT: 62 IN | BODY MASS INDEX: 21.71 KG/M2 | TEMPERATURE: 98.4 F

## 2022-07-11 DIAGNOSIS — I87.2 VENOUS INSUFFICIENCY: ICD-10-CM

## 2022-07-11 PROCEDURE — 99203 OFFICE O/P NEW LOW 30 MIN: CPT | Performed by: SURGERY

## 2022-07-11 NOTE — LETTER
July 11, 2022     Dougsimone FerndaleAdri  [address] [facility] [address]     Patient: Keiko Cota   YOB: 1970   Date of Visit: 7/11/2022       Dear Dr Lashay Cortez: Thank you for referring Mell Hooper to me for evaluation  Below are the relevant portions of my assessment and plan of care  Diagnoses and all orders for this visit:    Venous insufficiency  Current complaints of periodic muscle cramping  There is no evidence of significant venous disease and her complaints follow a musculoskeletal pattern  From a vascular standpoint there is no further treatment or evaluation necessary  Will follow as needed  If you have questions, please do not hesitate to call me  I look forward to following Jessica Cuenca along with you           Sincerely,        Tomer Barajas MD        CC: No Recipients

## 2022-07-11 NOTE — PATIENT INSTRUCTIONS
Venous insufficiency  Current complaints of periodic muscle cramping  There is no evidence of significant venous disease and her complaints follow a musculoskeletal pattern  From a vascular standpoint there is no further treatment or evaluation necessary  Will follow as needed

## 2022-07-11 NOTE — PROGRESS NOTES
Assessment/Plan:    Venous insufficiency  Current complaints of periodic muscle cramping  There is no evidence of significant venous disease and her complaints follow a musculoskeletal pattern  From a vascular standpoint there is no further treatment or evaluation necessary  Will follow as needed  Diagnoses and all orders for this visit:    Venous insufficiency  -     Ambulatory Referral to Vascular Surgery          Subjective:      Patient ID: Danna Roldan is a 50 y o  female  Pt is new and is here for saphenous vein of LLE  Pt had LEVDR 4/18/22  Pt c/o johana cramping in LE and bruises that randomly appear on legs  LL<RL  Pt wears compression occasionally  30-year-old presents with complaints of periodic cramping of both calves  She describes this as a severe tightening of her calf muscles in which she has to stand up and stretch  This evidently occurs most frequently at night and affects both legs  She states it has improved recently and only occurs on a weekly basis  She denies any other leg symptoms and specifically denies claudication  She denies varicosities  Venous reflux study 04/18/2022  Right side is normal with no evidence of incompetency  On the left there is only an isolated area of incompetency in the greater saphenous vein at the saphenofemoral junction  The remainder of the greater saphenous vein and deep system remains competent        The following portions of the patient's history were reviewed and updated as appropriate: allergies, current medications, past family history, past medical history, past social history, past surgical history and problem list     Past Medical History:  Past Medical History:   Diagnosis Date    Arthritis     Asthma     Disease of thyroid gland     Hypoparathyroidism ([address] )     Nephrolithiasis 7/22/2019    Osteoporosis     Partial symptompatic epilepsy     Seizure disorder [facility])        Past Surgical History:  Past Surgical History: Procedure Laterality Date    APPENDECTOMY      FRACTURE SURGERY      HYSTERECTOMY      OTHER SURGICAL HISTORY      left foot, rt knee, tonselectomy    SINUS SURGERY      TONSILLECTOMY         Social History:  Social History     Substance and Sexual Activity   Alcohol Use Not Currently     Social History     Substance and Sexual Activity   Drug Use Not Currently    Comment: social     Social History     Tobacco Use   Smoking Status Never Smoker   Smokeless Tobacco Never Used       Family History:  Family History   Problem Relation Age of Onset    Alcohol abuse Mother     RUSSELL disease Mother    [facility] Boles's esophagus Mother    [facility] Migraines Father     Pancreatic cancer Father     Alcohol abuse Maternal Uncle     Seizures Paternal Aunt        Allergies:   Allergies   Allergen Reactions    Morphine Other (See Comments)    Codeine Hives    Morphine And Related     Codeine Itching    Sulfa Antibiotics Rash       Medications:    Current Outpatient Medications:     ELDERBERRY PO, Take by mouth Daily every morning, Disp: , Rfl:     guaiFENesin (MUCINEX) 600 mg 12 hr tablet, Take 1,200 mg by mouth if needed for cough, Disp: , Rfl:     Synthroid 50 MCG tablet, Take 1 tablet (50 mcg total) by mouth daily, Disp: 90 tablet, Rfl: 1    topiramate (TOPAMAX) 200 MG tablet, Take 1 tablet by mouth twice per day , Disp: 180 tablet, Rfl: 3    benzonatate (TESSALON) 200 MG capsule, Take 1 capsule (200 mg total) by mouth 3 (three) times a day as needed for cough (Patient not taking: No sig reported), Disp: 20 capsule, Rfl: 0    fexofenadine (ALLEGRA) 180 MG tablet, Take 1 tablet (180 mg total) by mouth daily for 15 days (Patient not taking: No sig reported), Disp: 15 tablet, Rfl: 0    levalbuterol (Xopenex HFA) 45 mcg/act inhaler, Inhale 1-2 puffs every 4 (four) hours as needed for wheezing (Patient not taking: Reported on 7/11/2022), Disp: 15 g, Rfl: 0    prochlorperazine (COMPAZINE) 10 mg tablet, 1 tab at onset of migraine, can repeat in 8 hours, can take with triptan/NSAID (Patient not taking: Reported on 7/11/2022), Disp: 20 tablet, Rfl: 3    Vitals:  /82 (07/11/22 0831)    Temp 98 4 °F (36 9 °C) (07/11/22 0831)    Pulse 72 (07/11/22 0831)   Resp      SpO2        Lab Results and Cultures:   CBC with diff:   Lab Results   Component Value Date    WBC 3 9 04/18/2022    HGB 15 1 04/18/2022    HCT 44 3 04/18/2022    MCV 88 4 04/18/2022     04/18/2022    MCH 30 1 04/18/2022    MCHC 34 1 04/18/2022    RDW 12 4 04/18/2022    MPV 9 8 11/14/2019    NRBC 0 11/14/2019   ,   BMP/CMP:  Lab Results   Component Value Date    K 4 5 06/23/2021     06/23/2021    CO2 24 06/23/2021    BUN 13 06/23/2021    CREATININE 0 79 06/23/2021    CREATININE 0 85 11/14/2019    CALCIUM 9 5 06/23/2021    AST 19 06/23/2021    ALT 17 06/23/2021    ALKPHOS 54 06/23/2021    EGFR 82 11/14/2019   ,   Lipid Panel: No results found for: CHOL,   Coags:   Lab Results   Component Value Date    INR 1 0 04/18/2022   ,       Review of Systems   Constitutional: Negative  HENT: Negative  Eyes: Negative  Respiratory: Negative  Cardiovascular: Negative  Gastrointestinal: Negative  Endocrine: Negative  Genitourinary: Negative  Musculoskeletal: Negative  Crampin g in legs   Skin: Negative  Allergic/Immunologic: Negative  Neurological: Negative  Hematological: Negative  Psychiatric/Behavioral: Negative  Objective:      /82 (BP Location: Left arm, Patient Position: Sitting, Cuff Size: Standard)   Pulse 72   Temp 98 4 °F (36 9 °C) (Tympanic)   Ht 5' 2" (1 575 m)   Wt 53 5 kg (118 lb)   BMI 21 58 kg/m²          Physical Exam  Constitutional:       Appearance: Normal appearance  She is well-developed  HENT:      Head: Normocephalic and atraumatic  Eyes:      Conjunctiva/sclera: Conjunctivae normal    Neck:      Vascular: No carotid bruit or JVD     Cardiovascular:      Rate and Rhythm: Normal rate and regular rhythm  Pulses:           Carotid pulses are 2+ on the right side and 2+ on the left side  Radial pulses are 2+ on the right side and 2+ on the left side  Femoral pulses are 2+ on the right side and 2+ on the left side  Popliteal pulses are 2+ on the right side and 2+ on the left side  Dorsalis pedis pulses are 2+ on the right side and 2+ on the left side  Posterior tibial pulses are 2+ on the right side and 2+ on the left side  Heart sounds: Normal heart sounds, S1 normal and S2 normal  No murmur heard  Comments: No varicosities are visualized  There are a few scattered spider veins  Pulmonary:      Effort: Pulmonary effort is normal       Breath sounds: Normal breath sounds  Musculoskeletal:         General: No swelling, tenderness or deformity  Normal range of motion  Cervical back: Normal range of motion and neck supple  Skin:     General: Skin is warm and dry  Capillary Refill: Capillary refill takes less than 2 seconds  Coloration: Skin is not pale  Neurological:      General: No focal deficit present  Mental Status: She is alert and oriented to person, place, and time  Sensory: No sensory deficit  Motor: No weakness        Gait: Gait normal    Psychiatric:         Mood and Affect: Mood normal          Speech: Speech normal          Behavior: Behavior normal

## 2022-07-11 NOTE — ASSESSMENT & PLAN NOTE
Current complaints of periodic muscle cramping  There is no evidence of significant venous disease and her complaints follow a musculoskeletal pattern  From a vascular standpoint there is no further treatment or evaluation necessary  Will follow as needed

## 2022-07-19 ENCOUNTER — TELEPHONE (OUTPATIENT)
Dept: INTERNAL MEDICINE CLINIC | Facility: CLINIC | Age: 52
End: 2022-07-19

## 2022-07-19 NOTE — TELEPHONE ENCOUNTER
The patient came into the office with a black and blue gracie on the back of her knee that she said the gracie looked like it was getting bigger  She stated she did not bang it on anything    I spoke to the clinical staff  They  Advised to ask the patient to take a seat and we would talk to you as soon as you came out of a room  I also offered her our first available appointment which was not until 7/25 with a resident  The patient said she would continue to monitor it and call us if the gracie had any concerning changes and then she left

## 2022-07-20 ENCOUNTER — PATIENT MESSAGE (OUTPATIENT)
Dept: VASCULAR SURGERY | Facility: CLINIC | Age: 52
End: 2022-07-20

## 2022-07-20 ENCOUNTER — TELEPHONE (OUTPATIENT)
Dept: VASCULAR SURGERY | Facility: CLINIC | Age: 52
End: 2022-07-20

## 2022-07-20 NOTE — TELEPHONE ENCOUNTER
Pt called  She was concerned about an ecchymotic area that she has on the back of her left knee  Pt stated that on 7/17, she noted the area  Pt reports that the ecchymotic area is spreading  Denies injury to the area  States it "just appeared"  No lump  Area is sore to touch  Denies leg swelling  Pt is concerned that she may have a clot  Pt sent photos of the area through [address]  The first photo is from yesterday, per pt, and the second photo is when she first observed the ecchymosis on 7/17  Routed to triage to advise

## 2022-07-20 NOTE — TELEPHONE ENCOUNTER
Chart and picture sent by patient were reviewed  The patient was recently seen by Dr Maria Luisa Sommer  She has no significant venous disease or high risk features that I see  Would recommend issues of spontaneous bleeding to be discussed with her family doctor

## 2022-07-21 NOTE — TELEPHONE ENCOUNTER
Please see below and vascular's message  I spoke to the clinical team; they advised me to set up an appointment for her on Monday 7/25 with Dr Arlean Favre   I left a message for the patient to call back

## 2022-08-10 ENCOUNTER — TELEPHONE (OUTPATIENT)
Dept: INTERNAL MEDICINE CLINIC | Facility: CLINIC | Age: 52
End: 2022-08-10

## 2022-08-10 NOTE — TELEPHONE ENCOUNTER
Patient called stating she has no air conditioning in her car  She drives from [place] to [place],    She has the following symptoms for past 2 days  (including today)  Diarrhea, headache, stomach cramps  Leg spasms and throwing up  Could she have heat exhaustion? Patient would like a call back as soon as possible    Thank You ([phone]

## 2022-08-11 NOTE — TELEPHONE ENCOUNTER
I spoke with the patient I have scheduled a same day appointment with Dr Naidu symptoms have improved however she has had them 3 times in the past 3 months

## 2022-08-12 ENCOUNTER — TELEPHONE (OUTPATIENT)
Dept: INTERNAL MEDICINE CLINIC | Facility: CLINIC | Age: 52
End: 2022-08-12

## 2022-08-12 ENCOUNTER — OFFICE VISIT (OUTPATIENT)
Dept: INTERNAL MEDICINE CLINIC | Facility: CLINIC | Age: 52
End: 2022-08-12
Payer: COMMERCIAL

## 2022-08-12 VITALS
SYSTOLIC BLOOD PRESSURE: 132 MMHG | OXYGEN SATURATION: 99 % | BODY MASS INDEX: 21.49 KG/M2 | DIASTOLIC BLOOD PRESSURE: 84 MMHG | TEMPERATURE: 98.4 F | HEIGHT: 62 IN | HEART RATE: 61 BPM | WEIGHT: 116.8 LBS

## 2022-08-12 DIAGNOSIS — G43.909 MIGRAINE WITHOUT STATUS MIGRAINOSUS, NOT INTRACTABLE, UNSPECIFIED MIGRAINE TYPE: ICD-10-CM

## 2022-08-12 DIAGNOSIS — R10.13 DYSPEPSIA: ICD-10-CM

## 2022-08-12 DIAGNOSIS — R68.81 EARLY SATIETY: Primary | ICD-10-CM

## 2022-08-12 DIAGNOSIS — E03.9 HYPOTHYROIDISM, UNSPECIFIED TYPE: ICD-10-CM

## 2022-08-12 DIAGNOSIS — R11.0 NAUSEA: ICD-10-CM

## 2022-08-12 DIAGNOSIS — R68.81 EARLY SATIETY: ICD-10-CM

## 2022-08-12 DIAGNOSIS — R19.7 DIARRHEA, UNSPECIFIED TYPE: ICD-10-CM

## 2022-08-12 PROCEDURE — 99214 OFFICE O/P EST MOD 30 MIN: CPT | Performed by: INTERNAL MEDICINE

## 2022-08-12 RX ORDER — MULTIVIT-MIN/IRON/FOLIC ACID/K 18-600-40
1 CAPSULE ORAL DAILY
COMMUNITY

## 2022-08-12 RX ORDER — LEVOTHYROXINE SODIUM 75 MCG
75 TABLET ORAL DAILY
COMMUNITY
Start: 2022-07-29

## 2022-08-12 RX ORDER — OMEPRAZOLE 20 MG/1
20 CAPSULE, DELAYED RELEASE ORAL
Qty: 30 CAPSULE | Refills: 5 | Status: SHIPPED | OUTPATIENT
Start: 2022-08-12 | End: 2022-08-12

## 2022-08-12 RX ORDER — FAMOTIDINE 20 MG
1 TABLET ORAL DAILY
COMMUNITY

## 2022-08-12 NOTE — PROGRESS NOTES
Assessment/Plan:    Diarrhea  Intermittent episodes of nausea vomiting and diarrhea x4 lasting under 24 hours currently asymptomatic severe at times unclear etiology will have patient see GI for a GI workup, will complete her FMLA paperwork when she drops at off    Nausea  Intermittent nausea and vomiting rule out gallbladder disease will check ultrasound of the abdomen and have patient go for upper endoscopy with GI    Early satiety  Ulcer free diet start Prilosec 20 mg once daily will have patient see GI for upper endoscopy    Migraine headache  Intermittent symptoms working with Neurology    Hypothyroidism  Suboptimal control TSH in the 20 working with endocrinology levothyroxine recently adjusted symptoms of cold intolerance and heat intolerance reported         Problem List Items Addressed This Visit        Endocrine    Hypothyroidism     Suboptimal control TSH in the 20 working with endocrinology levothyroxine recently adjusted symptoms of cold intolerance and heat intolerance reported         Relevant Medications    Synthroid 75 MCG tablet       Cardiovascular and Mediastinum    Migraine headache     Intermittent symptoms working with Neurology            Other    Early satiety - Primary     Ulcer free diet start Prilosec 20 mg once daily will have patient see GI for upper endoscopy         Relevant Medications    omeprazole (PriLOSEC) 20 mg delayed release capsule    Other Relevant Orders    Ambulatory Referral to Gastroenterology    Dyspepsia    Relevant Medications    omeprazole (PriLOSEC) 20 mg delayed release capsule    Other Relevant Orders    US abdomen complete    Ambulatory Referral to Gastroenterology    Nausea     Intermittent nausea and vomiting rule out gallbladder disease will check ultrasound of the abdomen and have patient go for upper endoscopy with GI         Relevant Orders    Cortisol Level, AM Specimen    Ambulatory Referral to Gastroenterology    Diarrhea     Intermittent episodes of nausea vomiting and diarrhea x4 lasting under 24 hours currently asymptomatic severe at times unclear etiology will have patient see GI for a GI workup, will complete her FMLA paperwork when she drops at off               Return to office  2 months  call if any problems; FMLA paperwork very appropriate  Subjective:      Patient ID: Antoni Rolon is a 50 y o  female  HPI 57-year old female coming in for a follow up office visit regarding intermittent nausea and vomiting and diarrhea, migraines and early satiety sent The patient reports me compliant taking medications without untoward side effects the  The patient is here to review his medical condition, update me on the medical condition and the patient reports me no hospitalizations and no ER visits  She reports me she has been missing lot of work because of symptoms of nausea vomiting diarrhea she has had 4 episodes lasting under 24 hours also she has been experiencing intermittent migraines and working with Neurology also working with Endocrinology abnormal TSH in the 20s recent increase of the levothyroxine  N/v/d tues after eating  Water stool no bloot tntc the pt reports this is the 4th time since June temperature intoler,   The following portions of the patient's history were reviewed and updated as appropriate: allergies, current medications, past family history, past medical history, past social history, past surgical history and problem list   Drinking ,  Healthy diet , early satiety , dyspepsis,   Left leg bruise contacted the vas  Review of Systems   Constitutional: Negative for activity change, appetite change and unexpected weight change  HENT: Negative for congestion and postnasal drip  Eyes: Negative for visual disturbance  Respiratory: Negative for cough and shortness of breath  Cardiovascular: Negative for chest pain  Gastrointestinal: Positive for diarrhea, nausea and vomiting  Negative for abdominal pain     Endocrine: Positive for cold intolerance and heat intolerance  Neurological: Positive for headaches  Negative for dizziness and light-headedness  Objective:    Return in about 4 weeks (around 9/9/2022)  No results found  Allergies   Allergen Reactions    Morphine Other (See Comments)    Codeine Hives    Morphine And Related     Codeine Itching    Sulfa Antibiotics Rash       Past Medical History:   Diagnosis Date    Arthritis     Asthma     Disease of thyroid gland     [facility] [facility] [address]     Nephrolithiasis 7/22/2019    Osteoporosis     Partial symptompatic epilepsy     Seizure disorder ([facility] [address] )      Past Surgical History:   Procedure Laterality Date    APPENDECTOMY      FRACTURE SURGERY      HYSTERECTOMY      OTHER SURGICAL HISTORY      left foot, rt knee, tonselectomy    SINUS SURGERY      TONSILLECTOMY       Current Outpatient Medications on File Prior to Visit   Medication Sig Dispense Refill    Ascorbic Acid (Vitamin C) 500 MG CAPS Take 1 each by mouth daily      ECHINACEA PO Take by mouth daily      ELDERBERRY PO Take 1 tablet by mouth daily as needed Daily every morning      prochlorperazine (COMPAZINE) 10 mg tablet 1 tab at onset of migraine, can repeat in 8 hours, can take with triptan/NSAID 20 tablet 3    Synthroid 75 MCG tablet Take 75 mcg by mouth daily      topiramate (TOPAMAX) 200 MG tablet Take 1 tablet by mouth twice per day   180 tablet 3    Vitamin D, Cholecalciferol, 25 MCG (1000 UT) CAPS Take 1 tablet by mouth in the morning      Zinc 50 MG CAPS Take 1 capsule by mouth daily      benzonatate (TESSALON) 200 MG capsule Take 1 capsule (200 mg total) by mouth 3 (three) times a day as needed for cough (Patient not taking: Reported on 8/12/2022) 20 capsule 0    fexofenadine (ALLEGRA) 180 MG tablet Take 1 tablet (180 mg total) by mouth daily for 15 days 15 tablet 0    guaiFENesin (MUCINEX) 600 mg 12 hr tablet Take 1,200 mg by mouth if needed for cough (Patient not taking: Reported on 8/12/2022)      levalbuterol (Xopenex HFA) 45 mcg/act inhaler Inhale 1-2 puffs every 4 (four) hours as needed for wheezing (Patient not taking: Reported on 8/12/2022) 15 g 0     No current facility-administered medications on file prior to visit  Family History   Problem Relation Age of Onset    Alcohol abuse Mother     RUSSELL disease Mother    [facility] Boles's esophagus Mother     Migraines Father     Pancreatic cancer Father     Alcohol abuse Maternal Uncle     Seizures Paternal Aunt      Social History     Socioeconomic History    Marital status: Single     Spouse name: Not on file    Number of children: Not on file    Years of education: Not on file    Highest education level: Not on file   Occupational History    Not on file   Tobacco Use    Smoking status: Never Smoker    Smokeless tobacco: Never Used   Vaping Use    Vaping Use: Never used   Substance and Sexual Activity    Alcohol use: Not Currently    Drug use: Not Currently     Comment: social    Sexual activity: Not Currently   Other Topics Concern    Not on file   Social History Narrative    ** Merged History Encounter **          Social Determinants of Health     Financial Resource Strain: Not on file   Food Insecurity: No Food Insecurity    Worried About Running Out of Food in the Last Year: Never true    Mindy of Food in the Last Year: Never true   Transportation Needs: No Transportation Needs    Lack of Transportation (Medical): No    Lack of Transportation (Non-Medical):  No   Physical Activity: Not on file   Stress: Not on file   Social Connections: Not on file   Intimate Partner Violence: Not on file   Housing Stability: Not on file     Vitals:    08/12/22 1113   BP: 132/84   Pulse: 61   Temp: 98 4 °F (36 9 °C)   TempSrc: Tympanic   SpO2: 99%   Weight: 53 kg (116 lb 12 8 oz)   Height: 5' 2" (1 575 m)     Results for orders placed or performed in visit on 06/17/22   Urine culture    Specimen: Urine   Result Value Ref Range    Urine Culture 40,000-49,000 cfu/ml Escherichia coli (A)        Susceptibility    Escherichia coli - ANNELISE     Ampicillin ($$) <=8 00 Susceptible ug/ml     Aztreonam ($$$)  <=4 Susceptible ug/ml     Cefazolin ($) <=2 00 Susceptible ug/ml     Ciprofloxacin ($)  <=0 25 Susceptible ug/ml     Gentamicin ($$) <=2 Susceptible ug/ml     Levofloxacin ($) <=0 50 Susceptible ug/ml     Nitrofurantoin <=32 Susceptible ug/ml     Tetracycline <=4 Susceptible ug/ml     Tobramycin ($) <=2 Susceptible ug/ml     Trimethoprim + Sulfamethoxazole ($$$) <=0 5/9 5 Susceptible ug/ml   UA w Reflex to Microscopic w Reflex to Culture - Clinic Collect    Specimen: Urine   Result Value Ref Range    Color, UA Light Yellow     Clarity, UA Clear     Specific Gravity, UA 1 009 1 003 - 1 030    pH, UA 7 5 4 5, 5 0, 5 5, 6 0, 6 5, 7 0, 7 5, 8 0    Leukocytes, UA Small (A) Negative    Nitrite, UA Negative Negative    Protein, UA Negative Negative mg/dl    Glucose, UA Negative Negative mg/dl    Ketones, UA Negative Negative mg/dl    Urobilinogen, UA <2 0 <2 0 mg/dl mg/dl    Bilirubin, UA Negative Negative    Occult Blood, UA Negative Negative   Urine Microscopic   Result Value Ref Range    RBC, UA 1-2 None Seen, 1-2 /hpf    WBC, UA 20-30 (A) None Seen, 1-2 /hpf    Epithelial Cells None Seen None Seen, Occasional /hpf    Bacteria, UA None Seen None Seen, Occasional /hpf    MUCUS THREADS Occasional (A) None Seen   POCT urine dip   Result Value Ref Range    LEUKOCYTE ESTERASE,UA trace     NITRITE,UA neg     SL AMB POCT UROBILINOGEN neg     POCT URINE PROTEIN neg      PH,UA 7 5     BLOOD,UA trace     SPECIFIC GRAVITY,UA 1 010     KETONES,UA neg     BILIRUBIN,UA neg     GLUCOSE, UA neg      COLOR,UA yellow     CLARITY,UA cloudy      Weight (last 2 days)     Date/Time Weight    08/12/22 1113 53 (116 8)        Body mass index is 21 36 kg/m²    BP      Temp      Pulse     Resp      SpO2        Vitals:    08/12/22 1113   Weight: 53 kg (116 lb 12 8 oz) Vitals:    08/12/22 1113   Weight: 53 kg (116 lb 12 8 oz)       /84   Pulse 61   Temp 98 4 °F (36 9 °C) (Tympanic)   Ht 5' 2" (1 575 m)   Wt 53 kg (116 lb 12 8 oz)   SpO2 99%   BMI 21 36 kg/m²          Physical Exam  Constitutional:       Appearance: She is well-developed  She is not ill-appearing or toxic-appearing  HENT:      Head: Normocephalic  Eyes:      General: No scleral icterus  Right eye: No discharge  Left eye: No discharge  Conjunctiva/sclera: Conjunctivae normal       Pupils: Pupils are equal, round, and reactive to light  Cardiovascular:      Rate and Rhythm: Normal rate and regular rhythm  Heart sounds: Normal heart sounds  No murmur heard  No friction rub  No gallop  Pulmonary:      Effort: No respiratory distress  Breath sounds: Normal breath sounds  No wheezing or rales  Abdominal:      General: Bowel sounds are normal  There is no distension  Palpations: Abdomen is soft  There is no mass  Tenderness: There is no abdominal tenderness  There is no guarding or rebound  Musculoskeletal:         General: No deformity  Cervical back: Neck supple  Lymphadenopathy:      Cervical: No cervical adenopathy  Neurological:      Mental Status: She is alert  Coordination: Coordination normal    Psychiatric:         Mood and Affect: Mood is anxious  Mood is not depressed  Thought Content: Thought content does not include suicidal ideation

## 2022-08-13 PROBLEM — R10.13 DYSPEPSIA: Status: ACTIVE | Noted: 2022-08-13

## 2022-08-13 PROBLEM — R68.81 EARLY SATIETY: Status: ACTIVE | Noted: 2022-08-13

## 2022-08-13 PROBLEM — R11.0 NAUSEA: Status: ACTIVE | Noted: 2022-08-13

## 2022-08-13 PROBLEM — R19.7 DIARRHEA: Status: ACTIVE | Noted: 2022-08-13

## 2022-08-13 RX ORDER — PANTOPRAZOLE SODIUM 40 MG/1
40 TABLET, DELAYED RELEASE ORAL DAILY
Qty: 90 TABLET | Refills: 0 | Status: SHIPPED | OUTPATIENT
Start: 2022-08-13 | End: 2022-08-14

## 2022-08-13 NOTE — ASSESSMENT & PLAN NOTE
Intermittent nausea and vomiting rule out gallbladder disease will check ultrasound of the abdomen and have patient go for upper endoscopy with GI

## 2022-08-13 NOTE — ASSESSMENT & PLAN NOTE
Suboptimal control TSH in the 20 working with endocrinology levothyroxine recently adjusted symptoms of cold intolerance and heat intolerance reported

## 2022-08-13 NOTE — ASSESSMENT & PLAN NOTE
Intermittent episodes of nausea vomiting and diarrhea x4 lasting under 24 hours currently asymptomatic severe at times unclear etiology will have patient see GI for a GI workup, will complete her FMLA paperwork when she drops at off

## 2022-08-16 ENCOUNTER — HOSPITAL ENCOUNTER (OUTPATIENT)
Dept: RADIOLOGY | Age: 52
Discharge: HOME | End: 2022-08-16
Payer: COMMERCIAL

## 2022-08-16 DIAGNOSIS — R10.13 DYSPEPSIA: ICD-10-CM

## 2022-08-16 PROCEDURE — 76700 US EXAM ABDOM COMPLETE: CPT

## 2022-08-17 DIAGNOSIS — R10.13 DYSPEPSIA: ICD-10-CM

## 2022-08-17 DIAGNOSIS — R68.81 EARLY SATIETY: Primary | ICD-10-CM

## 2022-08-17 RX ORDER — OMEPRAZOLE 20 MG/1
20 CAPSULE, DELAYED RELEASE ORAL DAILY
Qty: 30 CAPSULE | Refills: 3 | Status: SHIPPED | OUTPATIENT
Start: 2022-08-17

## 2022-08-19 LAB — CORTIS AM PEAK SERPL-MCNC: 26.7 MCG/DL

## 2022-08-20 DIAGNOSIS — R79.89 ELEVATED CORTISOL LEVEL: Primary | ICD-10-CM

## 2022-08-22 ENCOUNTER — OFFICE VISIT (OUTPATIENT)
Dept: GASTROENTEROLOGY | Facility: CLINIC | Age: 52
End: 2022-08-22
Payer: COMMERCIAL

## 2022-08-22 ENCOUNTER — TELEPHONE (OUTPATIENT)
Dept: INTERNAL MEDICINE CLINIC | Facility: CLINIC | Age: 52
End: 2022-08-22

## 2022-08-22 VITALS
BODY MASS INDEX: 22.97 KG/M2 | OXYGEN SATURATION: 97 % | HEIGHT: 60 IN | DIASTOLIC BLOOD PRESSURE: 90 MMHG | SYSTOLIC BLOOD PRESSURE: 137 MMHG | HEART RATE: 69 BPM | TEMPERATURE: 97.7 F | WEIGHT: 117 LBS

## 2022-08-22 DIAGNOSIS — R07.9 EXERTIONAL CHEST PAIN: ICD-10-CM

## 2022-08-22 DIAGNOSIS — K82.4 GALLBLADDER POLYP: ICD-10-CM

## 2022-08-22 DIAGNOSIS — R10.13 EPIGASTRIC PAIN: ICD-10-CM

## 2022-08-22 DIAGNOSIS — R11.10 VOMITING, UNSPECIFIED VOMITING TYPE, UNSPECIFIED WHETHER NAUSEA PRESENT: Primary | ICD-10-CM

## 2022-08-22 DIAGNOSIS — R68.81 EARLY SATIETY: ICD-10-CM

## 2022-08-22 DIAGNOSIS — R10.13 DYSPEPSIA: ICD-10-CM

## 2022-08-22 DIAGNOSIS — R11.0 NAUSEA: ICD-10-CM

## 2022-08-22 DIAGNOSIS — K76.0 FATTY LIVER: Primary | ICD-10-CM

## 2022-08-22 PROCEDURE — 99214 OFFICE O/P EST MOD 30 MIN: CPT | Performed by: INTERNAL MEDICINE

## 2022-08-22 RX ORDER — PANTOPRAZOLE SODIUM 40 MG/1
40 TABLET, DELAYED RELEASE ORAL DAILY
Qty: 30 TABLET | Refills: 6 | Status: SHIPPED | OUTPATIENT
Start: 2022-08-22

## 2022-08-22 RX ORDER — FAMOTIDINE 40 MG/1
40 TABLET, FILM COATED ORAL
Qty: 30 TABLET | Refills: 6 | Status: SHIPPED | OUTPATIENT
Start: 2022-08-22 | End: 2022-09-23

## 2022-08-22 NOTE — PATIENT INSTRUCTIONS
Scheduled date of EGD(as of today): 11/02/22  Physician performing EGD: Norma Napoles  Location of EGD: [facility]  Instructions reviewed with patient by: Ilana Real  Clearances: NONE

## 2022-08-22 NOTE — LETTER
August 22, 2022     Adri Wilkins  [address] [facility] [address]    Patient: Rossi Donis   YOB: 1970   Date of Visit: 8/22/2022       Dear Dr Mindi Alejandre: Thank you for referring Amanda Whitehead to me for evaluation  Below are my notes for this consultation  If you have questions, please do not hesitate to call me  I look forward to following your patient along with you  Sincerely,        Maicol Pablo MD        CC: No Recipients  Maicol Pablo MD  8/22/2022  9:56 PM  Sign when Signing Visit  [facility]  Rossi Donis 50 y o  female MRN: [id number]            Assessment & Plan:  53-year-old female with history reflux in the past, more recently intermittent bouts of nocturnal vomiting, and 1 episode of vomiting with diarrhea  Also reports some exertional chest pain and shortness of breath  1  Exertional chest pain:  Discussed with patient that the symptoms are not typical of GI symptoms  -recommend evaluation with cardiologist   -patient was instructed to go to emergency room if she has any persistent chest pain    2  Vomiting: Appears to occur mostly in the evening, the symptoms more consistent with reflux and regurgitation, likely precipitated by eating dinner and then going to bed within 2 to 3 hours   -recommended some lifestyle and dietary modification   -recommend that she take PPI therapy once daily, additionally will start her on H2 blocker at night   -we will proceed with an upper endoscopy, I would like to have cardiac clearance prior to endoscopic evaluation  -will check laboratory studies including CBC, BMP, LFTs    3   Diarrhea: Unclear etiology, had a normal colonoscopy 3 years ago  -had 1 episode of diarrhea, we will continue to monitor, if symptoms persist we will consider additional evaluation, she was instructed to contact us with an update of symptoms prior to endoscopic evaluation      Madisyn David was seen today for early satiety, diarrhea, dyspepsia and nausea  Diagnoses and all orders for this visit:    Vomiting, unspecified vomiting type, unspecified whether nausea present    Epigastric pain  -     pantoprazole (PROTONIX) 40 mg tablet; Take 1 tablet (40 mg total) by mouth daily  -     famotidine (PEPCID) 40 MG tablet; Take 1 tablet (40 mg total) by mouth daily at bedtime  -     EGD; Future  -     CBC and differential; Future  -     Hepatitis C RNA, quantitative, PCR; Future  -     Hepatic function panel; Future  -     Basic metabolic panel; Future    Early satiety  -     Ambulatory Referral to Gastroenterology  -     EGD; Future    Dyspepsia  -     Ambulatory Referral to Gastroenterology    Nausea  -     Ambulatory Referral to Gastroenterology    Other orders  -     Diet NPO; Sips with meds; Standing  -     Void on call to OR; Standing            _____________________________________________________________        CC:  Vomiting and diarrhea    HPI:  Stuart Trinh is a 50 y  o female who is here for vomiting and diarrhea  This is an otherwise healthy 82-year-old female who reports a past 4 months she has had episodes approximately once per month when she lays down to go to bed about 2 hours later wakes vomiting  Typically this occurs after she eats dinner, goes to bed about 2 hours later  Usually her dinners consists of salads  Recently she had a similar episode however this was followed by diarrhea  She felt significant fatigue the next day  She does report having some retrosternal discomfort, frequent belching after eating  Additionally patient notes that recently while walking in the parking lot a going up stairs she has had chest discomfort with pressure like sensation and shortness of breath which resolves with rest       She denies any dysphagia  Bowel movements are normal except for the most recent bout of diarrhea  Denies any melena rectal bleeding  Her weight has been stable    No other recent change in medications    She does take NSAIDs occasionally  Patient reports she feels full somewhat sooner  Has a history of reflux in the past, had been off of H2 blockers in the past more recently was seen by PCP, has been started on over-the-counter PPI therapy with mild improvement in symptoms  Reports some mild epigastric discomfort      ROS:  The remainder of the ROS was negative except for the pertinent positives mentioned in HPI        Allergies: Morphine, Codeine, Morphine and related, Codeine, and Sulfa antibiotics    Medications:   Current Outpatient Medications:     Ascorbic Acid (Vitamin C) 500 MG CAPS, Take 1 each by mouth daily, Disp: , Rfl:     ECHINACEA PO, Take by mouth daily, Disp: , Rfl:     ELDERBERRY PO, Take 1 tablet by mouth daily as needed Daily every morning, Disp: , Rfl:     famotidine (PEPCID) 40 MG tablet, Take 1 tablet (40 mg total) by mouth daily at bedtime, Disp: 30 tablet, Rfl: 6    fexofenadine (ALLEGRA) 180 MG tablet, Take 1 tablet (180 mg total) by mouth daily for 15 days, Disp: 15 tablet, Rfl: 0    omeprazole (PriLOSEC) 20 mg delayed release capsule, Take 1 capsule (20 mg total) by mouth daily, Disp: 30 capsule, Rfl: 3    pantoprazole (PROTONIX) 40 mg tablet, Take 1 tablet (40 mg total) by mouth daily, Disp: 30 tablet, Rfl: 6    prochlorperazine (COMPAZINE) 10 mg tablet, 1 tab at onset of migraine, can repeat in 8 hours, can take with triptan/NSAID, Disp: 20 tablet, Rfl: 3    Synthroid 75 MCG tablet, Take 75 mcg by mouth daily, Disp: , Rfl:     topiramate (TOPAMAX) 200 MG tablet, Take 1 tablet by mouth twice per day , Disp: 180 tablet, Rfl: 3    Vitamin D, Cholecalciferol, 25 MCG (1000 UT) CAPS, Take 1 tablet by mouth in the morning, Disp: , Rfl:     Zinc 50 MG CAPS, Take 1 capsule by mouth daily, Disp: , Rfl:     benzonatate (TESSALON) 200 MG capsule, Take 1 capsule (200 mg total) by mouth 3 (three) times a day as needed for cough (Patient not taking: No sig reported), Disp: 20 capsule, Rfl: 0    guaiFENesin (MUCINEX) 600 mg 12 hr tablet, Take 1,200 mg by mouth if needed for cough (Patient not taking: No sig reported), Disp: , Rfl:     levalbuterol (Xopenex HFA) 45 mcg/act inhaler, Inhale 1-2 puffs every 4 (four) hours as needed for wheezing (Patient not taking: No sig reported), Disp: 15 g, Rfl: 0    Past Medical History:   Diagnosis Date    Arthritis     Asthma     Disease of thyroid gland     [facility] [address] [facility]     Nephrolithiasis 7/22/2019    Osteoporosis     Partial symptompatic epilepsy     Seizure disorder (HCC)        Past Surgical History:   Procedure Laterality Date    APPENDECTOMY      FRACTURE SURGERY      HYSTERECTOMY      OTHER SURGICAL HISTORY      left foot, rt knee, tonselectomy    SINUS SURGERY      TONSILLECTOMY         Family History   Problem Relation Age of Onset    Alcohol abuse Mother     RUSSELL disease Mother    Winnie Osuna Boles's esophagus Mother     Migraines Father     Pancreatic cancer Father     Cancer Father     Alcohol abuse Maternal Uncle     Seizures Paternal Aunt     Lung cancer Paternal Aunt     Prostate cancer Paternal Uncle     Colon cancer Paternal Uncle         reports that she has never smoked  She has never used smokeless tobacco  She reports previous alcohol use  She reports previous drug use        Physical Exam:    /90 (BP Location: Right arm, Patient Position: Sitting, Cuff Size: Standard)   Pulse 69   Temp 97 7 °F (36 5 °C)   Ht 5' 0 2" (1 529 m)   Wt 53 1 kg (117 lb)   SpO2 97%   BMI 22 70 kg/m²     Gen: wn/wd, NAD  HEENT: anicteric, MMM, no cervical LAD  CVS: RRR, no m/r/g  CHEST: CTA b/l  ABD: +BS, soft, mild epigastric discomfort, no rebound or guarding, no hepatosplenomegaly  EXT: no c/c/e  NEURO: aaox3  SKIN: NO rashes

## 2022-08-22 NOTE — TELEPHONE ENCOUNTER
The patient is returning your call  She received a voice mail from you about her ultra sound  Could you please call her back  Please advise  Thank you

## 2022-08-22 NOTE — TELEPHONE ENCOUNTER
Discuss the ultrasound with the patient she will see GI orders in the chart it did show fatty liver mild and gallbladder polyp also elevation of the cortisol she reports me she will be going for a urine 24 hour collection today    She mentions she feels depressed about her medical condition no SI she reports me that when she will get through all of his she will be feel better at this point time she does not want treatment will discuss further at her follow-up visit she also reports me lower back pain earlier this morning that went away at this point she put a topical cream on

## 2022-08-23 NOTE — PROGRESS NOTES
[facility]  Renatasa Layton 50 y o  female MRN: [id number]            Assessment & Plan:  28-year-old female with history reflux in the past, more recently intermittent bouts of nocturnal vomiting, and 1 episode of vomiting with diarrhea  Also reports some exertional chest pain and shortness of breath  1  Exertional chest pain:  Discussed with patient that the symptoms are not typical of GI symptoms  -recommend evaluation with cardiologist   -patient was instructed to go to emergency room if she has any persistent chest pain    2  Vomiting: Appears to occur mostly in the evening, the symptoms more consistent with reflux and regurgitation, likely precipitated by eating dinner and then going to bed within 2 to 3 hours   -recommended some lifestyle and dietary modification   -recommend that she take PPI therapy once daily, additionally will start her on H2 blocker at night   -we will proceed with an upper endoscopy, I would like to have cardiac clearance prior to endoscopic evaluation  -will check laboratory studies including CBC, BMP, LFTs    3  Diarrhea: Unclear etiology, had a normal colonoscopy 3 years ago  -had 1 episode of diarrhea, we will continue to monitor, if symptoms persist we will consider additional evaluation, she was instructed to contact us with an update of symptoms prior to endoscopic evaluation      Radha Storey was seen today for early satiety, diarrhea, dyspepsia and nausea  Diagnoses and all orders for this visit:    Vomiting, unspecified vomiting type, unspecified whether nausea present    Epigastric pain  -     pantoprazole (PROTONIX) 40 mg tablet; Take 1 tablet (40 mg total) by mouth daily  -     famotidine (PEPCID) 40 MG tablet; Take 1 tablet (40 mg total) by mouth daily at bedtime  -     EGD; Future  -     CBC and differential; Future  -     Hepatitis C RNA, quantitative, PCR; Future  -     Hepatic function panel; Future  -     Basic metabolic panel;  Future    Early satiety  -     Ambulatory Referral to Gastroenterology  -     EGD; Future    Dyspepsia  -     Ambulatory Referral to Gastroenterology    Nausea  -     Ambulatory Referral to Gastroenterology    Other orders  -     Diet NPO; Sips with meds; Standing  -     Void on call to OR; Standing            _____________________________________________________________        CC:  Vomiting and diarrhea    HPI:  Renata Layton is a 50 y  o female who is here for vomiting and diarrhea  This is an otherwise healthy 51-year-old female who reports a past 4 months she has had episodes approximately once per month when she lays down to go to bed about 2 hours later wakes vomiting  Typically this occurs after she eats dinner, goes to bed about 2 hours later  Usually her dinners consists of salads  Recently she had a similar episode however this was followed by diarrhea  She felt significant fatigue the next day  She does report having some retrosternal discomfort, frequent belching after eating  Additionally patient notes that recently while walking in the parking lot a going up stairs she has had chest discomfort with pressure like sensation and shortness of breath which resolves with rest       She denies any dysphagia  Bowel movements are normal except for the most recent bout of diarrhea  Denies any melena rectal bleeding  Her weight has been stable  No other recent change in medications    She does take NSAIDs occasionally  Patient reports she feels full somewhat sooner  Has a history of reflux in the past, had been off of H2 blockers in the past more recently was seen by PCP, has been started on over-the-counter PPI therapy with mild improvement in symptoms  Reports some mild epigastric discomfort      ROS:  The remainder of the ROS was negative except for the pertinent positives mentioned in HPI        Allergies: Morphine, Codeine, Morphine and related, Codeine, and Sulfa antibiotics    Medications: Current Outpatient Medications:     Ascorbic Acid (Vitamin C) 500 MG CAPS, Take 1 each by mouth daily, Disp: , Rfl:     ECHINACEA PO, Take by mouth daily, Disp: , Rfl:     ELDERBERRY PO, Take 1 tablet by mouth daily as needed Daily every morning, Disp: , Rfl:     famotidine (PEPCID) 40 MG tablet, Take 1 tablet (40 mg total) by mouth daily at bedtime, Disp: 30 tablet, Rfl: 6    fexofenadine (ALLEGRA) 180 MG tablet, Take 1 tablet (180 mg total) by mouth daily for 15 days, Disp: 15 tablet, Rfl: 0    omeprazole (PriLOSEC) 20 mg delayed release capsule, Take 1 capsule (20 mg total) by mouth daily, Disp: 30 capsule, Rfl: 3    pantoprazole (PROTONIX) 40 mg tablet, Take 1 tablet (40 mg total) by mouth daily, Disp: 30 tablet, Rfl: 6    prochlorperazine (COMPAZINE) 10 mg tablet, 1 tab at onset of migraine, can repeat in 8 hours, can take with triptan/NSAID, Disp: 20 tablet, Rfl: 3    Synthroid 75 MCG tablet, Take 75 mcg by mouth daily, Disp: , Rfl:     topiramate (TOPAMAX) 200 MG tablet, Take 1 tablet by mouth twice per day , Disp: 180 tablet, Rfl: 3    Vitamin D, Cholecalciferol, 25 MCG (1000 UT) CAPS, Take 1 tablet by mouth in the morning, Disp: , Rfl:     Zinc 50 MG CAPS, Take 1 capsule by mouth daily, Disp: , Rfl:     benzonatate (TESSALON) 200 MG capsule, Take 1 capsule (200 mg total) by mouth 3 (three) times a day as needed for cough (Patient not taking: No sig reported), Disp: 20 capsule, Rfl: 0    guaiFENesin (MUCINEX) 600 mg 12 hr tablet, Take 1,200 mg by mouth if needed for cough (Patient not taking: No sig reported), Disp: , Rfl:     levalbuterol (Xopenex HFA) 45 mcg/act inhaler, Inhale 1-2 puffs every 4 (four) hours as needed for wheezing (Patient not taking: No sig reported), Disp: 15 g, Rfl: 0    Past Medical History:   Diagnosis Date    Arthritis     Asthma     Disease of thyroid gland     [facility]     Nephrolithiasis 7/22/2019    Osteoporosis     Partial symptompatic epilepsy     Seizure disorder [facility])        Past Surgical History:   Procedure Laterality Date    APPENDECTOMY      FRACTURE SURGERY      HYSTERECTOMY      OTHER SURGICAL HISTORY      left foot, rt knee, tonselectomy    SINUS SURGERY      TONSILLECTOMY         Family History   Problem Relation Age of Onset    Alcohol abuse Mother     RUSSELL disease Mother    Tyree Pert Boles's esophagus Mother    Tyree Pert Migraines Father     Pancreatic cancer Father     Cancer Father     Alcohol abuse Maternal Uncle     Seizures Paternal Aunt     Lung cancer Paternal Aunt     Prostate cancer Paternal Uncle     Colon cancer Paternal Uncle         reports that she has never smoked  She has never used smokeless tobacco  She reports previous alcohol use  She reports previous drug use        Physical Exam:    /90 (BP Location: Right arm, Patient Position: Sitting, Cuff Size: Standard)   Pulse 69   Temp 97 7 °F (36 5 °C)   Ht 5' 0 2" (1 529 m)   Wt 53 1 kg (117 lb)   SpO2 97%   BMI 22 70 kg/m²     Gen: wn/wd, NAD  HEENT: anicteric, MMM, no cervical LAD  CVS: RRR, no m/r/g  CHEST: CTA b/l  ABD: +BS, soft, mild epigastric discomfort, no rebound or guarding, no hepatosplenomegaly  EXT: no c/c/e  NEURO: aaox3  SKIN: NO rashes

## 2022-08-24 LAB
ALBUMIN SERPL-MCNC: 4.6 G/DL (ref 3.6–5.1)
ALBUMIN/GLOB SERPL: 1.8 (CALC) (ref 1–2.5)
ALP SERPL-CCNC: 60 U/L (ref 37–153)
ALT SERPL-CCNC: 13 U/L (ref 6–29)
AST SERPL-CCNC: 17 U/L (ref 10–35)
BASOPHILS # BLD AUTO: 29 CELLS/UL (ref 0–200)
BASOPHILS NFR BLD AUTO: 0.7 %
BILIRUB DIRECT SERPL-MCNC: 0.1 MG/DL
BILIRUB INDIRECT SERPL-MCNC: 0.5 MG/DL (CALC) (ref 0.2–1.2)
BILIRUB SERPL-MCNC: 0.6 MG/DL (ref 0.2–1.2)
BUN SERPL-MCNC: 10 MG/DL (ref 7–25)
BUN/CREAT SERPL: NORMAL (CALC) (ref 6–22)
CALCIUM SERPL-MCNC: 9.3 MG/DL (ref 8.6–10.4)
CHLORIDE SERPL-SCNC: 108 MMOL/L (ref 98–110)
CO2 SERPL-SCNC: 25 MMOL/L (ref 20–32)
CREAT SERPL-MCNC: 0.69 MG/DL (ref 0.5–1.03)
EOSINOPHIL # BLD AUTO: 101 CELLS/UL (ref 15–500)
EOSINOPHIL NFR BLD AUTO: 2.4 %
ERYTHROCYTE [DISTWIDTH] IN BLOOD BY AUTOMATED COUNT: 12.8 % (ref 11–15)
GFR/BSA.PRED SERPLBLD CYS-BASED-ARV: 106 ML/MIN/1.73M2
GLOBULIN SER CALC-MCNC: 2.6 G/DL (CALC) (ref 1.9–3.7)
GLUCOSE SERPL-MCNC: 89 MG/DL (ref 65–99)
HCT VFR BLD AUTO: 42.6 % (ref 35–45)
HCV RNA SERPL NAA+PROBE-ACNC: NORMAL IU/ML
HCV RNA SERPL NAA+PROBE-LOG IU: NORMAL LOG IU/ML
HGB BLD-MCNC: 14.4 G/DL (ref 11.7–15.5)
LYMPHOCYTES # BLD AUTO: 1730 CELLS/UL (ref 850–3900)
LYMPHOCYTES NFR BLD AUTO: 41.2 %
MCH RBC QN AUTO: 29.9 PG (ref 27–33)
MCHC RBC AUTO-ENTMCNC: 33.8 G/DL (ref 32–36)
MCV RBC AUTO: 88.6 FL (ref 80–100)
MONOCYTES # BLD AUTO: 575 CELLS/UL (ref 200–950)
MONOCYTES NFR BLD AUTO: 13.7 %
NEUTROPHILS # BLD AUTO: 1764 CELLS/UL (ref 1500–7800)
NEUTROPHILS NFR BLD AUTO: 42 %
PLATELET # BLD AUTO: 279 THOUSAND/UL (ref 140–400)
PMV BLD REES-ECKER: 9.8 FL (ref 7.5–12.5)
POTASSIUM SERPL-SCNC: 4 MMOL/L (ref 3.5–5.3)
PROT SERPL-MCNC: 7.2 G/DL (ref 6.1–8.1)
RBC # BLD AUTO: 4.81 MILLION/UL (ref 3.8–5.1)
SODIUM SERPL-SCNC: 140 MMOL/L (ref 135–146)
WBC # BLD AUTO: 4.2 THOUSAND/UL (ref 3.8–10.8)

## 2022-08-29 LAB
CORTIS F 24H UR-MRATE: 27.7 MCG/24 H (ref 4–50)
CREAT 24H UR-MRATE: 0.91 G/24 H (ref 0.5–2.15)
SPECIMEN VOL 24H UR: 2300 ML

## 2022-09-09 ENCOUNTER — NURSE TRIAGE (OUTPATIENT)
Dept: OTHER | Facility: OTHER | Age: 52
End: 2022-09-09

## 2022-09-09 NOTE — TELEPHONE ENCOUNTER
Regarding: vomit   ----- Message from Beauty Dakins sent at 9/9/2022  6:39 PM EDT -----  " I vomited, I'm currently on famotidine (PEPCID) 40 MG tablet  The vomit was very foamy  "             PT  Does not know how to take off call blocks, does not have alternative number, I explained the call will not come through but she was not giving me any options, and still asking for the call back

## 2022-09-10 NOTE — TELEPHONE ENCOUNTER
New onset vomiting (foamy red) once, and diarrhea (five times), decreased urination and abdominal distention with constant  upper abdominal pain rated 8/10  Patient states last temp was 99 3  No further symptoms  Care advice given  En-route to ED ([facility])  On-call provider informed

## 2022-09-10 NOTE — TELEPHONE ENCOUNTER
Reason for Disposition   [1] Vomiting AND [2] abdomen looks much more swollen than usual   [1] Vomiting AND [2] contains red blood or black ("coffee ground") material  (Exception: few red streaks in vomit that only happened once)    Answer Assessment - Initial Assessment Questions  1  VOMITING SEVERITY: "How many times have you vomited in the past 24 hours?"      - MILD:  1 - 2 times/day     - MODERATE: 3 - 5 times/day, decreased oral intake without significant weight loss or symptoms of dehydration     - SEVERE: 6 or more times/day, vomits everything or nearly everything, with significant weight loss, symptoms of dehydration     Mild   2  ONSET: "When did the vomiting begin?"      9/9/22  3  FLUIDS: "What fluids or food have you vomited up today?" "Have you been able to keep any fluids down?"     Confirms   4  ABDOMINAL PAIN: "Are your having any abdominal pain?" If yes : "How bad is it and what does it feel like?" (e g , crampy, dull, intermittent, constant)       Confirms Upper gastric Constant rated 8/10  5  DIARRHEA: "Is there any diarrhea?" If Yes, ask: "How many times today?"      Confirms -Five times   6  CONTACTS: "Is there anyone else in the family with the same symptoms?"    denies   7  CAUSE: "What do you think is causing your vomiting?"      Shrimp   8  HYDRATION STATUS: "Any signs of dehydration?" (e g , dry mouth [not only dry lips], too weak to stand) "When did you last urinate?"      Decreased urination at 1430  9   OTHER SYMPTOMS: "Do you have any other symptoms?" (e g , fever, headache, vertigo, vomiting blood or coffee grounds, recent head injury)      Low grade temp 99 3, decreased urination , Foamy red vomiting    Protocols used: VOMITING-ADULT-

## 2022-09-15 ENCOUNTER — OFFICE VISIT (OUTPATIENT)
Dept: INTERNAL MEDICINE CLINIC | Facility: CLINIC | Age: 52
End: 2022-09-15
Payer: COMMERCIAL

## 2022-09-15 VITALS
BODY MASS INDEX: 21.27 KG/M2 | RESPIRATION RATE: 16 BRPM | WEIGHT: 115.6 LBS | HEIGHT: 62 IN | SYSTOLIC BLOOD PRESSURE: 138 MMHG | OXYGEN SATURATION: 99 % | TEMPERATURE: 97.6 F | DIASTOLIC BLOOD PRESSURE: 82 MMHG | HEART RATE: 90 BPM

## 2022-09-15 DIAGNOSIS — R68.89 FORGETFULNESS: ICD-10-CM

## 2022-09-15 DIAGNOSIS — R19.7 VOMITING AND DIARRHEA: ICD-10-CM

## 2022-09-15 DIAGNOSIS — R11.10 VOMITING AND DIARRHEA: ICD-10-CM

## 2022-09-15 DIAGNOSIS — Z87.898 HISTORY OF SEIZURE: ICD-10-CM

## 2022-09-15 DIAGNOSIS — F41.8 SITUATIONAL ANXIETY: Primary | ICD-10-CM

## 2022-09-15 PROCEDURE — 99214 OFFICE O/P EST MOD 30 MIN: CPT | Performed by: INTERNAL MEDICINE

## 2022-09-15 PROCEDURE — 3725F SCREEN DEPRESSION PERFORMED: CPT | Performed by: INTERNAL MEDICINE

## 2022-09-15 RX ORDER — BUSPIRONE HYDROCHLORIDE 5 MG/1
5 TABLET ORAL DAILY PRN
Qty: 30 TABLET | Refills: 2 | Status: SHIPPED | OUTPATIENT
Start: 2022-09-15

## 2022-09-15 RX ORDER — ONDANSETRON 4 MG/1
4 TABLET, FILM COATED ORAL EVERY 8 HOURS PRN
Qty: 20 TABLET | Refills: 0 | Status: SHIPPED | OUTPATIENT
Start: 2022-09-15

## 2022-09-15 NOTE — ASSESSMENT & PLAN NOTE
· Continue follow-up with GI   EGD is scheduled  · This could be related to anxiety  See plan above  · Will continue to monitor  · P r n   Zofran
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
## 2022-09-15 NOTE — ASSESSMENT & PLAN NOTE
[place] test 26/30  Will use this as baseline  Recent laboratory workup including CBC, BMP and TSH were unremarkable  This could be related to her anxiety  However will also rule out seizure activities and sleep apnea    May consider brain imaging next visit

## 2022-09-16 ENCOUNTER — TELEMEDICINE (OUTPATIENT)
Dept: INTERNAL MEDICINE CLINIC | Facility: CLINIC | Age: 52
End: 2022-09-16
Payer: COMMERCIAL

## 2022-09-16 DIAGNOSIS — J00 NASOPHARYNGITIS: Primary | ICD-10-CM

## 2022-09-16 PROCEDURE — 99213 OFFICE O/P EST LOW 20 MIN: CPT | Performed by: NURSE PRACTITIONER

## 2022-09-16 NOTE — PROGRESS NOTES
Virtual Regular Visit    Verification of patient location:    Patient is located in the following state in which I hold an active license [place]      Assessment/Plan:    Problem List Items Addressed This Visit    None     Visit Diagnoses     Nasopharyngitis    -  Primary      The case discussed with patient using patient centered shared decision making  The patient was counseled regarding instructions for management,-- risk factor reductions,-- prognosis,-- impressions,-- risks and benefits of treatment options,-- importance of compliance with treatment  I have reviewed the instructions with the patient, answering all questions to her satisfaction  Early viral illness  Supportive care measures per discussion  Explained rationale->abx not indicated for viral illness  Take home covid test-call if positive  Follow up Monday if not feeling better         Reason for visit is   Chief Complaint   Patient presents with    Virtual Regular Visit        Encounter provider Phyllis Burgos [address] [place] [address]    Provider located at [address] [place] [id number]      Recent Visits  Date Type Provider Dept   09/15/22 Office Visit Laura Galeano MD [address] [facility] recent visits within past 7 days and meeting all other requirements  Today's Visits  Date Type Provider Dept   09/16/22 Telemedicine Janeth Patterson today's visits and meeting all other requirements  Future Appointments  No visits were found meeting these conditions  Showing future appointments within next 150 days and meeting all other requirements       The patient was identified by name and date of birth  [place] was informed that this is a telemedicine visit and that the visit is being conducted through [address] [place] [address] Now and patient was informed that this is a secure, HIPAA-compliant platform  She agrees to proceed     My office door was closed   No one else was in the room  She acknowledged consent and understanding of privacy and security of the video platform  The patient has agreed to participate and understands they can discontinue the visit at any time  Patient is aware this is a billable service  Subjective        URI   This is a new (took a trip with a friend who has similar illness  Pt had covid in May 2022) problem  The current episode started today  The problem has been unchanged  There has been no fever  Associated symptoms include congestion, coughing, headaches, rhinorrhea and a sore throat  Pertinent negatives include no abdominal pain, chest pain, diarrhea or nausea  She has tried nothing for the symptoms          Past Medical History:   Diagnosis Date    Arthritis     Asthma     Disease of thyroid gland     Hypokalemia 09/09/2022    Hypoparathyroidism ([address] )     Nephrolithiasis 07/22/2019    Osteoporosis     Partial symptompatic epilepsy     Seizure disorder (HCC)        Past Surgical History:   Procedure Laterality Date    APPENDECTOMY      FRACTURE SURGERY      HYSTERECTOMY      OTHER SURGICAL HISTORY      left foot, rt knee, tonselectomy    SINUS SURGERY      TONSILLECTOMY         Current Outpatient Medications   Medication Sig Dispense Refill    Ascorbic Acid (Vitamin C) 500 MG CAPS Take 1 each by mouth daily      benzonatate (TESSALON) 200 MG capsule Take 1 capsule (200 mg total) by mouth 3 (three) times a day as needed for cough (Patient not taking: No sig reported) 20 capsule 0    busPIRone (BUSPAR) 5 mg tablet Take 1 tablet (5 mg total) by mouth daily as needed (anxiety) 30 tablet 2    ECHINACEA PO Take by mouth daily      ELDERBERRY PO Take 1 tablet by mouth daily as needed Daily every morning (Patient not taking: Reported on 9/15/2022)      famotidine (PEPCID) 40 MG tablet Take 1 tablet (40 mg total) by mouth daily at bedtime 30 tablet 6    fexofenadine (ALLEGRA) 180 MG tablet Take 1 tablet (180 mg total) by mouth daily for 15 days (Patient not taking: Reported on 9/15/2022) 15 tablet 0    guaiFENesin (MUCINEX) 600 mg 12 hr tablet Take 1,200 mg by mouth if needed for cough (Patient not taking: No sig reported)      levalbuterol (Xopenex HFA) 45 mcg/act inhaler Inhale 1-2 puffs every 4 (four) hours as needed for wheezing (Patient not taking: No sig reported) 15 g 0    omeprazole (PriLOSEC) 20 mg delayed release capsule Take 1 capsule (20 mg total) by mouth daily (Patient not taking: Reported on 9/15/2022) 30 capsule 3    ondansetron (ZOFRAN) 4 mg tablet Take 1 tablet (4 mg total) by mouth every 8 (eight) hours as needed for nausea or vomiting 20 tablet 0    pantoprazole (PROTONIX) 40 mg tablet Take 1 tablet (40 mg total) by mouth daily (Patient not taking: Reported on 9/15/2022) 30 tablet 6    prochlorperazine (COMPAZINE) 10 mg tablet 1 tab at onset of migraine, can repeat in 8 hours, can take with triptan/NSAID (Patient not taking: Reported on 9/15/2022) 20 tablet 3    Synthroid 75 MCG tablet Take 75 mcg by mouth daily      topiramate (TOPAMAX) 200 MG tablet Take 1 tablet by mouth twice per day  180 tablet 3    Vitamin D, Cholecalciferol, 25 MCG (1000 UT) CAPS Take 1 tablet by mouth in the morning      Zinc 50 MG CAPS Take 1 capsule by mouth daily       No current facility-administered medications for this visit  Allergies   Allergen Reactions    Morphine Other (See Comments)    Codeine Hives    Morphine And Related     Codeine Itching    Sulfa Antibiotics Rash       Review of Systems   Constitutional: Positive for fatigue  Negative for fever  HENT: Positive for congestion, postnasal drip, rhinorrhea and sore throat  Respiratory: Positive for cough  Negative for shortness of breath  Cardiovascular: Negative for chest pain  Gastrointestinal: Negative for abdominal pain, diarrhea and nausea  Musculoskeletal: Positive for myalgias  Neurological: Positive for headaches   Negative for dizziness and weakness  Video Exam    There were no vitals filed for this visit  Physical Exam  Constitutional:       General: She is not in acute distress  Appearance: Normal appearance  She is ill-appearing  Neurological:      Mental Status: She is alert            I spent 10 minutes directly with the patient during this visit

## 2022-09-21 DIAGNOSIS — J06.9 ACUTE URI: Primary | ICD-10-CM

## 2022-09-21 RX ORDER — AZITHROMYCIN 250 MG/1
TABLET, FILM COATED ORAL
Qty: 6 TABLET | Refills: 0 | Status: SHIPPED | OUTPATIENT
Start: 2022-09-21 | End: 2022-09-25

## 2022-09-23 DIAGNOSIS — R10.13 EPIGASTRIC PAIN: ICD-10-CM

## 2022-09-23 RX ORDER — FAMOTIDINE 40 MG/1
TABLET, FILM COATED ORAL
Qty: 90 TABLET | Refills: 3 | Status: SHIPPED | OUTPATIENT
Start: 2022-09-23

## 2022-09-27 ENCOUNTER — TELEPHONE (OUTPATIENT)
Dept: SLEEP CENTER | Facility: CLINIC | Age: 52
End: 2022-09-27

## 2022-09-27 NOTE — TELEPHONE ENCOUNTER
----- Message from Kim Leiva MD sent at 9/26/2022  4:32 PM EDT -----  Approved    ----- Message -----  From: Lamar Mooney  Sent: 6/44/5482   6:00 AM EDT  To: Sleep Medicine Jamal Plascencia Provider    This Home sleep study needs approval      If approved please sign and return to clerical pool  If denied please include reasons why  Also provide alternative testing if warranted  Please sign and return to clerical pool

## 2022-09-30 ENCOUNTER — TELEPHONE (OUTPATIENT)
Dept: OTHER | Facility: OTHER | Age: 52
End: 2022-09-30

## 2022-09-30 NOTE — TELEPHONE ENCOUNTER
Spoke with patient, reports last night she had 6 loose stools about half hour to an hour after eating tuna sandwich  Patient was on azithromycin for 4 days and finished it on the 28th she also took a pre and probiotic daily  Patient notices she has these episodes of loose stools after a stressful situation but then it goes away for a while  I advised patient to keep us updated if her diarrhea persists through the weekend to give us a call back to possibly send for stool studies  Patient verbalized understanding and appreciative of call

## 2022-09-30 NOTE — TELEPHONE ENCOUNTER
Patient calling with a message for Dr Alex Kauffman  She wants to know if the Pepcid that you ordered for her  should take care of her diarrhea problem also? She stopped vomiting but the diarrhea continues  She also just finished a Z-Pack 2 days ago which she took for a sinus infection  She did not want to speak to a triage nurse now, stating that it was not urgent she just wanted to speak to the Dr  about this  She also wants to know should she stay on the Pepcid

## 2022-10-04 ENCOUNTER — OFFICE VISIT (OUTPATIENT)
Dept: INTERNAL MEDICINE CLINIC | Facility: CLINIC | Age: 52
End: 2022-10-04
Payer: COMMERCIAL

## 2022-10-04 VITALS
SYSTOLIC BLOOD PRESSURE: 142 MMHG | DIASTOLIC BLOOD PRESSURE: 82 MMHG | WEIGHT: 117 LBS | HEIGHT: 62 IN | OXYGEN SATURATION: 95 % | HEART RATE: 84 BPM | BODY MASS INDEX: 21.53 KG/M2

## 2022-10-04 DIAGNOSIS — R13.10 DYSPHAGIA, UNSPECIFIED TYPE: Primary | ICD-10-CM

## 2022-10-04 DIAGNOSIS — Z00.00 WELLNESS EXAMINATION: ICD-10-CM

## 2022-10-04 DIAGNOSIS — R19.7 DIARRHEA, UNSPECIFIED TYPE: ICD-10-CM

## 2022-10-04 PROCEDURE — 99396 PREV VISIT EST AGE 40-64: CPT | Performed by: INTERNAL MEDICINE
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
## 2022-10-05 ENCOUNTER — APPOINTMENT (OUTPATIENT)
Dept: RADIOLOGY | Age: 52
End: 2022-10-05
Payer: COMMERCIAL

## 2022-10-05 DIAGNOSIS — R13.10 DYSPHAGIA, UNSPECIFIED TYPE: ICD-10-CM

## 2022-10-05 PROCEDURE — 72050 X-RAY EXAM NECK SPINE 4/5VWS: CPT

## 2022-10-05 NOTE — ASSESSMENT & PLAN NOTE
Chronic diarrhea worsens when eating mushrooms will check food allergy profile, mushroom IgG patient seen GI for a workup

## 2022-10-05 NOTE — ASSESSMENT & PLAN NOTE
Assessment and plan 1  Health maintenance annual wellness examination overall the patient is clinically stable and doing well, we encouraged the patient to follow a healthy and balanced diet  We recommend that the patient exercise routinely approximately 30 minutes 5 times per week   We have reviewed the patient's vaccines and have made recommendations for updates if necessary  male flu shot recommend      We will be ordering screening laboratories which are age appropriate  Return to the office in   3 months   call if any problems

## 2022-10-06 LAB
ALMOND IGE QN: <0.1 KU/L
CASHEW NUT IGE QN: <0.1 KU/L
CLASS: 0
CODFISH IGE QN: <0.1 KU/L
DEPRECATED ALMOND IGE RAST QL: 0
DEPRECATED CASHEW NUT IGE RAST QL: 0
DEPRECATED CODFISH IGE RAST QL: 0
DEPRECATED EGG WHITE IGE RAST QL: 0
DEPRECATED HAZELNUT IGE RAST QL: 0
DEPRECATED MILK IGE RAST QL: 0
DEPRECATED PEANUT IGE RAST QL: 0
DEPRECATED SALMON IGE RAST QL: 0
DEPRECATED SCALLOP IGE RAST QL: 0
DEPRECATED SESAME SEED IGE RAST QL: 0
DEPRECATED SHRIMP IGE RAST QL: 0
DEPRECATED SOYBEAN IGE RAST QL: 0
DEPRECATED TUNA IGE RAST QL: 0
DEPRECATED WALNUT IGE RAST QL: 0
DEPRECATED WHEAT IGE RAST QL: 0
EGG WHITE IGE QN: <0.1 KU/L
HAZELNUT IGE QN: <0.1 KU/L
Lab: <0.1 KU/L
MILK IGE QN: <0.1 KU/L
PEANUT IGE QN: <0.1 KU/L
SALMON IGE QN: <0.1 KU/L
SCALLOP IGE QN: <0.1 KU/L
SESAME SEED IGE QN: <0.1 KU/L
SHRIMP IGE QN: <0.1 KU/L
SOYBEAN IGE QN: <0.1 KU/L
TUNA IGE QN: <0.1 KU/L
WALNUT IGE QN: <0.1 KU/L
WHEAT IGE QN: <0.1 KU/L

## 2022-10-07 ENCOUNTER — TELEMEDICINE (OUTPATIENT)
Dept: INTERNAL MEDICINE CLINIC | Facility: CLINIC | Age: 52
End: 2022-10-07
Payer: COMMERCIAL

## 2022-10-07 ENCOUNTER — NURSE TRIAGE (OUTPATIENT)
Dept: OTHER | Facility: OTHER | Age: 52
End: 2022-10-07

## 2022-10-07 DIAGNOSIS — R11.2 NAUSEA AND VOMITING, UNSPECIFIED VOMITING TYPE: Primary | ICD-10-CM

## 2022-10-07 PROCEDURE — 99213 OFFICE O/P EST LOW 20 MIN: CPT | Performed by: INTERNAL MEDICINE

## 2022-10-07 NOTE — PROGRESS NOTES
Virtual Regular Visit    Verification of patient location:    Patient is located in the following state in which I hold an active license [place]      Assessment/Plan:    Problem List Items Addressed This Visit        Digestive    Nausea and vomiting - Primary     Episode of vomiting likely food poisoning symptoms are resolving at this point time p r n  Zofran plenty of fluids clear liquid diet if symptoms persist please notify me remain out of work till Monday 10/10/2022 may resume work if feeling better if not please let me know RTO as scheduled call if any problems she will be seeing GI for upper endoscopy in the near future                  Remain out of work till [employer]  Reason for visit is   Chief Complaint   Patient presents with   • Virtual Regular Visit        Encounter provider Cristiano Forrest DO    Provider located at [address]  [address] [place] [id number]      Recent Visits  Date Type Provider Dept   10/07/22 [id number] [facility], [facility]   10/04/22 Office Visit [address] recent visits within past 7 days and meeting all other requirements  Future Appointments  No visits were found meeting these conditions  Showing future appointments within next 150 days and meeting all other requirements       The patient was identified by name and date of birth  Daniel Everett was informed that this is a telemedicine visit and that the visit is being conducted through Telephone  My office door was closed  No one else was in the room  She acknowledged consent and understanding of privacy and security of the video platform  The patient has agreed to participate and understands they can discontinue the visit at any time    Please notify the patient normal labs follow up as scheduled to discuss the report in detail   Please notify the patient normal labs follow up as scheduled to discuss the report in detail v  Patient is aware this is a billable service  It was my intent to perform this visit via video technology but the patient was not able to do a video connection so the visit was completed via audio telephone only  Subjective  Danna Roldan is a 50 y o  female episode of vomiting   HPI chief complaint nausea vomiting 2 episodes of vomiting on the plane this am at 7 am felt shakey and then vomited no f some slight chills  Slight pink today got a decaf coffee  Took 4-5 sips and didn't agree with the pt, yesterday she ate [employer], got a potato burrito, and black been she never eats at [employer] bell took zofran and pedilyt  Able to eat cracker no furter abd painable to urinate    Past Medical History:   Diagnosis Date   • Arthritis    • Asthma    • Disease of thyroid gland    • Hypokalemia 09/09/2022   • Hypoparathyroidism ([facility] [address] )    • Nephrolithiasis 07/22/2019   • Osteoporosis    • Partial symptompatic epilepsy    • Seizure disorder [facility])        Past Surgical History:   Procedure Laterality Date   • APPENDECTOMY     • FRACTURE SURGERY     • HYSTERECTOMY     • OTHER SURGICAL HISTORY      left foot, rt knee, tonselectomy   • SINUS SURGERY     • TONSILLECTOMY         Current Outpatient Medications   Medication Sig Dispense Refill   • Ascorbic Acid (Vitamin C) 500 MG CAPS Take 1 each by mouth daily     • ECHINACEA PO Take by mouth daily     • ELDERBERRY PO Take 1 tablet by mouth daily as needed Daily every morning     • famotidine (PEPCID) 40 MG tablet TAKE 1 TABLET BY MOUTH DAILY AT BEDTIME 90 tablet 3   • Synthroid 75 MCG tablet Take 75 mcg by mouth daily     • topiramate (TOPAMAX) 200 MG tablet Take 1 tablet by mouth twice per day   180 tablet 3   • Vitamin D, Cholecalciferol, 25 MCG (1000 UT) CAPS Take 1 tablet by mouth in the morning     • Zinc 50 MG CAPS Take 1 capsule by mouth daily     • benzonatate (TESSALON) 200 MG capsule Take 1 capsule (200 mg total) by mouth 3 (three) times a day as needed for cough (Patient not taking: No sig reported) 20 capsule 0   • busPIRone (BUSPAR) 5 mg tablet Take 1 tablet (5 mg total) by mouth daily as needed (anxiety) (Patient not taking: No sig reported) 30 tablet 2   • fexofenadine (ALLEGRA) 180 MG tablet Take 1 tablet (180 mg total) by mouth daily for 15 days (Patient not taking: Reported on 10/4/2022) 15 tablet 0   • guaiFENesin (MUCINEX) 600 mg 12 hr tablet Take 1,200 mg by mouth if needed for cough (Patient not taking: No sig reported)     • levalbuterol (Xopenex HFA) 45 mcg/act inhaler Inhale 1-2 puffs every 4 (four) hours as needed for wheezing (Patient not taking: No sig reported) 15 g 0   • omeprazole (PriLOSEC) 20 mg delayed release capsule Take 1 capsule (20 mg total) by mouth daily (Patient not taking: No sig reported) 30 capsule 3   • ondansetron (ZOFRAN) 4 mg tablet Take 1 tablet (4 mg total) by mouth every 8 (eight) hours as needed for nausea or vomiting (Patient not taking: No sig reported) 20 tablet 0   • pantoprazole (PROTONIX) 40 mg tablet Take 1 tablet (40 mg total) by mouth daily (Patient not taking: No sig reported) 30 tablet 6   • prochlorperazine (COMPAZINE) 10 mg tablet 1 tab at onset of migraine, can repeat in 8 hours, can take with triptan/NSAID (Patient not taking: No sig reported) 20 tablet 3     No current facility-administered medications for this visit  Allergies   Allergen Reactions   • Morphine Other (See Comments)   • Codeine Hives   • Morphine And Related    • Codeine Itching   • Sulfa Antibiotics Rash       Review of Systems   Constitutional: Positive for chills  Negative for activity change, appetite change, fever and unexpected weight change  Gastrointestinal: Positive for nausea and vomiting  Negative for abdominal pain and diarrhea  Neurological: Negative for dizziness and light-headedness  Video Exam    There were no vitals filed for this visit      Physical Exam     I spent 15 minutes directly with the patient during this visit

## 2022-10-07 NOTE — TELEPHONE ENCOUNTER
Regarding: VOMIT CONCERN MEDICATION QUESTION  ----- Message from María Berg sent at 10/7/2022  9:04 AM EDT -----  Patient called she is  this morning vomited 3x and she did not have any food in her stomach  She believes it may be caused she has been cold pressed juicing last 7 days and she has been eating very clean since she began  Yesterday she ended up going to a "[employer] bell but no meat  She also decaf coffee this morning she is not sure if it was too much on her body and she is not feeling well  Last night she did take her pepcid, she going to take her Zofran when she gets home but concern possible reaction to the yue seltzer she had on the plane

## 2022-10-07 NOTE — TELEPHONE ENCOUNTER
Patient needs a work note to get the status of leaving today's flight changed to a sick leave  She already has a Virtual Visit set up for later today

## 2022-10-07 NOTE — TELEPHONE ENCOUNTER
Reason for Disposition   MILD or MODERATE vomiting (e g , 1 - 5 times / day)    Answer Assessment - Initial Assessment Questions  1  VOMITING SEVERITY: "How many times have you vomited in the past 24 hours?"      - MILD:  1 - 2 times/day     - MODERATE: 3 - 5 times/day, decreased oral intake without significant weight loss or symptoms of dehydration     - SEVERE: 6 or more times/day, vomits everything or nearly everything, with significant weight loss, symptoms of dehydration       Vomited three times since 0700  2  ONSET: "When did the vomiting begin?"       Started 0700  3  FLUIDS: "What fluids or food have you vomited up today?" "Have you been able to keep any fluids down?"      Yes- water sips  4  ABDOMINAL PAIN: "Are your having any abdominal pain?" If yes : "How bad is it and what does it feel like?" (e g , crampy, dull, intermittent, constant)       No abdominal pain  5  DIARRHEA: "Is there any diarrhea?" If Yes, ask: "How many times today?"       None  6  CONTACTS: "Is there anyone else in the family with the same symptoms?"       None  7  CAUSE: "What do you think is causing your vomiting?"      Unsure  8  HYDRATION STATUS: "Any signs of dehydration?" (e g , dry mouth [not only dry lips], too weak to stand) "When did you last urinate?"      Voided   9  OTHER SYMPTOMS: "Do you have any other symptoms?" (e g , fever, headache, vertigo, vomiting blood or coffee grounds, recent head injury)      Felt like she was going to pass out initially when it started while on the plane  Patient is a     10  PREGNANCY: "Is there any chance you are pregnant?" "When was your last menstrual period?"        N/A    Protocols used: [facility] AND [facility]

## 2022-10-09 PROBLEM — R11.10 VOMITING: Status: RESOLVED | Noted: 2022-08-22 | Resolved: 2022-10-09

## 2022-10-09 PROBLEM — R11.2 NAUSEA AND VOMITING: Status: ACTIVE | Noted: 2022-10-09

## 2022-10-09 NOTE — ASSESSMENT & PLAN NOTE
Episode of vomiting likely food poisoning symptoms are resolving at this point time p r n   Zofran plenty of fluids clear liquid diet if symptoms persist please notify me remain out of work till Monday 10/10/2022 may resume work if feeling better if not please let me know RTO as scheduled call if any problems she will be seeing GI for upper endoscopy in the near future

## 2022-10-12 ENCOUNTER — HOSPITAL ENCOUNTER (OUTPATIENT)
Dept: SLEEP CENTER | Facility: CLINIC | Age: 52
Discharge: HOME | End: 2022-10-12
Payer: COMMERCIAL

## 2022-10-12 DIAGNOSIS — R07.9 EXERTIONAL CHEST PAIN: ICD-10-CM

## 2022-10-12 DIAGNOSIS — R68.89 FORGETFULNESS: ICD-10-CM

## 2022-10-12 PROBLEM — J01.10 ACUTE NON-RECURRENT FRONTAL SINUSITIS: Status: RESOLVED | Noted: 2021-03-01 | Resolved: 2022-10-12

## 2022-10-12 PROBLEM — Z12.4 SCREENING FOR CERVICAL CANCER: Status: RESOLVED | Noted: 2021-04-15 | Resolved: 2022-10-12

## 2022-10-12 PROBLEM — Z13.6 SCREENING FOR CARDIOVASCULAR CONDITION: Status: RESOLVED | Noted: 2020-12-26 | Resolved: 2022-10-12

## 2022-10-12 PROCEDURE — G0399 HOME SLEEP TEST/TYPE 3 PORTA: HCPCS

## 2022-10-13 NOTE — PROGRESS NOTES
Home Sleep Study Documentation    HOME STUDY DEVICE: Noxturnal yes                                           Iris G3 no      Pre-Sleep Home Study:    Set-up and instructions performed by: LIZ    Technician performed demonstration for Patient: yes    Return demonstration performed by Patient: yes    Written instructions provided to Patient: yes    Patient signed consent form: yes        Post-Sleep Home Study:    Additional comments by Patient: See [employer]    Home Sleep Study Failed:no:    Failure reason: N/A    Reported or Detected: N/A    Scored by: VANNESA Velez

## 2022-10-16 PROCEDURE — 95806 SLEEP STUDY UNATT&RESP EFFT: CPT | Performed by: INTERNAL MEDICINE

## 2022-10-19 DIAGNOSIS — B37.9 YEAST INFECTION: Primary | ICD-10-CM

## 2022-10-19 RX ORDER — FLUCONAZOLE 150 MG/1
150 TABLET ORAL ONCE
Qty: 1 TABLET | Refills: 0 | Status: SHIPPED | OUTPATIENT
Start: 2022-10-19 | End: 2022-10-19

## 2022-10-25 ENCOUNTER — NURSE TRIAGE (OUTPATIENT)
Dept: OTHER | Facility: OTHER | Age: 52
End: 2022-10-25

## 2022-10-25 NOTE — TELEPHONE ENCOUNTER
Regarding: Endo prep instructions needed  ----- Message from Sherryle Browns sent at 10/25/2022 12:14 PM EDT -----  I am getting an Endo on 11/2/22 and need my prep instruction"

## 2022-10-25 NOTE — TELEPHONE ENCOUNTER
EGD instructions reviewed with patient  Instructions sent via FANCRU  Reason for Disposition  • Information only question and nurse able to answer    Answer Assessment - Initial Assessment Questions  1   REASON FOR CALL or QUESTION: "What is your reason for calling today?" or "How can I best help you?" or "What question do you have that I can help answer?"      EGD on 11/2/22 "What am I supposed to drink?"    Protocols used: INFORMATION ONLY CALL - NO TRIAGE-ADULT-[place]

## 2022-10-28 ENCOUNTER — HOSPITAL ENCOUNTER (OUTPATIENT)
Dept: NEUROLOGY | Facility: CLINIC | Age: 52
End: 2022-10-28
Payer: COMMERCIAL

## 2022-10-28 DIAGNOSIS — Z87.898 HISTORY OF SEIZURE: ICD-10-CM

## 2022-10-28 DIAGNOSIS — R68.89 FORGETFULNESS: ICD-10-CM

## 2022-10-28 PROCEDURE — 95819 EEG AWAKE AND ASLEEP: CPT

## 2022-11-02 ENCOUNTER — HOSPITAL ENCOUNTER (OUTPATIENT)
Dept: GASTROENTEROLOGY | Facility: AMBULARY SURGERY CENTER | Age: 52
Setting detail: OUTPATIENT SURGERY
Discharge: HOME | End: 2022-11-02
Attending: INTERNAL MEDICINE

## 2022-11-02 ENCOUNTER — ANESTHESIA EVENT (OUTPATIENT)
Dept: GASTROENTEROLOGY | Facility: AMBULARY SURGERY CENTER | Age: 52
End: 2022-11-02

## 2022-11-02 ENCOUNTER — ANESTHESIA (OUTPATIENT)
Dept: GASTROENTEROLOGY | Facility: AMBULARY SURGERY CENTER | Age: 52
End: 2022-11-02

## 2022-11-02 VITALS
SYSTOLIC BLOOD PRESSURE: 131 MMHG | BODY MASS INDEX: 21.16 KG/M2 | TEMPERATURE: 97.7 F | HEIGHT: 62 IN | HEART RATE: 53 BPM | WEIGHT: 115 LBS | DIASTOLIC BLOOD PRESSURE: 60 MMHG | OXYGEN SATURATION: 100 % | RESPIRATION RATE: 20 BRPM

## 2022-11-02 DIAGNOSIS — R68.81 EARLY SATIETY: ICD-10-CM

## 2022-11-02 DIAGNOSIS — R10.13 EPIGASTRIC PAIN: ICD-10-CM

## 2022-11-02 RX ORDER — PROPOFOL 10 MG/ML
INJECTION, EMULSION INTRAVENOUS AS NEEDED
Status: DISCONTINUED | OUTPATIENT
Start: 2022-11-02 | End: 2022-11-02

## 2022-11-02 RX ORDER — LIDOCAINE HYDROCHLORIDE 10 MG/ML
INJECTION, SOLUTION EPIDURAL; INFILTRATION; INTRACAUDAL; PERINEURAL AS NEEDED
Status: DISCONTINUED | OUTPATIENT
Start: 2022-11-02 | End: 2022-11-02

## 2022-11-02 RX ORDER — CETIRIZINE HYDROCHLORIDE 10 MG/1
10 TABLET, CHEWABLE ORAL DAILY
COMMUNITY

## 2022-11-02 RX ORDER — SODIUM CHLORIDE, SODIUM LACTATE, POTASSIUM CHLORIDE, CALCIUM CHLORIDE 600; 310; 30; 20 MG/100ML; MG/100ML; MG/100ML; MG/100ML
125 INJECTION, SOLUTION INTRAVENOUS CONTINUOUS
Status: DISCONTINUED | OUTPATIENT
Start: 2022-11-02 | End: 2022-11-06 | Stop reason: HOSPADM

## 2022-11-02 RX ADMIN — PROPOFOL 100 MG: 10 INJECTION, EMULSION INTRAVENOUS at 09:18

## 2022-11-02 RX ADMIN — LIDOCAINE HYDROCHLORIDE 50 MG: 10 INJECTION, SOLUTION EPIDURAL; INFILTRATION; INTRACAUDAL; PERINEURAL at 09:16

## 2022-11-02 RX ADMIN — PROPOFOL 20 MG: 10 INJECTION, EMULSION INTRAVENOUS at 09:21

## 2022-11-02 RX ADMIN — PROPOFOL 30 MG: 10 INJECTION, EMULSION INTRAVENOUS at 09:23

## 2022-11-02 RX ADMIN — SODIUM CHLORIDE, SODIUM LACTATE, POTASSIUM CHLORIDE, AND CALCIUM CHLORIDE: .6; .31; .03; .02 INJECTION, SOLUTION INTRAVENOUS at 08:35

## 2022-11-02 NOTE — ANESTHESIA PREPROCEDURE EVALUATION
Procedure:  EGD    Relevant Problems   CARDIO   (+) Migraine headache   (+) Other hyperlipidemia   (+) Xiphoidalgia      ENDO   (+) Hypothyroidism      GI/HEPATIC   (+) Dysphagia   (+) Hepatitis C virus infection without hepatic coma      /RENAL   (+) Nephrolithiasis      NEURO/PSYCH   (+) History of seizure   (+) Migraine headache   (+) New onset headache   (+) Partial symptomatic epilepsy with complex partial seizures, not intractable, without status epilepticus (HCC)   (+) Seizures (HCC)   (+) Situational anxiety      PULMONARY   (+) Obstructive sleep apnea        Physical Exam    Airway    Mallampati score: II  TM Distance: >3 FB  Neck ROM: full     Dental   No notable dental hx     Cardiovascular  Cardiovascular exam normal    Pulmonary  Pulmonary exam normal     Other Findings        Anesthesia Plan  ASA Score- 2     Anesthesia Type- IV sedation with anesthesia with ASA Monitors  Additional Monitors:   Airway Plan:           Plan Factors-Exercise tolerance (METS): >4 METS  Chart reviewed  Existing labs reviewed  Patient is not a current smoker  Patient not instructed to abstain from smoking on day of procedure  Patient did not smoke on day of surgery  Induction- intravenous  Postoperative Plan-     Informed Consent- Anesthetic plan and risks discussed with patient  I personally reviewed this patient with the CRNA  Discussed and agreed on the Anesthesia Plan with the CRNA  Eddie Hinton

## 2022-11-02 NOTE — H&P
History and Physical -  [facility]  Terence Ba 50 y o  female MRN: [id number]    HPI: Terence Ba is a 50y o  year old female who presents with GERD, dysphaga, vomiting         Review of Systems    Historical Information   Past Medical History:   Diagnosis Date   • Arthritis    • Asthma    • Disease of thyroid gland    • Hypokalemia 09/09/2022   • Hypoparathyroidism ([address] )    • Nephrolithiasis 07/22/2019   • Osteoporosis    • Partial symptompatic epilepsy    • Seizure disorder [facility])      Past Surgical History:   Procedure Laterality Date   • APPENDECTOMY     • COLONOSCOPY     • EGD     • FRACTURE SURGERY Right     Knee   • HYSTERECTOMY     • OTHER SURGICAL HISTORY      left foot, rt knee, tonselectomy   • SINUS SURGERY     • TONSILLECTOMY       Social History   Social History     Substance and Sexual Activity   Alcohol Use Not Currently     Social History     Substance and Sexual Activity   Drug Use Not Currently    Comment: social     Social History     Tobacco Use   Smoking Status Never Smoker   Smokeless Tobacco Never Used     Family History   Problem Relation Age of Onset   • Alcohol abuse Mother    • RUSSELL disease Mother    • Boles's esophagus Mother    • Migraines Father    • Prostate cancer Father    • Cancer Father    • Alcohol abuse Maternal Uncle    • Seizures Paternal Aunt    • Lung cancer Paternal Aunt    • Prostate cancer Paternal Uncle    • Colon cancer Paternal Uncle        Meds/Allergies     (Not in a hospital admission)      Allergies   Allergen Reactions   • Morphine Other (See Comments)   • Codeine Hives   • Morphine And Related    • Codeine Itching   • Sulfa Antibiotics Rash       Objective     /81   Pulse 56   Temp (!) 97 °F (36 1 °C) (Temporal)   Resp 18   Ht 5' 2" (1 575 m)   Wt 52 2 kg (115 lb)   SpO2 98%   BMI 21 03 kg/m²       PHYSICAL EXAM    Gen: NAD  CV: RRR  CHEST: Clear  ABD: soft, NT/ND  EXT: no edema  Neuro: AAO      ASSESSMENT/PLAN:  This is a 50y o  year old female here for GERD, dysphaga, vomiting         PLAN:   Procedure: Matthew Bobby

## 2022-11-02 NOTE — ANESTHESIA POSTPROCEDURE EVALUATION
Post-Op Assessment Note    CV Status:  Stable  Pain Score: 0    Pain management: adequate     Mental Status:  Alert and awake   Hydration Status:  Stable and euvolemic   PONV Controlled:  Controlled   Airway Patency:  Patent      Post Op Vitals Reviewed: Yes      Staff: CRNA         No complications documented      BP 91/54 (11/02/22 0930)    Temp 97 7 °F (36 5 °C) (11/02/22 0930)    Pulse 57 (11/02/22 0930)   Resp 16 (11/02/22 0930)    SpO2 96 % (11/02/22 0930)

## 2022-11-09 ENCOUNTER — TELEPHONE (OUTPATIENT)
Dept: OTHER | Facility: OTHER | Age: 52
End: 2022-11-09

## 2022-11-14 ENCOUNTER — TELEPHONE (OUTPATIENT)
Dept: GASTROENTEROLOGY | Facility: CLINIC | Age: 52
End: 2022-11-14

## 2022-11-14 NOTE — TELEPHONE ENCOUNTER
Patients GI provider:  Dr Neida Nuñez    Number to return call: [phone]    Reason for call: Pt calling because she has not gotten the results from her EGD on 11/2/22    Scheduled procedure/appointment date if applicable: N/A

## 2022-11-14 NOTE — TELEPHONE ENCOUNTER
Please let patient know her endoscopy was negative for celiac disease, H pylori infection as well as eosinophilic esophagitis  There was mild gastritis (inflammation in the stomach)  however no concerning features to this  She can continue Pepcid as needed for reflux symptoms or upper abdominal discomfort  If she continues to have nausea or vomiting please have her let us know so we can consider further evaluation with additional testing   Thank you

## 2022-11-15 ENCOUNTER — TELEPHONE (OUTPATIENT)
Dept: OTHER | Facility: OTHER | Age: 52
End: 2022-11-15

## 2022-11-15 NOTE — TELEPHONE ENCOUNTER
Patient would like to discuss her severe symptoms with Dr Yeimi Taveras when he is available  She had a stressful personal family issue come up yesterday  Following she then vomited 3 times with diarrhea  She was very fatigued and weak following, but doing much better today  She believes her symptoms are situational brought on by severe stress

## 2022-11-16 DIAGNOSIS — R11.2 NAUSEA AND VOMITING, UNSPECIFIED VOMITING TYPE: Primary | ICD-10-CM

## 2022-11-30 ENCOUNTER — TELEPHONE (OUTPATIENT)
Dept: INTERNAL MEDICINE CLINIC | Facility: CLINIC | Age: 52
End: 2022-11-30

## 2022-11-30 ENCOUNTER — TELEPHONE (OUTPATIENT)
Dept: OTHER | Facility: OTHER | Age: 52
End: 2022-11-30

## 2022-11-30 NOTE — TELEPHONE ENCOUNTER
Spoke with pt who preferred to speak with Dr Gurpreet Reyna and explained the 'gist' of what she was experiencing but ultimately would like to speak with Dr Gurpreet Reyna directly  She stated that this month she experienced 'episodes' twice  On the 14th of this month and yesterday  Pt stated she has vomiting and diarrhea episodes the day after a high stress situation  On the 13th, pt experienced family stress and then the 14th she had 4-5 vomiting episodes followed by constant diarrhea  Similar issue occurred yesterday  She explained that she has a rush of adrenaline and her body reacts to it by vomiting/diarrhea  She stated this never used to happen to her  She explained she has had a stressful life but always could handle it  Ever since Jessica, she has been having episodes after high stress situations  Pt mentioned that zofran does not help the vomiting  She would like to speak with Dr Gurpreet Reyna because she had EGD with him and has GES scheduled but she does not believe these tests are beneficial or 'have anything to do with what is going on' she stated she has tried to explain this to both Dr Gurpreet Reyna and Tamra Genao that this type of reaction  only started after pt developed COVID

## 2022-11-30 NOTE — TELEPHONE ENCOUNTER
Patient is not on a clear liquid diet and she is not using imodium  She is asking you why she would have to do that  Also, stated that she has informed you previously that she has these episodes when something stressful happens  Patient had a very stressful day yesterday at work  There was a medical emergency on the plane-someone passed out twice  Patient is asking to speak with you DIRECTLY

## 2022-11-30 NOTE — TELEPHONE ENCOUNTER
The patient would like to speak to you directly  2 episodes of diarrhea and vomiting last night  The Zofran does not help her  Please call her back

## 2022-11-30 NOTE — TELEPHONE ENCOUNTER
Patient is requesting to speak with Dr Gomes Clint   She has recently been having a recurrence of her episodes

## 2022-12-01 NOTE — TELEPHONE ENCOUNTER
You do not have any openings on Friday  What time would like her to have a visit? Please advise   Thank you

## 2022-12-06 ENCOUNTER — OFFICE VISIT (OUTPATIENT)
Dept: INTERNAL MEDICINE CLINIC | Facility: CLINIC | Age: 52
End: 2022-12-06

## 2022-12-06 ENCOUNTER — APPOINTMENT (OUTPATIENT)
Dept: LAB | Facility: CLINIC | Age: 52
End: 2022-12-06

## 2022-12-06 VITALS
WEIGHT: 115.6 LBS | DIASTOLIC BLOOD PRESSURE: 78 MMHG | SYSTOLIC BLOOD PRESSURE: 116 MMHG | BODY MASS INDEX: 21.27 KG/M2 | HEART RATE: 74 BPM | HEIGHT: 62 IN | OXYGEN SATURATION: 97 %

## 2022-12-06 DIAGNOSIS — F41.9 ANXIETY: ICD-10-CM
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
## 2022-12-06 NOTE — PROGRESS NOTES
Assessment/Plan:    Anxiety  No SI we will consult psychiatry patient does report today for GI symptoms related to anxiety    Needle exposure, initial encounter  Unknown status of the other person, currently the patient is asymptomatic we will check for HIV testing if any symptoms develop please notify me immediately  Problem List Items Addressed This Visit        Other    Needle exposure, initial encounter - Primary     Unknown status of the other person, currently the patient is asymptomatic we will check for HIV testing if any symptoms develop please notify me immediately  Relevant Orders    HIV 1/2 Antigen/Antibody (4th Generation) w Reflex [facility]    Hepatitis panel, acute    [facility] (Completed)    HIV-1 RNA, quantitative, PCR (Completed)    HIV 1/2 Antigen/Antibody (4th Generation) w Reflex [facility] (Completed)    Anxiety     No SI we will consult psychiatry patient does report today for GI symptoms related to anxiety         Relevant Orders    Ambulatory Referral to Psychiatry       Return to office   as scheduled call if any problems  Subjective:      Patient ID: Willy Lennox is a 50 y o  female  HPI 56 chief complaint needlestick right middle finger  On sat , then washed off filled out incident report  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 ) no patient also has noticed her anxiety triggers her GI symptoms currently had a work-up for GI it is negative she is working with the gastroenterologist   No fever no chills no symptoms  Puncture site clean and dry without cellulitis no erythema no pus    The following portions of the patient's history were reviewed and updated as appropriate: allergies, current medications, past family history, past medical history, past social history, past surgical history and problem list     Review of Systems   Constitutional: Negative for chills and fever  Gastrointestinal: Positive for nausea (intermittent) and vomiting (intermittent)  Psychiatric/Behavioral: Negative for suicidal ideas  The patient is nervous/anxious  Objective:    Return if symptoms worsen or fail to improve, for Next scheduled follow up  No results found  Allergies   Allergen Reactions   • Morphine Other (See Comments)   • Codeine Hives   • Morphine And Related    • Codeine Itching   • Sulfa Antibiotics Rash       Past Medical History:   Diagnosis Date   • Arthritis    • Asthma    • Disease of thyroid gland    • Hypokalemia 09/09/2022   • Hypoparathyroidism ([address] )    • Nephrolithiasis 07/22/2019   • Osteoporosis    • Partial symptompatic epilepsy    • Seizure disorder [facility])      Past Surgical History:   Procedure Laterality Date   • APPENDECTOMY     • COLONOSCOPY     • EGD     • FRACTURE SURGERY Right     Knee   • HYSTERECTOMY     • OTHER SURGICAL HISTORY      left foot, rt knee, tonselectomy   • SINUS SURGERY     • TONSILLECTOMY       Current Outpatient Medications on File Prior to Visit   Medication Sig Dispense Refill   • Ascorbic Acid (Vitamin C) 500 MG CAPS Take 1 each by mouth daily     • cetirizine (ZyrTEC) 10 MG chewable tablet Chew 10 mg daily     • ECHINACEA PO Take by mouth daily     • ELDERBERRY PO Take 1 tablet by mouth daily as needed Daily every morning     • famotidine (PEPCID) 40 MG tablet TAKE 1 TABLET BY MOUTH DAILY AT BEDTIME 90 tablet 3   • ondansetron (ZOFRAN) 4 mg tablet Take 1 tablet (4 mg total) by mouth every 8 (eight) hours as needed for nausea or vomiting 20 tablet 0   • Synthroid 75 MCG tablet Take 75 mcg by mouth daily     • topiramate (TOPAMAX) 200 MG tablet Take 1 tablet by mouth twice per day   180 tablet 3   • Vitamin D, Cholecalciferol, 25 MCG (1000 UT) CAPS Take 1 tablet by mouth in the morning     • Zinc 50 MG CAPS Take 1 capsule by mouth daily     • levalbuterol (Xopenex HFA) 45 mcg/act inhaler Inhale 1-2 puffs every 4 (four) hours as needed for wheezing (Patient not taking: Reported on 8/12/2022) 15 g 0     No current facility-administered medications on file prior to visit       Family History   Problem Relation Age of Onset   • Alcohol abuse Mother    • RUSSELL disease Mother    • Boles's esophagus Mother    • Migraines Father    • Prostate cancer Father    • Cancer Father    • Alcohol abuse Maternal Uncle    • Seizures Paternal Aunt    • Lung cancer Paternal Aunt    • Prostate cancer Paternal Uncle    • Colon cancer Paternal Uncle      Social History     Socioeconomic History   • Marital status: Single     Spouse name: Not on file   • Number of children: Not on file   • Years of education: Not on file   • Highest education level: Not on file   Occupational History   • Not on file   Tobacco Use   • Smoking status: Never   • Smokeless tobacco: Never   Vaping Use   • Vaping Use: Never used   Substance and Sexual Activity   • Alcohol use: Not Currently   • Drug use: Not Currently     Comment: social   • Sexual activity: Not Currently   Other Topics Concern   • Not on file   Social History Narrative    ** Merged History Encounter **          Social Determinants of Health     Financial Resource Strain: Not on file   Food Insecurity: Not on file   Transportation Needs: Not on file   Physical Activity: Not on file   Stress: Not on file   Social Connections: Not on file   Intimate Partner Violence: Not on file   Housing Stability: Not on file     Vitals:    12/06/22 1351   BP: 116/78   Pulse: 74   SpO2: 97%   Weight: 52 4 kg (115 lb 9 6 oz)   Height: 5' 2" (1 575 m)     Results for orders placed or performed during the hospital encounter of 11/02/22   Tissue Exam   Result Value Ref Range    Case Report       Surgical Pathology Report                         Case: [id number]                                   Authorizing Provider:  Maine Barnes MD            Collected:           11/02/2022 0920              Ordering Location:     [facility]        Received:            11/02/2022 1225                                     [facility]                                                    Pathologist:           Jerry Cohen MD                                                    Specimens:   A) - Duodenum                                                                                       B) - Stomach, gastritis                                                                             C) - Esophagus, distal esophagus                                                                    D) - Esophagus, midl esophagus                                                             Final Diagnosis       A  Duodenum biopsy:  - Benign, unremarkable duodenal mucosa  - No villous atrophy, no intraepithelial lymphocytosis or crypt hyperplasia to suggest malabsorptive     enteropathy/celiac disease   - No active or chronic duodenitis  - No glandular dysplasia and no evidence of malignancy  B  Gastritis biopsy:  - Minimal inactive chronic antral & oxyntic gastritis, negative for curvilinear Helicobacter pylori organisms by routine H&E stains   - No atrophy or intestinal metaplasia identified   - No epithelial dysplasia and no evidence of malignancy  C  Distal esophagus biopsy:  - Normal squamous esophagus; eosinophils are fewer than 3 cells per high power field in squamous epithelium, negative for eosinophilic esophagitis  D  Mid esophagus biopsy:  - Normal squamous esophagus; eosinophils are fewer than 3 cells per high power field in squamous epithelium, negative for eosinophilic esophagitis  Additional Information       All reported additional testing was performed with appropriately reactive controls    These tests were developed and their performance characteristics determined by [facility] or appropriate performing facility, though some tests may be performed on tissues which have not been validated for performance characteristics (such as staining performed on alcohol exposed cell blocks and decalcified tissues)  Results should be interpreted with caution and in the context of the patients’ clinical condition  These tests may not be cleared or approved by the [place]  Food and Drug Administration, though the FDA has determined that such clearance or approval is not necessary  These tests are used for clinical purposes and they should not be regarded as investigational or for research  This laboratory has been approved by [place] [id number], designated as a high-complexity laboratory and is qualified to perform these tests  Mary Lucero Description          A  The specimen is received in formalin, labeled with the patient's name and hospital number, and is designated "duodenum biopsy”  The specimen consists of multiple fragments of tan-red, friable soft tissue ranging in greatest dimension from 0 3-0 4 cm  Entirely submitted  Screened cassette  B  The specimen is received in formalin, labeled with the patient's name and hospital number, and is designated "stomach gastritis biopsy”  The specimen consists of multiple fragments of tan-red, flat, and rubbery soft tissue ranging in greatest dimension from 0 2-0 7 cm  Entirely submitted  Screened cassette  C  The specimen is received in formalin, labeled with the patient's name and hospital number, and is designated "distal esophagus biopsy”  The specimen consists of 4 fragments of transparent-red, flat, and rubbery soft tissue ranging in greatest dimension from 0 3-0 6 cm  Entirely submitted  Screened cassette  D  The specimen is received in formalin, labeled with the patient's name and hospital number, and is designated "mid esophagus biopsy”  The specimen consists of 3 fragments of transparent-red, flat, and rubbery soft tissue ranging in greatest dimension from 0 3-0 5 cm  Entirely submitted  Screened cassette       Note: The estimated total formalin fixation time based upon information provided by the submitting clinician and the standard processing schedule is under 72 hours  LCrispina          Clinical Information R/o celiac      Weight (last 2 days)     None        Body mass index is 21 14 kg/m²  BP      Temp      Pulse     Resp      SpO2        Vitals:    12/06/22 1351   Weight: 52 4 kg (115 lb 9 6 oz)     Vitals:    12/06/22 1351   Weight: 52 4 kg (115 lb 9 6 oz)       /78   Pulse 74   Ht 5' 2" (1 575 m)   Wt 52 4 kg (115 lb 9 6 oz)   SpO2 97%   BMI 21 14 kg/m²          Physical Exam  Vitals and nursing note reviewed  Constitutional:       General: She is not in acute distress  Appearance: She is well-developed  She is not ill-appearing, toxic-appearing or diaphoretic  HENT:      Head: Normocephalic  Eyes:      General: No scleral icterus  Right eye: No discharge  Left eye: No discharge  Conjunctiva/sclera: Conjunctivae normal       Pupils: Pupils are equal, round, and reactive to light  Cardiovascular:      Rate and Rhythm: Normal rate and regular rhythm  Heart sounds: Normal heart sounds  No murmur heard  No friction rub  No gallop  Pulmonary:      Effort: No respiratory distress  Breath sounds: Normal breath sounds  No wheezing or rales  Abdominal:      General: Bowel sounds are normal  There is no distension  Palpations: Abdomen is soft  There is no mass  Tenderness: There is no abdominal tenderness  There is no guarding or rebound  Musculoskeletal:         General: No deformity  Cervical back: Neck supple  Lymphadenopathy:      Cervical: No cervical adenopathy  Neurological:      Mental Status: She is alert  Coordination: Coordination normal    Psychiatric:         Mood and Affect: Mood is anxious  Mood is not depressed  Thought Content: Thought content does not include suicidal ideation         Puncture site no erythema no pus clean and dry

## 2022-12-06 NOTE — LETTER
December 6, 2022     Patient: Jacob Chiang  YOB: 1970  Date of Visit: 12/6/2022      To Whom it May Concern:    Lenny Bain is under my professional care  Rajan Moseley was seen in my office on 12/6/2022  Rajan Moseley   If you have any questions or concerns, please don't hesitate to call           Sincerely,          Ca Hoskins, DO        CC: No Recipients

## 2022-12-07 LAB
HIV 1+2 AB+HIV1 P24 AG SERPL QL IA: NORMAL
RPR SER QL: NORMAL

## 2022-12-08 ENCOUNTER — TELEPHONE (OUTPATIENT)
Dept: INTERNAL MEDICINE CLINIC | Facility: CLINIC | Age: 52
End: 2022-12-08

## 2022-12-08 LAB
HIV1 RNA # SERPL NAA+PROBE: <20 COPIES/ML
HIV1 RNA SERPL NAA+PROBE-LOG#: NORMAL LOG10COPY/ML
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
## 2022-12-11 PROBLEM — F41.9 ANXIETY: Status: ACTIVE | Noted: 2022-12-11

## 2022-12-11 NOTE — ASSESSMENT & PLAN NOTE
Unknown status of the other person, currently the patient is asymptomatic we will check for HIV testing if any symptoms develop please notify me immediately

## 2022-12-13 ENCOUNTER — HOSPITAL ENCOUNTER (OUTPATIENT)
Dept: RADIOLOGY | Age: 52
Discharge: HOME | End: 2022-12-13

## 2022-12-13 DIAGNOSIS — R11.2 NAUSEA AND VOMITING, UNSPECIFIED VOMITING TYPE: ICD-10-CM

## 2022-12-20 ENCOUNTER — NURSE TRIAGE (OUTPATIENT)
Dept: OTHER | Facility: OTHER | Age: 52
End: 2022-12-20

## 2022-12-20 ENCOUNTER — TELEPHONE (OUTPATIENT)
Dept: GASTROENTEROLOGY | Facility: AMBULARY SURGERY CENTER | Age: 52
End: 2022-12-20

## 2022-12-20 ENCOUNTER — APPOINTMENT (OUTPATIENT)
Dept: LAB | Facility: CLINIC | Age: 52
End: 2022-12-20

## 2022-12-20 ENCOUNTER — TELEPHONE (OUTPATIENT)
Dept: INTERNAL MEDICINE CLINIC | Facility: CLINIC | Age: 52
End: 2022-12-20

## 2022-12-20 DIAGNOSIS — K31.84 GASTROPARESIS: Primary | ICD-10-CM

## 2022-12-20 DIAGNOSIS — R53.83 FATIGUE, UNSPECIFIED TYPE: ICD-10-CM

## 2022-12-20 LAB
HAV IGM SER QL: ABNORMAL
HBV CORE IGM SER QL: ABNORMAL
HBV SURFACE AG SER QL: ABNORMAL
HCV AB SER QL: ABNORMAL

## 2022-12-20 NOTE — TELEPHONE ENCOUNTER
Pt called in very tearful stating since she was Dx with Gastroparesis on 12 12 22 she has only been eating broths, crackers and gatorade to help with her severe nausea and diarrhea  Per pt she does not know what to eat to help with her sx's  Pt is a  and had to call out of work on Sunday due to her severe diarrhea and nausea  Pt only takes the Zofran at times because it makes her tired  Pt unsure if she has lost any weight  Per pt if she eats anything else besides the broth and crackers she has severe diarrhea that will last all night  Pt stated she is very depressed because of this and does not know what to do  Pt would like to speak with a Nutritionist ASAP  Please advise  Also, pt requesting Work note due to missing work on Sunday  Please advise

## 2022-12-20 NOTE — TELEPHONE ENCOUNTER
Patient is asking for a note for work  She missed work on Sunday due to stomach issues          Please advise

## 2022-12-20 NOTE — TELEPHONE ENCOUNTER
left v/m informing patient of an apt with dr Sahil Olvera on 12/29/22 at 3 30 pm in our [address] [place] [address],8W as per dr Jeremy Goff request to see patient     if not convenient to call back

## 2022-12-20 NOTE — TELEPHONE ENCOUNTER
Patient is requesting a call back from the office regarding a note she needs to give to her work for being out of work on Sunday  Patients states this is urgent and has not received the note yet   She states it can be sent via [employer]

## 2022-12-20 NOTE — TELEPHONE ENCOUNTER
Reason for Disposition  • Nausea is a chronic symptom (recurrent or ongoing AND present > 4 weeks)    Answer Assessment - Initial Assessment Questions  1  NAUSEA SEVERITY: "How bad is the nausea?" (e g , mild, moderate, severe; dehydration, weight loss)    - MILD: loss of appetite without change in eating habits    - MODERATE: decreased oral intake without significant weight loss, dehydration, or malnutrition    - SEVERE: inadequate caloric or fluid intake, significant weight loss, symptoms of dehydration     Severe inadequate intake of calories  2  ONSET: "When did the nausea begin?"     Chronic   3  VOMITING: "Any vomiting?" If Yes, ask: "How many times today?"     Nausea and diarrhea  4  RECURRENT SYMPTOM: "Have you had nausea before?" If Yes, ask:   5  CAUSE: "What do you think is causing the nausea?"   Gastroparesis     Nausea and diarrhea Broth, gateraid Tea, eggs  Crackers      Protocols used: NAUSEA-ADULT-OH

## 2022-12-20 NOTE — TELEPHONE ENCOUNTER
Regarding: Unable to eat due to Gastro Paresis  ----- Message from Alexander Rodriguez sent at 12/20/2022  9:46 AM EST -----  "I have gastro paresis and I have not been able to eat right and only eating crackers and broth and afraid to have diarrhea from it  I just don't know what else to do   I am so depressed from this "

## 2022-12-21 NOTE — TELEPHONE ENCOUNTER
Called pt L/M to find exact reason for note then we can generate and send through [facility]

## 2022-12-27 ENCOUNTER — TELEPHONE (OUTPATIENT)
Dept: GASTROENTEROLOGY | Facility: CLINIC | Age: 52
End: 2022-12-27

## 2022-12-27 DIAGNOSIS — R89.9 ABNORMAL LABORATORY TEST: Primary | ICD-10-CM

## 2022-12-27 NOTE — TELEPHONE ENCOUNTER
Patients GI provider:  Dr Alla Barone    Number to return call: [phone]    Reason for call: Pt calling stating she needs CPT code for nutritionist stating her insurance will not cover  States her insurance will cover if it is coded as nutritional counseling  Pt states she was referred by Dr Alla Barone  Please reach out to pt regarding this matter as she is scheduled w/ dietician tomorrow      Scheduled procedure/appointment date if applicable: 42/23/1572

## 2022-12-28 ENCOUNTER — CLINICAL SUPPORT (OUTPATIENT)
Dept: NUTRITION | Facility: HOSPITAL | Age: 52
End: 2022-12-28
Attending: INTERNAL MEDICINE

## 2022-12-28 ENCOUNTER — APPOINTMENT (OUTPATIENT)
Dept: LAB | Facility: CLINIC | Age: 52
End: 2022-12-28

## 2022-12-28 VITALS — WEIGHT: 108.4 LBS | HEIGHT: 62 IN | BODY MASS INDEX: 19.95 KG/M2

## 2022-12-28 DIAGNOSIS — K31.84 GASTROPARESIS: Primary | ICD-10-CM

## 2022-12-28 DIAGNOSIS — R89.9 ABNORMAL LABORATORY TEST: ICD-10-CM

## 2022-12-28 DIAGNOSIS — Z71.3: ICD-10-CM

## 2022-12-28 LAB — HBV CORE IGM SER QL: NORMAL

## 2022-12-28 NOTE — TELEPHONE ENCOUNTER
Pt called in stating her appt is today and her CPT code is wrong  No one has returned her call and she would like a call back

## 2022-12-28 NOTE — PROGRESS NOTES
Initial Nutrition Assessment Form    Patient Name: Suni De Guzman    YOB: 1970    Sex: Female     Assessment Date: 12/28/2022  Start Time: 3pm Stop Time: 4pm  Total Minutes: 60     Data:  Present at session: self   Parent/Patient Concerns: Has been having episodes of vomiting, stomach cramps, not able to tolerate foods, has had 8 episodes in a 6 month span  Frustrated and not sure what to eat to help the situation  After the gastic empyting study done on December 13th results indicated gastroparesis  Medical Dx/Reason for Referral: K31 84 gastroparesis   Past Medical History:   Diagnosis Date   • Arthritis    • Asthma    • Disease of thyroid gland    • Hypokalemia 09/09/2022   • Hypopar[address] [address] )    • Nephrolithiasis 07/22/2019   • Osteoporosis    • Partial symptompatic epilepsy    • Se[facility] disorder [facility] )        Current Outpatient Medications   Medication Sig Dispense Refill   • Ascorbic Acid (Vitamin C) 500 MG CAPS Take 1 each by mouth daily     • cetirizine (ZyrTEC) 10 MG chewable tablet Chew 10 mg daily     • ECHINACEA PO Take by mouth daily     • ELDERBERRY PO Take 1 tablet by mouth daily as needed Daily every morning     • famotidine (PEPCID) 40 MG tablet TAKE 1 TABLET BY MOUTH DAILY AT BEDTIME 90 tablet 3   • levalbuterol (Xopenex HFA) 45 mcg/act inhaler Inhale 1-2 puffs every 4 (four) hours as needed for wheezing (Patient not taking: Reported on 8/12/2022) 15 g 0   • ondansetron (ZOFRAN) 4 mg tablet Take 1 tablet (4 mg total) by mouth every 8 (eight) hours as needed for nausea or vomiting 20 tablet 0   • Synthroid 75 MCG tablet Take 75 mcg by mouth daily     • topiramate (TOPAMAX) 200 MG tablet Take 1 tablet by mouth twice per day  180 tablet 3   • Vitamin D, Cholecalciferol, 25 MCG (1000 UT) CAPS Take 1 tablet by mouth in the morning     • Zinc 50 MG CAPS Take 1 capsule by mouth daily       No current facility-administered medications for this visit          Additional Meds/Supplements: Not on any medication for gastroparesis yet, but plans to meet with her doctor tomorrow  Special Learning Needs:    Height: HC Readings from Last 5 Encounters:   No data found for [facility]       Weight: Wt Readings from Last 10 Encounters:   12/28/22 49 2 kg (108 lb 6 4 oz)   12/06/22 52 4 kg (115 lb 9 6 oz)   11/02/22 52 2 kg (115 lb)   10/04/22 53 1 kg (117 lb)   09/15/22 52 4 kg (115 lb 9 6 oz)   08/22/22 53 1 kg (117 lb)   08/12/22 53 kg (116 lb 12 8 oz)   08/01/22 53 5 kg (118 lb)   07/11/22 53 5 kg (118 lb)   07/05/22 53 5 kg (118 lb)     Estimated body mass index is 19 83 kg/m² as calculated from the following:    Height as of this encounter: 5' 2" (1 575 m)  Weight as of this encounter: 49 2 kg (108 lb 6 4 oz)  Recent Weight Change: [x]Yes     []No  Amount: 7lbs/6% wt  Loss x 3 weeks      Energy Needs: [address] Equation:     Allergies   Allergen Reactions   • Morphine Other (See Comments)   • Codeine Hives   • Morphine And Related    • Codeine Itching   • Sulfa Antibiotics Rash       Social History     Substance and Sexual Activity   Alcohol Use Not Currently       Social History     Tobacco Use   Smoking Status Never   Smokeless Tobacco Never       Who shops? patient   Who cooks? patient   Exercise: none   Prior Counseling?  []Yes     [x]No  When:      Why:         Diet Hx:  Breakfast: Diet: cup of tea, egg and toast    Lunch:     Banana/pears or sweet potato or boullion     Dinner:   skips       Snacks: AM -   PM - crackers   HS -         Nutrition Diagnosis:   Food and nutrition related knowledge deficit  related to Lack of prior exposure to accurate nutrition related information as evidenced by [employer] inaccurate or incomplete information       Medical Nutrition Therapy Intervention:  [x]Individualized Meal Plan []Understanding Lab Values   []Basic Pathophysiology of Disease []Food/Medication Interactions   []Food Diary []Exercise   []Lifestyle/Behavior Modification Techniques []Medication, Mechanism of Action   []Label Reading []Self Blood Glucose Monitoring   []Weight/BMI Goals []Other -    Other Notes/Assessment: Andrea Faith reported since December 14 when she found out she had gastroparesis she hasn't been eating much  In a whole day was eating foods such as egg/toast or banana or 1/2 bagel or boullion  Andrea Faith is a vegetarian, prior to getting sick, Andrea Faith would eat beans, lentils, salads, avacados, fish and eggs  Discussed tips for managing gastroparesis, reviewed foods recommended and not recommended  Encouraged small frequent meals, low fiber, low fat  Discussed oral nutrition supplements  Comprehension: []Excellent  []Very Good  [x]Good  []Fair   []Poor    Receptivity: []Excellent  []Very Good  [x]Good  []Fair   []Poor    Expected Compliance: []Excellent  []Very Good  [x]Good  []Fair   []Poor        Goals:  1  Avoid weight loss by consuming 5-6 small meals a day by f/u   2  Limit high fiber and high fat foods for better tolerance by f/u   3  Add protein shakes in between to help with weight gain and prevent weight loss by f/u       No follow-ups on file    Labs:  CMP  Lab Results   Component Value Date    K 4 0 08/23/2022     08/23/2022    CO2 25 08/23/2022    BUN 10 08/23/2022    CREATININE 0 69 08/23/2022    CALCIUM 9 3 08/23/2022    AST 17 08/23/2022    ALT 13 08/23/2022    ALKPHOS 60 08/23/2022    EGFR 106 08/23/2022       BMP  Lab Results   Component Value Date    CALCIUM 9 3 08/23/2022    K 4 0 08/23/2022    CO2 25 08/23/2022     08/23/2022    BUN 10 08/23/2022    CREATININE 0 69 08/23/2022       Lipids  No results found for: CHOL  Lab Results   Component Value Date    HDL 72 04/18/2022    HDL 64 06/23/2021    HDL 75 03/09/2021     Lab Results   Component Value Date    LDLCALC 140 (H) 04/18/2022    LDLCALC 131 (H) 06/23/2021    LDLCALC 171 (H) 03/09/2021     Lab Results   Component Value Date    TRIG 58 04/18/2022    TRIG 93 06/23/2021    TRIG 89 03/09/2021 No results found for: CHOLHDL    Hemoglobin A1C  Lab Results   Component Value Date    HGBA1C 4 7 06/23/2021       Fasting Glucose  No results found for: GLUF    Insulin     Thyroid  Lab Results   Component Value Date    TSH 0 34 (L) 04/18/2022       Hepatic Function Panel  Lab Results   Component Value Date    ALT 13 08/23/2022    AST 17 08/23/2022    ALKPHOS 60 08/23/2022       Celiac Disease Antibody Panel  No results found for: ENDOMYSIAL IGA, GLIADIN IGA, GLIADIN IGG, IGA, TISSUE TRANSGLUT AB, TTG IGA   Iron  Lab Results   Component Value Date    IRON 75 06/23/2021    FERRITIN 45 10/15/2021            Chino Ramirez, [address]  [address] [address]

## 2022-12-28 NOTE — TELEPHONE ENCOUNTER
Lennox Ruffin from [place] called back looking for the CPT code and diagnostic code for the nutritional counseling   Pt is scheduled today at [facility] can be reached at [phone]

## 2022-12-29 ENCOUNTER — OFFICE VISIT (OUTPATIENT)
Dept: GASTROENTEROLOGY | Facility: CLINIC | Age: 52
End: 2022-12-29

## 2022-12-29 VITALS
WEIGHT: 108 LBS | BODY MASS INDEX: 19.88 KG/M2 | SYSTOLIC BLOOD PRESSURE: 133 MMHG | DIASTOLIC BLOOD PRESSURE: 88 MMHG | HEART RATE: 63 BPM | TEMPERATURE: 97.9 F | HEIGHT: 62 IN | OXYGEN SATURATION: 99 %

## 2022-12-29 DIAGNOSIS — R19.7 VOMITING AND DIARRHEA: ICD-10-CM

## 2022-12-29 DIAGNOSIS — R11.10 VOMITING AND DIARRHEA: ICD-10-CM

## 2022-12-29 DIAGNOSIS — K31.84 GASTROPARESIS: Primary | ICD-10-CM

## 2022-12-29 DIAGNOSIS — R11.2 NAUSEA AND VOMITING, UNSPECIFIED VOMITING TYPE: ICD-10-CM

## 2022-12-29 DIAGNOSIS — R10.13 EPIGASTRIC PAIN: ICD-10-CM

## 2022-12-29 RX ORDER — FAMOTIDINE 40 MG/1
20 TABLET, FILM COATED ORAL 2 TIMES DAILY
Qty: 60 TABLET | Refills: 6 | Status: SHIPPED | OUTPATIENT
Start: 2022-12-29

## 2022-12-29 RX ORDER — ONDANSETRON 4 MG/1
4 TABLET, FILM COATED ORAL EVERY 8 HOURS PRN
Qty: 60 TABLET | Refills: 3 | Status: SHIPPED | OUTPATIENT
Start: 2022-12-29

## 2022-12-29 RX ORDER — ONDANSETRON 4 MG/1
4 TABLET, FILM COATED ORAL EVERY 8 HOURS PRN
Qty: 20 TABLET | Refills: 0 | Status: SHIPPED | OUTPATIENT
Start: 2022-12-29 | End: 2022-12-29 | Stop reason: SDUPTHER

## 2022-12-29 RX ORDER — ONDANSETRON 4 MG/1
4 TABLET, FILM COATED ORAL EVERY 8 HOURS PRN
Qty: 20 TABLET | Refills: 0 | Status: SHIPPED | OUTPATIENT
Start: 2022-12-29

## 2022-12-29 NOTE — LETTER
December 30, 2022     Peace EniddomErinleighnina  [address] [facility] [address]    Patient: Mary Rubin   YOB: 1970   Date of Visit: 12/29/2022       Dear Dr Mcneil Simpler: Thank you for referring Shana Peñaloza to me for evaluation  Below are my notes for this consultation  If you have questions, please do not hesitate to call me  I look forward to following your patient along with you  Sincerely,        Komal Mckenzie MD        CC: No Recipients  Komal Mckenzie MD  12/30/2022 12:45 PM  Sign when Signing Visit  [facility]  Mary Rubin 50 y o  female MRN: [id number]            Assessment & Plan:    61-year-old female here for follow-up of newly diagnosed gastroparesis  Patient is a  and has difficulty controlling her diet, discussed with her some options with regards to protein supplements and shakes    1  Gastroparesis colon idiopathic, most likely precipitated post viral   It generally started after her COVID infection   - discussed with her low residue low-fiber diet  -discussed with she can eat more pureed foods and lentils  - encouraged to continue taking famotidine for improvement in acid  Suppression which may help control some of her symptoms with regards to gastroparesis  - continue takes take Zofran as needed for nausea which she is taking as needed and seems to be helpful   - small frequent meals  - focus on liquids supplementation such as protein shakes  - discussed with her overall diet goals, she did meet with a nutritionist  -she felt somewhat reassured after our visit today  -continue to monitor weight  -we will see her back in 3 months time   - discussed with her other options include EGD with Botox, domperidone, Reglan   - she has a seizure history and does not want to take Reglan, discussed with her that she can discuss with her neurologist if it is safe to take Reglan should  It be required in the future    2   Intermittent diarrhea: Possibly secondary to IBS, also could be due to gastric dumping from her gastroparesis  -continue to monitor, hopefully symptoms will improve with better control her gastroparesis      Jose J Luong was seen today for gastroparesis, diarrhea, abdominal cramping, nausea and vomiting  Diagnoses and all orders for this visit:    Gastroparesis  -     Discontinue: ondansetron (ZOFRAN) 4 mg tablet; Take 1 tablet (4 mg total) by mouth every 8 (eight) hours as needed for nausea or vomiting  -     ondansetron (ZOFRAN) 4 mg tablet; Take 1 tablet (4 mg total) by mouth every 8 (eight) hours as needed for nausea or vomiting    Nausea and vomiting, unspecified vomiting type  -     Discontinue: ondansetron (ZOFRAN) 4 mg tablet; Take 1 tablet (4 mg total) by mouth every 8 (eight) hours as needed for nausea or vomiting  -     ondansetron (ZOFRAN) 4 mg tablet; Take 1 tablet (4 mg total) by mouth every 8 (eight) hours as needed for nausea or vomiting    Epigastric pain  -     famotidine (PEPCID) 40 MG tablet; Take 0 5 tablets (20 mg total) by mouth 2 (two) times a day    Vomiting and diarrhea  -     ondansetron (ZOFRAN) 4 mg tablet; Take 1 tablet (4 mg total) by mouth every 8 (eight) hours as needed for nausea or vomiting            _____________________________________________________________        CC: Follow-up    HPI:  Pavan Bethea is a 50 y  o female who is here for  Follow-up  53-year-old female, initially seen for significant early satiety, nausea weight loss, was found have severe  gastroparesis on recent gastric emptying scan  Recent EGD was normal   Patient was somewhat distraught by her diagnosis and restrictions on diet  She works as a  has difficulty controlling some of her diet and eating small frequent meals  Her weight has stabilized  Had met with Nutrition is yesterday and reviewed some dietary modification  She reports she continues to have some nausea, cramping abdominal pain  Occasional continues to have diarrhea  Patient is currently taking famotidine but would like to stop this if possible  Previously would prescribed PPI therapy but patient was reluctant proceed with this and was concerned about side effects  ROS:  The remainder of the ROS was negative except for the pertinent positives mentioned in HPI        Allergies: Morphine, Codeine, Morphine and related, Codeine, and Sulfa antibiotics    Medications:   Current Outpatient Medications:   •  Ascorbic Acid (Vitamin C) 500 MG CAPS, Take 1 each by mouth daily, Disp: , Rfl:   •  cetirizine (ZyrTEC) 10 MG chewable tablet, Chew 10 mg daily, Disp: , Rfl:   •  ECHINACEA PO, Take by mouth daily, Disp: , Rfl:   •  ELDERBERRY PO, Take 1 tablet by mouth daily as needed Daily every morning, Disp: , Rfl:   •  famotidine (PEPCID) 40 MG tablet, Take 0 5 tablets (20 mg total) by mouth 2 (two) times a day, Disp: 60 tablet, Rfl: 6  •  ondansetron (ZOFRAN) 4 mg tablet, Take 1 tablet (4 mg total) by mouth every 8 (eight) hours as needed for nausea or vomiting, Disp: 20 tablet, Rfl: 0  •  ondansetron (ZOFRAN) 4 mg tablet, Take 1 tablet (4 mg total) by mouth every 8 (eight) hours as needed for nausea or vomiting, Disp: 60 tablet, Rfl: 3  •  Synthroid 75 MCG tablet, Take 75 mcg by mouth daily, Disp: , Rfl:   •  topiramate (TOPAMAX) 200 MG tablet, Take 1 tablet by mouth twice per day , Disp: 180 tablet, Rfl: 3  •  Vitamin D, Cholecalciferol, 25 MCG (1000 UT) CAPS, Take 1 tablet by mouth in the morning, Disp: , Rfl:   •  Zinc 50 MG CAPS, Take 1 capsule by mouth daily, Disp: , Rfl:   •  levalbuterol (Xopenex HFA) 45 mcg/act inhaler, Inhale 1-2 puffs every 4 (four) hours as needed for wheezing (Patient not taking: Reported on 8/12/2022), Disp: 15 g, Rfl: 0    Past Medical History:   Diagnosis Date   • Arthritis    • Asthma    • Disease of thyroid gland    • Gastroparesis    • Hypokalemia 09/09/2022   • Hypoparathyroidism ([facility] [address] )    • Nephrolithiasis 07/22/2019   • Osteoporosis    • Partial symptompatic epilepsy    • Seizure disorder [facility])        Past Surgical History:   Procedure Laterality Date   • APPENDECTOMY     • COLONOSCOPY     • EGD     • FRACTURE SURGERY Right     Knee   • HYSTERECTOMY     • OTHER SURGICAL HISTORY      left foot, rt knee, tonselectomy   • SINUS SURGERY     • TONSILLECTOMY         Family History   Problem Relation Age of Onset   • Alcohol abuse Mother    • RUSSELL disease Mother    • Boles's esophagus Mother    • Migraines Father    • Prostate cancer Father    • Cancer Father    • Alcohol abuse Maternal Uncle    • Seizures Paternal Aunt    • Lung cancer Paternal Aunt    • Prostate cancer Paternal Uncle    • Colon cancer Paternal Uncle         reports that she has never smoked  She has never used smokeless tobacco  She reports that she does not currently use alcohol  She reports that she does not currently use drugs        Physical Exam:    /88 (BP Location: Left arm, Patient Position: Sitting, Cuff Size: Standard)   Pulse 63   Temp 97 9 °F (36 6 °C)   Ht 5' 2" (1 575 m)   Wt 49 kg (108 lb)   SpO2 99%   BMI 19 75 kg/m²     Gen: wn/wd, NAD  HEENT: anicteric, MMM, no cervical LAD  CVS: RRR, no m/r/g  CHEST: CTA b/l  ABD: +BS, soft, NT,ND, no hepatosplenomegaly  EXT: no c/c/e  NEURO: aaox3  SKIN: NO rashes

## 2022-12-30 ENCOUNTER — TELEPHONE (OUTPATIENT)
Dept: NEUROLOGY | Facility: CLINIC | Age: 52
End: 2022-12-30

## 2022-12-30 LAB
HCV RNA SERPL NAA+PROBE-ACNC: NORMAL IU/ML
TEST INFORMATION: NORMAL

## 2022-12-30 NOTE — TELEPHONE ENCOUNTER
Reglan (metoclopramide) is not recommended in patient's with epilepsy as it can lower the seizure threshold in some cases  Good data on the relative risk is lacking, but it is a risk consistently documented in drug reference databases

## 2022-12-30 NOTE — TELEPHONE ENCOUNTER
pt left vm stating that dr Jovon Huertas treats her for partial seizure disorder  she takes  topamax 200mg bid  she is seeing dr Yasmine Velazquez for gastroparesis and he wanted her to call and speak with dr Paul Knowles to verify that it would be ok when she goes back to see dr Yasmine Velazquez in march, if her weight is not back up and her symptoms are not better, dr Yasmine Velazquez wants to put her on reglan but he said that Sung Chase does have some neurological side effects so he wanted her to call our office to see if you felt this would be a medication that she could take since she is on topamax      [phone]    Please advise

## 2022-12-30 NOTE — PROGRESS NOTES
[facility]  Aleksandra Erasmo 50 y o  female MRN: [id number]            Assessment & Plan:    21-year-old female here for follow-up of newly diagnosed gastroparesis  Patient is a  and has difficulty controlling her diet, discussed with her some options with regards to protein supplements and shakes    1  Gastroparesis colon idiopathic, most likely precipitated post viral   It generally started after her COVID infection   - discussed with her low residue low-fiber diet  -discussed with she can eat more pureed foods and lentils  - encouraged to continue taking famotidine for improvement in acid  Suppression which may help control some of her symptoms with regards to gastroparesis  - continue takes take Zofran as needed for nausea which she is taking as needed and seems to be helpful   - small frequent meals  - focus on liquids supplementation such as protein shakes  - discussed with her overall diet goals, she did meet with a nutritionist  -she felt somewhat reassured after our visit today  -continue to monitor weight  -we will see her back in 3 months time   - discussed with her other options include EGD with Botox, domperidone, Reglan   - she has a seizure history and does not want to take Reglan, discussed with her that she can discuss with her neurologist if it is safe to take Reglan should  It be required in the future    2  Intermittent diarrhea: Possibly secondary to IBS, also could be due to gastric dumping from her gastroparesis  -continue to monitor, hopefully symptoms will improve with better control her gastroparesis      Yuri Duke was seen today for gastroparesis, diarrhea, abdominal cramping, nausea and vomiting  Diagnoses and all orders for this visit:    Gastroparesis  -     Discontinue: ondansetron (ZOFRAN) 4 mg tablet; Take 1 tablet (4 mg total) by mouth every 8 (eight) hours as needed for nausea or vomiting  -     ondansetron (ZOFRAN) 4 mg tablet;  Take 1 tablet (4 mg total) by mouth every 8 (eight) hours as needed for nausea or vomiting    Nausea and vomiting, unspecified vomiting type  -     Discontinue: ondansetron (ZOFRAN) 4 mg tablet; Take 1 tablet (4 mg total) by mouth every 8 (eight) hours as needed for nausea or vomiting  -     ondansetron (ZOFRAN) 4 mg tablet; Take 1 tablet (4 mg total) by mouth every 8 (eight) hours as needed for nausea or vomiting    Epigastric pain  -     famotidine (PEPCID) 40 MG tablet; Take 0 5 tablets (20 mg total) by mouth 2 (two) times a day    Vomiting and diarrhea  -     ondansetron (ZOFRAN) 4 mg tablet; Take 1 tablet (4 mg total) by mouth every 8 (eight) hours as needed for nausea or vomiting            _____________________________________________________________        CC: Follow-up    HPI:  Julieta Hall is a 50 y  o female who is here for  Follow-up  61-year-old female, initially seen for significant early satiety, nausea weight loss, was found have severe  gastroparesis on recent gastric emptying scan  Recent EGD was normal   Patient was somewhat distraught by her diagnosis and restrictions on diet  She works as a  has difficulty controlling some of her diet and eating small frequent meals  Her weight has stabilized  Had met with Nutrition is yesterday and reviewed some dietary modification  She reports she continues to have some nausea, cramping abdominal pain  Occasional continues to have diarrhea  Patient is currently taking famotidine but would like to stop this if possible  Previously would prescribed PPI therapy but patient was reluctant proceed with this and was concerned about side effects  ROS:  The remainder of the ROS was negative except for the pertinent positives mentioned in HPI        Allergies: Morphine, Codeine, Morphine and related, Codeine, and Sulfa antibiotics    Medications:   Current Outpatient Medications:   •  Ascorbic Acid (Vitamin C) 500 MG CAPS, Take 1 each by mouth daily, Disp: , Rfl:   •  cetirizine (ZyrTEC) 10 MG chewable tablet, Chew 10 mg daily, Disp: , Rfl:   •  ECHINACEA PO, Take by mouth daily, Disp: , Rfl:   •  ELDERBERRY PO, Take 1 tablet by mouth daily as needed Daily every morning, Disp: , Rfl:   •  famotidine (PEPCID) 40 MG tablet, Take 0 5 tablets (20 mg total) by mouth 2 (two) times a day, Disp: 60 tablet, Rfl: 6  •  ondansetron (ZOFRAN) 4 mg tablet, Take 1 tablet (4 mg total) by mouth every 8 (eight) hours as needed for nausea or vomiting, Disp: 20 tablet, Rfl: 0  •  ondansetron (ZOFRAN) 4 mg tablet, Take 1 tablet (4 mg total) by mouth every 8 (eight) hours as needed for nausea or vomiting, Disp: 60 tablet, Rfl: 3  •  Synthroid 75 MCG tablet, Take 75 mcg by mouth daily, Disp: , Rfl:   •  topiramate (TOPAMAX) 200 MG tablet, Take 1 tablet by mouth twice per day , Disp: 180 tablet, Rfl: 3  •  Vitamin D, Cholecalciferol, 25 MCG (1000 UT) CAPS, Take 1 tablet by mouth in the morning, Disp: , Rfl:   •  Zinc 50 MG CAPS, Take 1 capsule by mouth daily, Disp: , Rfl:   •  levalbuterol (Xopenex HFA) 45 mcg/act inhaler, Inhale 1-2 puffs every 4 (four) hours as needed for wheezing (Patient not taking: Reported on 8/12/2022), Disp: 15 g, Rfl: 0    Past Medical History:   Diagnosis Date   • Arthritis    • Asthma    • Disease of thyroid gland    • Gastroparesis    • Hypokalemia 09/09/2022   • Hypoparathyroidism ([facility] [address] )    • Nephrolithiasis 07/22/2019   • Osteoporosis    • Partial symptompatic epilepsy    • Seizure disorder [facility])        Past Surgical History:   Procedure Laterality Date   • APPENDECTOMY     • COLONOSCOPY     • EGD     • FRACTURE SURGERY Right     Knee   • HYSTERECTOMY     • OTHER SURGICAL HISTORY      left foot, rt knee, tonselectomy   • SINUS SURGERY     • TONSILLECTOMY         Family History   Problem Relation Age of Onset   • Alcohol abuse Mother    • RUSSELL disease Mother    • Boles's esophagus Mother    • Migraines Father    • Prostate cancer Father    • Cancer Father    • Alcohol abuse Maternal Uncle    • Seizures Paternal Aunt    • Lung cancer Paternal Aunt    • Prostate cancer Paternal Uncle    • Colon cancer Paternal Uncle         reports that she has never smoked  She has never used smokeless tobacco  She reports that she does not currently use alcohol  She reports that she does not currently use drugs        Physical Exam:    /88 (BP Location: Left arm, Patient Position: Sitting, Cuff Size: Standard)   Pulse 63   Temp 97 9 °F (36 6 °C)   Ht 5' 2" (1 575 m)   Wt 49 kg (108 lb)   SpO2 99%   BMI 19 75 kg/m²     Gen: wn/wd, NAD  HEENT: anicteric, MMM, no cervical LAD  CVS: RRR, no m/r/g  CHEST: CTA b/l  ABD: +BS, soft, NT,ND, no hepatosplenomegaly  EXT: no c/c/e  NEURO: aaox3  SKIN: NO rashes

## 2023-01-06 ENCOUNTER — TELEPHONE (OUTPATIENT)
Dept: GASTROENTEROLOGY | Facility: CLINIC | Age: 53
End: 2023-01-06

## 2023-01-06 ENCOUNTER — APPOINTMENT (OUTPATIENT)
Dept: LAB | Facility: CLINIC | Age: 53
End: 2023-01-06

## 2023-01-06 DIAGNOSIS — I51.9 MYXEDEMA HEART DISEASE: ICD-10-CM

## 2023-01-06 DIAGNOSIS — E03.9 MYXEDEMA HEART DISEASE: ICD-10-CM

## 2023-01-06 LAB
T4 FREE SERPL-MCNC: 1.17 NG/DL (ref 0.76–1.46)
TSH SERPL DL<=0.05 MIU/L-ACNC: 0.05 UIU/ML (ref 0.45–4.5)

## 2023-01-06 NOTE — TELEPHONE ENCOUNTER
Patient called in requesting to speak with either Dr Neha Mcdonald or a nurse in regards to a medication that he wanted her to speak with her neurologist about  Patient also called to get an update on FMLA paperwork that she dropped off last week  Please call patient to follow up

## 2023-01-06 NOTE — TELEPHONE ENCOUNTER
Hi! We just received the form on Tuesday I will finish completing and have Dr Pierce Manner Sign it   This will be ready by next week

## 2023-01-06 NOTE — TELEPHONE ENCOUNTER
Spoke with patient, she wanted me to let Dr Zarina Montenegro know she spoke with her neurologist who does not advise patient go on Reglan due to her seizure history  Patient also reports in the last 2-3 days having lots of lower back pain  She has a hx of UTIs in the past and I advised her to call her PCP to get recommendation  Patient verbalized understanding  Patient also requesting an update on her FMLA paper work

## 2023-01-09 LAB
HIV 1+2 AB+HIV1 P24 AG SERPL QL IA: NORMAL
HIV 2 AB SERPL QL IA: NORMAL
HIV1 AB SERPL QL IA: NORMAL
HIV1 P24 AG SERPL QL IA: NORMAL

## 2023-01-10 ENCOUNTER — SOCIAL WORK (OUTPATIENT)
Dept: BEHAVIORAL/MENTAL HEALTH CLINIC | Facility: CLINIC | Age: 53
End: 2023-01-10

## 2023-01-10 DIAGNOSIS — F41.9 ANXIETY: Primary | ICD-10-CM
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
## 2023-01-11 ENCOUNTER — OFFICE VISIT (OUTPATIENT)
Dept: INTERNAL MEDICINE CLINIC | Facility: CLINIC | Age: 53
End: 2023-01-11

## 2023-01-11 VITALS
HEART RATE: 55 BPM | TEMPERATURE: 98 F | OXYGEN SATURATION: 100 % | BODY MASS INDEX: 19.95 KG/M2 | WEIGHT: 108.4 LBS | SYSTOLIC BLOOD PRESSURE: 120 MMHG | DIASTOLIC BLOOD PRESSURE: 78 MMHG | HEIGHT: 62 IN

## 2023-01-11 DIAGNOSIS — R68.81 EARLY SATIETY: ICD-10-CM

## 2023-01-11 DIAGNOSIS — E03.9 HYPOTHYROIDISM, UNSPECIFIED TYPE: ICD-10-CM

## 2023-01-11 DIAGNOSIS — Z13.6 SCREENING FOR CARDIOVASCULAR CONDITION: Primary | ICD-10-CM

## 2023-01-11 DIAGNOSIS — K31.84 GASTROPARESIS: ICD-10-CM

## 2023-01-11 DIAGNOSIS — F41.9 ANXIETY: ICD-10-CM

## 2023-01-11 NOTE — PROGRESS NOTES
Assessment/Plan:    Gastroparesis  Recent diagnosis of gastroparesis I did review the GI letter, gastric emptying stomach she does have severe gastroparesis unfortunately she cannot take Reglan under the advice of her neurologist she will be seeing cardiology to see if she can take the domperidone we did also discuss smaller more frequent meals she did see a dietitian she can discuss further with GI the pacemaker for the stomach and if she would be a candidate currently her symptoms have improved with dietary changes    Hypothyroidism  Clinically stable and doing well continue the current medical regiment will continue monitor  Early satiety  GI work-up did reveal gastroparesis she will continue follow-up with GI Dr Jerrod Henriquez  Has met with counselor Bria Pederson today currently she is stable her symptoms have improved since finding out the diagnosis of gastroparesis which was likely the triggering with symptom improvement anxiety has improved         Problem List Items Addressed This Visit        Digestive    Gastroparesis     Recent diagnosis of gastroparesis I did review the GI letter, gastric emptying stomach she does have severe gastroparesis unfortunately she cannot take Reglan under the advice of her neurologist she will be seeing cardiology to see if she can take the domperidone we did also discuss smaller more frequent meals she did see a dietitian she can discuss further with GI the pacemaker for the stomach and if she would be a candidate currently her symptoms have improved with dietary changes            Endocrine    Hypothyroidism     Clinically stable and doing well continue the current medical regiment will continue monitor              Other    Early satiety     GI work-up did reveal gastroparesis she will continue follow-up with GI Dr Nicolás Rubalcava     Has met with counselor Bria Pederson today currently she is stable her symptoms have improved since finding out the diagnosis of gastroparesis which was likely the triggering with symptom improvement anxiety has improved        Other Visit Diagnoses     Screening for cardiovascular condition    -  Primary    Relevant Orders    Ambulatory Referral to Cardiology          Return to office  6 months  call if any problems  Subjective:      Patient ID: Frank Recio is a 50 y o  female  HPI 57-year old female coming in for a follow up office visit regarding gastroparesis, hypothyroidism, early satiety anxiety; the patient reports me compliant taking medications without untoward side effects the  The patient is here to review his medical condition, update me on the medical condition and the patient reports me no hospitalizations and no ER visits  Reports me recent diagnosis of gastroparesis she had abnormal gastric emptying study that did show severe gastroparesis she is now working with the dietitian there has been improvement in her symptoms unfortunately she cannot take the Reglan per her neurologist   She will be seeing cardiology to see if she can take domperidone  Anxiety has improved since improvements of the gastroparesis wants to hold off on cov vaccine  HIV nonreactive hepatitis C antibody highly reactive similar compared to prior RPR nonreactive hepatitis C RNA not detected  The following portions of the patient's history were reviewed and updated as appropriate: allergies, current medications, past family history, past medical history, past social history, past surgical history and problem list     Review of Systems   Constitutional: Negative for activity change, appetite change and unexpected weight change  HENT: Negative for congestion and postnasal drip  Eyes: Negative for visual disturbance  Respiratory: Negative for cough and shortness of breath  Cardiovascular: Negative for chest pain  Gastrointestinal: Negative for abdominal pain, diarrhea, nausea and vomiting     Neurological: Negative for dizziness, light-headedness and headaches  Psychiatric/Behavioral: The patient is not nervous/anxious  Objective:    Return in about 6 months (around 7/11/2023)  No results found  Allergies   Allergen Reactions   • Morphine Other (See Comments)   • Codeine Hives   • Morphine And Related    • Codeine Itching   • Sulfa Antibiotics Rash       Past Medical History:   Diagnosis Date   • Arthritis    • Asthma    • Disease of thyroid gland    • Gastroparesis    • Hypokalemia 09/09/2022   • Hypoparathyroidism ([address] )    • Nephrolithiasis 07/22/2019   • Osteoporosis    • Partial symptompatic epilepsy    • Seizure disorder [facility])      Past Surgical History:   Procedure Laterality Date   • APPENDECTOMY     • COLONOSCOPY     • EGD     • FRACTURE SURGERY Right     Knee   • HYSTERECTOMY     • OTHER SURGICAL HISTORY      left foot, rt knee, tonselectomy   • SINUS SURGERY     • TONSILLECTOMY       Current Outpatient Medications on File Prior to Visit   Medication Sig Dispense Refill   • Ascorbic Acid (Vitamin C) 500 MG CAPS Take 1 each by mouth daily     • cetirizine (ZyrTEC) 10 MG chewable tablet Chew 10 mg daily     • ECHINACEA PO Take by mouth daily     • ELDERBERRY PO Take 1 tablet by mouth daily as needed Daily every morning     • famotidine (PEPCID) 40 MG tablet Take 0 5 tablets (20 mg total) by mouth 2 (two) times a day 60 tablet 6   • ondansetron (ZOFRAN) 4 mg tablet Take 1 tablet (4 mg total) by mouth every 8 (eight) hours as needed for nausea or vomiting 20 tablet 0   • ondansetron (ZOFRAN) 4 mg tablet Take 1 tablet (4 mg total) by mouth every 8 (eight) hours as needed for nausea or vomiting 60 tablet 3   • Synthroid 75 MCG tablet Take 75 mcg by mouth daily     • topiramate (TOPAMAX) 200 MG tablet Take 1 tablet by mouth twice per day   180 tablet 3   • Vitamin D, Cholecalciferol, 25 MCG (1000 UT) CAPS Take 1 tablet by mouth in the morning     • Zinc 50 MG CAPS Take 1 capsule by mouth daily     • levalbuterol (Xopenex HFA) 45 mcg/act inhaler Inhale 1-2 puffs every 4 (four) hours as needed for wheezing (Patient not taking: Reported on 8/12/2022) 15 g 0     No current facility-administered medications on file prior to visit       Family History   Problem Relation Age of Onset   • Alcohol abuse Mother    • RUSSELL disease Mother    • Boles's esophagus Mother    • Migraines Father    • Prostate cancer Father    • Cancer Father    • Alcohol abuse Maternal Uncle    • Seizures Paternal Aunt    • Lung cancer Paternal Aunt    • Prostate cancer Paternal Uncle    • Colon cancer Paternal Uncle      Social History     Socioeconomic History   • Marital status: Single     Spouse name: Not on file   • Number of children: Not on file   • Years of education: Not on file   • Highest education level: Not on file   Occupational History   • Not on file   Tobacco Use   • Smoking status: Never   • Smokeless tobacco: Never   Vaping Use   • Vaping Use: Never used   Substance and Sexual Activity   • Alcohol use: Not Currently   • Drug use: Not Currently     Comment: social   • Sexual activity: Not Currently   Other Topics Concern   • Not on file   Social History Narrative    ** Merged History Encounter **          Social Determinants of Health     Financial Resource Strain: Not on file   Food Insecurity: Not on file   Transportation Needs: Not on file   Physical Activity: Not on file   Stress: Not on file   Social Connections: Not on file   Intimate Partner Violence: Not on file   Housing Stability: Not on file     Vitals:    01/11/23 1340   BP: 120/78   BP Location: Left arm   Patient Position: Sitting   Cuff Size: Adult   Pulse: 55   Temp: 98 °F (36 7 °C)   TempSrc: Oral   SpO2: 100%   Weight: 49 2 kg (108 lb 6 4 oz)   Height: 5' 2" (1 575 m)     Results for orders placed or performed in visit on 01/06/23   TSH, 3rd generation with Free T4 reflex   Result Value Ref Range    TSH 3RD GENERATON 0 052 (L) 0 450 - 4 500 uIU/mL   T4, free Result Value Ref Range    Free T4 1 17 0 76 - 1 46 ng/dL     Weight (last 2 days)     Date/Time Weight    01/11/23 1340 49 2 (108 4)        Body mass index is 19 83 kg/m²  BP      Temp      Pulse     Resp      SpO2        Vitals:    01/11/23 1340   Weight: 49 2 kg (108 lb 6 4 oz)     Vitals:    01/11/23 1340   Weight: 49 2 kg (108 lb 6 4 oz)       /78 (BP Location: Left arm, Patient Position: Sitting, Cuff Size: Adult)   Pulse 55   Temp 98 °F (36 7 °C) (Oral)   Ht 5' 2" (1 575 m)   Wt 49 2 kg (108 lb 6 4 oz)   SpO2 100%   BMI 19 83 kg/m²          Physical Exam  Vitals and nursing note reviewed  Constitutional:       General: She is not in acute distress  Appearance: She is well-developed  She is not ill-appearing, toxic-appearing or diaphoretic  HENT:      Head: Normocephalic  Eyes:      General: No scleral icterus  Right eye: No discharge  Left eye: No discharge  Conjunctiva/sclera: Conjunctivae normal       Pupils: Pupils are equal, round, and reactive to light  Cardiovascular:      Rate and Rhythm: Normal rate and regular rhythm  Heart sounds: Normal heart sounds  No murmur heard  No friction rub  No gallop  Pulmonary:      Effort: No respiratory distress  Breath sounds: Normal breath sounds  No wheezing or rales  Abdominal:      General: Bowel sounds are normal  There is no distension  Palpations: Abdomen is soft  There is no mass  Tenderness: There is no abdominal tenderness  There is no guarding or rebound  Musculoskeletal:         General: No deformity  Cervical back: Neck supple  Lymphadenopathy:      Cervical: No cervical adenopathy  Neurological:      Mental Status: She is alert  Coordination: Coordination normal    Psychiatric:         Mood and Affect: Mood is not anxious or depressed  Thought Content: Thought content does not include suicidal ideation

## 2023-01-12 NOTE — ASSESSMENT & PLAN NOTE
Recent diagnosis of gastroparesis I did review the GI letter, gastric emptying stomach she does have severe gastroparesis unfortunately she cannot take Reglan under the advice of her neurologist she will be seeing cardiology to see if she can take the domperidone we did also discuss smaller more frequent meals she did see a dietitian she can discuss further with GI the pacemaker for the stomach and if she would be a candidate currently her symptoms have improved with dietary changes

## 2023-01-12 NOTE — ASSESSMENT & PLAN NOTE
Has met with counselor Brijesh Recinos today currently she is stable her symptoms have improved since finding out the diagnosis of gastroparesis which was likely the triggering with symptom improvement anxiety has improved

## 2023-01-17 ENCOUNTER — TELEPHONE (OUTPATIENT)
Dept: GASTROENTEROLOGY | Facility: CLINIC | Age: 53
End: 2023-01-17

## 2023-01-17 NOTE — TELEPHONE ENCOUNTER
Patients GI provider:  Dr Froylan Aguilar    Number to return call: [phone]    Reason for call: Pt calling because she is having a really bad day yesterday and today  She is not feeling well at all        Scheduled procedure/appointment date if applicable: Appt 3/1/55

## 2023-01-17 NOTE — TELEPHONE ENCOUNTER
Please Advise   Last seen: 12/29/22 Ping   Hx: Gastroparesis, intermittent diarrhea     Spoke with patient, reports upper abdominal cramping with gas pains and constipation  She ate small pieces of cantaloupe and a vanilla shake yesterday and has been uncomfortable since  She reports last BM 2-3 days ago but was hard and small  She is having some nausea but no vomiting  She has not tried any thing OTC yet as she is afraid to worsen symptoms  Advised to try miralax daily to help with constipation which can be causing worsening upper GI symptoms  Patient is on a gastroparesis diet and goes to see a nutritionist    Other recs? She is also asking if her thyroid level being abnormal can be causing her GI issues  She explains her thyroid has been off for a couple years and will call her endocrinologist but her GI symptoms all started after she had covid

## 2023-01-17 NOTE — LETTER
January 18, 2023     To whom it may concern,     Please excuse Hank Jefferson from work on 1/17/23 due to illness  If you have any questions or concerns please reach out to our office at [phone]       Thank you,    [facility] [place] [facility]

## 2023-01-18 NOTE — TELEPHONE ENCOUNTER
Spoke with patient, reviewed provider recommendations  Patient verbalized understanding, she missed work yesterday due to painful constipation  She is requesting work note be sent through [facility], [place]
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
## 2023-01-20 ENCOUNTER — HOSPITAL ENCOUNTER (EMERGENCY)
Facility: HOSPITAL | Age: 53
Discharge: HOME | End: 2023-01-21
Attending: EMERGENCY MEDICINE

## 2023-01-20 DIAGNOSIS — R19.7 DIARRHEA: ICD-10-CM

## 2023-01-20 DIAGNOSIS — R11.10 VOMITING: ICD-10-CM

## 2023-01-20 DIAGNOSIS — R11.0 NAUSEA: Primary | ICD-10-CM

## 2023-01-20 LAB
ALBUMIN SERPL BCP-MCNC: 4.2 G/DL (ref 3.5–5)
ALP SERPL-CCNC: 74 U/L (ref 46–116)
ALT SERPL W P-5'-P-CCNC: 15 U/L (ref 12–78)
ANION GAP SERPL CALCULATED.3IONS-SCNC: 9 MMOL/L (ref 4–13)
AST SERPL W P-5'-P-CCNC: 10 U/L (ref 5–45)
BASOPHILS # BLD AUTO: 0.06 THOUSANDS/ÂΜL (ref 0–0.1)
BASOPHILS NFR BLD AUTO: 1 % (ref 0–1)
BILIRUB SERPL-MCNC: 0.57 MG/DL (ref 0.2–1)
BUN SERPL-MCNC: 13 MG/DL (ref 5–25)
CALCIUM SERPL-MCNC: 8.9 MG/DL (ref 8.3–10.1)
CHLORIDE SERPL-SCNC: 107 MMOL/L (ref 96–108)
CO2 SERPL-SCNC: 23 MMOL/L (ref 21–32)
CREAT SERPL-MCNC: 0.72 MG/DL (ref 0.6–1.3)
EOSINOPHIL # BLD AUTO: 0.13 THOUSAND/ÂΜL (ref 0–0.61)
EOSINOPHIL NFR BLD AUTO: 1 % (ref 0–6)
ERYTHROCYTE [DISTWIDTH] IN BLOOD BY AUTOMATED COUNT: 12.4 % (ref 11.6–15.1)
GFR SERPL CREATININE-BSD FRML MDRD: 97 ML/MIN/1.73SQ M
GLUCOSE SERPL-MCNC: 108 MG/DL (ref 65–140)
HCT VFR BLD AUTO: 43.9 % (ref 34.8–46.1)
HGB BLD-MCNC: 14.9 G/DL (ref 11.5–15.4)
IMM GRANULOCYTES # BLD AUTO: 0.05 THOUSAND/UL (ref 0–0.2)
IMM GRANULOCYTES NFR BLD AUTO: 0 % (ref 0–2)
LIPASE SERPL-CCNC: 138 U/L (ref 73–393)
LYMPHOCYTES # BLD AUTO: 0.64 THOUSANDS/ÂΜL (ref 0.6–4.47)
LYMPHOCYTES NFR BLD AUTO: 5 % (ref 14–44)
MAGNESIUM SERPL-MCNC: 2 MG/DL (ref 1.6–2.6)
MCH RBC QN AUTO: 29.6 PG (ref 26.8–34.3)
MCHC RBC AUTO-ENTMCNC: 33.9 G/DL (ref 31.4–37.4)
MCV RBC AUTO: 87 FL (ref 82–98)
MONOCYTES # BLD AUTO: 1.16 THOUSAND/ÂΜL (ref 0.17–1.22)
MONOCYTES NFR BLD AUTO: 10 % (ref 4–12)
NEUTROPHILS # BLD AUTO: 9.82 THOUSANDS/ÂΜL (ref 1.85–7.62)
NEUTS SEG NFR BLD AUTO: 83 % (ref 43–75)
NRBC BLD AUTO-RTO: 0 /100 WBCS
PLATELET # BLD AUTO: 276 THOUSANDS/UL (ref 149–390)
PMV BLD AUTO: 9.7 FL (ref 8.9–12.7)
POTASSIUM SERPL-SCNC: 3.4 MMOL/L (ref 3.5–5.3)
PROT SERPL-MCNC: 7.9 G/DL (ref 6.4–8.4)
RBC # BLD AUTO: 5.03 MILLION/UL (ref 3.81–5.12)
SODIUM SERPL-SCNC: 139 MMOL/L (ref 135–147)
WBC # BLD AUTO: 11.86 THOUSAND/UL (ref 4.31–10.16)

## 2023-01-20 RX ORDER — POTASSIUM CHLORIDE 20 MEQ/1
40 TABLET, EXTENDED RELEASE ORAL ONCE
Status: COMPLETED | OUTPATIENT
Start: 2023-01-20 | End: 2023-01-21

## 2023-01-20 RX ORDER — ONDANSETRON 2 MG/ML
4 INJECTION INTRAMUSCULAR; INTRAVENOUS ONCE
Status: COMPLETED | OUTPATIENT
Start: 2023-01-20 | End: 2023-01-20

## 2023-01-20 RX ADMIN — ONDANSETRON 4 MG: 2 INJECTION INTRAMUSCULAR; INTRAVENOUS at 22:25

## 2023-01-20 RX ADMIN — SODIUM CHLORIDE 1000 ML: 0.9 INJECTION, SOLUTION INTRAVENOUS at 22:24

## 2023-01-21 VITALS
OXYGEN SATURATION: 98 % | RESPIRATION RATE: 18 BRPM | TEMPERATURE: 97.4 F | DIASTOLIC BLOOD PRESSURE: 70 MMHG | SYSTOLIC BLOOD PRESSURE: 121 MMHG | HEART RATE: 70 BPM

## 2023-01-21 RX ADMIN — POTASSIUM CHLORIDE 40 MEQ: 1500 TABLET, EXTENDED RELEASE ORAL at 00:11

## 2023-01-21 NOTE — ED PROVIDER NOTES
History  Chief Complaint   Patient presents with   • Abdominal Pain     Patient states "she has gastroparesis and is having a terrible episode"  Patient c/o vomiting, diarrhea  Patient is a 48year old female with history of gastroparesis who presents to the emergency department for vomiting, diarrhea  She states that she was working and was unable to eat her usual meals and protein shakes, and she instead went out to eat and had salmon  She then developed worsening abdominal pain  She states that her symptoms feel like her typical gastroparesis episodes  She has taken zofran at home without relief  Patient also states that her gastroenterologist wants to start her on reglan but she is unable to take that because of her history of seizures in the past  She denies having any fevers or chills  She has no blood or bile in her vomit or stool  She does complain of having some generalized body cramps  She states that she has had hypokalemia in the past requiring repletion  Prior to Admission Medications   Prescriptions Last Dose Informant Patient Reported? Taking?    Ascorbic Acid (Vitamin C) 500 MG CAPS   Yes No   Sig: Take 1 each by mouth daily   ECHINACEA PO   Yes No   Sig: Take by mouth daily   ELDERBERRY PO   Yes No   Sig: Take 1 tablet by mouth daily as needed Daily every morning   Synthroid 75 MCG tablet   Yes No   Sig: Take 75 mcg by mouth daily   Vitamin D, Cholecalciferol, 25 MCG (1000 UT) CAPS   Yes No   Sig: Take 1 tablet by mouth in the morning   Zinc 50 MG CAPS   Yes No   Sig: Take 1 capsule by mouth daily   cetirizine (ZyrTEC) 10 MG chewable tablet   Yes No   Sig: Chew 10 mg daily   famotidine (PEPCID) 40 MG tablet   No No   Sig: Take 0 5 tablets (20 mg total) by mouth 2 (two) times a day   levalbuterol (Xopenex HFA) 45 mcg/act inhaler   No No   Sig: Inhale 1-2 puffs every 4 (four) hours as needed for wheezing   Patient not taking: Reported on 8/12/2022   ondansetron (ZOFRAN) 4 mg tablet   No No   Sig: Take 1 tablet (4 mg total) by mouth every 8 (eight) hours as needed for nausea or vomiting   ondansetron (ZOFRAN) 4 mg tablet   No No   Sig: Take 1 tablet (4 mg total) by mouth every 8 (eight) hours as needed for nausea or vomiting   topiramate (TOPAMAX) 200 MG tablet   No No   Sig: Take 1 tablet by mouth twice per day  Facility-Administered Medications: None       Past Medical History:   Diagnosis Date   • Arthritis    • Asthma    • Disease of thyroid gland    • Gastroparesis    • Hypokalemia 09/09/2022   • Hypoparathyroidism ([facility] [address] )    • Nephrolithiasis 07/22/2019   • Osteoporosis    • Partial symptompatic epilepsy    • Seizure disorder [facility])        Past Surgical History:   Procedure Laterality Date   • APPENDECTOMY     • COLONOSCOPY     • EGD     • FRACTURE SURGERY Right     Knee   • HYSTERECTOMY     • OTHER SURGICAL HISTORY      left foot, rt knee, tonselectomy   • SINUS SURGERY     • TONSILLECTOMY         Family History   Problem Relation Age of Onset   • Alcohol abuse Mother    • RUSSELL disease Mother    • Boles's esophagus Mother    • Migraines Father    • Prostate cancer Father    • Cancer Father    • Alcohol abuse Maternal Uncle    • Seizures Paternal Aunt    • Lung cancer Paternal Aunt    • Prostate cancer Paternal Uncle    • Colon cancer Paternal Uncle      I have reviewed and agree with the history as documented  E-Cigarette/Vaping   • E-Cigarette Use Never User      E-Cigarette/Vaping Substances   • Nicotine No    • THC No    • CBD No    • Flavoring No      Social History     Tobacco Use   • Smoking status: Never   • Smokeless tobacco: Never   Vaping Use   • Vaping Use: Never used   Substance Use Topics   • Alcohol use: Not Currently   • Drug use: Not Currently     Comment: social        Review of Systems   Constitutional: Negative for chills and fever  HENT: Negative for congestion  Eyes: Negative for redness  Respiratory: Negative for cough and shortness of breath  Cardiovascular: Negative for chest pain and palpitations  Gastrointestinal: Positive for abdominal pain, diarrhea, nausea and vomiting  Negative for blood in stool  Endocrine: Negative for polyuria  Genitourinary: Negative for dysuria and hematuria  Musculoskeletal: Positive for myalgias  Negative for arthralgias and back pain  Skin: Negative for rash  Neurological: Negative for light-headedness and headaches  All other systems reviewed and are negative  Physical Exam  ED Triage Vitals   Temperature Pulse Respirations Blood Pressure SpO2   01/20/23 1938 01/20/23 1938 01/20/23 1938 01/20/23 1938 01/20/23 1938   (!) 97 4 °F (36 3 °C) (!) 124 18 136/96 98 %      Temp Source Heart Rate Source Patient Position - Orthostatic VS BP Location FiO2 (%)   01/20/23 1938 01/20/23 1938 01/21/23 0015 01/21/23 0015 --   Oral Monitor Lying Right arm       Pain Score       01/20/23 1938       9             Orthostatic Vital Signs  Vitals:    01/20/23 1938 01/21/23 0015   BP: 136/96 121/70   Pulse: (!) 124 70   Patient Position - Orthostatic VS:  Lying       Physical Exam  Vitals and nursing note reviewed  Constitutional:       General: She is not in acute distress  Appearance: Normal appearance  She is not ill-appearing  HENT:      Head: Normocephalic and atraumatic  Mouth/Throat:      Comments: Dry mucous membranes  Eyes:      Conjunctiva/sclera: Conjunctivae normal    Cardiovascular:      Rate and Rhythm: Normal rate and regular rhythm  Pulses: Normal pulses  Heart sounds: Normal heart sounds  Pulmonary:      Effort: Pulmonary effort is normal       Breath sounds: Normal breath sounds  Abdominal:      Palpations: Abdomen is soft  Tenderness: There is no abdominal tenderness  There is no right CVA tenderness, left CVA tenderness, guarding or rebound  Musculoskeletal:         General: No tenderness  Cervical back: Neck supple  Skin:     General: Skin is warm and dry  Capillary Refill: Capillary refill takes less than 2 seconds  Neurological:      General: No focal deficit present  Mental Status: She is alert  Mental status is at baseline     Psychiatric:         Mood and Affect: Mood normal          ED Medications  Medications   sodium chloride 0 9 % bolus 1,000 mL (0 mL Intravenous Stopped 1/21/23 0011)   ondansetron (ZOFRAN) injection 4 mg (4 mg Intravenous Given 1/20/23 2225)   potassium chloride (K-DUR,KLOR-CON) CR tablet 40 mEq (40 mEq Oral Given 1/21/23 0011)       Diagnostic Studies  Results Reviewed     Procedure Component Value Units Date/Time    Comprehensive metabolic panel [[id number]]  (Abnormal) Collected: 01/20/23 2224    Lab Status: Final result Specimen: Blood from Arm, Left Updated: 01/20/23 2302     Sodium 139 mmol/L      Potassium 3 4 mmol/L      Chloride 107 mmol/L      CO2 23 mmol/L      ANION GAP 9 mmol/L      BUN 13 mg/dL      Creatinine 0 72 mg/dL      Glucose 108 mg/dL      Calcium 8 9 mg/dL      AST 10 U/L      ALT 15 U/L      Alkaline Phosphatase 74 U/L      Total Protein 7 9 g/dL      Albumin 4 2 g/dL      Total Bilirubin 0 57 mg/dL      eGFR 97 ml/min/1 73sq m     Narrative:      [place] guidelines for Chronic Kidney Disease (CKD):   •  Stage 1 with normal or high GFR (GFR > 90 mL/min/1 73 square meters)  •  Stage 2 Mild CKD (GFR = 60-89 mL/min/1 73 square meters)  •  Stage 3A Moderate CKD (GFR = 45-59 mL/min/1 73 square meters)  •  Stage 3B Moderate CKD (GFR = 30-44 mL/min/1 73 square meters)  •  Stage 4 Severe CKD (GFR = 15-29 mL/min/1 73 square meters)  •  Stage 5 End Stage CKD (GFR <15 mL/min/1 73 square meters)  Note: GFR calculation is accurate only with a steady state creatinine    Lipase [[id number]]  (Normal) Collected: 01/20/23 2224    Lab Status: Final result Specimen: Blood from Arm, Left Updated: 01/20/23 2258     Lipase 138 u/L     Magnesium [[id number]]  (Normal) Collected: 01/20/23 2224    Lab Status: Final result Specimen: Blood from Arm, Left Updated: 01/20/23 2258     Magnesium 2 0 mg/dL     CBC and differential [[id number]]  (Abnormal) Collected: 01/20/23 2224    Lab Status: Final result Specimen: Blood from Arm, Left Updated: 01/20/23 2237     WBC 11 86 Thousand/uL      RBC 5 03 Million/uL      Hemoglobin 14 9 g/dL      Hematocrit 43 9 %      MCV 87 fL      MCH 29 6 pg      MCHC 33 9 g/dL      RDW 12 4 %      MPV 9 7 fL      Platelets 470 Thousands/uL      nRBC 0 /100 WBCs      Neutrophils Relative 83 %      Immat GRANS % 0 %      Lymphocytes Relative 5 %      Monocytes Relative 10 %      Eosinophils Relative 1 %      Basophils Relative 1 %      Neutrophils Absolute 9 82 Thousands/µL      Immature Grans Absolute 0 05 Thousand/uL      Lymphocytes Absolute 0 64 Thousands/µL      Monocytes Absolute 1 16 Thousand/µL      Eosinophils Absolute 0 13 Thousand/µL      Basophils Absolute 0 06 Thousands/µL                  No orders to display         Procedures  Procedures      ED Course  ED Course as of 01/25/23 0918   Fri Jan 20, 2023   2240 WBC(!): 11 86  Increased from prior 3 years ago   2301 Patient resting comfortably in the room  States that her nausea has improved  IV fluids still running, will re-evaluate patient once fluids completed  2315 Potassium(!): 3 4  Will replete   2352 Patient complaining of lower abdominal cramping, had to run to the bathroom to have a bowel movement  Heart rate in the 80s   Sat Jan 21, 2023   6325 Patient feeling better, still complaining of having diarrhea, but able to tolerate PO  Will discharge home  Medical Decision Making  Patient is a 48year old female with PMH of gastroparesis who presents to the ED with N/V/D  Vital signs stable  On exam dry appearing, no focal abdominal TTP, nauseated  History and exam most consistent with gastroparesis   No focal TTP on examination however will perform serial abdominal examinations to monitor for development of any focal abdominal pain requiring imaging  At this time will treat patient with IV fluids, and zofran given patients inability to be treated with reglan given her seizure history  Will check basic labs to evaluate for possible ADA  View ED course above for further discussion on patient workup  I reviewed all testing with the patient  Upon re-evaluation patients pain has improved  I have reviewed the patient's vital signs, nursing notes, and other relevant tests/information  I had a detailed discussion with the patient regarding the history, exam findings, and any diagnostic results  Plan to discharge home in stable condition with follow up with gastroenterology  Discussed with patient who is agreeable to plan  I discussed discharge instructions, need for follow-up, and oral return precautions for what to return for in addition to the written return precautions and discharge instructions, specifically highlighting areas of special concern  The patient verbalized understanding of the discharge instructions and warnings that would necessitate return to the Emergency Department including worsening abdominal pain, repeated vomiting, fevers, chills  All questions the patient had were answered prior to discharge  Diarrhea: self-limited or minor problem  Nausea: self-limited or minor problem  Vomiting: acute illness or injury  Amount and/or Complexity of Data Reviewed  Labs: ordered  Decision-making details documented in ED Course  Risk  Prescription drug management              Disposition  Final diagnoses:   Nausea   Vomiting   Diarrhea     Time reflects when diagnosis was documented in both MDM as applicable and the Disposition within this note     Time User Action Codes Description Comment    1/21/2023  1:10 AM Myrtie Cogan Add [R11 0] Nausea     1/21/2023  1:10 AM Mike LUX Add [R11 10] Vomiting     1/21/2023  1:10 AM Ellis Cogan Add [R19 7] Diarrhea ED Disposition     ED Disposition   Discharge    Condition   Stable    Date/Time   Sat Jan 21, 2023  1:10 AM    Comment   Eder June discharge to home/self care  Follow-up Information     Follow up With Specialties Details Why [address]  [facility], DO Internal Medicine   [address] Antoine Cochran MD Gastroenterology Call  As needed [address]  [address] [facility]  [phone]            Discharge Medication List as of 1/21/2023  1:21 AM      CONTINUE these medications which have NOT CHANGED    Details   Ascorbic Acid (Vitamin C) 500 MG CAPS Take 1 each by mouth daily, Historical Med      cetirizine (ZyrTEC) 10 MG chewable tablet Chew 10 mg daily, Historical Med      ECHINACEA PO Take by mouth daily, Historical Med      ELDERBERRY PO Take 1 tablet by mouth daily as needed Daily every morning, Historical Med      famotidine (PEPCID) 40 MG tablet Take 0 5 tablets (20 mg total) by mouth 2 (two) times a day, Starting Thu 12/29/2022, Normal      levalbuterol (Xopenex HFA) 45 mcg/act inhaler Inhale 1-2 puffs every 4 (four) hours as needed for wheezing, Starting Mon 6/27/2022, Normal      !! ondansetron (ZOFRAN) 4 mg tablet Take 1 tablet (4 mg total) by mouth every 8 (eight) hours as needed for nausea or vomiting, Starting Thu 12/29/2022, Normal      !! ondansetron (ZOFRAN) 4 mg tablet Take 1 tablet (4 mg total) by mouth every 8 (eight) hours as needed for nausea or vomiting, Starting Thu 12/29/2022, Normal      Synthroid 75 MCG tablet Take 75 mcg by mouth daily, Starting Fri 7/29/2022, Historical Med      topiramate (TOPAMAX) 200 MG tablet Take 1 tablet by mouth twice per day , Normal      Vitamin D, Cholecalciferol, 25 MCG (1000 UT) CAPS Take 1 tablet by mouth in the morning, Historical Med      Zinc 50 MG CAPS Take 1 capsule by mouth daily, Historical Med       !! - Potential duplicate medications found   Please discuss with provider  No discharge procedures on file  PDMP Review     None           ED Provider  Attending physically available and evaluated Jena Judie COREY managed the patient along with the ED Attending      Electronically Signed by         Evelyn Colby DO  01/25/23 1175 Declined

## 2023-01-21 NOTE — DISCHARGE INSTRUCTIONS
You were seen in the emergency department today for nausea and vomiting  Your testing showed low potassium  Follow up with your GI doctor and primary care provider  Take Zofran as prescribed  Return to the emergency department for any new or concerning symptoms including worsening abdominal pain and vomiting  Thank you for choosing [facility] for your care today

## 2023-01-22 ENCOUNTER — PATIENT MESSAGE (OUTPATIENT)
Dept: GASTROENTEROLOGY | Facility: CLINIC | Age: 53
End: 2023-01-22

## 2023-01-22 NOTE — ED ATTENDING ATTESTATION
1/20/2023  I, Carla Lazo MD, saw and evaluated the patient  I have discussed the patient with the resident/non-physician practitioner and agree with the resident's/non-physician practitioner's findings, Plan of Care, and MDM as documented in the resident's/non-physician practitioner's note, except where noted  All available labs and Radiology studies were reviewed  I was present for key portions of any procedure(s) performed by the resident/non-physician practitioner and I was immediately available to provide assistance  At this point I agree with the current assessment done in the Emergency Department  I have conducted an independent evaluation of this patient a history and physical is as follows:    5year-old female presents for nausea vomiting abdominal pain consistent with her known gastroparesis  She is unable to take Reglan due to seizure disorder  Managed with Zofran  Reports recent exacerbation with associated diarrhea as well  On exam he is resting comfortably in bed, looks well with mildly nauseous  Her symptoms have improved with Zofran prior to my evaluation that were administered  She states she feels better after receiving IV fluids and Zofran frequently  Her abdomen is relatively benign except for mild epigastric discomfort  She appears well-hydrated  Mucous membranes are moist, no nontachycardic on exam at this time  A/P: This is a 48 y o  female who presents to the ED for evaluation of abd pain  Consistent with gastroparesis  IV fluids, Zofran, basic labs rule out electrolyte disturbance, ADA  Will defer imaging at this time  Will avoid seizure provoking medications as noted in her chart  I do not believe imaging is of benefit to this patient at this time as her exam is relatively benign and consistent with her most likely complaint of gastroparesis  Should she improved and wished to go home I believe that that is a reasonable plan    If she gets worse or has no improvement, will consider imaging at that time she is to follow-up outpatient with her gastroenterologist     ED Course         Critical Care Time  Procedures

## 2023-01-23 ENCOUNTER — TELEPHONE (OUTPATIENT)
Dept: INTERNAL MEDICINE CLINIC | Facility: CLINIC | Age: 53
End: 2023-01-23

## 2023-01-23 DIAGNOSIS — K31.84 GASTROPARESIS: Primary | ICD-10-CM

## 2023-01-23 RX ORDER — PROMETHAZINE HYDROCHLORIDE 12.5 MG/1
12.5 SUPPOSITORY RECTAL EVERY 6 HOURS PRN
Qty: 12 EACH | Refills: 0 | Status: SHIPPED | OUTPATIENT
Start: 2023-01-23 | End: 2023-05-19

## 2023-01-23 NOTE — TELEPHONE ENCOUNTER
The patient was In the Er on Friday  She was vomiting and had diarrhea  She was given potassium and zofran  Please check her lab results  Please advise   thank you

## 2023-01-23 NOTE — PATIENT COMMUNICATION
Patient called stating she was in the ED on Friday and told to follow up with gastro sooner than march  Please contact patient to schedule sooner follow up

## 2023-01-23 NOTE — TELEPHONE ENCOUNTER
Please check all labs patient states they are all out of wack and she would like to know what to do         Please advise

## 2023-01-26 DIAGNOSIS — E87.6 LOW SERUM POTASSIUM: Primary | ICD-10-CM

## 2023-01-26 DIAGNOSIS — D72.829 LEUKOCYTOSIS, UNSPECIFIED TYPE: ICD-10-CM

## 2023-01-30 DIAGNOSIS — R09.81 NASAL CONGESTION: Primary | ICD-10-CM

## 2023-01-30 RX ORDER — FLUTICASONE PROPIONATE 50 MCG
1 SPRAY, SUSPENSION (ML) NASAL DAILY
Qty: 16 G | Refills: 3 | Status: SHIPPED | OUTPATIENT
Start: 2023-01-30

## 2023-02-24 ENCOUNTER — HOSPITAL ENCOUNTER (OUTPATIENT)
Dept: NON INVASIVE DIAGNOSTICS | Facility: CLINIC | Age: 53
Discharge: HOME | End: 2023-02-24

## 2023-02-24 ENCOUNTER — APPOINTMENT (OUTPATIENT)
Dept: LAB | Facility: CLINIC | Age: 53
End: 2023-02-24

## 2023-02-24 DIAGNOSIS — E87.6 LOW SERUM POTASSIUM: ICD-10-CM

## 2023-02-24 DIAGNOSIS — D72.829 LEUKOCYTOSIS, UNSPECIFIED TYPE: ICD-10-CM

## 2023-02-24 DIAGNOSIS — R00.2 PALPITATIONS: ICD-10-CM

## 2023-02-24 LAB
ALBUMIN SERPL BCP-MCNC: 4 G/DL (ref 3.5–5)
ALP SERPL-CCNC: 76 U/L (ref 46–116)
ALT SERPL W P-5'-P-CCNC: 15 U/L (ref 12–78)
ANION GAP SERPL CALCULATED.3IONS-SCNC: 3 MMOL/L (ref 4–13)
AST SERPL W P-5'-P-CCNC: 14 U/L (ref 5–45)
BASOPHILS # BLD AUTO: 0.04 THOUSANDS/ÂΜL (ref 0–0.1)
BASOPHILS NFR BLD AUTO: 1 % (ref 0–1)
BILIRUB SERPL-MCNC: 0.75 MG/DL (ref 0.2–1)
BUN SERPL-MCNC: 11 MG/DL (ref 5–25)
CALCIUM SERPL-MCNC: 9.7 MG/DL (ref 8.3–10.1)
CHLORIDE SERPL-SCNC: 110 MMOL/L (ref 96–108)
CO2 SERPL-SCNC: 26 MMOL/L (ref 21–32)
CREAT SERPL-MCNC: 0.78 MG/DL (ref 0.6–1.3)
EOSINOPHIL # BLD AUTO: 0.31 THOUSAND/ÂΜL (ref 0–0.61)
EOSINOPHIL NFR BLD AUTO: 6 % (ref 0–6)
ERYTHROCYTE [DISTWIDTH] IN BLOOD BY AUTOMATED COUNT: 13 % (ref 11.6–15.1)
GFR SERPL CREATININE-BSD FRML MDRD: 88 ML/MIN/1.73SQ M
GLUCOSE P FAST SERPL-MCNC: 94 MG/DL (ref 65–99)
HCT VFR BLD AUTO: 43.7 % (ref 34.8–46.1)
HGB BLD-MCNC: 14.6 G/DL (ref 11.5–15.4)
IMM GRANULOCYTES # BLD AUTO: 0.01 THOUSAND/UL (ref 0–0.2)
IMM GRANULOCYTES NFR BLD AUTO: 0 % (ref 0–2)
LYMPHOCYTES # BLD AUTO: 1.92 THOUSANDS/ÂΜL (ref 0.6–4.47)
LYMPHOCYTES NFR BLD AUTO: 40 % (ref 14–44)
MCH RBC QN AUTO: 29.6 PG (ref 26.8–34.3)
MCHC RBC AUTO-ENTMCNC: 33.4 G/DL (ref 31.4–37.4)
MCV RBC AUTO: 89 FL (ref 82–98)
MONOCYTES # BLD AUTO: 0.67 THOUSAND/ÂΜL (ref 0.17–1.22)
MONOCYTES NFR BLD AUTO: 14 % (ref 4–12)
NEUTROPHILS # BLD AUTO: 1.86 THOUSANDS/ÂΜL (ref 1.85–7.62)
NEUTS SEG NFR BLD AUTO: 39 % (ref 43–75)
NRBC BLD AUTO-RTO: 0 /100 WBCS
PLATELET # BLD AUTO: 316 THOUSANDS/UL (ref 149–390)
PMV BLD AUTO: 9.6 FL (ref 8.9–12.7)
POTASSIUM SERPL-SCNC: 3.6 MMOL/L (ref 3.5–5.3)
PROT SERPL-MCNC: 7.5 G/DL (ref 6.4–8.4)
RBC # BLD AUTO: 4.94 MILLION/UL (ref 3.81–5.12)
SODIUM SERPL-SCNC: 139 MMOL/L (ref 135–147)
WBC # BLD AUTO: 4.81 THOUSAND/UL (ref 4.31–10.16)

## 2023-02-26 DIAGNOSIS — R89.9 ABNORMAL LABORATORY TEST: Primary | ICD-10-CM

## 2023-02-27 ENCOUNTER — TELEPHONE (OUTPATIENT)
Dept: OTHER | Facility: OTHER | Age: 53
End: 2023-02-27
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
## 2023-02-27 NOTE — TELEPHONE ENCOUNTER
Patient called stating for the past few days she has been having a pain in her shoulder that shoots up to her neck and it will stay feeling like that all day  She states the one day it was on her right side and now today it is her left  She was wondering if this is would be gastro related at all? Please call patient back today to discuss

## 2023-03-01 ENCOUNTER — APPOINTMENT (OUTPATIENT)
Dept: LAB | Facility: CLINIC | Age: 53
End: 2023-03-01

## 2023-03-01 DIAGNOSIS — R89.9 ABNORMAL LABORATORY TEST: ICD-10-CM

## 2023-03-01 PROCEDURE — 84166 PROTEIN E-PHORESIS/URINE/CSF: CPT | Performed by: INTERNAL MEDICINE

## 2023-03-02 ENCOUNTER — TELEPHONE (OUTPATIENT)
Dept: INTERNAL MEDICINE CLINIC | Facility: CLINIC | Age: 53
End: 2023-03-02

## 2023-03-02 DIAGNOSIS — R89.9 ABNORMAL LABORATORY TEST: Primary | ICD-10-CM

## 2023-03-02 LAB
ALBUMIN SERPL ELPH-MCNC: 4.65 G/DL (ref 3.5–5)
ALBUMIN SERPL ELPH-MCNC: 65.5 % (ref 52–65)
ALBUMIN UR ELPH-MCNC: 100 %
ALPHA1 GLOB MFR UR ELPH: 0 %
ALPHA1 GLOB SERPL ELPH-MCNC: 0.28 G/DL (ref 0.1–0.4)
ALPHA1 GLOB SERPL ELPH-MCNC: 4 % (ref 2.5–5)
ALPHA2 GLOB MFR UR ELPH: 0 %
ALPHA2 GLOB SERPL ELPH-MCNC: 0.7 G/DL (ref 0.4–1.2)
ALPHA2 GLOB SERPL ELPH-MCNC: 9.9 % (ref 7–13)
B-GLOBULIN MFR UR ELPH: 0 %
BETA GLOB ABNORMAL SERPL ELPH-MCNC: 0.4 G/DL (ref 0.4–0.8)
BETA1 GLOB SERPL ELPH-MCNC: 5.6 % (ref 5–13)
BETA2 GLOB SERPL ELPH-MCNC: 4.3 % (ref 2–8)
BETA2+GAMMA GLOB SERPL ELPH-MCNC: 0.31 G/DL (ref 0.2–0.5)
GAMMA GLOB ABNORMAL SERPL ELPH-MCNC: 0.76 G/DL (ref 0.5–1.6)
GAMMA GLOB MFR UR ELPH: 0 %
GAMMA GLOB SERPL ELPH-MCNC: 10.7 % (ref 12–22)
IGG/ALB SER: 1.9 {RATIO} (ref 1.1–1.8)
PROT PATTERN SERPL ELPH-IMP: ABNORMAL
PROT PATTERN UR ELPH-IMP: NORMAL
PROT SERPL-MCNC: 7.1 G/DL (ref 6.4–8.2)
PROT UR-MCNC: 8 MG/DL

## 2023-03-03 ENCOUNTER — TELEPHONE (OUTPATIENT)
Dept: HEMATOLOGY ONCOLOGY | Facility: CLINIC | Age: 53
End: 2023-03-03

## 2023-03-03 NOTE — TELEPHONE ENCOUNTER
Made attempt to schedule a follow up appointment , patient is a current patient of Dr Leydi Wilcox with a new referral in chart  There was no answer and a voicemail was left with the hopeline number to call to schedule an appointment

## 2023-03-03 NOTE — TELEPHONE ENCOUNTER
Patient called to schedule a follow up appointment with Dr Ema Flowers  During the scheduling process, patient explained that she needed to go and would call back at a later time to schedule the visit

## 2023-03-03 NOTE — TELEPHONE ENCOUNTER
Scheduling Appointment SEND TO POOLS    Person calling in Patient     If other than patient calling, are they listed on the communication consent form? N/A   Doctor Dr Susy Robles   Location [address]   Appointment date and time 3/23/23 3:20PM   Reason for scheduling appointment Follow Up   Patient verbalized understanding?   Yes   No show policy reviewed with patient Yes

## 2023-03-06 ENCOUNTER — CONSULT (OUTPATIENT)
Dept: CARDIOLOGY CLINIC | Facility: CLINIC | Age: 53
End: 2023-03-06

## 2023-03-06 VITALS
SYSTOLIC BLOOD PRESSURE: 140 MMHG | DIASTOLIC BLOOD PRESSURE: 80 MMHG | WEIGHT: 110.5 LBS | BODY MASS INDEX: 20.21 KG/M2 | HEART RATE: 63 BPM

## 2023-03-06 DIAGNOSIS — E78.49 OTHER HYPERLIPIDEMIA: ICD-10-CM

## 2023-03-06 DIAGNOSIS — Z13.6 SCREENING FOR CARDIOVASCULAR CONDITION: Primary | ICD-10-CM

## 2023-03-06 DIAGNOSIS — R07.89 OTHER CHEST PAIN: ICD-10-CM

## 2023-03-06 NOTE — PROGRESS NOTES
Cardiology Consultation     Yarelis Neely  [id number]  1970  HEART [facility] VASCULAR [facility] [address] [id number]    1  Screening for cardiovascular condition  Ambulatory Referral to Cardiology    POCT ECG    Stress test only, exercise    Echo complete w/ contrast if indicated      2  Other hyperlipidemia        3  Other chest pain  Stress test only, exercise    Echo complete w/ contrast if indicated        Chief Complaint   Patient presents with   • New Patient Visit     Establishing w/ cardi and ED f/u; patient states that seldomly she'll have slight chest discomfort   States that she is having chest discomfort when laying on her left side- after a decent amount of time she will develop SOB      HPI: Patient is here for cardiac evaluation of chest discomfort on the left side, usually when she is lying on that side, sometimes associated with shortness of breath  Only lasts for about a minute or so  She does have some diaphoresis, usually at night  She does have some lightheadedness, but different from chest discomfort  She is a  and she does note that she has LE edema that lasts longer more recently, and wears compression stockings  She has gained about 3lbs recently  No reported palpitations, syncope, orthopnea, PND  Patient remains active without any increased fatigue out of the ordinary        Patient Active Problem List   Diagnosis   • Hypothyroidism   • Migraine headache   • Partial symptomatic epilepsy with complex partial seizures, not intractable, without status epilepticus ([address] )   • New onset headache   • Cervicalgia   • Nephrolithiasis   • Chronic left lumbar radiculopathy   • Seizures (HCC)   • Left flank pain   • Fatigue   • Screening for cardiovascular condition   • Other hyperlipidemia   • Vitamin D deficiency   • Frequent urination   • Hepatitis C virus infection without hepatic coma   • Annual physical exam   • Muscle cramps   • Screening for HIV (human immunodeficiency virus)   • Snoring   • Metatarsalgia of left foot   • Epigastric pain   • High risk social situation   • Dysuria   • Wrist sprain, right, subsequent encounter   • Easy bruising   • Venous insufficiency   • Xiphoidalgia   • Bronchitis   • Early satiety   • Dyspepsia   • Nausea   • Vomiting and diarrhea   • Situational anxiety   • Forgetfulness   • History of seizure   • Dysphagia   • Diarrhea   • Wellness examination   • Nausea and vomiting   • Obstructive sleep apnea   • Suspected sleep apnea   • Needle exposure, initial encounter   • Anxiety   • Gastroparesis   • Other chest pain     Past Medical History:   Diagnosis Date   • Arthritis    • Asthma    • Disease of thyroid gland    • Gastroparesis    • Hypokalemia 09/09/2022   • Hypoparathyroidism ([facility] )    • Nephrolithiasis 07/22/2019   • Osteoporosis    • Partial symptompatic epilepsy    • Seizure disorder ([facility] )      Social History     Socioeconomic History   • Marital status: Single     Spouse name: Not on file   • Number of children: Not on file   • Years of education: Not on file   • Highest education level: Not on file   Occupational History   • Not on file   Tobacco Use   • Smoking status: Never   • Smokeless tobacco: Never   Vaping Use   • Vaping Use: Never used   Substance and Sexual Activity   • Alcohol use: Not Currently   • Drug use: Not Currently     Comment: social   • Sexual activity: Not Currently   Other Topics Concern   • Not on file   Social History Narrative    ** Merged History Encounter **          Social Determinants of Health     Financial Resource Strain: Not on file   Food Insecurity: Not on file   Transportation Needs: Not on file   Physical Activity: Not on file   Stress: Not on file   Social Connections: Not on file   Intimate Partner Violence: Not on file   Housing Stability: Not on file      Family History   Problem Relation Age of Onset • Alcohol abuse Mother    • RUSSELL disease Mother    • Boles's esophagus Mother    • Migraines Father    • Prostate cancer Father    • Cancer Father    • Alcohol abuse Maternal Uncle    • Seizures Paternal Aunt    • Lung cancer Paternal Aunt    • Prostate cancer Paternal Uncle    • Colon cancer Paternal Uncle    GM: with angioplasty - her brother also with heart issues  Mother: HTN with meds, HLD    Past Surgical History:   Procedure Laterality Date   • APPENDECTOMY     • COLONOSCOPY     • EGD     • FRACTURE SURGERY Right     Knee   • HYSTERECTOMY     • OTHER SURGICAL HISTORY      left foot, rt knee, tonselectomy   • SINUS SURGERY     • TONSILLECTOMY         Current Outpatient Medications:   •  Ascorbic Acid (Vitamin C) 500 MG CAPS, Take 1 each by mouth daily, Disp: , Rfl:   •  cetirizine (ZyrTEC) 10 MG chewable tablet, Chew 10 mg daily, Disp: , Rfl:   •  ECHINACEA PO, Take by mouth daily, Disp: , Rfl:   •  ELDERBERRY PO, Take 1 tablet by mouth daily as needed Daily every morning, Disp: , Rfl:   •  famotidine (PEPCID) 40 MG tablet, Take 0 5 tablets (20 mg total) by mouth 2 (two) times a day, Disp: 60 tablet, Rfl: 6  •  fluticasone (FLONASE) 50 mcg/act nasal spray, 1 spray into each nostril daily, Disp: 16 g, Rfl: 3  •  ondansetron (ZOFRAN) 4 mg tablet, Take 1 tablet (4 mg total) by mouth every 8 (eight) hours as needed for nausea or vomiting, Disp: 20 tablet, Rfl: 0  •  ondansetron (ZOFRAN) 4 mg tablet, Take 1 tablet (4 mg total) by mouth every 8 (eight) hours as needed for nausea or vomiting, Disp: 60 tablet, Rfl: 3  •  Synthroid 75 MCG tablet, Take 75 mcg by mouth daily, Disp: , Rfl:   •  topiramate (TOPAMAX) 200 MG tablet, Take 1 tablet by mouth twice per day , Disp: 180 tablet, Rfl: 3  •  Vitamin D, Cholecalciferol, 25 MCG (1000 UT) CAPS, Take 1 tablet by mouth in the morning, Disp: , Rfl:   •  Zinc 50 MG CAPS, Take 1 capsule by mouth daily, Disp: , Rfl:   •  levalbuterol (Xopenex HFA) 45 mcg/act inhaler, Inhale 1-2 puffs every 4 (four) hours as needed for wheezing (Patient not taking: Reported on 8/12/2022), Disp: 15 g, Rfl: 0  •  promethazine (PHENERGAN) 12 5 mg suppository, Insert 1 suppository (12 5 mg total) into the rectum every 6 (six) hours as needed for nausea or vomiting (Patient not taking: Reported on 3/6/2023), Disp: 12 each, Rfl: 0  Allergies   Allergen Reactions   • Morphine Other (See Comments)   • Codeine Hives   • Morphine And Related    • Codeine Itching   • Sulfa Antibiotics Rash     Vitals:    03/06/23 0851   BP: 140/80   BP Location: Left arm   Patient Position: Sitting   Cuff Size: Standard   Pulse: 63   Weight: 50 1 kg (110 lb 8 oz)       Labs:  Appointment on 03/01/2023   Component Date Value   • A/G Ratio 03/01/2023 1 90 (H)    • Albumin Electrophoresis 03/01/2023 65 5 (H)    • Albumin CONC 03/01/2023 4 65    • Alpha 1 03/01/2023 4 0    • ALPHA 1 CONC 03/01/2023 0 28    • Alpha 2 03/01/2023 9 9    • ALPHA 2 CONC 03/01/2023 0 70    • Beta-1 03/01/2023 5 6    • BETA 1 CONC 03/01/2023 0 40    • Beta-2 03/01/2023 4 3    • BETA 2 CONC 03/01/2023 0 31    • Gamma Globulin 03/01/2023 10 7 (L)    • GAMMA CONC 03/01/2023 0 76    • Total Protein 03/01/2023 7 1    • SPEP Interpretation 03/01/2023 See Comment    Orders Only on 02/26/2023   Component Date Value   • Urine Protein 03/01/2023 8 0    • Albumin ELP, Urine 03/01/2023 100 0    • Alpha 1, Urine 03/01/2023 0 0    • Alpha 2, Urine 03/01/2023 0 0    • Beta, Urine 03/01/2023 0 0    • Gamma Globulin, Urine 03/01/2023 0 0    • UPEP Interp 03/01/2023 See Comment    Lab on 02/24/2023   Component Date Value   • Sodium 02/24/2023 139    • Potassium 02/24/2023 3 6    • Chloride 02/24/2023 110 (H)    • CO2 02/24/2023 26    • ANION GAP 02/24/2023 3 (L)    • BUN 02/24/2023 11    • Creatinine 02/24/2023 0 78    • Glucose, Fasting 02/24/2023 94    • Calcium 02/24/2023 9 7    • AST 02/24/2023 14    • ALT 02/24/2023 15    • Alkaline Phosphatase 02/24/2023 76    • Total Protein 02/24/2023 7 5    • Albumin 02/24/2023 4 0    • Total Bilirubin 02/24/2023 0 75    • eGFR 02/24/2023 88    • WBC 02/24/2023 4 81    • RBC 02/24/2023 4 94    • Hemoglobin 02/24/2023 14 6    • Hematocrit 02/24/2023 43 7    • MCV 02/24/2023 89    • MCH 02/24/2023 29 6    • MCHC 02/24/2023 33 4    • RDW 02/24/2023 13 0    • MPV 02/24/2023 9 6    • Platelets 06/37/0636 316    • nRBC 02/24/2023 0    • Neutrophils Relative 02/24/2023 39 (L)    • Immat GRANS % 02/24/2023 0    • Lymphocytes Relative 02/24/2023 40    • Monocytes Relative 02/24/2023 14 (H)    • Eosinophils Relative 02/24/2023 6    • Basophils Relative 02/24/2023 1    • Neutrophils Absolute 02/24/2023 1 86    • Immature Grans Absolute 02/24/2023 0 01    • Lymphocytes Absolute 02/24/2023 1 92    • Monocytes Absolute 02/24/2023 0 67    • Eosinophils Absolute 02/24/2023 0 31    • Basophils Absolute 02/24/2023 0 04    Admission on 01/20/2023, Discharged on 01/21/2023   Component Date Value   • WBC 01/20/2023 11 86 (H)    • RBC 01/20/2023 5 03    • Hemoglobin 01/20/2023 14 9    • Hematocrit 01/20/2023 43 9    • MCV 01/20/2023 87    • MCH 01/20/2023 29 6    • MCHC 01/20/2023 33 9    • RDW 01/20/2023 12 4    • MPV 01/20/2023 9 7    • Platelets 90/84/6588 276    • nRBC 01/20/2023 0    • Neutrophils Relative 01/20/2023 83 (H)    • Immat GRANS % 01/20/2023 0    • Lymphocytes Relative 01/20/2023 5 (L)    • Monocytes Relative 01/20/2023 10    • Eosinophils Relative 01/20/2023 1    • Basophils Relative 01/20/2023 1    • Neutrophils Absolute 01/20/2023 9 82 (H)    • Immature Grans Absolute 01/20/2023 0 05    • Lymphocytes Absolute 01/20/2023 0 64    • Monocytes Absolute 01/20/2023 1 16    • Eosinophils Absolute 01/20/2023 0 13    • Basophils Absolute 01/20/2023 0 06    • Sodium 01/20/2023 139    • Potassium 01/20/2023 3 4 (L)    • Chloride 01/20/2023 107    • CO2 01/20/2023 23    • ANION GAP 01/20/2023 9    • BUN 01/20/2023 13    • Creatinine 01/20/2023 0 72 • Glucose 01/20/2023 108    • Calcium 01/20/2023 8 9    • AST 01/20/2023 10    • ALT 01/20/2023 15    • Alkaline Phosphatase 01/20/2023 74    • Total Protein 01/20/2023 7 9    • Albumin 01/20/2023 4 2    • Total Bilirubin 01/20/2023 0 57    • eGFR 01/20/2023 97    • Lipase 01/20/2023 138    • Magnesium 01/20/2023 2 0    Appointment on 01/06/2023   Component Date Value   • TSH 3RD GENERATON 01/06/2023 0 052 (L)    • Free T4 01/06/2023 1 17    Appointment on 12/28/2022   Component Date Value   • HCV PCR Quantitative 12/28/2022 HCV Not Detected    • Test Information 12/28/2022 Comment    • Hep B C IgM 12/28/2022 Non-reactive    Appointment on 12/06/2022   Component Date Value   • Hepatitis B Surface Ag 12/20/2022 Non-reactive    • Hep A IgM 12/20/2022 Non-reactive    • Hepatitis C Ab 12/20/2022 High Reactive (A)    • Hep B C IgM 12/20/2022 Non-reactive    • RPR 12/06/2022 Non-Reactive    • HIV-1 RNA by PCR, Qn 12/06/2022 <20    • HIV-1 RNA Viral Load Log 12/06/2022 COMMENT    • HIV-1/HIV-2 Ab 12/06/2022 Non-Reactive    • HIV-1 p24 Antigen 01/06/2023 Non-Reactive    • HIV-1 Antibody 01/06/2023 Non-Reactive    • HIV-2 Antibody 01/06/2023 Non-Reactive    • HIV Ag-Ab 5th Gen 01/06/2023 [facility] Outpatient Visit on 11/02/2022   Component Date Value   • Case Report 11/02/2022                      Value:Surgical Pathology Report                         Case: [id number]                                   Authorizing Provider:  Joseph Parnell MD            Collected:           11/02/2022 0920              Ordering Location:     [facility]        Received:            11/02/2022 [place] [address]                                                    Pathologist:           Jose D Slade MD                                                    Specimens:   A) - Duodenum                                                                                       B) - Stomach, gastritis                                                                             C) - Esophagus, distal esophagus                                                                    D) - Esophagus, midl esophagus                                                            • Final Diagnosis 11/02/2022                      Value: This result contains rich text formatting which cannot be displayed here  • Additional Information 11/02/2022                      Value: This result contains rich text formatting which cannot be displayed here  • Gross Description 11/02/2022                      Value: This result contains rich text formatting which cannot be displayed here     • Clinical Information 11/02/2022                      Value:R/o celiac   Orders Only on 10/05/2022   Component Date Value   • Y939-SrQ Egg White 10/05/2022 <0 10    • Egg White IgE RAST 10/05/2022 0    • W681-RwU Peanut 10/05/2022 <0 10    • Class 10/05/2022 0    • Wheat IgE 10/05/2022 <0 10    • Class 10/05/2022 0    • X998-EhS [facility] 10/05/2022 <0 10    • Class 10/05/2022 0    • Codfish IgE 10/05/2022 <0 10    • Class 10/05/2022 0    • Cow's Milk (F2) IgE 10/05/2022 <0 10    • Class 10/05/2022 0    • R496-KkL Soybean 10/05/2022 <0 10    • Class 10/05/2022 0    • Shrimp IgE 10/05/2022 <0 10    • Class 10/05/2022 0    • SCALLOP IGE 10/05/2022 <0 10    • Class 10/05/2022 0    • N993-YrL Sesame Seed 10/05/2022 <0 10    • Class 10/05/2022 0    • O510-PlN Hazelnut (Filbe* 10/05/2022 <0 10    • Class 10/05/2022 0    • M681-YhM Cashew Nut 10/05/2022 <0 10    • Class 10/05/2022 0    • B368-TxR Ruby 10/05/2022 <0 10    • Class 10/05/2022 0    • [place] 10/05/2022 <0 10    • Class 10/05/2022 0    • Tuna 10/05/2022 <0 10    • Class 10/05/2022 0    • MUSHROOM (F212) IGE   10/05/2022 <0 10    • CLASS 10/05/2022 0    • INTERPRETATION 10/05/2022       Lab Results   Component Value Date    TRIG 58 04/18/2022    HDL 72 04/18/2022     Imaging: Holter monitor    Result Date: 3/3/2023  Narrative: INDICATIONS: Palpitations DESCRIPTION OF FINDINGS: The patient was monitored for a total of 48 hours  The patient was predominantly in sinus throughout the study  The average heart rate was 62 beats per minute  The heart rate ranged from a low of 40 beats per minute to a maximum of 129 beats per minute  Ventricular ectopic activity consisted of 273 beats (0 2% of total beats), of which 270 were single PVCs  There was no sustained or nonsustained ventricular tachycardia  Supraventricular ectopic activity consisted of 195 beats (0 1% of total beats)  There was one short atrial run  There was no supraventricular tachycardia identified  There was no evidence of atrial fibrillation or atrial flutter  There were no significant pauses  There was no evidence of advanced degree heart block  NO patient diary was provided  Impression: Predominantly sinus, with an average heart rate of 62 beats per minute Rare premature atrial contractions with one atrial run  Rare premature ventricular contractions No significant pauses or advanced degree heart block Bunny Sandhoff, MD Dwithiya K Renee Altes, MD, [facility]       Review of Systems:  Review of Systems   Constitutional: Negative for activity change, appetite change, chills, diaphoresis, fatigue and unexpected weight change  HENT: Negative for hearing loss, nosebleeds and sore throat  Eyes: Negative for photophobia and visual disturbance  Respiratory: Negative for cough, chest tightness, shortness of breath and wheezing  Cardiovascular: Positive for chest pain  Negative for palpitations and leg swelling  Gastrointestinal: Negative for abdominal pain, diarrhea, nausea and vomiting  Endocrine: Negative for polyuria  Genitourinary: Negative for dysuria, frequency and hematuria  Musculoskeletal: Negative for arthralgias, back pain, gait problem and neck pain  Skin: Negative for pallor and rash     Neurological: Negative for dizziness, syncope and headaches  Hematological: Does not bruise/bleed easily  Psychiatric/Behavioral: Negative for behavioral problems and confusion  Physical Exam:  Physical Exam  Vitals reviewed  Constitutional:       Appearance: She is well-developed  She is not diaphoretic  HENT:      Head: Normocephalic and atraumatic  Nose: Nose normal    Eyes:      General: No scleral icterus  Pupils: Pupils are equal, round, and reactive to light  Neck:      Vascular: No JVD  Cardiovascular:      Rate and Rhythm: Normal rate and regular rhythm  Heart sounds: Normal heart sounds  No murmur heard  No friction rub  No gallop  Pulmonary:      Effort: Pulmonary effort is normal  No respiratory distress  Breath sounds: Normal breath sounds  No wheezing or rales  Abdominal:      General: Bowel sounds are normal  There is no distension  Palpations: Abdomen is soft  Tenderness: There is no abdominal tenderness  Musculoskeletal:         General: No deformity  Normal range of motion  Cervical back: Normal range of motion and neck supple  Skin:     General: Skin is warm and dry  Findings: No rash  Neurological:      Mental Status: She is alert and oriented to person, place, and time  Cranial Nerves: No cranial nerve deficit  Psychiatric:         Behavior: Behavior normal        Blood pressure 140/80, pulse 63, weight 50 1 kg (110 lb 8 oz)  EKG:  Normal sinus rhythm  Possible anterior infarct, age undetermine  Abnormal ECG    Discussion/Summary:  Chest Pain: unclear etiology  Has risk factors of HLD  Will screen for cardiac conditions with a stress test to rule out ischemic etiology  Will also check an echocardiogram to rule out structural heart disease  She had a Holter monitor to rule out arrhythmic cause, which revealed no significant arrhythmias  HLD: not currently on medical therapy   in April 2022 with HDL of 72

## 2023-03-08 ENCOUNTER — OFFICE VISIT (OUTPATIENT)
Dept: GASTROENTEROLOGY | Facility: AMBULARY SURGERY CENTER | Age: 53
End: 2023-03-08

## 2023-03-08 VITALS
OXYGEN SATURATION: 98 % | WEIGHT: 109.8 LBS | BODY MASS INDEX: 20.2 KG/M2 | DIASTOLIC BLOOD PRESSURE: 86 MMHG | SYSTOLIC BLOOD PRESSURE: 142 MMHG | HEIGHT: 62 IN | HEART RATE: 63 BPM

## 2023-03-08 DIAGNOSIS — R19.7 VOMITING AND DIARRHEA: ICD-10-CM

## 2023-03-08 DIAGNOSIS — R11.10 VOMITING AND DIARRHEA: ICD-10-CM

## 2023-03-08 DIAGNOSIS — R13.19 ESOPHAGEAL DYSPHAGIA: ICD-10-CM

## 2023-03-08 DIAGNOSIS — R11.0 NAUSEA: ICD-10-CM

## 2023-03-08 DIAGNOSIS — R10.13 EPIGASTRIC PAIN: ICD-10-CM

## 2023-03-08 DIAGNOSIS — K31.84 GASTROPARESIS: Primary | ICD-10-CM

## 2023-03-08 RX ORDER — ONDANSETRON 4 MG/1
4 TABLET, FILM COATED ORAL EVERY 8 HOURS PRN
Qty: 30 TABLET | Refills: 3 | Status: SHIPPED | OUTPATIENT
Start: 2023-03-08

## 2023-03-08 RX ORDER — FAMOTIDINE 40 MG/1
20 TABLET, FILM COATED ORAL 2 TIMES DAILY
Qty: 60 TABLET | Refills: 6 | Status: SHIPPED | OUTPATIENT
Start: 2023-03-08

## 2023-03-08 NOTE — LETTER
March 8, 2023     Adri Rome  [address] [facility] [address]    Patient: Julius Sicard   YOB: 1970   Date of Visit: 3/8/2023       Dear Dr Hien Alexis: Thank you for referring Frank Chu to me for evaluation  Below are my notes for this consultation  If you have questions, please do not hesitate to call me  I look forward to following your patient along with you  Sincerely,        Mu Otto MD        CC: No Recipients  Mu Otto MD  3/8/2023  9:51 AM  Sign when Signing Visit  [facility]  Julius Sicard 48 y o  female MRN: [id number]            Assessment & Plan:  Pleasant 51-year-old female, she works as a , initially presented with early satiety, nausea, vomiting, irregular stools, diagnosed with gastroparesis  Also has some dysphagia  1   Gastroparesis: Currently well controlled, patient was not prescribed Reglan due to a seizure history   -Continue Zofran as needed  -Continue famotidine  -Patient has been able to modify her symptoms and have improvement with dietary modification, small frequent meals, low residue diet, protein shakes  -I advised her to gradually increase her fiber content eat with oatmeal or whole-wheat breads  -Recommended daily fiber supplement  -Follow-up in 6 months time  -At this time no indication for EGD with Botox or domperidone, hopeful that her gastroparesis will improve    2  Irregular stools: Most likely secondary to change in fiber content in her diet  -Recommended taking daily fiber supplement such as Benefiber    3  Dysphagia: Unclear etiology, upper endoscopy was unremarkable  -We will check a barium esophagram, may have some underlying dysmotility  -She has not had any food impaction and is able to tolerate her feeds as long as she chews carefully  -We can consider esophageal manometry      Jami Tinajero was seen today for follow-up      Diagnoses and all orders for this visit:    Gastroparesis    Vomiting and diarrhea  -     ondansetron (ZOFRAN) 4 mg tablet; Take 1 tablet (4 mg total) by mouth every 8 (eight) hours as needed for nausea or vomiting    Epigastric pain  -     famotidine (PEPCID) 40 MG tablet; Take 0 5 tablets (20 mg total) by mouth 2 (two) times a day    Nausea    Esophageal dysphagia  -     FL barium swallow; Future            _____________________________________________________________        CC: Follow-up    HPI:  Ashley Diamond is a 48 y  o female who is here for follow-up  Pleasant 53-year-old female, here for follow-up, was recently diagnosed with idiopathic gastroparesis, severely delayed gastric emptying most likely precipitated by COVID infection  Patient reports that she been able to do somewhat better, she gained about 2 or 3 pound since her last visit  She is doing small frequent meals, low residue diet and she is tolerating this better and she has been able to adjust to her work schedule  Modifying her diet  Drinking protein shakes regularly  Avoiding red meats and high-fat foods but occasionally eating steak once in a while  Her bowel function has changed, she is having a 2 bowel moods per week, previously had regular bowel movements  Still having occasional loose stools, soft greasy unformed stools when she eats meats  Otherwise denies any melena or rectal bleeding  No abdominal pain  Mild nausea, uses Zofran only intermittently  Taking famotidine daily with improvement in reflux  Still has occasional dysphagia to some certain foods  She notes that she has to chew for a long time and carefully swallow  She does overall feel better  She complains of some intermittent chest pain, is being worked up by cardiology  Has some cervical neck pain  Thoracic spine pain  ROS:  The remainder of the ROS was negative except for the pertinent positives mentioned in HPI        Allergies: Morphine, Codeine, Morphine and related, Codeine, and Sulfa antibiotics    Medications:   Current Outpatient Medications:   •  Ascorbic Acid (Vitamin C) 500 MG CAPS, Take 1 each by mouth daily, Disp: , Rfl:   •  cetirizine (ZyrTEC) 10 MG chewable tablet, Chew 10 mg daily, Disp: , Rfl:   •  ECHINACEA PO, Take by mouth daily, Disp: , Rfl:   •  ELDERBERRY PO, Take 1 tablet by mouth daily as needed Daily every morning, Disp: , Rfl:   •  famotidine (PEPCID) 40 MG tablet, Take 0 5 tablets (20 mg total) by mouth 2 (two) times a day, Disp: 60 tablet, Rfl: 6  •  fluticasone (FLONASE) 50 mcg/act nasal spray, 1 spray into each nostril daily, Disp: 16 g, Rfl: 3  •  levalbuterol (Xopenex HFA) 45 mcg/act inhaler, Inhale 1-2 puffs every 4 (four) hours as needed for wheezing, Disp: 15 g, Rfl: 0  •  ondansetron (ZOFRAN) 4 mg tablet, Take 1 tablet (4 mg total) by mouth every 8 (eight) hours as needed for nausea or vomiting, Disp: 60 tablet, Rfl: 3  •  ondansetron (ZOFRAN) 4 mg tablet, Take 1 tablet (4 mg total) by mouth every 8 (eight) hours as needed for nausea or vomiting, Disp: 30 tablet, Rfl: 3  •  promethazine (PHENERGAN) 12 5 mg suppository, Insert 1 suppository (12 5 mg total) into the rectum every 6 (six) hours as needed for nausea or vomiting, Disp: 12 each, Rfl: 0  •  Synthroid 75 MCG tablet, Take 75 mcg by mouth daily, Disp: , Rfl:   •  topiramate (TOPAMAX) 200 MG tablet, Take 1 tablet by mouth twice per day , Disp: 180 tablet, Rfl: 3  •  Vitamin D, Cholecalciferol, 25 MCG (1000 UT) CAPS, Take 1 tablet by mouth in the morning, Disp: , Rfl:   •  Zinc 50 MG CAPS, Take 1 capsule by mouth daily, Disp: , Rfl:     Past Medical History:   Diagnosis Date   • Arthritis    • Asthma    • Disease of thyroid gland    • Gastroparesis    • Hypokalemia 09/09/2022   • Hypoparathyroidism ([facility] [address] )    • Nephrolithiasis 07/22/2019   • Osteoporosis    • Partial symptompatic epilepsy    • Seizure disorder [facility])        Past Surgical History:   Procedure Laterality Date   • APPENDECTOMY     • COLONOSCOPY     • EGD     • FRACTURE SURGERY Right     Knee   • HYSTERECTOMY     • OTHER SURGICAL HISTORY      left foot, rt knee, tonselectomy   • SINUS SURGERY     • TONSILLECTOMY         Family History   Problem Relation Age of Onset   • Alcohol abuse Mother    • RUSSELL disease Mother    • Boles's esophagus Mother    • Migraines Father    • Prostate cancer Father    • Cancer Father    • Alcohol abuse Maternal Uncle    • Seizures Paternal Aunt    • Lung cancer Paternal Aunt    • Prostate cancer Paternal Uncle    • Colon cancer Paternal Uncle         reports that she has never smoked  She has never used smokeless tobacco  She reports that she does not currently use alcohol  She reports that she does not currently use drugs        Physical Exam:    /86 (BP Location: Right arm, Patient Position: Sitting, Cuff Size: Standard)   Pulse 63   Ht 5' 2" (1 575 m)   Wt 49 8 kg (109 lb 12 8 oz)   SpO2 98%   BMI 20 08 kg/m²     Gen: wn/wd, NAD, thin but healthy-appearing female  HEENT: anicteric, MMM, no cervical LAD  CVS: RRR, no m/r/g  CHEST: CTA b/l  ABD: +BS, soft, NT,ND, no hepatosplenomegaly  EXT: no c/c/e  NEURO: aaox3  SKIN: NO rashes

## 2023-03-08 NOTE — PROGRESS NOTES
[facility]  Sandra Plunkett 48 y o  female MRN: [id number]            Assessment & Plan:  Pleasant 53-year-old female, she works as a , initially presented with early satiety, nausea, vomiting, irregular stools, diagnosed with gastroparesis  Also has some dysphagia  1   Gastroparesis: Currently well controlled, patient was not prescribed Reglan due to a seizure history   -Continue Zofran as needed  -Continue famotidine  -Patient has been able to modify her symptoms and have improvement with dietary modification, small frequent meals, low residue diet, protein shakes  -I advised her to gradually increase her fiber content eat with oatmeal or whole-wheat breads  -Recommended daily fiber supplement  -Follow-up in 6 months time  -At this time no indication for EGD with Botox or domperidone, hopeful that her gastroparesis will improve    2  Irregular stools: Most likely secondary to change in fiber content in her diet  -Recommended taking daily fiber supplement such as Benefiber    3  Dysphagia: Unclear etiology, upper endoscopy was unremarkable  -We will check a barium esophagram, may have some underlying dysmotility  -She has not had any food impaction and is able to tolerate her feeds as long as she chews carefully  -We can consider esophageal manometry      Mayo Zuluaga was seen today for follow-up  Diagnoses and all orders for this visit:    Gastroparesis    Vomiting and diarrhea  -     ondansetron (ZOFRAN) 4 mg tablet; Take 1 tablet (4 mg total) by mouth every 8 (eight) hours as needed for nausea or vomiting    Epigastric pain  -     famotidine (PEPCID) 40 MG tablet; Take 0 5 tablets (20 mg total) by mouth 2 (two) times a day    Nausea    Esophageal dysphagia  -     FL barium swallow; Future            _____________________________________________________________        CC: Follow-up    HPI:  Sandra Plunkett is a 48 y  o female who is here for follow-up    Pleasant 48year-old female, here for follow-up, was recently diagnosed with idiopathic gastroparesis, severely delayed gastric emptying most likely precipitated by COVID infection  Patient reports that she been able to do somewhat better, she gained about 2 or 3 pound since her last visit  She is doing small frequent meals, low residue diet and she is tolerating this better and she has been able to adjust to her work schedule  Modifying her diet  Drinking protein shakes regularly  Avoiding red meats and high-fat foods but occasionally eating steak once in a while  Her bowel function has changed, she is having a 2 bowel moods per week, previously had regular bowel movements  Still having occasional loose stools, soft greasy unformed stools when she eats meats  Otherwise denies any melena or rectal bleeding  No abdominal pain  Mild nausea, uses Zofran only intermittently  Taking famotidine daily with improvement in reflux  Still has occasional dysphagia to some certain foods  She notes that she has to chew for a long time and carefully swallow  She does overall feel better  She complains of some intermittent chest pain, is being worked up by cardiology  Has some cervical neck pain  Thoracic spine pain  ROS:  The remainder of the ROS was negative except for the pertinent positives mentioned in HPI        Allergies: Morphine, Codeine, Morphine and related, Codeine, and Sulfa antibiotics    Medications:   Current Outpatient Medications:   •  Ascorbic Acid (Vitamin C) 500 MG CAPS, Take 1 each by mouth daily, Disp: , Rfl:   •  cetirizine (ZyrTEC) 10 MG chewable tablet, Chew 10 mg daily, Disp: , Rfl:   •  ECHINACEA PO, Take by mouth daily, Disp: , Rfl:   •  ELDERBERRY PO, Take 1 tablet by mouth daily as needed Daily every morning, Disp: , Rfl:   •  famotidine (PEPCID) 40 MG tablet, Take 0 5 tablets (20 mg total) by mouth 2 (two) times a day, Disp: 60 tablet, Rfl: 6  •  fluticasone (FLONASE) 50 mcg/act nasal spray, 1 spray into each nostril daily, Disp: 16 g, Rfl: 3  •  levalbuterol (Xopenex HFA) 45 mcg/act inhaler, Inhale 1-2 puffs every 4 (four) hours as needed for wheezing, Disp: 15 g, Rfl: 0  •  ondansetron (ZOFRAN) 4 mg tablet, Take 1 tablet (4 mg total) by mouth every 8 (eight) hours as needed for nausea or vomiting, Disp: 60 tablet, Rfl: 3  •  ondansetron (ZOFRAN) 4 mg tablet, Take 1 tablet (4 mg total) by mouth every 8 (eight) hours as needed for nausea or vomiting, Disp: 30 tablet, Rfl: 3  •  promethazine (PHENERGAN) 12 5 mg suppository, Insert 1 suppository (12 5 mg total) into the rectum every 6 (six) hours as needed for nausea or vomiting, Disp: 12 each, Rfl: 0  •  Synthroid 75 MCG tablet, Take 75 mcg by mouth daily, Disp: , Rfl:   •  topiramate (TOPAMAX) 200 MG tablet, Take 1 tablet by mouth twice per day , Disp: 180 tablet, Rfl: 3  •  Vitamin D, Cholecalciferol, 25 MCG (1000 UT) CAPS, Take 1 tablet by mouth in the morning, Disp: , Rfl:   •  Zinc 50 MG CAPS, Take 1 capsule by mouth daily, Disp: , Rfl:     Past Medical History:   Diagnosis Date   • Arthritis    • Asthma    • Disease of thyroid gland    • Gastroparesis    • Hypokalemia 09/09/2022   • Hypoparathyroidism ([facility] [address] )    • Nephrolithiasis 07/22/2019   • Osteoporosis    • Partial symptompatic epilepsy    • Seizure disorder [facility])        Past Surgical History:   Procedure Laterality Date   • APPENDECTOMY     • COLONOSCOPY     • EGD     • FRACTURE SURGERY Right     Knee   • HYSTERECTOMY     • OTHER SURGICAL HISTORY      left foot, rt knee, tonselectomy   • SINUS SURGERY     • TONSILLECTOMY         Family History   Problem Relation Age of Onset   • Alcohol abuse Mother    • RUSSELL disease Mother    • Boles's esophagus Mother    • Migraines Father    • Prostate cancer Father    • Cancer Father    • Alcohol abuse Maternal Uncle    • Seizures Paternal Aunt    • Lung cancer Paternal Aunt    • Prostate cancer Paternal Uncle    • Colon cancer Paternal Uncle         reports that she has never smoked  She has never used smokeless tobacco  She reports that she does not currently use alcohol  She reports that she does not currently use drugs        Physical Exam:    /86 (BP Location: Right arm, Patient Position: Sitting, Cuff Size: Standard)   Pulse 63   Ht 5' 2" (1 575 m)   Wt 49 8 kg (109 lb 12 8 oz)   SpO2 98%   BMI 20 08 kg/m²     Gen: wn/wd, NAD, thin but healthy-appearing female  HEENT: anicteric, MMM, no cervical LAD  CVS: RRR, no m/r/g  CHEST: CTA b/l  ABD: +BS, soft, NT,ND, no hepatosplenomegaly  EXT: no c/c/e  NEURO: aaox3  SKIN: NO rashes

## 2023-03-18 ENCOUNTER — APPOINTMENT (EMERGENCY)
Dept: RADIOLOGY | Facility: HOSPITAL | Age: 53
End: 2023-03-18

## 2023-03-18 ENCOUNTER — HOSPITAL ENCOUNTER (EMERGENCY)
Facility: HOSPITAL | Age: 53
Discharge: HOME | End: 2023-03-18
Attending: EMERGENCY MEDICINE

## 2023-03-18 VITALS
SYSTOLIC BLOOD PRESSURE: 175 MMHG | WEIGHT: 110 LBS | TEMPERATURE: 98.4 F | DIASTOLIC BLOOD PRESSURE: 81 MMHG | BODY MASS INDEX: 20.12 KG/M2 | RESPIRATION RATE: 18 BRPM | HEART RATE: 74 BPM | OXYGEN SATURATION: 97 %

## 2023-03-18 DIAGNOSIS — M54.6 THORACIC BACK PAIN: Primary | ICD-10-CM

## 2023-03-18 DIAGNOSIS — S29.019A STRAIN OF THORACIC SPINE, INITIAL ENCOUNTER: ICD-10-CM

## 2023-03-18 RX ORDER — METHOCARBAMOL 500 MG/1
500 TABLET, FILM COATED ORAL 2 TIMES DAILY
Qty: 20 TABLET | Refills: 0 | Status: SHIPPED | OUTPATIENT
Start: 2023-03-18

## 2023-03-18 RX ORDER — METHOCARBAMOL 500 MG/1
500 TABLET, FILM COATED ORAL 2 TIMES DAILY PRN
Qty: 10 TABLET | Refills: 0 | Status: CANCELLED | OUTPATIENT
Start: 2023-03-18

## 2023-03-18 RX ORDER — DIAZEPAM 2 MG/1
2 TABLET ORAL ONCE
Status: COMPLETED | OUTPATIENT
Start: 2023-03-18 | End: 2023-03-18

## 2023-03-18 RX ORDER — KETOROLAC TROMETHAMINE 30 MG/ML
15 INJECTION, SOLUTION INTRAMUSCULAR; INTRAVENOUS ONCE
Status: COMPLETED | OUTPATIENT
Start: 2023-03-18 | End: 2023-03-18

## 2023-03-18 RX ADMIN — KETOROLAC TROMETHAMINE 15 MG: 30 INJECTION, SOLUTION INTRAMUSCULAR; INTRAVENOUS at 14:35

## 2023-03-18 RX ADMIN — DIAZEPAM 2 MG: 2 TABLET ORAL at 14:35
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
## 2023-03-18 NOTE — DISCHARGE INSTRUCTIONS
You may take tylenol and ibuprofen as needed for pain  You may also use a heating pad to help with muscle spasms  You may take prescribed robaxin as needed for muscle spasms, but DO NOT TAKE THIS MEDICATION AND DRIVE OR OPERATE HEAVY MACHINERY as this medication can make you drowsy  Follow up with your PCP if symptoms do not improve over the next few days  Return to the emergency department for any new or worsening symptoms including worsening pain, numbness/tingling or weakness in your extremities, or other concerning symptoms

## 2023-03-18 NOTE — Clinical Note
Shannon Browne was seen and treated in our emergency department on 3/18/2023  Diagnosis:     Andree Bowling  is off the rest of the shift today, may return to work on return date  She may return on this date: 03/20/2023         If you have any questions or concerns, please don't hesitate to call        Yesi Cruz, DO    ______________________________           _______________          _______________  Hospital Representative                              Date                                Time
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
## 2023-03-22 ENCOUNTER — TELEPHONE (OUTPATIENT)
Dept: HEMATOLOGY ONCOLOGY | Facility: CLINIC | Age: 53
End: 2023-03-22

## 2023-03-22 NOTE — TELEPHONE ENCOUNTER
Spoke with pt and asked if she would be willing to see Elsi Enciso tomorrow  Dr Cirilo Orellana next available 5/2  She chose to schedule with [facility]  Appt updated

## 2023-03-23 ENCOUNTER — TELEPHONE (OUTPATIENT)
Dept: HEMATOLOGY ONCOLOGY | Facility: CLINIC | Age: 53
End: 2023-03-23
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
## 2023-03-24 ENCOUNTER — APPOINTMENT (OUTPATIENT)
Dept: NON INVASIVE DIAGNOSTICS | Facility: CLINIC | Age: 53
End: 2023-03-24

## 2023-03-24 ENCOUNTER — HOSPITAL ENCOUNTER (OUTPATIENT)
Dept: NON INVASIVE DIAGNOSTICS | Facility: CLINIC | Age: 53
Discharge: HOME | End: 2023-03-24

## 2023-03-24 VITALS
HEART RATE: 60 BPM | SYSTOLIC BLOOD PRESSURE: 175 MMHG | WEIGHT: 110 LBS | HEIGHT: 62 IN | DIASTOLIC BLOOD PRESSURE: 81 MMHG | BODY MASS INDEX: 20.24 KG/M2

## 2023-03-24 DIAGNOSIS — R07.89 OTHER CHEST PAIN: ICD-10-CM

## 2023-03-24 DIAGNOSIS — Z13.6 SCREENING FOR CARDIOVASCULAR CONDITION: ICD-10-CM

## 2023-03-24 LAB
AORTIC ROOT: 2.7 CM
APICAL FOUR CHAMBER EJECTION FRACTION: 66 %
ASCENDING AORTA: 3 CM
E WAVE DECELERATION TIME: 197 MS
FRACTIONAL SHORTENING: 36 % (ref 28–44)
INTERVENTRICULAR SEPTUM IN DIASTOLE (PARASTERNAL SHORT AXIS VIEW): 0.7 CM
INTERVENTRICULAR SEPTUM: 0.7 CM (ref 0.6–1.1)
LAAS-AP2: 12.9 CM2
LAAS-AP4: 12.6 CM2
LEFT ATRIUM AREA SYSTOLE SINGLE PLANE A4C: 12.1 CM2
LEFT ATRIUM SIZE: 2.9 CM
LEFT INTERNAL DIMENSION IN SYSTOLE: 2.7 CM (ref 2.1–4)
LEFT VENTRICLE DIASTOLIC VOLUME (MOD BIPLANE): 61 ML
LEFT VENTRICLE SYSTOLIC VOLUME (MOD BIPLANE): 23 ML
LEFT VENTRICULAR INTERNAL DIMENSION IN DIASTOLE: 4.2 CM (ref 3.5–6)
LEFT VENTRICULAR POSTERIOR WALL IN END DIASTOLE: 0.6 CM
LEFT VENTRICULAR STROKE VOLUME: 49 ML
LV EF: 62 %
LVSV (TEICH): 49 ML
MV E'TISSUE VEL-SEP: 7 CM/S
MV PEAK A VEL: 0.44 M/S
MV PEAK E VEL: 50 CM/S
MV STENOSIS PRESSURE HALF TIME: 57 MS
MV VALVE AREA P 1/2 METHOD: 3.86 CM2
RIGHT ATRIUM AREA SYSTOLE A4C: 9.7 CM2
RIGHT VENTRICLE ID DIMENSION: 2.8 CM
SL CV LEFT ATRIUM LENGTH A2C: 4.6 CM
SL CV LV EF: 60
SL CV PED ECHO LEFT VENTRICLE DIASTOLIC VOLUME (MOD BIPLANE) 2D: 77 ML
SL CV PED ECHO LEFT VENTRICLE SYSTOLIC VOLUME (MOD BIPLANE) 2D: 28 ML
TRICUSPID ANNULAR PLANE SYSTOLIC EXCURSION: 1.4 CM

## 2023-03-27 DIAGNOSIS — R09.81 NASAL CONGESTION: ICD-10-CM

## 2023-03-27 RX ORDER — FLUTICASONE PROPIONATE 50 MCG
SPRAY, SUSPENSION (ML) NASAL
Qty: 24 ML | Refills: 3 | Status: SHIPPED | OUTPATIENT
Start: 2023-03-27 | End: 2023-04-05

## 2023-03-29 ENCOUNTER — TELEPHONE (OUTPATIENT)
Dept: NEUROLOGY | Facility: CLINIC | Age: 53
End: 2023-03-29

## 2023-03-29 DIAGNOSIS — G40.209 PARTIAL SYMPTOMATIC EPILEPSY WITH COMPLEX PARTIAL SEIZURES, NOT INTRACTABLE, WITHOUT STATUS EPILEPTICUS (HCC): Primary | ICD-10-CM
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
## 2023-03-30 NOTE — TELEPHONE ENCOUNTER
I can see her tomorrow in [place] at 300 if she can get a ride to the office  If not I can see her virtually

## 2023-03-30 NOTE — TELEPHONE ENCOUNTER
Pt called she just got the message about an appt tomorrow with Jeanne  She is a  couldn't answer her calls  She would like a call today if she can be seen tomorrow and she will not work
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
## 2023-03-31 NOTE — TELEPHONE ENCOUNTER
It looks like Milton Parikh scheduled another patient in that time slot   Let me look and see if there is another place I can get her in

## 2023-03-31 NOTE — TELEPHONE ENCOUNTER
Rudolph if anyone cancels with me, Yvrose Mckeon, or Dr Kathrin Cam today you can see if she can do a virtual at that time

## 2023-04-03 NOTE — TELEPHONE ENCOUNTER
If there are any openings with Dr Allan Guzman or myself this week, please schedule patient for an appointment
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
## 2023-04-03 NOTE — TELEPHONE ENCOUNTER
Pt is now scheduled for 4/20 @ 8:45 in [facility] with Kwesi Martinez and added her to the wait list for every location she can be seen at so we can move this up ASAP

## 2023-04-04 ENCOUNTER — TELEPHONE (OUTPATIENT)
Dept: GASTROENTEROLOGY | Facility: CLINIC | Age: 53
End: 2023-04-04

## 2023-04-04 ENCOUNTER — TELEPHONE (OUTPATIENT)
Dept: OTHER | Facility: OTHER | Age: 53
End: 2023-04-04

## 2023-04-04 NOTE — TELEPHONE ENCOUNTER
Patients GI provider:  Dr Janice Bloch    Number to return call: [phone]    Reason for call: Pt called in stating that she went to her pharmacy to get a refill on her famotidine and the pharmacist told her it was too soon  Pt stated that she was not aware that she was supposed to be taking 0 5 a pill and has been taking 1 pill twice a day  Pt would like to know if this is going to a problem that she is taking it this way  Above is her number       Scheduled procedure/appointment date if applicable: Apt/procedure NA
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
## 2023-04-05 DIAGNOSIS — R10.13 DYSPEPSIA: ICD-10-CM

## 2023-04-05 DIAGNOSIS — R09.81 NASAL CONGESTION: ICD-10-CM

## 2023-04-05 DIAGNOSIS — R10.13 EPIGASTRIC PAIN: Primary | ICD-10-CM

## 2023-04-05 RX ORDER — FLUTICASONE PROPIONATE 50 MCG
SPRAY, SUSPENSION (ML) NASAL
Qty: 24 ML | Refills: 3 | Status: SHIPPED | OUTPATIENT
Start: 2023-04-05

## 2023-04-05 RX ORDER — FAMOTIDINE 20 MG/1
20 TABLET, FILM COATED ORAL 2 TIMES DAILY
Qty: 60 TABLET | Refills: 2 | Status: SHIPPED | OUTPATIENT
Start: 2023-04-05
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
## 2023-04-05 NOTE — TELEPHONE ENCOUNTER
Please let patient know that we do prescribe Pepcid 40 mg twice a day and typically do not see these symptoms  The note from neurology visit is not yet completed so I am unsure their thoughts regarding her symptoms  However, we can decrease to Pepcid 20 mg twice a day instead at this time

## 2023-04-07 NOTE — TELEPHONE ENCOUNTER
Patient is returning a missed call from [facility]  Please call patient back

## 2023-04-09 PROBLEM — Z87.898 HISTORY OF SEIZURE: Status: RESOLVED | Noted: 2022-09-15 | Resolved: 2023-04-09

## 2023-05-01 ENCOUNTER — TELEPHONE (OUTPATIENT)
Dept: INTERNAL MEDICINE CLINIC | Facility: CLINIC | Age: 53
End: 2023-05-01
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
## 2023-05-05 PROBLEM — Z13.6 SCREENING FOR CARDIOVASCULAR CONDITION: Status: RESOLVED | Noted: 2020-12-26 | Resolved: 2023-05-05

## 2023-05-06 DIAGNOSIS — R10.13 DYSPEPSIA: ICD-10-CM

## 2023-05-06 DIAGNOSIS — R10.13 EPIGASTRIC PAIN: ICD-10-CM

## 2023-05-06 RX ORDER — FAMOTIDINE 20 MG/1
TABLET, FILM COATED ORAL
Qty: 180 TABLET | Refills: 1 | Status: SHIPPED | OUTPATIENT
Start: 2023-05-06

## 2023-05-19 ENCOUNTER — OFFICE VISIT (OUTPATIENT)
Dept: HEMATOLOGY ONCOLOGY | Facility: CLINIC | Age: 53
End: 2023-05-19

## 2023-05-19 ENCOUNTER — TELEPHONE (OUTPATIENT)
Dept: NEUROLOGY | Facility: CLINIC | Age: 53
End: 2023-05-19

## 2023-05-19 VITALS
RESPIRATION RATE: 18 BRPM | HEIGHT: 62 IN | OXYGEN SATURATION: 98 % | HEART RATE: 60 BPM | DIASTOLIC BLOOD PRESSURE: 78 MMHG | BODY MASS INDEX: 20.7 KG/M2 | WEIGHT: 112.5 LBS | SYSTOLIC BLOOD PRESSURE: 138 MMHG | TEMPERATURE: 97.7 F

## 2023-05-19 DIAGNOSIS — D72.819 LEUKOPENIA, UNSPECIFIED TYPE: Primary | ICD-10-CM

## 2023-05-19 PROBLEM — R07.89 OTHER CHEST PAIN: Status: RESOLVED | Noted: 2023-03-06 | Resolved: 2023-05-19

## 2023-05-19 PROBLEM — R25.2 MUSCLE CRAMPS: Status: RESOLVED | Noted: 2021-04-15 | Resolved: 2023-05-19

## 2023-05-19 PROBLEM — R11.10 VOMITING AND DIARRHEA: Status: RESOLVED | Noted: 2022-08-13 | Resolved: 2023-05-19

## 2023-05-19 PROBLEM — R19.7 VOMITING AND DIARRHEA: Status: RESOLVED | Noted: 2022-08-13 | Resolved: 2023-05-19

## 2023-05-19 PROBLEM — R19.7 DIARRHEA: Status: RESOLVED | Noted: 2022-10-04 | Resolved: 2023-05-19

## 2023-05-19 PROBLEM — R23.3 EASY BRUISING: Status: RESOLVED | Noted: 2022-04-08 | Resolved: 2023-05-19

## 2023-05-19 PROBLEM — R51.9 NEW ONSET HEADACHE: Status: RESOLVED | Noted: 2018-10-01 | Resolved: 2023-05-19

## 2023-05-19 RX ORDER — BENZONATATE 200 MG/1
200 CAPSULE ORAL 3 TIMES DAILY PRN
COMMUNITY

## 2023-05-19 NOTE — TELEPHONE ENCOUNTER
Patient stopped by the [place] office expressing that she is suffering from extreme fatigue  She saw Dr Dennis Garcia today and he suggested reaching out to Dr Trina Gonzalez about the Topamax that she is taking  Apparently this Is one of the side affects  She want to discuss possibly reducing the amount she take?     [phone]

## 2023-05-19 NOTE — TELEPHONE ENCOUNTER
Received vm from 10:23am-Yes  Baljeet, this is Cinthya ball calling  I am a patient of Dr Macho Stoll  I was wondering if I could have one of his nursing staff call me back  I was seen by one of my other doctors today and we discussed me being so tired all the time  And my doctor had mentioned something to me that I want to discuss with dr Baudilio Rodriges staff  And then they could discuss it with Dr Anuja Cano, and it's about my dose that I'm on of my medicine  So if someone from Dr Tina Arambula staff could please call me back  My number is [phone]  I would appreciate it again this cinthya ball  It is Friday, 10:23 am and it is May 19th  Thank you so much  Have a good day  [phone]  -------------------------------------------  Called pt  States that she saw another dr today and he reviewed her meds and he said guerrero topamax causes fatigue and advised that he contact our office  Last seizure was [address] up at 2am for work and when she is driving from work there have been times that she is   Almost falling asleep when driving home   She finishes work at 2:30pm or 3pm  Fatigue has been going for the longest time but seems like it has gotten a lot worse since being dx with covid and dx with gastroparesis  Fatigue has gotten intolerable, before she was able to deal with it  Currently taking  topiramate 200mg bid    Can not afford to decrease meds if it will cause her to have a seizure  States that she really doesn't want to change medications  She has not done 48hr aEEG as she needs to take off of work to have this completed  She is asking if EEG needs to be done prior to making med adjustment     She would this message sent to dr Tish Galeas  Please advise  [phone] to leave detailed message

## 2023-05-19 NOTE — TELEPHONE ENCOUNTER
Is there sleep schedule consistent each day or does it change based on her schedule and has this schedule recently changed? How many hours of sleep does she get nightly? Does she nap? What time does she take her topiramate? Does she feel more tired 1-2 hours after she takes topiramate? Has anxiety and/or sleep difficulty worsened lately? It would be good to get the 48 hour ambulatory EEG scheduled to try and estimate the risk of seizure, especially if there is a possibility of topiramate dose decrease  I would also like her to get a topiramate trough level drawn to estimate the risk that it is causing side effects  It looks like a topiramate level order was placed on 3/30
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
## 2023-06-21 ENCOUNTER — TELEPHONE (OUTPATIENT)
Age: 53
End: 2023-06-21

## 2023-06-21 NOTE — TELEPHONE ENCOUNTER
Spoke with pt who explained that her job informed her that her FMLA paperwork needs to be updated  The same information can remain but the date needs to be updated    -there is a blank FMLA form in pts chart scanned in on 8/12/22  She asks for completed form information (scanned on 2/3/23) to be copied onto new form and signed  Please inform pt when complete and discuss options for pt to receive copy  Pt also had a question for Dr Radha Starks:  She inquired if there was a specific shake with veggies and fruits that she can purchase for people with gastroparesis?  -Please respond to pt via [employer]-          Pt also inquired if there was anything stronger than over the counter medications for gas/bloat  I advised on IB guard, pt agreeable to trial     Pt expressed frustration with inability to have 'normal' diet without enduring symptoms of gastroparesis  Pt stated she feels miserable and tired of eating the same thing and not being able to have her 'old normal' life again  I validated feelings and offered recommendation to speak with dietician  Pt voiced feelings of being overwhelmed and stressed, I again validated and discussed possibility of discussing stress management with a mental health specialist  Pt agreeable

## 2023-06-29 ENCOUNTER — TELEPHONE (OUTPATIENT)
Dept: NEUROLOGY | Facility: CLINIC | Age: 53
End: 2023-06-29

## 2023-06-29 ENCOUNTER — TELEPHONE (OUTPATIENT)
Dept: PSYCHIATRY | Facility: CLINIC | Age: 53
End: 2023-06-29

## 2023-06-29 ENCOUNTER — APPOINTMENT (OUTPATIENT)
Dept: LAB | Facility: CLINIC | Age: 53
End: 2023-06-29
Payer: COMMERCIAL

## 2023-06-29 ENCOUNTER — OFFICE VISIT (OUTPATIENT)
Dept: NEUROLOGY | Facility: CLINIC | Age: 53
End: 2023-06-29
Payer: COMMERCIAL

## 2023-06-29 VITALS
TEMPERATURE: 98.2 F | BODY MASS INDEX: 20.28 KG/M2 | HEIGHT: 62 IN | DIASTOLIC BLOOD PRESSURE: 92 MMHG | HEART RATE: 74 BPM | SYSTOLIC BLOOD PRESSURE: 170 MMHG | OXYGEN SATURATION: 96 % | WEIGHT: 110.2 LBS

## 2023-06-29 DIAGNOSIS — R53.83 FATIGUE: ICD-10-CM

## 2023-06-29 DIAGNOSIS — G40.209 PARTIAL SYMPTOMATIC EPILEPSY WITH COMPLEX PARTIAL SEIZURES, NOT INTRACTABLE, WITHOUT STATUS EPILEPTICUS (HCC): Primary | ICD-10-CM

## 2023-06-29 DIAGNOSIS — G40.209 PARTIAL SYMPTOMATIC EPILEPSY WITH COMPLEX PARTIAL SEIZURES, NOT INTRACTABLE, WITHOUT STATUS EPILEPTICUS (HCC): ICD-10-CM

## 2023-06-29 DIAGNOSIS — R68.89 FORGETFULNESS: ICD-10-CM

## 2023-06-29 DIAGNOSIS — R05.9 COUGH, UNSPECIFIED TYPE: ICD-10-CM

## 2023-06-29 DIAGNOSIS — R51.9 HEADACHE: ICD-10-CM

## 2023-06-29 DIAGNOSIS — F41.9 ANXIETY: ICD-10-CM

## 2023-06-29 NOTE — ASSESSMENT & PLAN NOTE
Patient reports new onset headache type, different than headaches in the past  There is also nausea and light sensitivity  Given her age (>50 years), new headache type, forgetfulness, and fatigue, recommended checking MRI brain to rule out underlying pathology to be causing this new type of headache  In the meantime, recommended magnesium 400 mg BID and B2 400 mg daily  If headaches are not improved in 2 weeks, consider adding prescription medication to regimen such as venlafaxine or nortriptyline  She will notify office in 2 weeks if headaches are not improved  Check ESR/CRP given unilateral pain as well

## 2023-06-29 NOTE — ASSESSMENT & PLAN NOTE
"Patient with well controlled epilepsy, on topiramate 200 mg BID for many years  She does report fatigue but given she has been on this medication for so many years without issues, doubt this medication is playing a role in her fatigue  She was ordered a 48 hour ambulatory EEG at her last visit  From discussion with patient, her episodes of \"lapses of time\" are more related to distraction and memory difficulties, likely multifactorial in the setting of poor sleep, attention difficulties given business and doing multiple things at a time, and anxiety  Can hold off on EEG at this time  Recommend patient have topiramate level done in the near future and continue current dose of topiramate 200 mg BID  If there are episodes concerning for seizure, she will call the office  Follow up in 6-8 weeks or sooner if needed     "

## 2023-06-29 NOTE — TELEPHONE ENCOUNTER
Spoke with the pt in regards to EMDR and providers and locations. None worked for the pt whom declined services at this time.  So writer emailed out an additional resource packet,

## 2023-06-29 NOTE — ASSESSMENT & PLAN NOTE
Likely multifactorial in the setting of poor sleep  Check B12 and vitamin D  Recent CBC, CMP unremarkable

## 2023-06-29 NOTE — ASSESSMENT & PLAN NOTE
Encouraged establishing with a therapist  She does not feel talk therapy would be helpful but is looking into EMDR but having difficulty finding someone in the area  Message sent to social work for assistance in finding someone

## 2023-06-29 NOTE — ASSESSMENT & PLAN NOTE
Recent blood work unremarkable but will check B12 and vitamin D given deficiency in these can lead to memory difficulty  Check MRI brain as well  Consider neuropsychological testing in the future if needed but would be beneficial to have sleep and anxiety better managed prior to this being done  Will work on sleep hygiene, relaxation  Recommended using a pill box for her medications so she can track if she took her medications or not in the morning and night time

## 2023-06-29 NOTE — PATIENT INSTRUCTIONS
- Continue topiramate 200 mg twice per day  - Start using a daytime and night time pill box to keep track of if you took your medications  - See your PCP for your cough   - Work on sleep hygiene  - Magnesium oxide 400 mg twice per day- check what you have at home- headaches, leg cramps  - Another supplement to take is vitamin B2 (riboflavin) 400 mg once per day - headaches   - If not helpful after 2 weeks, let us know and we can try medication  - Have MRI brain done  - Call the office with seizures, worsening headaches, concerns  - Follow up in 6-8 weeks with Dr Janay Burgos

## 2023-06-29 NOTE — TELEPHONE ENCOUNTER
----- Message from Gordy Moon sent at 6/29/2023 11:24 AM EDT -----  Do you know anyone who does EMDR in [facility]? Patient is interested in this  Does not want to do typical talk therapy

## 2023-06-29 NOTE — TELEPHONE ENCOUNTER
Contacted patient in regards to [employer] for patient seeking EMDR therapy to notify patient of the different providers whom specialize in preferred therapy and see if patient would like to be wait listed for said providers.  lvm for patient to contact intake dept in regards to referral.

## 2023-06-29 NOTE — TELEPHONE ENCOUNTER
[facility] dept have 2 virtual therapist whom specialize in EMDR and two other therapists located in [place], [place]   At this time all providers are unfortunately working off of a wait list  Ana Gilmore from [address] [place] [address] Street phoned pt and lvm in order to place patient on waitlist  =
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
## 2023-06-29 NOTE — PROGRESS NOTES
Review of Systems   Constitutional: Positive for fatigue  Negative for appetite change and fever  HENT: Negative  Negative for hearing loss, tinnitus, trouble swallowing and voice change  Eyes: Negative for photophobia, pain and visual disturbance  Respiratory: Negative  Negative for shortness of breath  Cardiovascular: Negative  Negative for palpitations  Gastrointestinal: Negative  Negative for nausea and vomiting  Endocrine: Negative  Negative for cold intolerance  Genitourinary: Negative  Negative for dysuria, frequency and urgency  Musculoskeletal: Negative for back pain, gait problem, myalgias and neck pain  Skin: Negative  Negative for rash  Allergic/Immunologic: Negative  Neurological: Positive for seizures (no recent sz), light-headedness and headaches (Left temple area)  Negative for dizziness, tremors, syncope, facial asymmetry, speech difficulty, weakness and numbness  Hematological: Negative  Does not bruise/bleed easily  Psychiatric/Behavioral: Positive for confusion (has been forgetful ) and sleep disturbance  Negative for hallucinations  All other systems reviewed and are negative

## 2023-06-30 ENCOUNTER — TELEPHONE (OUTPATIENT)
Dept: NEUROLOGY | Facility: CLINIC | Age: 53
End: 2023-06-30

## 2023-06-30 NOTE — TELEPHONE ENCOUNTER
Called pt re: below and left a detailed VM with call back # if any further assistance is required.    ----- Message -----   From: LENORA Salinas   Sent: 6/29/2023   4:40 PM EDT   To: [facility] [place] Clinical Team 5     Please let patient know I added additional labs for her that Dr Rutherford requested. Would like for her to have labs done at earliest convenience.   Thanks

## 2023-06-30 NOTE — TELEPHONE ENCOUNTER
MSW phoned [facility] at [address] [facility], [address]   - lvm to learn if they can do EMDR therapy  [employer]  [address] [facility], [facility] [address]   [phone]  - lvm to learn if they can do EMDR  [facility] [place] [id number] [place] [address]  GTTYP: [phone]  - lvm to learn if they can do EMDR  [facility]   Phone: [phone]  Fax: [phone]  [address], [address]  - can do EMDR, awaiting to learn if they can accept insurance  [facility] [phone]  -lvm to learn if accepting patient's insurance

## 2023-07-01 ENCOUNTER — LAB (OUTPATIENT)
Dept: LAB | Facility: CLINIC | Age: 53
End: 2023-07-01
Payer: COMMERCIAL
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
## 2023-07-01 DIAGNOSIS — R51.9 HEADACHE: ICD-10-CM

## 2023-07-01 LAB
25(OH)D3 SERPL-MCNC: 58.4 NG/ML (ref 30–100)
CRP SERPL QL: <3 MG/L
ERYTHROCYTE [SEDIMENTATION RATE] IN BLOOD: 6 MM/HOUR (ref 0–29)
VIT B12 SERPL-MCNC: 404 PG/ML (ref 180–914)

## 2023-07-01 PROCEDURE — 80201 ASSAY OF TOPIRAMATE: CPT

## 2023-07-01 PROCEDURE — 85652 RBC SED RATE AUTOMATED: CPT

## 2023-07-01 PROCEDURE — 87522 HEPATITIS C REVRS TRNSCRPJ: CPT

## 2023-07-01 PROCEDURE — 82607 VITAMIN B-12: CPT

## 2023-07-01 PROCEDURE — 82306 VITAMIN D 25 HYDROXY: CPT

## 2023-07-01 PROCEDURE — 86140 C-REACTIVE PROTEIN: CPT

## 2023-07-01 PROCEDURE — 36415 COLL VENOUS BLD VENIPUNCTURE: CPT

## 2023-07-03 LAB — TOPIRAMATE SERPL-MCNC: 8.4 UG/ML (ref 2–25)

## 2023-07-03 NOTE — TELEPHONE ENCOUNTER
MSW received incoming call from pt she informed that she spoke with Delaney Waterman but she prefers to do in person, not virtual and for the [place] location they could not provide an actual address and name of provider. MSW informed that this writer has called other places and is awaiting providers to call back to learn if they accept her insurance. Call ended. This writer called again and lvm informing that MSW will continue to search for in network providers.

## 2023-07-06 ENCOUNTER — TELEPHONE (OUTPATIENT)
Dept: NEUROLOGY | Facility: CLINIC | Age: 53
End: 2023-07-06

## 2023-07-06 NOTE — TELEPHONE ENCOUNTER
----- Message from LENORA Salinas sent at 7/3/2023  4:24 PM EDT -----  Vitamin D and B12 look good  ----- Message -----  From: Lab, Background User  Sent: 7/1/2023  11:43 AM EDT  To: LENORA Salinas

## 2023-07-06 NOTE — TELEPHONE ENCOUNTER
----- Message from LENORA Salinas sent at 7/3/2023  4:23 PM EDT -----  Sed rate and CRP are normal (no inflammatory markers)  ----- Message -----  From: Lab, Background User  Sent: 7/1/2023  11:07 AM EDT  To: LENORA Salinas

## 2023-07-06 NOTE — TELEPHONE ENCOUNTER
Called re: below and left another detailed VM with call back # if any further assistance is required.

## 2023-07-07 LAB
HCV RNA SERPL NAA+PROBE-ACNC: NORMAL IU/ML
TEST INFORMATION: NORMAL

## 2023-07-10 ENCOUNTER — OFFICE VISIT (OUTPATIENT)
Dept: INTERNAL MEDICINE CLINIC | Facility: CLINIC | Age: 53
End: 2023-07-10
Payer: COMMERCIAL

## 2023-07-10 VITALS
BODY MASS INDEX: 20.32 KG/M2 | HEART RATE: 66 BPM | OXYGEN SATURATION: 98 % | WEIGHT: 110.4 LBS | HEIGHT: 62 IN | SYSTOLIC BLOOD PRESSURE: 126 MMHG | DIASTOLIC BLOOD PRESSURE: 78 MMHG

## 2023-07-10 DIAGNOSIS — R05.9 COUGH, UNSPECIFIED TYPE: Primary | ICD-10-CM

## 2023-07-10 PROCEDURE — 99214 OFFICE O/P EST MOD 30 MIN: CPT | Performed by: INTERNAL MEDICINE

## 2023-07-10 RX ORDER — ALBUTEROL SULFATE 90 UG/1
2 AEROSOL, METERED RESPIRATORY (INHALATION) EVERY 6 HOURS PRN
Qty: 6.7 G | Refills: 0 | Status: SHIPPED | OUTPATIENT
Start: 2023-07-10

## 2023-07-10 NOTE — PATIENT INSTRUCTIONS
Problem List Items Addressed This Visit          Other    Cough - Primary     Lung exam okay, will check chest x-ray, will also check pulmonary function tests. Patient were to develop fevers, chills, pleuritic pain, then to reach out. Pulse ox is good at 98%.   Pt requesting short term use of rescue inhaler which she has used in the past.         Relevant Medications    albuterol (Proventil HFA) 90 mcg/act inhaler

## 2023-07-10 NOTE — PROGRESS NOTES
Name: Felix Weber      : 1970      MRN: [id number]  Encounter Provider: Danny Nichols MD  Encounter Date: 7/10/2023   Encounter department: [facility] [place] [address]. Cough, unspecified type  Assessment & Plan:  Lung exam okay, will check chest x-ray, will also check pulmonary function tests. Patient were to develop fevers, chills, pleuritic pain, then to reach out. Pulse ox is good at 98%. Pt requesting short term use of rescue inhaler which she has used in the past.    Orders:  -     albuterol (Proventil HFA) 90 mcg/act inhaler; Inhale 2 puffs every 6 (six) hours as needed for wheezing           Subjective     Pt reports has been coughing a lot since [facility] a few weeks ago. She has some mucus in the mornings. No pleuritic pain, feels a little SOB with exertion. Patient reports he was diagnosed with seasonal asthma in the past.   Pt had similar bronchitis symptoms when she had COVID a year ago, with chest x-ray done with hyperinflation suggestive of COPD. No history of smoking. She did use tessalon perles which helped a little. No calf swelling. No significant GERD symptoms, no difficulty swallowing. Cough  Associated symptoms include headaches and shortness of breath (mild with exertion). Pertinent negatives include no chills or fever. Review of Systems   Constitutional: Negative for chills and fever. Respiratory: Positive for cough and shortness of breath (mild with exertion). Cardiovascular: Negative for palpitations. Neurological: Positive for headaches.        Past Medical History:   Diagnosis Date   • Arthritis    • Asthma    • Disease of thyroid gland    • Gastroparesis    • Hypokalemia 2022   • Hypoparathyroidism ([address])    • Nephrolithiasis 2019   • Osteoporosis    • Partial symptompatic epilepsy    • Seizure disorder [facility])      Past Surgical History:   Procedure Laterality Date   • APPENDECTOMY     • COLONOSCOPY     • EGD     • FRACTURE SURGERY Right     Knee   • HYSTERECTOMY     • OTHER SURGICAL HISTORY      left foot, rt knee, tonselectomy   • SINUS SURGERY     • TONSILLECTOMY       Family History   Problem Relation Age of Onset   • Alcohol abuse Mother    • RUSSELL disease Mother    • Boles's esophagus Mother    • Migraines Father    • Prostate cancer Father    • Cancer Father    • Alcohol abuse Maternal Uncle    • Seizures Paternal Aunt    • Lung cancer Paternal Aunt    • Prostate cancer Paternal Uncle    • Colon cancer Paternal Uncle      Social History     Socioeconomic History   • Marital status: Single     Spouse name: None   • Number of children: None   • Years of education: None   • Highest education level: None   Occupational History   • None   Tobacco Use   • Smoking status: Never   • Smokeless tobacco: Never   Vaping Use   • Vaping Use: Never used   Substance and Sexual Activity   • Alcohol use: Not Currently   • Drug use: Not Currently     Comment: social   • Sexual activity: Not Currently   Other Topics Concern   • None   Social History Narrative    ** Merged History Encounter **          Social Determinants of Health     Financial Resource Strain: Not on file   Food Insecurity: No Food Insecurity (11/8/2021)    Hunger Vital Sign    • Worried About Running Out of Food in the Last Year: Never true    • Ran Out of Food in the Last Year: Never true   Transportation Needs: No Transportation Needs (11/8/2021)    PRAPARE - Transportation    • Lack of Transportation (Medical): No    • Lack of Transportation (Non-Medical):  No   Physical Activity: Not on file   Stress: Not on file   Social Connections: Not on file   Intimate Partner Violence: Not on file   Housing Stability: Not on file     Current Outpatient Medications on File Prior to Visit   Medication Sig   • Ascorbic Acid (Vitamin C) 500 MG CAPS Take 1 each by mouth daily   • cetirizine (ZyrTEC) 10 MG chewable tablet Chew 10 mg daily • ECHINACEA PO Take by mouth daily   • ELDERBERRY PO Take 1 tablet by mouth daily as needed Daily every morning   • famotidine (PEPCID) 20 mg tablet TAKE 1 TABLET BY MOUTH TWICE A DAY   • fluticasone (FLONASE) 50 mcg/act nasal spray SPRAY 1 SPRAY INTO EACH NOSTRIL EVERY DAY   • ondansetron (ZOFRAN) 4 mg tablet Take 1 tablet (4 mg total) by mouth every 8 (eight) hours as needed for nausea or vomiting   • Synthroid 75 MCG tablet Take 75 mcg by mouth daily Except sundays   • topiramate (TOPAMAX) 200 MG tablet Take 1 tablet by mouth twice per day. • Vitamin D, Cholecalciferol, 25 MCG (1000 UT) CAPS Take 1 tablet by mouth in the morning   • Zinc 50 MG CAPS Take 1 capsule by mouth daily   • benzonatate (TESSALON) 200 MG capsule Take 200 mg by mouth 3 (three) times a day as needed for cough Patient states took one. (Patient not taking: Reported on 6/29/2023)   • ondansetron (ZOFRAN) 4 mg tablet Take 1 tablet (4 mg total) by mouth every 8 (eight) hours as needed for nausea or vomiting (Patient not taking: Reported on 6/29/2023)     Allergies   Allergen Reactions   • Morphine Other (See Comments)   • Codeine Hives   • Morphine And Related    • Codeine Itching   • Sulfa Antibiotics Rash     Immunization History   Administered Date(s) Administered   • COVID-19 PFIZER VACCINE 0.3 ML IM 04/10/2021, 05/02/2021   • Tdap 02/11/2011, 06/24/2020       Objective     /78   Pulse 66   Ht 5' 2" (1.575 m)   Wt 50.1 kg (110 lb 6.4 oz)   SpO2 98%   BMI 20.19 kg/m²     Physical Exam  Constitutional:       General: She is not in acute distress. Appearance: Normal appearance. Cardiovascular:      Rate and Rhythm: Normal rate and regular rhythm. Heart sounds: Normal heart sounds. No murmur heard. Pulmonary:      Effort: Pulmonary effort is normal. No respiratory distress. Breath sounds: Normal breath sounds. No stridor. No wheezing, rhonchi or rales. Musculoskeletal:      Right lower leg: No edema.       Left lower leg: No edema. Neurological:      Mental Status: She is alert.        Dennis Torres MD

## 2023-07-11 ENCOUNTER — TELEPHONE (OUTPATIENT)
Dept: GASTROENTEROLOGY | Facility: CLINIC | Age: 53
End: 2023-07-11

## 2023-07-11 ENCOUNTER — TELEPHONE (OUTPATIENT)
Dept: NEUROLOGY | Facility: CLINIC | Age: 53
End: 2023-07-11

## 2023-07-11 DIAGNOSIS — G40.209 PARTIAL SYMPTOMATIC EPILEPSY WITH COMPLEX PARTIAL SEIZURES, NOT INTRACTABLE, WITHOUT STATUS EPILEPTICUS (HCC): Primary | ICD-10-CM

## 2023-07-11 NOTE — TELEPHONE ENCOUNTER
7/10 4:12    Pt left a VM requesting to speak to Jeanne re: MRI and Circadian Rhythm.    Transcribed VM:   Yes. Baljeet, this is Cinthya Howard this message is for Jeanne, Dr. Vicente CHAMPION. I was wondering if you could please have Jeanne, I'm not sure, I think she's a doctor there...she's a PA...I don't know what the difference is. If you could ask her to call me today, if she can please call me before she leaves. I am going for my MRI she scheduled me for. I'm going tomorrow and I have a question for her. And I need to know...Darío the way My MRI is written is about headaches and stuff. And I just had a question for her regarding...I've been doing some research and reading about um...um...I can't think. If she could call me back, I'll have it...circadian rhythm. I was wondering if maybe my circadian rhythm being off, if that could have caused me to have headaches as well. And if so, if that needs to be, if that's something they could check in the MRI or not, I don't know, I don't how they check for that. And if they can check it in the MRI, If she could maybe add that to my MRI order for tomorrow. So she could please call me back before she leaves the office today. My number is [phone]. And again, my name is Cinthya Howard and my date of birth is [birth year]. Thank you.    Called back and spoke with pt. She was leery of driving to [place] for her MRI, so she rescheduled to 7/22 in [place]. She was wondering about Circadian Rhythms and if it is something that be seen on an MRI. Explained that MRIs for the most part view structures in the body using a high powered magnet and a radio frequency.  They give very clear non-invasive 3D images and cross-sectional images of internal body structures, but as far as this writer knows they can't see sleep and wakefulness patterns. Explained to pt that circadian rhythms are intangible sleep and wake pattern/cycles that are not structural and can't be visualized by an  MRI.    She also wanted to mention that she gets cramping and sissy horses so bad that she gets ecchymotic area on her calves/legs. She was told when she went to the drug store that there is a better form of Magnesium for cramps than MagOx.     Please advise on any other information re: Mag and circadian rhythms that you would like pt to know of. Thank you.

## 2023-07-11 NOTE — TELEPHONE ENCOUNTER
Referral placed to sleep medicine. If the pharmacist recommended a different for of magnesium that would be fine to try.

## 2023-07-11 NOTE — TELEPHONE ENCOUNTER
Patient called regarding the FMLA form message was never received to the clinical pool     Filling out and having signed since patient was recently seen

## 2023-07-12 ENCOUNTER — TELEPHONE (OUTPATIENT)
Dept: NEUROLOGY | Facility: CLINIC | Age: 53
End: 2023-07-12

## 2023-07-12 NOTE — TELEPHONE ENCOUNTER
----- Message from LENORA Salinas sent at 7/12/2023  4:16 PM EDT -----  Stable labs  ----- Message -----  From: Lab, Background User  Sent: 7/1/2023  11:43 AM EDT  To: LENORA Salinas       [phone]

## 2023-07-12 NOTE — TELEPHONE ENCOUNTER
Called re: below and spoke with pt who verbalized an understanding. She has actually already made an appt. with Sleep Medicine.

## 2023-07-17 ENCOUNTER — TELEPHONE (OUTPATIENT)
Dept: OTHER | Facility: OTHER | Age: 53
End: 2023-07-17

## 2023-07-21 ENCOUNTER — TELEPHONE (OUTPATIENT)
Dept: INTERNAL MEDICINE CLINIC | Facility: CLINIC | Age: 53
End: 2023-07-21

## 2023-07-21 ENCOUNTER — TELEPHONE (OUTPATIENT)
Dept: NEUROLOGY | Facility: CLINIC | Age: 53
End: 2023-07-21

## 2023-07-21 NOTE — TELEPHONE ENCOUNTER
Pt aware you are out of the office but wanted this message sent to you - OK to respond for Monday. Pt was seen at Urgent Care Monday and put on 6 days of steroids, she has 2 days left and has a pulmonary function test scheduled. . since being on the steroids she is no longer coughing.  Wants to know if she should still go through with the test, is it ok to be on the prednisone while doing the test - if not how long should she wait until the medication is completed to do the test?

## 2023-07-21 NOTE — TELEPHONE ENCOUNTER
Arjun oneil   My name is Rommel Chiang and I am calling because [facility] had scheduled me for an M R. I of the brain that is scheduled for tomorrow, but I was going to call and see if they had a cancellation for today by any chance but before I did that and even before my appointment, tomorrow I needed to call your office. I was put  Monday on a steroid because I had broken out on a rash all over my face. And so I needed to make sure that I can do the mri with contrast being on the steroid. If someone could call me back, please at your earliest convenience to make sure that I can do the mri with contrast being on the steroid, i'd greatly appreciate it. . My phone number is [phone] my birthday is January, 13, 1970. Thank you very much. And again, the appointment is for Saturday, although I was gonna call and see if they happen to have had a cancellation for today. Patient said she had rash which started x 4 days ago, also had  is on a steroid for 6 days, hydroxyzine for itching and topical cream for rash. Was told rash was not shingles or fungal, may be stress related, possible allergic reaction to something. She said she called MRI department to see if meds would interefer and they referred her to neurology for input. MRI is not urgent. We discussed medications should not impact MRI however must lie still for MRI; discussed if she has symptom of itching may not be able to tolerate and lie still for length of time needed for clear images; consider rescheduling if uncomfortable. Patient verbalized understanding and wishes to reschedule. She will call to do same.

## 2023-07-22 ENCOUNTER — HOSPITAL ENCOUNTER (OUTPATIENT)
Dept: RADIOLOGY | Facility: HOSPITAL | Age: 53
Discharge: HOME | End: 2023-07-22
Payer: COMMERCIAL

## 2023-07-22 DIAGNOSIS — R51.9 HEADACHE: ICD-10-CM

## 2023-07-22 DIAGNOSIS — R68.89 FORGETFULNESS: ICD-10-CM

## 2023-07-22 PROCEDURE — G1004 CDSM NDSC: HCPCS

## 2023-07-22 PROCEDURE — 70553 MRI BRAIN STEM W/O & W/DYE: CPT

## 2023-07-22 PROCEDURE — A9585 GADOBUTROL INJECTION: HCPCS | Performed by: NURSE PRACTITIONER

## 2023-07-22 RX ADMIN — GADOBUTROL 5 ML: 604.72 INJECTION INTRAVENOUS at 10:18

## 2023-07-26 ENCOUNTER — TELEPHONE (OUTPATIENT)
Dept: GASTROENTEROLOGY | Facility: AMBULARY SURGERY CENTER | Age: 53
End: 2023-07-26

## 2023-07-26 NOTE — TELEPHONE ENCOUNTER
Pt called and is not agreeing with the dates that are on her FMLA form and is requesting for  or a PA that completed the forms to call.  [phone]

## 2023-07-27 NOTE — TELEPHONE ENCOUNTER
LENORA Salinas  You 13 minutes ago (12:21 PM)       No changes on MRI from prior. 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 Unchanged small pineal cyst.   Otherwise, normal MRI of brain.

## 2023-07-27 NOTE — TELEPHONE ENCOUNTER
07-, 2:07:50 pm EDT   Taken off 7/27/2023, 9am.    Patient calling for results of MRI. Please advise on findings.     [phone]

## 2023-08-04 DIAGNOSIS — G40.209 PARTIAL SYMPTOMATIC EPILEPSY WITH COMPLEX PARTIAL SEIZURES, NOT INTRACTABLE, WITHOUT STATUS EPILEPTICUS (HCC): ICD-10-CM

## 2023-08-04 DIAGNOSIS — G43.909 MIGRAINE WITHOUT STATUS MIGRAINOSUS, NOT INTRACTABLE, UNSPECIFIED MIGRAINE TYPE: ICD-10-CM

## 2023-08-06 ENCOUNTER — APPOINTMENT (OUTPATIENT)
Dept: RADIOLOGY | Age: 53
End: 2023-08-06
Payer: COMMERCIAL

## 2023-08-06 ENCOUNTER — OFFICE VISIT (OUTPATIENT)
Dept: URGENT CARE | Age: 53
End: 2023-08-06
Payer: COMMERCIAL

## 2023-08-06 VITALS
DIASTOLIC BLOOD PRESSURE: 86 MMHG | TEMPERATURE: 98.6 F | OXYGEN SATURATION: 98 % | RESPIRATION RATE: 18 BRPM | HEART RATE: 60 BPM | SYSTOLIC BLOOD PRESSURE: 184 MMHG

## 2023-08-06 DIAGNOSIS — S90.31XA CONTUSION OF RIGHT FOOT, INITIAL ENCOUNTER: ICD-10-CM

## 2023-08-06 DIAGNOSIS — S99.921A INJURY OF RIGHT FOOT, INITIAL ENCOUNTER: ICD-10-CM

## 2023-08-06 DIAGNOSIS — S99.921A INJURY OF RIGHT FOOT, INITIAL ENCOUNTER: Primary | ICD-10-CM

## 2023-08-06 PROCEDURE — G0382 LEV 3 HOSP TYPE B ED VISIT: HCPCS

## 2023-08-06 PROCEDURE — 73630 X-RAY EXAM OF FOOT: CPT

## 2023-08-06 NOTE — PROGRESS NOTES
[facility]        NAME: Reji Barahona is a 48 y.o. female  : 1970    MRN: [id number]  DATE: 2023  TIME: 2:30 PM    Assessment and Plan   Injury of right foot, initial encounter [S99.921A]  1. Injury of right foot, initial encounter  XR foot 3+ vw right      2. Contusion of right foot, initial encounter        X-rays reviewed, no acute abnormality noted, awaiting official read. Rest, ice, compress and elevate and continue to alternate Tylenol and Ibuprofen for pain. Suspect radiating pain into calf is r/t gait disturbance-patient observed ambulating of right heel. Recommended crutches, patient has at home. Follow up with PCP if no improvement within 1 week. Patient Instructions   Contusion in Adults   WHAT YOU NEED TO KNOW:   A contusion is a bruise that appears on your skin after an injury. A bruise happens when small blood vessels tear but skin does not. Blood leaks into nearby tissue, such as soft tissue or muscle. DISCHARGE INSTRUCTIONS:   Return to the emergency department if:   • You have new trouble moving the injured area.     • You have tingling or numbness in or near the injured area.     • Your hand or foot below the bruise gets cold or turns pale.     Call your doctor if:   • You find a new lump in the injured area.     • Your symptoms do not improve with treatment after 4 to 5 days.     • You have questions or concerns about your condition or care.     Medicines: You may need any of the following:  • NSAIDs  help decrease swelling and pain or fever. This medicine is available with or without a doctor's order. NSAIDs can cause stomach bleeding or kidney problems in certain people. If you take blood thinner medicine, always ask your healthcare provider if NSAIDs are safe for you. Always read the medicine label and follow directions.     • Prescription pain medicine  may be given. Ask your healthcare provider how to take this medicine safely.  Some prescription pain medicines contain acetaminophen. Do not take other medicines that contain acetaminophen without talking to your healthcare provider. Too much acetaminophen may cause liver damage. Prescription pain medicine may cause constipation. Ask your healthcare provider how to prevent or treat constipation.      • Take your medicine as directed. Contact your healthcare provider if you think your medicine is not helping or if you have side effects. Tell your provider if you are allergic to any medicine. Keep a list of the medicines, vitamins, and herbs you take. Include the amounts, and when and why you take them. Bring the list or the pill bottles to follow-up visits. Carry your medicine list with you in case of an emergency.     Help a contusion heal:   • Rest the injured area  or use it less than usual. If you bruised your leg or foot, you may need crutches or a cane to help you walk. This will help you keep weight off your injured body part.      • Apply ice  to decrease swelling and pain. Ice may also help prevent tissue damage. Use an ice pack, or put crushed ice in a plastic bag. Cover it with a towel and place it on your bruise for 15 to 20 minutes every hour or as directed.     • Use compression  to support the area and decrease swelling. Wrap an elastic bandage around the area over the bruised muscle. Make sure the bandage is not too tight. You should be able to fit 1 finger between the bandage and your skin.     • Elevate (raise) your injured body part  above the level of your heart to help decrease pain and swelling. Use pillows, blankets, or rolled towels to elevate the area as often as you can.     • Do not drink alcohol  as directed. Alcohol may slow healing.     • Do not stretch injured muscles  right after your injury. Ask your healthcare provider when and how you may safely stretch after your injury.  Gentle stretches can help increase your flexibility.     • Do not massage the area or put heating pads on the bruise right after your injury. Heat and massage may slow healing. Your healthcare provider may tell you to apply heat after several days. At that time, heat will start to help the injury heal.     Prevent another contusion:   • Stretch and warm up before you play sports or exercise.     • Wear protective gear when you play sports. Examples are shin guards and padding.      • If you begin a new physical activity, start slowly to give your body a chance to adjust.     Follow up with your doctor as directed:  Write down your questions so you remember to ask them during your visits. © Copyright [employer] 2022 Information is for End User's use only and may not be sold, redistributed or otherwise used for commercial purposes. The above information is an  only. It is not intended as medical advice for individual conditions or treatments. Talk to your doctor, nurse or pharmacist before following any medical regimen to see if it is safe and effective for you.       Follow up with PCP in 3-5 days. Proceed to  ER if symptoms worsen. Chief Complaint     Chief Complaint   Patient presents with   • Foot Injury     Patient licked an object 3 days ago and now having foot pain up her leg and calf pain- limping when she walks         History of Present Illness       48year old female with no significant PMH presents for evaluation of right foot pain over the last 3 days after kicking an ottoman in her living room. She denies numbness, tingling, swelling. She does note occasional pain which radiates up into her calf as well as discoloration which she noticed today. She  Has been applying ice and taking Advil with little relief. She denies numbness, tingling, ankle pain. Review of Systems   Review of Systems   Constitutional: Negative for fatigue and fever. HENT: Negative for congestion, ear discharge, ear pain, postnasal drip, rhinorrhea, sinus pressure, sinus pain, sneezing and sore throat. Eyes: Negative. Negative for pain, discharge, redness and itching. Respiratory: Negative. Negative for apnea, cough, choking, chest tightness, shortness of breath, wheezing and stridor. Cardiovascular: Negative. Negative for chest pain and palpitations. Gastrointestinal: Negative. Negative for diarrhea, nausea and vomiting. Endocrine: Negative. Negative for polydipsia, polyphagia and polyuria. Genitourinary: Negative. Negative for decreased urine volume and flank pain. Musculoskeletal: Positive for arthralgias and gait problem. Negative for back pain, joint swelling, myalgias, neck pain and neck stiffness. Skin: Negative. Negative for color change and rash. Allergic/Immunologic: Negative. Negative for environmental allergies. Neurological: Negative for dizziness, facial asymmetry, light-headedness, numbness and headaches. Hematological: Negative. Negative for adenopathy. Psychiatric/Behavioral: Negative. Current Medications       Current Outpatient Medications:   •  albuterol (Proventil HFA) 90 mcg/act inhaler, Inhale 2 puffs every 6 (six) hours as needed for wheezing, Disp: 6.7 g, Rfl: 0  •  Ascorbic Acid (Vitamin C) 500 MG CAPS, Take 1 each by mouth daily, Disp: , Rfl:   •  benzonatate (TESSALON) 200 MG capsule, Take 200 mg by mouth 3 (three) times a day as needed for cough Patient states took one.  (Patient not taking: Reported on 6/29/2023), Disp: , Rfl:   •  cetirizine (ZyrTEC) 10 MG chewable tablet, Chew 10 mg daily, Disp: , Rfl:   •  ECHINACEA PO, Take by mouth daily, Disp: , Rfl:   •  ELDERBERRY PO, Take 1 tablet by mouth daily as needed Daily every morning, Disp: , Rfl:   •  famotidine (PEPCID) 20 mg tablet, TAKE 1 TABLET BY MOUTH TWICE A DAY, Disp: 180 tablet, Rfl: 1  •  fluticasone (FLONASE) 50 mcg/act nasal spray, SPRAY 1 SPRAY INTO EACH NOSTRIL EVERY DAY, Disp: 24 mL, Rfl: 3  •  ondansetron (ZOFRAN) 4 mg tablet, Take 1 tablet (4 mg total) by mouth every 8 (eight) hours as needed for nausea or vomiting, Disp: 60 tablet, Rfl: 3  •  ondansetron (ZOFRAN) 4 mg tablet, Take 1 tablet (4 mg total) by mouth every 8 (eight) hours as needed for nausea or vomiting (Patient not taking: Reported on 6/29/2023), Disp: 30 tablet, Rfl: 3  •  Synthroid 75 MCG tablet, Take 75 mcg by mouth daily Except sundays, Disp: , Rfl:   •  topiramate (TOPAMAX) 200 MG tablet, TAKE 1 TABLET BY MOUTH TWICE A DAY, Disp: 180 tablet, Rfl: 3  •  Vitamin D, Cholecalciferol, 25 MCG (1000 UT) CAPS, Take 1 tablet by mouth in the morning, Disp: , Rfl:   •  Zinc 50 MG CAPS, Take 1 capsule by mouth daily, Disp: , Rfl:     Current Allergies     Allergies as of 08/06/2023 - Reviewed 08/06/2023   Allergen Reaction Noted   • Morphine Other (See Comments) 07/22/2019   • Codeine Hives 11/14/2019   • Morphine and related  11/14/2019   • Codeine Itching 10/06/2016   • Sulfa antibiotics Rash 05/24/2016            The following portions of the patient's history were reviewed and updated as appropriate: allergies, current medications, past family history, past medical history, past social history, past surgical history and problem list.     Past Medical History:   Diagnosis Date   • Arthritis    • Asthma    • Disease of thyroid gland    • Gastroparesis    • Hypokalemia 09/09/2022   • Hypoparathyroidism ([address])    • Nephrolithiasis 07/22/2019   • Osteoporosis    • Partial symptompatic epilepsy    • Seizure disorder [facility])        Past Surgical History:   Procedure Laterality Date   • APPENDECTOMY     • COLONOSCOPY     • EGD     • FRACTURE SURGERY Right     Knee   • HYSTERECTOMY     • OTHER SURGICAL HISTORY      left foot, rt knee, tonselectomy   • SINUS SURGERY     • TONSILLECTOMY         Family History   Problem Relation Age of Onset   • Alcohol abuse Mother    • RUSSELL disease Mother    • Boles's esophagus Mother    • Migraines Father    • Prostate cancer Father    • Cancer Father    • Alcohol abuse Maternal Uncle    • Seizures Paternal Aunt    • Lung cancer Paternal Aunt    • Prostate cancer Paternal Uncle    • Colon cancer Paternal Uncle          Medications have been verified. Objective   BP (!) 184/86 (BP Location: Right arm, Patient Position: Sitting, Cuff Size: Standard)   Pulse 60   Temp 98.6 °F (37 °C)   Resp 18   SpO2 98%        Physical Exam     Physical Exam  Vitals and nursing note reviewed. Constitutional:       General: She is not in acute distress. Appearance: Normal appearance. She is not ill-appearing, toxic-appearing or diaphoretic. HENT:      Head: Normocephalic and atraumatic. Left Ear: External ear normal.      Nose: Nose normal. No congestion or rhinorrhea. Mouth/Throat:      Mouth: Mucous membranes are moist.   Eyes:      Extraocular Movements: Extraocular movements intact. Conjunctiva/sclera: Conjunctivae normal.      Pupils: Pupils are equal, round, and reactive to light. Cardiovascular:      Rate and Rhythm: Normal rate and regular rhythm. Pulses: Normal pulses. Dorsalis pedis pulses are 2+ on the right side. Heart sounds: Normal heart sounds. No murmur heard. No friction rub. No gallop. Pulmonary:      Effort: Pulmonary effort is normal. No respiratory distress. Breath sounds: Normal breath sounds. No stridor. No wheezing, rhonchi or rales. Abdominal:      General: Bowel sounds are normal.      Palpations: Abdomen is soft. Tenderness: There is no abdominal tenderness. There is no guarding or rebound. Musculoskeletal:         General: Normal range of motion. Cervical back: Normal range of motion and neck supple. No tenderness. Right foot: Normal range of motion. No deformity, bunion, Charcot foot, foot drop or prominent metatarsal heads. Feet:    Feet:      Right foot:      Skin integrity: Skin integrity normal.      Comments: (+) generalized TTP over distal dorsum of right foot.   Mild erythema of entire dorsum of right foot without ecchymosis or swelling. Skin:     General: Skin is warm and dry. Capillary Refill: Capillary refill takes less than 2 seconds. Neurological:      General: No focal deficit present. Mental Status: She is alert and oriented to person, place, and time. Cranial Nerves: No cranial nerve deficit.    Psychiatric:         Mood and Affect: Mood normal.         Behavior: Behavior normal.

## 2023-08-06 NOTE — PATIENT INSTRUCTIONS
X-rays reviewed, no acute abnormality noted, awaiting official read. Rest, ice, compress and elevate and continue to alternate Tylenol and Ibuprofen for pain. Follow up with PCP if no improvement within 1 week.

## 2023-08-09 ENCOUNTER — OFFICE VISIT (OUTPATIENT)
Dept: INTERNAL MEDICINE CLINIC | Facility: CLINIC | Age: 53
End: 2023-08-09
Payer: COMMERCIAL

## 2023-08-09 VITALS
HEIGHT: 62 IN | SYSTOLIC BLOOD PRESSURE: 124 MMHG | OXYGEN SATURATION: 97 % | BODY MASS INDEX: 20.35 KG/M2 | WEIGHT: 110.6 LBS | DIASTOLIC BLOOD PRESSURE: 72 MMHG | HEART RATE: 71 BPM

## 2023-08-09 DIAGNOSIS — L03.116 CELLULITIS OF LEFT LOWER EXTREMITY: Primary | ICD-10-CM
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
## 2023-08-09 DIAGNOSIS — S80.862A INSECT BITE OF LEFT LOWER LEG, INITIAL ENCOUNTER: ICD-10-CM

## 2023-08-09 DIAGNOSIS — R05.9 COUGH, UNSPECIFIED TYPE: ICD-10-CM

## 2023-08-09 PROCEDURE — 99213 OFFICE O/P EST LOW 20 MIN: CPT | Performed by: INTERNAL MEDICINE

## 2023-08-09 RX ORDER — CEPHALEXIN 500 MG/1
500 CAPSULE ORAL EVERY 6 HOURS SCHEDULED
Qty: 28 CAPSULE | Refills: 0 | Status: SHIPPED | OUTPATIENT
Start: 2023-08-09 | End: 2023-08-16

## 2023-08-09 RX ORDER — ALBUTEROL SULFATE 90 UG/1
AEROSOL, METERED RESPIRATORY (INHALATION)
Qty: 6.7 G | Refills: 0 | Status: SHIPPED | OUTPATIENT
Start: 2023-08-09

## 2023-08-11 ENCOUNTER — TELEPHONE (OUTPATIENT)
Dept: NEUROLOGY | Facility: CLINIC | Age: 53
End: 2023-08-11

## 2023-08-12 ENCOUNTER — LAB (OUTPATIENT)
Dept: LAB | Facility: CLINIC | Age: 53
End: 2023-08-12
Payer: COMMERCIAL
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
## 2023-08-12 DIAGNOSIS — S80.862A INSECT BITE OF LEFT LOWER LEG, INITIAL ENCOUNTER: ICD-10-CM

## 2023-08-12 LAB — B BURGDOR IGG+IGM SER QL IA: NEGATIVE

## 2023-08-12 PROCEDURE — 36415 COLL VENOUS BLD VENIPUNCTURE: CPT

## 2023-08-12 PROCEDURE — 86618 LYME DISEASE ANTIBODY: CPT
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
## 2023-08-14 PROBLEM — L03.116 CELLULITIS OF LEFT LOWER EXTREMITY: Status: ACTIVE | Noted: 2023-08-14

## 2023-08-14 PROBLEM — S80.862A INSECT BITE OF LEFT LOWER LEG: Status: ACTIVE | Noted: 2023-08-14

## 2023-08-14 NOTE — ASSESSMENT & PLAN NOTE
Likely insect bite cellulitis Rx for Keflex 500 mg 4 times daily x7 days keep area clean and dry wash with antibacterial soap monitor the line of demarcation if increasing erythema streaks fevers or chills please notify us immediately we will check a Lyme titer RTO as scheduled call if any problems

## 2023-08-14 NOTE — PROGRESS NOTES
Assessment/Plan:    Cellulitis of left lower extremity  Likely insect bite cellulitis Rx for Keflex 500 mg 4 times daily x7 days keep area clean and dry wash with antibacterial soap monitor the line of demarcation if increasing erythema streaks fevers or chills please notify us immediately we will check a Lyme titer RTO as scheduled call if any problems         Problem List Items Addressed This Visit        Musculoskeletal and Integument    Insect bite of left lower leg    Relevant Orders    Lyme Total AB W Reflex to IGM/IGG (Completed)       Other    Cellulitis of left lower extremity - Primary     Likely insect bite cellulitis Rx for Keflex 500 mg 4 times daily x7 days keep area clean and dry wash with antibacterial soap monitor the line of demarcation if increasing erythema streaks fevers or chills please notify us immediately we will check a Lyme titer RTO as scheduled call if any problems         Relevant Medications    cephalexin (KEFLEX) 500 mg capsule         Subjective:      Patient ID: Mone Barger is a 48 y.o. female. HPI 72-year-old female chief complaint of a bug bite possibly a spider Saturday night the area is red warm and tender mildly hard to touch. No fever no chills no pus no abscess formation no tick noted    The following portions of the patient's history were reviewed and updated as appropriate: allergies, current medications, past family history, past medical history, past social history, past surgical history and problem list.    Review of Systems   Constitutional: Negative for chills and fever. Respiratory: Negative for shortness of breath. Cardiovascular: Negative for chest pain. Gastrointestinal: Negative for diarrhea. Skin: Positive for color change and rash. Objective:    Return if symptoms worsen or fail to improve, for Next scheduled follow up.     Procedure: XR foot 3+ vw right    Result Date: 8/7/2023  Narrative: RIGHT FOOT INDICATION:   H16.425A: Unspecified injury of right foot, initial encounter. COMPARISON:  None VIEWS:  XR FOOT 3+ VW RIGHT Images: 3 FINDINGS: There is no acute fracture or dislocation. Arthritic changes at the first metatarsal phalangeal joint and also at the third metatarsal phalangeal joint with some flattening of the head of the third metatarsal. No lytic or blastic osseous lesion. Soft tissues are unremarkable. Impression: No acute osseous abnormality. Degenerative changes. Workstation performed: [id number]     Procedure: MRI brain with and without contrast    Result Date: 7/26/2023  Narrative: MRI BRAIN WITH AND WITHOUT CONTRAST INDICATION: R68.89: Other general symptoms and signs R51.9: Headache, unspecified. COMPARISON: MRI brain with and without contrast 10/17/2018, 10/6/2014, 4/30/2007. CT head without contrast 8/6/2008. TECHNIQUE: Multiplanar, multisequence imaging of the brain was performed before and after gadolinium administration. IV Contrast:  5 mL of Gadobutrol injection (SINGLE-DOSE) IMAGE QUALITY:   Diagnostic. FINDINGS: BRAIN PARENCHYMA:  There is no discrete mass, mass effect or midline shift. There is no intracranial hemorrhage. Normal posterior fossa. Diffusion imaging is unremarkable. No acute infarction. Unchanged small pineal cyst. There are no white matter changes in the cerebral hemispheres. Postcontrast imaging of the brain demonstrates no abnormal enhancement. 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 VENTRICLES:  Normal for the patient's age. SELLA AND PITUITARY GLAND:  Normal. ORBITS:  Normal. PARANASAL SINUSES:  Normal. VASCULATURE:  Evaluation of the major intracranial vasculature demonstrates appropriate flow voids.  CALVARIUM AND SKULL BASE:  Normal. EXTRACRANIAL SOFT TISSUES:  Normal.     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 Unchanged small pineal cyst. Otherwise, normal MRI of brain.  Workstation performed: [id number]          Allergies   Allergen Reactions   • Morphine Other (See Comments)   • Codeine Hives   • Morphine And Related    • Codeine Itching   • Sulfa Antibiotics Rash       Past Medical History:   Diagnosis Date   • Arthritis    • Asthma    • Disease of thyroid gland    • Gastroparesis    • Hypokalemia 09/09/2022   • Hypoparathyroidism ([address])    • Nephrolithiasis 07/22/2019   • Osteoporosis    • Partial symptompatic epilepsy    • Seizure disorder [facility])      Past Surgical History:   Procedure Laterality Date   • APPENDECTOMY     • COLONOSCOPY     • EGD     • FRACTURE SURGERY Right     Knee   • HYSTERECTOMY     • OTHER SURGICAL HISTORY      left foot, rt knee, tonselectomy   • SINUS SURGERY     • TONSILLECTOMY       Current Outpatient Medications on File Prior to Visit   Medication Sig Dispense Refill   • albuterol (PROVENTIL HFA,VENTOLIN HFA) 90 mcg/act inhaler INHALE 2 PUFFS EVERY 6 HOURS AS NEEDED FOR WHEEZING 6.7 g 0   • Ascorbic Acid (Vitamin C) 500 MG CAPS Take 1 each by mouth daily     • cetirizine (ZyrTEC) 10 MG chewable tablet Chew 10 mg daily     • ECHINACEA PO Take by mouth daily     • ELDERBERRY PO Take 1 tablet by mouth daily as needed Daily every morning     • famotidine (PEPCID) 20 mg tablet TAKE 1 TABLET BY MOUTH TWICE A  tablet 1   • fluticasone (FLONASE) 50 mcg/act nasal spray SPRAY 1 SPRAY INTO EACH NOSTRIL EVERY DAY 24 mL 3   • ondansetron (ZOFRAN) 4 mg tablet Take 1 tablet (4 mg total) by mouth every 8 (eight) hours as needed for nausea or vomiting 60 tablet 3   • Synthroid 75 MCG tablet Take 75 mcg by mouth daily Except sundays     • topiramate (TOPAMAX) 200 MG tablet TAKE 1 TABLET BY MOUTH TWICE A  tablet 3   • Vitamin D, Cholecalciferol, 25 MCG (1000 UT) CAPS Take 1 tablet by mouth in the morning     • Zinc 50 MG CAPS Take 1 capsule by mouth daily     • benzonatate (TESSALON) 200 MG capsule Take 200 mg by mouth 3 (three) times a day as needed for cough Patient states took one. (Patient not taking: Reported on 6/29/2023)     • ondansetron (ZOFRAN) 4 mg tablet Take 1 tablet (4 mg total) by mouth every 8 (eight) hours as needed for nausea or vomiting (Patient not taking: Reported on 6/29/2023) 30 tablet 3     No current facility-administered medications on file prior to visit. Family History   Problem Relation Age of Onset   • Alcohol abuse Mother    • RUSSELL disease Mother    • Boles's esophagus Mother    • Migraines Father    • Prostate cancer Father    • Cancer Father    • Alcohol abuse Maternal Uncle    • Seizures Paternal Aunt    • Lung cancer Paternal Aunt    • Prostate cancer Paternal Uncle    • Colon cancer Paternal Uncle      Social History     Socioeconomic History   • Marital status: Single     Spouse name: Not on file   • Number of children: Not on file   • Years of education: Not on file   • Highest education level: Not on file   Occupational History   • Not on file   Tobacco Use   • Smoking status: Never   • Smokeless tobacco: Never   Vaping Use   • Vaping Use: Never used   Substance and Sexual Activity   • Alcohol use: Not Currently   • Drug use: Not Currently     Comment: social   • Sexual activity: Not Currently   Other Topics Concern   • Not on file   Social History Narrative    ** Merged History Encounter **          Social Determinants of Health     Financial Resource Strain: Not on file   Food Insecurity: No Food Insecurity (11/8/2021)    Hunger Vital Sign    • Worried About Running Out of Food in the Last Year: Never true    • Ran Out of Food in the Last Year: Never true   Transportation Needs: No Transportation Needs (11/8/2021)    PRAPARE - Transportation    • Lack of Transportation (Medical): No    • Lack of Transportation (Non-Medical):  No   Physical Activity: Not on file   Stress: Not on file   Social Connections: Not on file   Intimate Partner Violence: Not on file   Housing Stability: Not on file     Vitals:    08/09/23 1555   BP: 124/72   BP Location: Left arm   Patient Position: Sitting   Cuff Size: Standard   Pulse: 71   SpO2: 97%   Weight: 50.2 kg (110 lb 9.6 oz)   Height: 5' 2" (1.575 m)     Results for orders placed or performed in visit on 07/01/23   Hepatitis C RNA, quantitative, PCR   Result Value Ref Range    HCV PCR Quantitative HCV Not Detected IU/mL    Test Information Comment    Sedimentation rate, automated   Result Value Ref Range    Sed Rate 6 0 - 29 mm/hour   C-reactive protein   Result Value Ref Range    CRP <3.0 <3.0 mg/L     Weight (last 2 days)     None        Body mass index is 20.23 kg/m². BP      Temp      Pulse     Resp      SpO2        Vitals:    08/09/23 1555   Weight: 50.2 kg (110 lb 9.6 oz)     Vitals:    08/09/23 1555   Weight: 50.2 kg (110 lb 9.6 oz)       /72 (BP Location: Left arm, Patient Position: Sitting, Cuff Size: Standard)   Pulse 71   Ht 5' 2" (1.575 m)   Wt 50.2 kg (110 lb 9.6 oz)   SpO2 97%   BMI 20.23 kg/m²          Physical Exam  Constitutional:       General: She is not in acute distress. Appearance: Normal appearance. She is well-developed. She is not ill-appearing, toxic-appearing or diaphoretic. HENT:      Head: Normocephalic. Eyes:      General: No scleral icterus. Right eye: No discharge. Left eye: No discharge. Conjunctiva/sclera: Conjunctivae normal.      Pupils: Pupils are equal, round, and reactive to light. Cardiovascular:      Rate and Rhythm: Normal rate and regular rhythm. Heart sounds: Normal heart sounds. No murmur heard. No friction rub. No gallop. Pulmonary:      Effort: No respiratory distress. Breath sounds: Normal breath sounds. No wheezing or rales. Musculoskeletal:      Cervical back: Neck supple. Lymphadenopathy:      Cervical: No cervical adenopathy. Neurological:      Mental Status: She is alert.       Coordination: Coordination normal. Round 2 cm erythematous region left lower extremity no abscess minimally tender warm to touch no streaking

## 2023-08-21 ENCOUNTER — TELEPHONE (OUTPATIENT)
Dept: OTHER | Facility: OTHER | Age: 53
End: 2023-08-21

## 2023-08-21 NOTE — TELEPHONE ENCOUNTER
PT. Called in to cancel her appointment for 8/25 due to not driving to [place]. PT is requesting a call back if someone cancels tomorrow at the [facility] location. Please call PT to reschedule.

## 2023-08-22 ENCOUNTER — OFFICE VISIT (OUTPATIENT)
Dept: NEUROLOGY | Facility: CLINIC | Age: 53
End: 2023-08-22
Payer: COMMERCIAL

## 2023-08-22 VITALS
SYSTOLIC BLOOD PRESSURE: 136 MMHG | HEART RATE: 67 BPM | OXYGEN SATURATION: 99 % | WEIGHT: 108.4 LBS | BODY MASS INDEX: 19.95 KG/M2 | HEIGHT: 62 IN | DIASTOLIC BLOOD PRESSURE: 80 MMHG | TEMPERATURE: 98 F

## 2023-08-22 DIAGNOSIS — R20.0 NUMBNESS AND TINGLING OF BOTH LOWER EXTREMITIES: ICD-10-CM

## 2023-08-22 DIAGNOSIS — R20.2 NUMBNESS AND TINGLING OF BOTH LOWER EXTREMITIES: ICD-10-CM

## 2023-08-22 DIAGNOSIS — R25.2 CRAMPS OF LOWER EXTREMITY: ICD-10-CM

## 2023-08-22 DIAGNOSIS — R51.9 HEADACHE: ICD-10-CM

## 2023-08-22 DIAGNOSIS — M54.9 BACK PAIN: ICD-10-CM

## 2023-08-22 DIAGNOSIS — G40.209 PARTIAL SYMPTOMATIC EPILEPSY WITH COMPLEX PARTIAL SEIZURES, NOT INTRACTABLE, WITHOUT STATUS EPILEPTICUS (HCC): Primary | ICD-10-CM

## 2023-08-22 PROCEDURE — 99215 OFFICE O/P EST HI 40 MIN: CPT | Performed by: PSYCHIATRY & NEUROLOGY

## 2023-08-22 NOTE — PATIENT INSTRUCTIONS
Continue Topiramate 200mg twice daily for seizure management. Recommend B-complex supplement (including vitamin B12), which can be purchased over the counter. May help with leg numbness/tingling and cramps. Recommend Diclofenac gel 1% for your back pain, which can be purchased over the counter. If your back pain does not improve in 4-6 weeks, we recommend reaching out to your PCP for further evaluation and treatment including consideration for physical therapy. Let us know if you would like to reconsider Nurtec or Ubrelvy for your headaches. Recommend maintaining a headache journal: including severity, frequency, possible triggers. Recommend regular stretching and strength activity for your back and legs. Follow up in 6 months.
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
## 2023-08-28 PROBLEM — R05.9 COUGH: Status: RESOLVED | Noted: 2023-06-29 | Resolved: 2023-08-28

## 2023-09-01 ENCOUNTER — TELEPHONE (OUTPATIENT)
Dept: NEUROLOGY | Facility: CLINIC | Age: 53
End: 2023-09-01

## 2023-09-01 NOTE — TELEPHONE ENCOUNTER
received vm from 8/31 at 3:02pm-Yeah, hello, my name is jolene ball i'm a patient of Dr. Vicente barrow. I was wondering if Dr. barrow or roland could please give me a call back regarding the cramping in my feet, and I was advised to take magnesium oxide and I've been taking that every day. I've been taking it during the day. I have also started taking the B12, which was I was under the impression it's supposed to help with energy. I've taken that for 3 days now. And I'm having cramping not only in my feet at night, but now I've had increased cramping in my feet during the day between 2 and 3 o'clock in the afternoon. So I was wondering if someone could please give me a call back again. Jolene ball date of birth, [birth year]. My phone number is [phone]. Thanks  -----------------------------------------------------  Called pt.  Having cramping in her feet during the day which is new.  This started about 5 days, and occurring everyday.  She is taking mag in the morning and in between 2-3 pm, she is having cramping.  It lasts for about 2 hours.    At night, the cramping hasn't been bother her,  She still has them at night but not as bad.   Mag 400mg daily  b12-she thinks that they are 1000mg-1 tab daily.    Please advise  [phone] to leave detailed message

## 2023-09-22 ENCOUNTER — TELEPHONE (OUTPATIENT)
Dept: NEUROLOGY | Facility: CLINIC | Age: 53
End: 2023-09-22

## 2023-09-22 DIAGNOSIS — G43.909 MIGRAINE WITHOUT STATUS MIGRAINOSUS, NOT INTRACTABLE, UNSPECIFIED MIGRAINE TYPE: Primary | ICD-10-CM

## 2023-09-22 RX ORDER — DIVALPROEX SODIUM 500 MG/1
500 TABLET, DELAYED RELEASE ORAL DAILY
Qty: 5 TABLET | Refills: 0 | Status: SHIPPED | OUTPATIENT
Start: 2023-09-22 | End: 2023-09-27

## 2023-09-22 RX ORDER — DEXAMETHASONE 2 MG/1
TABLET ORAL
Qty: 5 TABLET | Refills: 0 | Status: SHIPPED | OUTPATIENT
Start: 2023-09-22

## 2023-09-22 RX ORDER — RIMEGEPANT SULFATE 75 MG/75MG
TABLET, ORALLY DISINTEGRATING ORAL
Qty: 8 TABLET | Refills: 3 | Status: SHIPPED | OUTPATIENT
Start: 2023-09-22

## 2023-09-22 RX ORDER — RIZATRIPTAN BENZOATE 10 MG/1
10 TABLET ORAL AS NEEDED
Qty: 9 TABLET | Refills: 2 | Status: SHIPPED | OUTPATIENT
Start: 2023-09-22 | End: 2023-09-22 | Stop reason: SINTOL

## 2023-09-22 NOTE — TELEPHONE ENCOUNTER
New rizatriptan Rx sent. If ineffective we can try another medication. She told me in the past that it did not help.

## 2023-09-22 NOTE — TELEPHONE ENCOUNTER
Seems to be a miscommunication - patient now does not want rizatriptan. Asking for an alternate medication that can be picked up immediately. Likely nurtec or ubrelvy will require PA. Would depakote or alternate triptan work? Patient sent [employer] message stating rizatriptan previously gave her chest heaviness.

## 2023-09-22 NOTE — TELEPHONE ENCOUNTER
----- Message from Orien Holter, RN sent at 9/22/2023  8:34 AM EDT -----  Regarding: FW: Migrain headache   Contact: [phone]    ----- Message -----  From: Kelsey Sims  Sent: 9/22/2023   6:28 AM EDT  To: [facility]  Subject: Migrain headache                                 I called last week no return call. But I am  upset because I called yesterday asking for someone to call me back because Wendsay night  into Thursday  I had a terrible migraine. I vomited three times during the night. I took two zolfran within an 6 hour period. Hoping I would stop vomiting. I had to call out of work for Thursday and today. I have no migraine medicine  my head hurts. And my doctor's office is not returning my calls.  Can someone from  office please call me

## 2023-09-22 NOTE — TELEPHONE ENCOUNTER
Spoke to patient. States headaches started on Wednesday. Throbbing pain above right eye, sharp pain to left temple area. Associated symptoms of nausea, vomiting and light sensitivity. Patient had to use FMLA days due to severity of headaches. Medications tried in the past: imitrex and rizatriptan. Patient needs refill of rizatriptan. (not wanting to do "new" medication at this time)    Jelani Fong.

## 2023-09-22 NOTE — TELEPHONE ENCOUNTER
Called patient. She ended up taking the Maxalt and headache improved. She felt heaviness in chest and arms after she took it initially. Dexamethasone and divalproex sent to her pharmacy in case the headache comes back and does not respond to her current medications. She also has Zofran. Nurtec sent as well, but that will require prior-auth.

## 2023-09-22 NOTE — TELEPHONE ENCOUNTER
received  from 9/21 at 2:31pm-Yes, this is Sarah Jacobs calling. I called last week as well, and nobody called me back. I need someone to please call me back today. I had a terrible migraine last night,  I threw up 3 times. I took 2 of my zofran. I even took them before the 8 hours was up, [place] I was throwing up so bad last night I thought it was gonna end up at the emergency room. I still have a headache today, its above my right eye. Earlier this morning it was on the left side of my temple. And now it's just above my r eye. please, I need someone to call me back. I don't have any migraine medicine. My number is [phone]. I just need someone to call me back so I can get some migraine medicine. Again, it's Sarah Jacobs birthday is [birth year]. And my phone number is [phone]. And don't wanna end up vomiting again tonight. Thank you. I'm a patient of Dr. Rae Johnston. Thank you.  --------------------------------------  Temo Copeland spoke to pt this am    Called pt.   Made her aware of dr Zheng Clark message

## 2023-10-10 NOTE — ASSESSMENT & PLAN NOTE
Lung exam okay, will check chest x-ray, will also check pulmonary function tests. Patient were to develop fevers, chills, pleuritic pain, then to reach out. Pulse ox is good at 98%.   Pt requesting short term use of rescue inhaler which she has used in the past.
Previously Declined (within the last year)

## 2023-10-13 ENCOUNTER — OFFICE VISIT (OUTPATIENT)
Dept: INTERNAL MEDICINE CLINIC | Facility: CLINIC | Age: 53
End: 2023-10-13
Payer: COMMERCIAL

## 2023-10-13 VITALS
OXYGEN SATURATION: 97 % | WEIGHT: 108.6 LBS | HEART RATE: 89 BPM | TEMPERATURE: 97.9 F | DIASTOLIC BLOOD PRESSURE: 64 MMHG | HEIGHT: 62 IN | RESPIRATION RATE: 16 BRPM | BODY MASS INDEX: 19.98 KG/M2 | SYSTOLIC BLOOD PRESSURE: 122 MMHG

## 2023-10-13 DIAGNOSIS — U07.1 COVID-19: ICD-10-CM

## 2023-10-13 DIAGNOSIS — J40 BRONCHITIS: Primary | ICD-10-CM

## 2023-10-13 LAB
SARS-COV-2 AG UPPER RESP QL IA: POSITIVE
VALID CONTROL: ABNORMAL

## 2023-10-13 PROCEDURE — 99213 OFFICE O/P EST LOW 20 MIN: CPT | Performed by: INTERNAL MEDICINE

## 2023-10-13 PROCEDURE — 87811 SARS-COV-2 COVID19 W/OPTIC: CPT | Performed by: INTERNAL MEDICINE

## 2023-10-13 RX ORDER — ZOLEDRONIC ACID 5 MG/100ML
INJECTION, SOLUTION INTRAVENOUS
COMMUNITY

## 2023-10-13 RX ORDER — LIDOCAINE 4 G/G
PATCH TOPICAL
COMMUNITY

## 2023-10-13 RX ORDER — LEVALBUTEROL TARTRATE 45 UG/1
1-2 AEROSOL, METERED ORAL EVERY 4 HOURS PRN
Qty: 15 G | Refills: 0 | Status: SHIPPED | OUTPATIENT
Start: 2023-10-13

## 2023-10-13 RX ORDER — BENZONATATE 200 MG/1
200 CAPSULE ORAL
Qty: 20 CAPSULE | Refills: 0 | Status: SHIPPED | OUTPATIENT
Start: 2023-10-13

## 2023-10-13 RX ORDER — NIRMATRELVIR AND RITONAVIR 300-100 MG
3 KIT ORAL 2 TIMES DAILY
Qty: 30 TABLET | Refills: 0 | Status: SHIPPED | OUTPATIENT
Start: 2023-10-13 | End: 2023-10-18

## 2023-10-13 RX ORDER — AZITHROMYCIN 250 MG/1
TABLET, FILM COATED ORAL
Qty: 6 TABLET | Refills: 0 | Status: SHIPPED | OUTPATIENT
Start: 2023-10-13 | End: 2023-10-13

## 2023-10-13 RX ORDER — RIZATRIPTAN BENZOATE 10 MG/1
TABLET ORAL
COMMUNITY
Start: 2023-09-22

## 2023-10-13 NOTE — PROGRESS NOTES
Assessment/Plan:    COVID-19  Patient's rapid COVID test is positive in the office Rx for paxlovid as directed, Tessalon Perles at bedtime as needed, Mucinex as directed. And Xopenex inhaler 1 to 2 puffs every 4-6 hours as needed wheezing or tightness; plenty fluids and rest at like her to do home quarantine for 5 days from symptom onset if feeling better she may leave the home wearing a body mass call if any change, worse or symptoms not marina with a shortness of breath or persistently high temperature to immediately to ER RTO as scheduled call if any problems         Problem List Items Addressed This Visit        Respiratory    Bronchitis - Primary    Relevant Medications    levalbuterol (Xopenex HFA) 45 mcg/act inhaler    benzonatate (TESSALON) 200 MG capsule    Other Relevant Orders    POCT Rapid Covid Ag (Completed)       Other    COVID-19     Patient's rapid COVID test is positive in the office Rx for paxlovid as directed, Tessalon Perles at bedtime as needed, Mucinex as directed. And Xopenex inhaler 1 to 2 puffs every 4-6 hours as needed wheezing or tightness; plenty fluids and rest at like her to do home quarantine for 5 days from symptom onset if feeling better she may leave the home wearing a body mass call if any change, worse or symptoms not marina with a shortness of breath or persistently high temperature to immediately to ER RTO as scheduled call if any problems         Relevant Medications    nirmatrelvir & ritonavir (Paxlovid, 300/100,) tablet therapy pack           Subjective:      Patient ID: Heather Castillo is a 48 y.o. female.     HPI 70-year-old female coming in with a chief complaint of cold symptoms; she reports me her symptoms comprise a no fever no chills no shortness of breath she was exposed to a sick passenger on the plane st , cough tickle  non prod , no temp 3 day, able to move neck neck sore hair cut , ear pain; mucinex who syncopized and appear to be ill    The following portions of the patient's history were reviewed and updated as appropriate: allergies, current medications, past family history, past medical history, past social history, past surgical history and problem list.    Review of Systems   Constitutional:  Negative for appetite change, chills and fever. HENT:  Positive for congestion and sore throat. Negative for ear pain, rhinorrhea, sinus pressure and sinus pain. Eyes:  Negative for itching. Respiratory:  Positive for cough. Negative for shortness of breath and wheezing. Gastrointestinal:  Negative for diarrhea, nausea and vomiting. Neurological:  Negative for headaches. Objective:    Return if symptoms worsen or fail to improve, for Next scheduled follow up. No results found.       Allergies   Allergen Reactions   • Morphine Other (See Comments)   • Codeine Hives   • Morphine And Related    • Codeine Itching   • Sulfa Antibiotics Rash       Past Medical History:   Diagnosis Date   • Arthritis    • Asthma    • Disease of thyroid gland    • Gastroparesis    • Hypokalemia 09/09/2022   • Hypoparathyroidism ([address])    • Nephrolithiasis 07/22/2019   • Osteoporosis    • Partial symptompatic epilepsy    • Seizure disorder [facility])      Past Surgical History:   Procedure Laterality Date   • APPENDECTOMY     • COLONOSCOPY     • EGD     • FRACTURE SURGERY Right     Knee   • HYSTERECTOMY     • OTHER SURGICAL HISTORY      left foot, rt knee, tonselectomy   • SINUS SURGERY     • TONSILLECTOMY       Current Outpatient Medications on File Prior to Visit   Medication Sig Dispense Refill   • albuterol (PROVENTIL HFA,VENTOLIN HFA) 90 mcg/act inhaler INHALE 2 PUFFS EVERY 6 HOURS AS NEEDED FOR WHEEZING 6.7 g 0   • Ascorbic Acid (Vitamin C) 500 MG CAPS Take 1 each by mouth daily     • cetirizine (ZyrTEC) 10 MG chewable tablet Chew 10 mg daily     • dexamethasone (DECADRON) 2 mg tablet One tab with breakfast for 1-5 days for unrelenting migraine 5 tablet 0   • ECHINACEA PO Take by mouth daily     • ELDERBERRY PO Take 1 tablet by mouth daily as needed Daily every morning     • famotidine (PEPCID) 20 mg tablet TAKE 1 TABLET BY MOUTH TWICE A  tablet 1   • fluticasone (FLONASE) 50 mcg/act nasal spray SPRAY 1 SPRAY INTO EACH NOSTRIL EVERY DAY 24 mL 3   • Lidocaine 4 % PTCH as needed     • ondansetron (ZOFRAN) 4 mg tablet Take 1 tablet (4 mg total) by mouth every 8 (eight) hours as needed for nausea or vomiting 60 tablet 3   • ondansetron (ZOFRAN) 4 mg tablet Take 1 tablet (4 mg total) by mouth every 8 (eight) hours as needed for nausea or vomiting 30 tablet 3   • rimegepant sulfate (Nurtec) 75 mg TBDP Take one NURTEC 75 mg at onset under tongue. Limit 1 in 24 hours. 8 a month. 8 tablet 3   • rizatriptan (MAXALT) 10 mg tablet      • Synthroid 75 MCG tablet Take 75 mcg by mouth daily Except sundays     • topiramate (TOPAMAX) 200 MG tablet TAKE 1 TABLET BY MOUTH TWICE A  tablet 3   • Vitamin D, Cholecalciferol, 25 MCG (1000 UT) CAPS Take 1 tablet by mouth in the morning     • Zinc 50 MG CAPS Take 1 capsule by mouth daily     • zoledronic acid (Reclast) 5 mg/100 mL IV infusion (premix)      • divalproex sodium (Depakote) 500 mg DR tablet Take 1 tablet (500 mg total) by mouth in the morning for 5 days 5 tablet 0     No current facility-administered medications on file prior to visit.      Family History   Problem Relation Age of Onset   • Alcohol abuse Mother    • RUSSELL disease Mother    • Boles's esophagus Mother    • Migraines Father    • Prostate cancer Father    • Cancer Father    • Alcohol abuse Maternal Uncle    • Seizures Paternal Aunt    • Lung cancer Paternal Aunt    • Prostate cancer Paternal Uncle    • Colon cancer Paternal Uncle      Social History     Socioeconomic History   • Marital status: Single     Spouse name: Not on file   • Number of children: Not on file   • Years of education: Not on file   • Highest education level: Not on file   Occupational History   • Not on file Tobacco Use   • Smoking status: Never   • Smokeless tobacco: Never   Vaping Use   • Vaping Use: Never used   Substance and Sexual Activity   • Alcohol use: Not Currently   • Drug use: Not Currently     Comment: social   • Sexual activity: Not Currently   Other Topics Concern   • Not on file   Social History Narrative    ** Merged History Encounter **          Social Determinants of Health     Financial Resource Strain: Not on file   Food Insecurity: No Food Insecurity (11/8/2021)    Hunger Vital Sign    • Worried About Running Out of Food in the Last Year: Never true    • Ran Out of Food in the Last Year: Never true   Transportation Needs: No Transportation Needs (11/8/2021)    PRAPARE - Transportation    • Lack of Transportation (Medical): No    • Lack of Transportation (Non-Medical): No   Physical Activity: Not on file   Stress: Not on file   Social Connections: Not on file   Intimate Partner Violence: Not on file   Housing Stability: Not on file     Vitals:    10/13/23 1131   BP: 122/64   Pulse: 89   Resp: 16   Temp: 97.9 °F (36.6 °C)   TempSrc: Tympanic   SpO2: 97%   Weight: 49.3 kg (108 lb 9.6 oz)   Height: 5' 2" (1.575 m)     Results for orders placed or performed in visit on 10/13/23   POCT Rapid Covid Ag   Result Value Ref Range    POCT SARS-CoV-2 Ag Positive (A) Negative    VALID CONTROL Valid      Weight (last 2 days)     Date/Time Weight    10/13/23 1131 49.3 (108.6)        Body mass index is 19.86 kg/m². BP      Temp      Pulse     Resp      SpO2        Vitals:    10/13/23 1131   Weight: 49.3 kg (108 lb 9.6 oz)     Vitals:    10/13/23 1131   Weight: 49.3 kg (108 lb 9.6 oz)       /64   Pulse 89   Temp 97.9 °F (36.6 °C) (Tympanic)   Resp 16   Ht 5' 2" (1.575 m)   Wt 49.3 kg (108 lb 9.6 oz)   SpO2 97%   BMI 19.86 kg/m²          Physical Exam  Constitutional:       General: She is not in acute distress. Appearance: She is well-developed.  She is not ill-appearing, toxic-appearing or diaphoretic. HENT:      Head: Normocephalic and atraumatic. Right Ear: Tympanic membrane and external ear normal. Tympanic membrane is not erythematous. Left Ear: Tympanic membrane and external ear normal. Tympanic membrane is not erythematous. Nose: Congestion and rhinorrhea present. Mouth/Throat:      Mouth: Mucous membranes are moist. No oral lesions. Pharynx: Posterior oropharyngeal erythema present. No pharyngeal swelling or oropharyngeal exudate. Eyes:      General: No scleral icterus. Right eye: No discharge. Left eye: No discharge. Conjunctiva/sclera: Conjunctivae normal.      Pupils: Pupils are equal, round, and reactive to light. Cardiovascular:      Heart sounds: Normal heart sounds. No murmur heard. Pulmonary:      Effort: No respiratory distress. Breath sounds: No wheezing or rales. Lymphadenopathy:      Cervical: No cervical adenopathy. Neurological:      Mental Status: She is alert.

## 2023-10-13 NOTE — LETTER
October 13, 2023    Patient: Heather Casitllo  YOB: 1970  Date of Last Encounter: 8/9/2023      To whom it may concern:     Heather Castillo has tested positive for COVID-19 (Coronavirus). She may return to work on 10/15/23, which is 5 days from illness onset (provided symptoms are improving) and 24 hours without fever.     Sincerely,         Shun aJy, DO

## 2023-10-14 PROBLEM — U07.1 COVID-19: Status: ACTIVE | Noted: 2023-10-14

## 2023-10-15 NOTE — ASSESSMENT & PLAN NOTE
Patient's rapid COVID test is positive in the office Rx for paxlovid as directed, Tessalon Perles at bedtime as needed, Mucinex as directed.   And Xopenex inhaler 1 to 2 puffs every 4-6 hours as needed wheezing or tightness; plenty fluids and rest at like her to do home quarantine for 5 days from symptom onset if feeling better she may leave the home wearing a body mass call if any change, worse or symptoms not marina with a shortness of breath or persistently high temperature to immediately to ER RTO as scheduled call if any problems

## 2023-11-09 ENCOUNTER — NURSE TRIAGE (OUTPATIENT)
Age: 53
End: 2023-11-09

## 2023-11-09 NOTE — TELEPHONE ENCOUNTER
Pt calling in, reports lower abdominal discomfort starting today after eating raw carrots with humus. She is constipated and had small BM this morning. Hx gastroporesis. Pt was having car troubles while on the phone she will call back.

## 2023-11-13 ENCOUNTER — DOCUMENTATION (OUTPATIENT)
Dept: PSYCHIATRY | Facility: CLINIC | Age: 53
End: 2023-11-13

## 2023-11-13 NOTE — PROGRESS NOTES
Psychotherapy Discharge Summary    Preferred Name: La Nena Nixon  YOB: 1970    Admission date to psychotherapy: 11/9/21    Referred by: PCP    Presenting Problem: Stress and anxiety    Course of treatment included : individual therapy     Progress/Outcome of Treatment Goals (brief summary of course of treatment) Seen for two sessions the last being on 1/10/23. Still struggling to manage periods of anxiety due to her trauma hx. Reviewed coping strategies for her anxiety and provided handout on same. Offered referral to [facility] therapist due to her trauma hx. No additional appts scheduled and did not request referral for therapy. Treatment Complications (if any): none    Treatment Progress: fair    Current [facility] Psychiatric Provider: none    Discharge Medications include: none    Discharge Date: 11/13/23    Discharge Diagnosis:  Anxiety    Criteria for Discharge:  no f/u appts made    Aftercare recommendations include (include specific referral names and phone numbers, if appropriate): none    Prognosis: fair

## 2023-11-28 ENCOUNTER — NURSE TRIAGE (OUTPATIENT)
Age: 53
End: 2023-11-28

## 2023-11-28 NOTE — TELEPHONE ENCOUNTER
Last OV 03/08/23 w/Dr. Deanne Murry, EGD 08/22/22 w/ Dr. Deanne Murry, labs 07/01/23    Pt calling in with s/s of constipation. Pt states that this is new to her- last BM 11/09/23. Pt is passing gas. Pt notes trying miralax for one day in the past without effect. Advised to restart miralax 1 cap per day for several days. If no results, increase to BID for a week. Pt encouraged to continue on her daily hydration regiment. Pt instructed to go to ED if symptoms such as lack of flatulence, dark tarry stools arise. Pt will call back in about a week if advice given does not produce results. Reason for Disposition   MILD constipation    Answer Assessment - Initial Assessment Questions  1. STOOL PATTERN OR FREQUENCY: "How often do you pass bowel movements (BMs)?"  (Normal range: tid to q 3 days)  "When was the last BM passed?"        11/09/23        3. RECTAL PAIN: "Does your rectum hurt when the stool comes out?" If Yes, ask: "Do you have hemorrhoids? How bad is the pain?"  (Scale 1-10; or mild, moderate, severe)      Denies     4. STOOL COMPOSITION: "Are the stools hard?"       Small, formed, round    5. BLOOD ON STOOLS: "Has there been any blood on the toilet tissue or on the surface of the BM?" If Yes, ask: "When was the last time?"       No    6. CHRONIC CONSTIPATION: "Is this a new problem for you?"  If no, ask: "How long have you had this problem?" (days, weeks, months)       New onset    7. CHANGES IN DIET OR HYDRATION: "Have there been any recent changes in your diet?" "How much fluids are you drinking consuming on a daily basis?"  "How much have you had to drink today?"      Not eating the way she typically does. Loss of appetite    8. MEDICATIONS: "Have you been taking any new medications?" "Are you taking any narcotic pain medications?" (e.g., Vicoden, Percocet, morphine, dilaudid)      Denies    9.  LAXATIVES: "Have you been using any stool softeners, laxatives, or enemas?"  If yes, ask "What, how often, and when was the last time?"  Miralax 1/2 cap BID for one day    10. ACTIVITY:  "How much walking do you do every day? on a daily basis?"  "Has your activity level decreased in the past week?"         Active at work. Constantly on the go    11. CAUSE: "What do you think is causing the constipation?"         Unsure    12. OTHER SYMPTOMS: "Do you have any other symptoms?" (e.g., abdominal pain, bloating, fever, vomiting)        Back pain.  Nausea after eating, stress    Protocols used: Constipation-ADULT-[place]

## 2023-12-01 ENCOUNTER — NURSE TRIAGE (OUTPATIENT)
Age: 53
End: 2023-12-01

## 2023-12-01 NOTE — TELEPHONE ENCOUNTER
Reason for Disposition  • Constant abdominal pain lasting > 2 hours    Answer Assessment - Initial Assessment Questions  1. VOMITING SEVERITY: "How many times have you vomited in the past 24 hours?"      - MILD:  1 - 2 times/day     - MODERATE: 3 - 5 times/day, decreased oral intake without significant weight loss or symptoms of dehydration     - SEVERE: 6 or more times/day, vomits everything or nearly everything, with significant weight loss, symptoms of dehydration       Vomited twice last night    2. ONSET: "When did the vomiting begin?"       All started last night with abdominal pain, vomiting, & diarrhea. 3. FLUIDS: "What fluids or food have you vomited up today?" "Have you been able to keep any fluids down?"      Did drink some fluids late last night. 4. ABDOMINAL PAIN: "Are your having any abdominal pain?" If yes : "How bad is it and what does it feel like?" (e.g., crampy, dull, intermittent, constant) . Abdominal pain since last night. Pain was much more severe last night but still having pain now and pain does intensify intermittently. 5. DIARRHEA: "Is there any diarrhea?" If Yes, ask: "How many times today?"       5 diarrhea episodes since last night. No blood in stool. 6. CONTACTS: "Is there anyone else in the family with the same symptoms?"       No sick contacts    7. CAUSE: "What do you think is causing your vomiting?"      Gastroparesis hx    8. HYDRATION STATUS: "Any signs of dehydration?" (e.g., dry mouth [not only dry lips], too weak to stand) "When did you last urinate?"      Just urinated now. 9. OTHER SYMPTOMS: "Do you have any other symptoms?" (e.g., fever, headache, vertigo, vomiting blood or coffee grounds, recent head injury)      Denies fever. Pt feels very weak. Says her heart was racing.     Protocols used: Vomiting-ADULT-[place]

## 2023-12-01 NOTE — TELEPHONE ENCOUNTER
Patient is currently in the emergency room in [facility] per documentation in chart.  Thank you

## 2023-12-17 ENCOUNTER — NURSE TRIAGE (OUTPATIENT)
Dept: OTHER | Facility: OTHER | Age: 53
End: 2023-12-17

## 2023-12-17 NOTE — TELEPHONE ENCOUNTER
Reason for Disposition  • Third attempt to contact caller AND no contact made. Phone number verified.    Protocols used: No Contact or Duplicate Contact Call-ADULT-

## 2023-12-17 NOTE — TELEPHONE ENCOUNTER
"Regarding: no bowel movements/ stomach pain  ----- Message from Aydee Rai sent at 12/17/2023  8:50 AM EST -----  \"I have gastroparesis. I haven't had a bowel movement in the last three days. Today I went to the bathroom and wiped it looked like coffee grounds.I also have lower abdominal pain.\"    "

## 2023-12-29 ENCOUNTER — TELEPHONE (OUTPATIENT)
Dept: NEUROLOGY | Facility: CLINIC | Age: 53
End: 2023-12-29

## 2023-12-29 NOTE — TELEPHONE ENCOUNTER
"Recd   3:55 PM    Cinthya Howard.  The last time I came in we spoke about the pain in my back and she said she would be able to get some patches but at the time I said I would try to go without them but I can't do it anymore.  I had a CT scan at [facility] where I went for a stomach issue  and I told them about my back pain and [facility] is sending me to the spine center. I was hoping I could get the patches for my back in the meantime. I have the sciatic pain that goes down my back to me leg and my toes tingle. The motrin and ibuprofen are not working anymore.  So if I could get some patches until my appointment with spine center is on .  74.   [phone]  ______________  Last visit  Dr. Rutherford    S/p CT spine lumbar at [facility] 2023:  CT spine lumbar wo contrast    IMPRESSION:    No acute fracture of lumbar spine.  T12-L1 and L1-L2 disc bulge and central disc protrusion with cranial extension  causing mild to moderate spinal stenosis.  L3-L4 right foraminal disc protrusion with mild to moderate foraminal narrowing.  L5-S1 left foraminal disc protrusion with mild to moderate foraminal narrowing.  Clinical correlation suggested.    Last visit  Dr. Rutherford, office note documents:   \"Recommend Diclofenac gel 1% for your back pain, which can be purchased over the counter. If your back pain does not improve in 4-6 weeks, we recommend reaching out to your PCP for further evaluation and treatment including consideration for physical therapy.\"    Please provide recommendation regarding patient request for \"patches\" for back pain, thank you.      "

## 2023-12-29 NOTE — TELEPHONE ENCOUNTER
"I do not see any recommendation as to a patch in Dr. Rutherford' note.    Did she try Diclofenac gel 1%?    One possible \"patch\" is lidocaine patch.  But she can get Aspercreme which has 5% lidocaine, she can try this as it has the same ingredient as a lidocaine patch.  "

## 2024-01-03 ENCOUNTER — NURSE TRIAGE (OUTPATIENT)
Age: 54
End: 2024-01-03

## 2024-01-03 NOTE — TELEPHONE ENCOUNTER
"Patient was looking to see if she could have an earlier appointment. Patient is agreeable to home care advice. Increase fluids, cranberry juice, and  acetaminophen for pain. Patient is agreeable to 1/5/24 appointment  Reason for Disposition   Abdominal pain is a chronic symptom (recurrent or ongoing AND lasting > 4 weeks)    Answer Assessment - Initial Assessment Questions  1. LOCATION: \"Where does it hurt?\"       LLQ abdominal pain  2. RADIATION: \"Does the pain shoot anywhere else?\" (e.g., chest, back)      Pain also in her   3. ONSET: \"When did the pain begin?\" (e.g., minutes, hours or days ago)       2 weeks  4. SUDDEN: \"Gradual or sudden onset?\"      Sudden onset  5. PATTERN \"Does the pain come and go, or is it constant?\"     - If constant: \"Is it getting better, staying the same, or worsening?\"       (Note: Constant means the pain never goes away completely; most serious pain is constant and it progresses)      - If intermittent: \"How long does it last?\" \"Do you have pain now?\"      (Note: Intermittent means the pain goes away completely between bouts)      intermittant  6. SEVERITY: \"How bad is the pain?\"  (e.g., Scale 1-10; mild, moderate, or severe)    - MILD (1-3): doesn't interfere with normal activities, abdomen soft and not tender to touch     - MODERATE (4-7): interferes with normal activities or awakens from sleep, tender to touch     - SEVERE (8-10): excruciating pain, doubled over, unable to do any normal activities       Intermittent mostly noted at night  7. RECURRENT SYMPTOM: \"Have you ever had this type of stomach pain before?\" If Yes, ask: \"When was the last time?\" and \"What happened that time?\"       Yes has had pain before  8. CAUSE: \"What do you think is causing the stomach pain?\"      unsure  9. RELIEVING/AGGRAVATING FACTORS: \"What makes it better or worse?\" (e.g., movement, antacids, bowel movement)      Patient has been drinking josefina water with honey, has been using acetaminophen and " "ibuprofen  10. OTHER SYMPTOMS: \"Has there been any vomiting, diarrhea, constipation, or urine problems?\"        Patient was traveling on 1/3/23 and experienced lightheadedness and nausea, mild odor in the urine, chills but no fever. No ann bleeding.   11. PREGNANCY: \"Is there any chance you are pregnant?\" \"When was your last menstrual period?\"        N/A    Protocols used: Abdominal Pain - Female-ADULT-[place]    "

## 2024-01-03 NOTE — TELEPHONE ENCOUNTER
Regarding: Abdominal Pain  ----- Message from April Ede sent at 1/3/2024  2:54 PM EST -----  Patient was in the ER 2 weeks ago for blood in urine.  Booked appt for follow on Friday.  She is still experiencing abdominal pain ( Lower Lt Side between pelvic and groin bone). She also stated that on the plane today she felt very nauseas and light headed.    Please call to triage symptoms.

## 2024-01-05 ENCOUNTER — OFFICE VISIT (OUTPATIENT)
Dept: INTERNAL MEDICINE CLINIC | Facility: CLINIC | Age: 54
End: 2024-01-05

## 2024-01-05 VITALS
WEIGHT: 108.4 LBS | DIASTOLIC BLOOD PRESSURE: 80 MMHG | SYSTOLIC BLOOD PRESSURE: 112 MMHG | OXYGEN SATURATION: 97 % | HEART RATE: 71 BPM | BODY MASS INDEX: 19.83 KG/M2

## 2024-01-05 DIAGNOSIS — Z23 ENCOUNTER FOR IMMUNIZATION: ICD-10-CM

## 2024-01-05 DIAGNOSIS — M54.41 CHRONIC BILATERAL LOW BACK PAIN WITH RIGHT-SIDED SCIATICA: ICD-10-CM

## 2024-01-05 DIAGNOSIS — R31.29 MICROSCOPIC HEMATURIA: ICD-10-CM

## 2024-01-05 DIAGNOSIS — Z12.4 SCREENING FOR CERVICAL CANCER: Primary | ICD-10-CM

## 2024-01-05 DIAGNOSIS — G40.209 PARTIAL SYMPTOMATIC EPILEPSY WITH COMPLEX PARTIAL SEIZURES, NOT INTRACTABLE, WITHOUT STATUS EPILEPTICUS (HCC): ICD-10-CM

## 2024-01-05 DIAGNOSIS — K64.8 INTERNAL HEMORRHOID: ICD-10-CM

## 2024-01-05 DIAGNOSIS — Z91.013 FISH ALLERGY: ICD-10-CM

## 2024-01-05 DIAGNOSIS — Z12.31 ENCOUNTER FOR SCREENING MAMMOGRAM FOR BREAST CANCER: ICD-10-CM

## 2024-01-05 DIAGNOSIS — Z00.00 HEALTHCARE MAINTENANCE: ICD-10-CM

## 2024-01-05 DIAGNOSIS — G89.29 CHRONIC BILATERAL LOW BACK PAIN WITH RIGHT-SIDED SCIATICA: ICD-10-CM

## 2024-01-05 NOTE — PROGRESS NOTES
Assessment/Plan:    Microscopic hematuria  Reviewed CAT scan in detail with the patient reviewed urinalysis showing microscopic hematuria next step will have patient see urology for cystoscopy for further evaluation    Internal hemorrhoid   patient has noticed a lump sensation and blood in stool we will have patient see colorectal Dr. Brambila for evaluation of internal hemorrhoids and possible banding she may need a colonoscopy    Back pain  Musculoskeletal in nature/hypertonicity of the paravertebral musculature lumbar spine CT reviewed Rx for Voltaren gel 1% 3 times daily as needed, warm compress, lidocaine patch over-the-counter 4% apply for 12 hours off for 12 hours as needed patient to see spine center at [facility]    Fish allergy  Will evaluate the patient for fish allergy she reports me GI symptoms after eating fish no anaphylaxis no rash no hives will check shellfish allergen panel she will continue follow-up with her new primary care doctor         Problem List Items Addressed This Visit        Digestive    Internal hemorrhoid      patient has noticed a lump sensation and blood in stool we will have patient see colorectal Dr. Brambila for evaluation of internal hemorrhoids and possible banding she may need a colonoscopy         Relevant Orders    Ambulatory Referral to Colorectal Surgery       Nervous and Auditory    RESOLVED: Partial symptomatic epilepsy with complex partial seizures, not intractable, without status epilepticus (HCC)       Genitourinary    Microscopic hematuria     Reviewed CAT scan in detail with the patient reviewed urinalysis showing microscopic hematuria next step will have patient see urology for cystoscopy for further evaluation         Relevant Orders    Ambulatory Referral to Urology       Other    Back pain     Musculoskeletal in nature/hypertonicity of the paravertebral musculature lumbar spine CT reviewed Rx for Voltaren gel 1% 3 times daily as needed, warm compress,  lidocaine patch over-the-counter 4% apply for 12 hours off for 12 hours as needed patient to see spine center at [facility]         Relevant Medications    Diclofenac Sodium (VOLTAREN) 1 %    Fish allergy     Will evaluate the patient for fish allergy she reports me GI symptoms after eating fish no anaphylaxis no rash no hives will check shellfish allergen panel she will continue follow-up with her new primary care doctor         Relevant Orders    Allergen Shellfish Panel   Other Visit Diagnoses     Screening for cervical cancer    -  Primary    Relevant Orders    Ambulatory referral to Obstetrics / Gynecology    Encounter for screening mammogram for breast cancer        Encounter for immunization        Healthcare maintenance        Relevant Orders    Ambulatory referral to clinical pharmacy          RTO as needed patient is requesting to be referred to a new primary care doctor she reports me that the hours our office do not correlate to her work hours patient is requested for a referral.  Subjective:      Patient ID: Cinthya Howard is a 53 y.o. female.    HPI 53-year old female coming in for a follow up office visit regarding internal hemorrhoids, microscopic hematuria, back pain, blood in the stool?  Fish allergy recent er evaluation for abdominal pain/back pain.  Underwent CAT scan please see the official report she has been referred to the spine/back clinic at [facility].  Patient reports me lower back pain., reports possible internal hemmoroid report 3 times gastroporesis with salmon    The following portions of the patient's history were reviewed and updated as appropriate: allergies, current medications, past family history, past medical history, past social history, past surgical history and problem list.    Review of Systems   Constitutional:  Negative for activity change, appetite change and unexpected weight change.   HENT:  Negative for congestion.    Eyes:  Negative for visual disturbance.    Respiratory:  Negative for cough and shortness of breath.    Cardiovascular:  Negative for chest pain.   Gastrointestinal:  Positive for abdominal pain and blood in stool. Negative for diarrhea, nausea and vomiting.   Musculoskeletal:  Positive for back pain.   Neurological:  Negative for dizziness, light-headedness and headaches.   Hematological:  Negative for adenopathy.         Objective:    Return if symptoms worsen or fail to improve.    Procedure: CT spine lumbar wo contrast    Result Date: 12/17/2023  Narrative: CT examination of lumbar spine performed in axial image. Reformatted sagittal and coronal images acquired. HISTORY: Low back pain, looking for compression fracture. FINDINGS: Lumbar lordosis is preserved. Alignment of lumbar spine is within normal limits. Vertebral body heights are maintained. There is no acute fracture seen of lumbar spine. T12-L1: Slight disc space narrowing. Disc bulge with calcification and central disc protrusion with cranial extension to mid T12 level impressing ventral thecal sac. Mild to moderate spinal stenosis. L1-L2: Slight disc space narrowing. Disc bulge with calcification and central disc protrusion with slight cranial extension. Mild to moderate spinal canal narrowing. L2-L3: Mild disc bulge. L3-L4: Mild disc bulge. Right foraminal disc protrusion causing mild to moderate right foraminal narrowing. L4-L5: Mild disc bulge. L5-S1: Mild disc bulge. Left foraminal disc protrusion with mild to moderate left neuroforaminal narrowing.    Impression: IMPRESSION: No acute fracture of lumbar spine. T12-L1 and L1-L2 disc bulge and central disc protrusion with cranial extension causing mild to moderate spinal stenosis. L3-L4 right foraminal disc protrusion with mild to moderate foraminal narrowing. L5-S1 left foraminal disc protrusion with mild to moderate foraminal narrowing. Clinical correlation suggested. CT examination performed with dose lowering protocol in accordance with  LULI. Workstation:[id number]    Procedure: CTA abdomen pelvis w wo contrast    Result Date: 12/17/2023  Narrative: History: Lower abdominal pain. Evaluate for diverticulitis.   Exam: CT of the abdomen and pelvis with IV contrast.   Technique: Using helical technique, axial images were obtained through the abdomen and pelvis following administration of 80 cc of Omnipaque 350 intravenous contrast. Coronal and sagittal reformations were performed.   Comparison: Prior examination dated 9/10/2022.     Abdomen: Lung Bases: Partially visualized breast implants. Stable minimal pericardial effusion. Liver: Normal. Gallbladder/Bile ducts: Normal. Spleen: Normal. Pancreas: Normal. Adrenal glands: Normal. Kidneys/Ureters: There are a few scattered predominantly subcentimeter hypodense renal lesions, probable cysts largest lesion at the lower pole of the left kidney measures 1 cm in size. Bowel/Mesentery: No evidence of bowel obstruction or ileus. No significant colonic diverticula or inflammatory changes to suggest acute diverticulitis. Lymph nodes: Normal. Vessels: Normal. Abdominal Wall: Normal.   Pelvis: Status post hysterectomy. No mass or free fluid. Urinary Bladder: Normal. Lymph nodes:  Normal.   Bones: Normal.      Impression: Impression: No acute inflammatory process. No significant colonic diverticula or inflammatory changes to suggest acute diverticulitis. Workstation:[id number]       Allergies   Allergen Reactions   • Morphine Other (See Comments)   • Codeine Hives   • Morphine And Related    • Codeine Itching   • Sulfa Antibiotics Rash       Past Medical History:   Diagnosis Date   • Arthritis    • Asthma    • Disease of thyroid gland    • Gastroparesis    • Hematuria    • Hypokalemia 09/09/2022   • Hypoparathyroidism (HCC)    • Nephrolithiasis 07/22/2019   • Osteoporosis    • Partial symptomatic epilepsy with complex partial seizures, not intractable, without status epilepticus (HCC)    • Partial symptompatic epilepsy     • Seizure disorder (HCC)      Past Surgical History:   Procedure Laterality Date   • APPENDECTOMY     • COLONOSCOPY     • EGD     • FRACTURE SURGERY Right     Knee   • HYSTERECTOMY     • OTHER SURGICAL HISTORY      left foot, rt knee, tonselectomy   • SINUS SURGERY     • TONSILLECTOMY       Current Outpatient Medications on File Prior to Visit   Medication Sig Dispense Refill   • albuterol (PROVENTIL HFA,VENTOLIN HFA) 90 mcg/act inhaler INHALE 2 PUFFS EVERY 6 HOURS AS NEEDED FOR WHEEZING 6.7 g 0   • Ascorbic Acid (Vitamin C) 500 MG CAPS Take 1 each by mouth daily     • benzonatate (TESSALON) 200 MG capsule Take 1 capsule (200 mg total) by mouth daily at bedtime as needed for cough Patient states took one. 20 capsule 0   • cetirizine (ZyrTEC) 10 MG chewable tablet Chew 10 mg daily     • dexamethasone (DECADRON) 2 mg tablet One tab with breakfast for 1-5 days for unrelenting migraine 5 tablet 0   • ECHINACEA PO Take by mouth daily     • ELDERBERRY PO Take 1 tablet by mouth daily as needed Daily every morning     • famotidine (PEPCID) 20 mg tablet TAKE 1 TABLET BY MOUTH TWICE A  tablet 1   • fluticasone (FLONASE) 50 mcg/act nasal spray SPRAY 1 SPRAY INTO EACH NOSTRIL EVERY DAY 24 mL 3   • levalbuterol (Xopenex HFA) 45 mcg/act inhaler Inhale 1-2 puffs every 4 (four) hours as needed for wheezing 15 g 0   • Lidocaine 4 % PTCH as needed     • ondansetron (ZOFRAN) 4 mg tablet Take 1 tablet (4 mg total) by mouth every 8 (eight) hours as needed for nausea or vomiting 60 tablet 3   • ondansetron (ZOFRAN) 4 mg tablet Take 1 tablet (4 mg total) by mouth every 8 (eight) hours as needed for nausea or vomiting 30 tablet 3   • rimegepant sulfate (Nurtec) 75 mg TBDP Take one NURTEC 75 mg at onset under tongue. Limit 1 in 24 hours. 8 a month. 8 tablet 3   • rizatriptan (MAXALT) 10 mg tablet      • Synthroid 75 MCG tablet Take 75 mcg by mouth daily Except sundays     • topiramate (TOPAMAX) 200 MG tablet TAKE 1 TABLET BY MOUTH  TWICE A  tablet 3   • Vitamin D, Cholecalciferol, 25 MCG (1000 UT) CAPS Take 1 tablet by mouth in the morning     • Zinc 50 MG CAPS Take 1 capsule by mouth daily     • zoledronic acid (Reclast) 5 mg/100 mL IV infusion (premix)      • divalproex sodium (Depakote) 500 mg DR tablet Take 1 tablet (500 mg total) by mouth in the morning for 5 days 5 tablet 0     No current facility-administered medications on file prior to visit.     Family History   Problem Relation Age of Onset   • Alcohol abuse Mother    • RUSSELL disease Mother    • Boles's esophagus Mother    • Migraines Father    • Prostate cancer Father    • Cancer Father    • Alcohol abuse Maternal Uncle    • Seizures Paternal Aunt    • Lung cancer Paternal Aunt    • Prostate cancer Paternal Uncle    • Colon cancer Paternal Uncle      Social History     Socioeconomic History   • Marital status: Single     Spouse name: Not on file   • Number of children: Not on file   • Years of education: Not on file   • Highest education level: Not on file   Occupational History   • Not on file   Tobacco Use   • Smoking status: Never   • Smokeless tobacco: Never   Vaping Use   • Vaping status: Never Used   Substance and Sexual Activity   • Alcohol use: Not Currently   • Drug use: Not Currently     Comment: social   • Sexual activity: Not Currently   Other Topics Concern   • Not on file   Social History Narrative    ** Merged History Encounter **          Social Determinants of Health     Financial Resource Strain: Not on file   Food Insecurity: No Food Insecurity (11/8/2021)    Hunger Vital Sign    • Worried About Running Out of Food in the Last Year: Never true    • Ran Out of Food in the Last Year: Never true   Transportation Needs: No Transportation Needs (11/8/2021)    PRAPARE - Transportation    • Lack of Transportation (Medical): No    • Lack of Transportation (Non-Medical): No   Physical Activity: Not on file   Stress: Not on file   Social Connections: Not on file    Intimate Partner Violence: Not on file   Housing Stability: Not on file     Vitals:    01/05/24 1123   BP: 112/80   BP Location: Left arm   Patient Position: Sitting   Cuff Size: Standard   Pulse: 71   SpO2: 97%   Weight: 49.2 kg (108 lb 6.4 oz)     Results for orders placed or performed in visit on 10/13/23   POCT Rapid Covid Ag   Result Value Ref Range    POCT SARS-CoV-2 Ag Positive (A) Negative    VALID CONTROL Valid      Weight (last 2 days)     Date/Time Weight    01/05/24 1123 49.2 (108.4)        Body mass index is 19.83 kg/m².  BP      Temp      Pulse     Resp      SpO2        Vitals:    01/05/24 1123   Weight: 49.2 kg (108 lb 6.4 oz)     Vitals:    01/05/24 1123   Weight: 49.2 kg (108 lb 6.4 oz)       /80 (BP Location: Left arm, Patient Position: Sitting, Cuff Size: Standard)   Pulse 71   Wt 49.2 kg (108 lb 6.4 oz)   SpO2 97%   BMI 19.83 kg/m²       Hypertonicity of the paravertebral musculature of the lumbar spine motor strength lower extremity 5 out of 5, DTR 2/4 bilateral lower extremity sensation intact   Physical Exam  Vitals and nursing note reviewed.   Constitutional:       General: She is not in acute distress.     Appearance: Normal appearance. She is well-developed. She is not ill-appearing, toxic-appearing or diaphoretic.   HENT:      Head: Normocephalic.      Right Ear: Tympanic membrane normal.      Left Ear: Tympanic membrane normal.   Eyes:      General: No scleral icterus.        Right eye: No discharge.         Left eye: No discharge.      Conjunctiva/sclera: Conjunctivae normal.      Pupils: Pupils are equal, round, and reactive to light.   Cardiovascular:      Rate and Rhythm: Normal rate and regular rhythm.      Heart sounds: Normal heart sounds. No murmur heard.     No friction rub. No gallop.   Pulmonary:      Effort: No respiratory distress.      Breath sounds: Normal breath sounds. No wheezing or rales.   Abdominal:      General: Bowel sounds are normal. There is no  distension.      Palpations: Abdomen is soft. There is no mass.      Tenderness: There is no abdominal tenderness. There is no guarding or rebound.   Musculoskeletal:         General: No deformity.      Cervical back: Neck supple.   Lymphadenopathy:      Cervical: No cervical adenopathy.   Neurological:      Mental Status: She is alert.      Coordination: Coordination normal.

## 2024-01-05 NOTE — TELEPHONE ENCOUNTER
Cinthya vo was seen today, states Dr Naidu was to send script for Lidocaine patches and other med that she doesn't know the name to:    [facility] on [address]    Pt states she wants to be able to get meds before the snow comes this weekend.

## 2024-01-05 NOTE — TELEPHONE ENCOUNTER
Spoke to Dr. Naidu. The patient can try Diclofenac Sodium (VOLTAREN) 1 % (sent to pharmacy) and Salonpas 4% patch (over the counter ) since the Lidocaine Patches will not be approved through her insurance.

## 2024-01-06 PROBLEM — R31.29 MICROSCOPIC HEMATURIA: Status: ACTIVE | Noted: 2024-01-06

## 2024-01-06 PROBLEM — Z91.013 FISH ALLERGY: Status: ACTIVE | Noted: 2024-01-06

## 2024-01-06 PROBLEM — K64.8 INTERNAL HEMORRHOID: Status: ACTIVE | Noted: 2024-01-06

## 2024-01-06 NOTE — ASSESSMENT & PLAN NOTE
Reviewed CAT scan in detail with the patient reviewed urinalysis showing microscopic hematuria next step will have patient see urology for cystoscopy for further evaluation

## 2024-01-06 NOTE — ASSESSMENT & PLAN NOTE
Will evaluate the patient for fish allergy she reports me GI symptoms after eating fish no anaphylaxis no rash no hives will check shellfish allergen panel she will continue follow-up with her new primary care doctor

## 2024-01-06 NOTE — ASSESSMENT & PLAN NOTE
patient has noticed a lump sensation and blood in stool we will have patient see colorectal Dr. Brambila for evaluation of internal hemorrhoids and possible banding she may need a colonoscopy

## 2024-01-06 NOTE — ASSESSMENT & PLAN NOTE
Musculoskeletal in nature/hypertonicity of the paravertebral musculature lumbar spine CT reviewed Rx for Voltaren gel 1% 3 times daily as needed, warm compress, lidocaine patch over-the-counter 4% apply for 12 hours off for 12 hours as needed patient to see spine center at [facility]

## 2024-01-10 ENCOUNTER — OFFICE VISIT (OUTPATIENT)
Dept: INTERNAL MEDICINE CLINIC | Facility: CLINIC | Age: 54
End: 2024-01-10

## 2024-01-10 ENCOUNTER — TELEPHONE (OUTPATIENT)
Age: 54
End: 2024-01-10

## 2024-01-10 VITALS
OXYGEN SATURATION: 97 % | HEIGHT: 62 IN | WEIGHT: 111 LBS | BODY MASS INDEX: 20.43 KG/M2 | HEART RATE: 94 BPM | SYSTOLIC BLOOD PRESSURE: 112 MMHG | DIASTOLIC BLOOD PRESSURE: 72 MMHG

## 2024-01-10 DIAGNOSIS — J06.9 ACUTE URI: Primary | ICD-10-CM

## 2024-01-10 DIAGNOSIS — J02.9 SORE THROAT: ICD-10-CM

## 2024-01-10 DIAGNOSIS — H61.21 IMPACTED CERUMEN OF RIGHT EAR: ICD-10-CM

## 2024-01-10 LAB — S PYO DNA THROAT QL NAA+PROBE: NOT DETECTED

## 2024-01-10 NOTE — TELEPHONE ENCOUNTER
Patient called with questions on referrals given at last visit and to schedule a same day appointment for a bad sore throat and headache that began 2 days ago.  Scheduled patient for this morning at 11:40 am.

## 2024-01-10 NOTE — PATIENT INSTRUCTIONS
-For sore throat you may take Tylenol/ibuprofen as needed  -Perform daily salt water gargles  -If you develop cough you may take over-the-counter Mucinex dm  -For sinus congestion use an over-the-counter saline nasal spray  -Use Flonase 1 spray in each nostril daily

## 2024-01-10 NOTE — ASSESSMENT & PLAN NOTE
-Point-of-care strep testing negative  -For sore throat you may take Tylenol/ibuprofen as needed  -Perform daily salt water gargles  -If you develop cough you may take over-the-counter Mucinex dm  -For sinus congestion use an over-the-counter saline nasal spray  -Use Flonase 1 spray in each nostril daily   -If no improvement over the next couple days patient will call back in.  If needed we will send in an antibiotic since she is a  and will [place].

## 2024-01-10 NOTE — PROGRESS NOTES
Name: Cinthya Howard      : 1970      MRN: [id number]  Encounter Provider: Nelson Gamez MD  Encounter Date: 1/10/2024   [facility]    Assessment & Plan     1. Acute URI  Assessment & Plan:  -Point-of-care strep testing negative  -For sore throat you may take Tylenol/ibuprofen as needed  -Perform daily salt water gargles  -If you develop cough you may take over-the-counter Mucinex dm  -For sinus congestion use an over-the-counter saline nasal spray  -Use Flonase 1 spray in each nostril daily   -If no improvement over the next couple days patient will call back in.  If needed we will send in an antibiotic since she is a  and will be on an extended cross-country flight.      2. Sore throat  -     POCT rapid PCR strepA    3. Impacted cerumen of right ear  Assessment & Plan:  -Impaction removed using ear curette    Orders:  -     Ear cerumen removal           Subjective      HPI  The patient presents today as an acute visit. She complains of headache, sore throat and sinus pressure over the past 2 days. She denies any fever or cough. For symptom management she has ruby taking ibuprofen.       Ear cerumen removal    Date/Time: 1/10/2024 11:40 AM    Performed by: Nelson Gamez MD  Authorized by: Nelson Gamez MD  Universal Protocol:  Consent: Verbal consent obtained.  Consent given by: patient  Patient understanding: patient states understanding of the procedure being performed  Patient identity confirmed: verbally with patient    Patient location:  Clinic  Procedure details:     Location:  R ear    Procedure type: curette      Approach:  Natural orifice  Post-procedure details:     Complication:  None    Hearing quality:  Normal    Patient tolerance of procedure:  Tolerated well, no immediate complications        All other systems negative except for pertinent findings noted in HPI.       Current Outpatient Medications on  File Prior to Visit   Medication Sig   • albuterol (PROVENTIL HFA,VENTOLIN HFA) 90 mcg/act inhaler INHALE 2 PUFFS EVERY 6 HOURS AS NEEDED FOR WHEEZING   • Ascorbic Acid (Vitamin C) 500 MG CAPS Take 1 each by mouth daily   • benzonatate (TESSALON) 200 MG capsule Take 1 capsule (200 mg total) by mouth daily at bedtime as needed for cough Patient states took one.   • cetirizine (ZyrTEC) 10 MG chewable tablet Chew 10 mg daily   • dexamethasone (DECADRON) 2 mg tablet One tab with breakfast for 1-5 days for unrelenting migraine   • Diclofenac Sodium (VOLTAREN) 1 % Apply 2 g topically 4 (four) times a day   • divalproex sodium (Depakote) 500 mg DR tablet Take 1 tablet (500 mg total) by mouth in the morning for 5 days   • ECHINACEA PO Take by mouth daily   • ELDERBERRY PO Take 1 tablet by mouth daily as needed Daily every morning   • famotidine (PEPCID) 20 mg tablet TAKE 1 TABLET BY MOUTH TWICE A DAY   • fluticasone (FLONASE) 50 mcg/act nasal spray SPRAY 1 SPRAY INTO EACH NOSTRIL EVERY DAY   • levalbuterol (Xopenex HFA) 45 mcg/act inhaler Inhale 1-2 puffs every 4 (four) hours as needed for wheezing   • Lidocaine 4 % PTCH as needed   • ondansetron (ZOFRAN) 4 mg tablet Take 1 tablet (4 mg total) by mouth every 8 (eight) hours as needed for nausea or vomiting   • ondansetron (ZOFRAN) 4 mg tablet Take 1 tablet (4 mg total) by mouth every 8 (eight) hours as needed for nausea or vomiting   • rimegepant sulfate (Nurtec) 75 mg TBDP Take one NURTEC 75 mg at onset under tongue. Limit 1 in 24 hours. 8 a month.   • rizatriptan (MAXALT) 10 mg tablet    • Synthroid 75 MCG tablet Take 75 mcg by mouth daily Except sundays   • topiramate (TOPAMAX) 200 MG tablet TAKE 1 TABLET BY MOUTH TWICE A DAY   • Vitamin D, Cholecalciferol, 25 MCG (1000 UT) CAPS Take 1 tablet by mouth in the morning   • Zinc 50 MG CAPS Take 1 capsule by mouth daily   • zoledronic acid (Reclast) 5 mg/100 mL IV infusion (premix)        Objective     /72   Pulse 94   " Ht 5' 2\" (1.575 m)   Wt 50.3 kg (111 lb)   SpO2 97%   BMI 20.30 kg/m²     BP Readings from Last 3 Encounters:   01/10/24 112/72   01/05/24 112/80   10/13/23 122/64        Wt Readings from Last 3 Encounters:   01/10/24 50.3 kg (111 lb)   01/05/24 49.2 kg (108 lb 6.4 oz)   10/13/23 49.3 kg (108 lb 9.6 oz)       Physical Exam    General: NAD  HEENT: NCAT, EOMI, normal conjunctiva, tender cervical LAD, posterior pharyngeal erythema, mild left frontal sinus tenderness  Cardiovascular: RRR, normal S1 and S2, no m/r/g  Pulmonary: Normal respiratory effort, no wheezes, rales or rhonchi  Extremities: No lower extremity edema  Skin: Normal skin color, no rashes     Nelson Gamez MD  "

## 2024-01-12 ENCOUNTER — TELEPHONE (OUTPATIENT)
Age: 54
End: 2024-01-12

## 2024-01-12 DIAGNOSIS — J06.9 ACUTE URI: Primary | ICD-10-CM

## 2024-01-12 RX ORDER — AZITHROMYCIN 250 MG/1
TABLET, FILM COATED ORAL DAILY
Qty: 6 TABLET | Refills: 0 | Status: SHIPPED | OUTPATIENT
Start: 2024-01-12 | End: 2024-01-17

## 2024-01-12 NOTE — TELEPHONE ENCOUNTER
Patient saw Dr. Gamez on 1/10/24. She states he told her if she was not improving or feeling better to call back and he would call something in for her.    Patient states both of her ears are bothering her and her throat is still very sore.    Please advise, thank you

## 2024-01-17 ENCOUNTER — TELEPHONE (OUTPATIENT)
Dept: INTERNAL MEDICINE CLINIC | Facility: CLINIC | Age: 54
End: 2024-01-17

## 2024-01-17 NOTE — TELEPHONE ENCOUNTER
[facility]  Janet Angela Pharmacist    ____________________________________________________________________    Communication with patient: will send MyChart Message     Reason for documentation: pharm referral - Please check if Quercetin and N Acetyl L Cysteine  marshall elderberry are safe,  please send a mychart message     Findings:   Quercetin interacts with the following:  Dexamethasone: quercetin may increase serum conc of dexamethasone        Pharmacist Tracking Tool  Reason For Outreach: Embedded Pharmacist  Demographics:  Intervention Method: Mychart Message  Type of Intervention: New  Topics Addressed: Polypharmacy  Pharmacologic Interventions: Drug Interaction   Non-Pharmacologic Interventions: N/A  Time:  Direct Patient Care:  5  mins   Care Coordination:  15  mins  Recommendation Recipient: Patient/Caregiver  Outcome: Accepted

## 2024-01-25 ENCOUNTER — NURSE TRIAGE (OUTPATIENT)
Age: 54
End: 2024-01-25

## 2024-01-25 ENCOUNTER — TELEPHONE (OUTPATIENT)
Dept: NEUROLOGY | Facility: CLINIC | Age: 54
End: 2024-01-25

## 2024-01-25 ENCOUNTER — TELEPHONE (OUTPATIENT)
Age: 54
End: 2024-01-25

## 2024-01-25 NOTE — TELEPHONE ENCOUNTER
Pt called in pain saying when she sits it feels like there is a ball under her ribs.    Pt transferred to [facility]

## 2024-01-25 NOTE — TELEPHONE ENCOUNTER
Patient called to report that they called the office a few times trying to make an appointment for February as instructed by the providers last office note and they were told that the providers scheduled was not out yet.     This is what the patient stated     I offered the 1st available with the provider in March. She only wants a Friday.    Patient declined at first and stated that the provider wants to see them in February only.    I explained that the providers schedule is full and patient stated that was not their problem it was ours for not calling them to get them scheduled when the provider had openings in Feb.    Patient then agreed to let me put them on the schedule for 3/22/24 and send a message to the provider to find a spot in Feb. Or if the provider is fine with seeing the patient at the later appointment date     Patient then stated we need to figure it out and they needed to get back to work and ended the call       Patient request a call back to be seen sooner for their ongoing back issue     #[phone]

## 2024-01-25 NOTE — TELEPHONE ENCOUNTER
Pt calling in with concerns of a lot of left sided pain. Has an appt in April for a follow up for ongoing hematuria. States the pain has been worse and requesting to be seen sooner. I scheduled an appt for 2/26 given her worsening pain. Denies fevers. Advised to increase water intake. ER precautions reviewed with pt. Pt would like to know if she could be seen sooner if possible.     Reason for Disposition   The patient has severe hydronephrosis with pain    Answer Questions - Initial Assessment   Where is your pain: left side    Is your pain on the left, right, or both sides: left    Does your pain radiate anywhere: no    Can you qualify the pain: constant     Do you have nausea or vomiting: no    Do you have a fever 101 or higher: no    Do you have any urinary symptoms: no    Protocols used: FLANK PAIN / KIDNEY STONES / HYDRONEPHROSIS

## 2024-01-31 NOTE — TELEPHONE ENCOUNTER
Pt urinated into a glass honey jar and said she does not see any black flacks it looks clear.    Pt  [phone]

## 2024-02-02 ENCOUNTER — OFFICE VISIT (OUTPATIENT)
Dept: NEUROLOGY | Facility: CLINIC | Age: 54
End: 2024-02-02
Payer: COMMERCIAL

## 2024-02-02 VITALS
TEMPERATURE: 97.6 F | SYSTOLIC BLOOD PRESSURE: 128 MMHG | BODY MASS INDEX: 20.41 KG/M2 | DIASTOLIC BLOOD PRESSURE: 90 MMHG | HEIGHT: 62 IN | WEIGHT: 110.9 LBS

## 2024-02-02 DIAGNOSIS — G40.209 PARTIAL SYMPTOMATIC EPILEPSY WITH COMPLEX PARTIAL SEIZURES, NOT INTRACTABLE, WITHOUT STATUS EPILEPTICUS (HCC): Primary | ICD-10-CM

## 2024-02-02 DIAGNOSIS — M54.30 SCIATICA: ICD-10-CM

## 2024-02-02 DIAGNOSIS — G43.909 MIGRAINE WITHOUT STATUS MIGRAINOSUS, NOT INTRACTABLE, UNSPECIFIED MIGRAINE TYPE: ICD-10-CM

## 2024-02-02 DIAGNOSIS — M48.00 SPINAL STENOSIS: ICD-10-CM

## 2024-02-02 PROCEDURE — 99215 OFFICE O/P EST HI 40 MIN: CPT | Performed by: NURSE PRACTITIONER

## 2024-02-02 RX ORDER — LIDOCAINE 4 G/G
1 PATCH TOPICAL DAILY
Qty: 30 PATCH | Refills: 2 | Status: SHIPPED | OUTPATIENT
Start: 2024-02-02

## 2024-02-02 RX ORDER — GABAPENTIN 100 MG/1
CAPSULE ORAL
Qty: 90 CAPSULE | Refills: 2 | Status: SHIPPED | OUTPATIENT
Start: 2024-02-02

## 2024-02-02 RX ORDER — RIMEGEPANT SULFATE 75 MG/75MG
TABLET, ORALLY DISINTEGRATING ORAL
Qty: 8 TABLET | Refills: 3 | Status: SHIPPED | OUTPATIENT
Start: 2024-02-02

## 2024-02-02 NOTE — PROGRESS NOTES
Review of Systems   Constitutional:  Negative for appetite change, fatigue and fever.   HENT: Negative.  Negative for hearing loss, tinnitus, trouble swallowing and voice change.    Eyes: Negative.  Negative for photophobia, pain and visual disturbance.   Respiratory: Negative.  Negative for shortness of breath.    Cardiovascular: Negative.  Negative for palpitations.   Gastrointestinal: Negative.  Negative for nausea and vomiting.   Endocrine: Negative.  Negative for cold intolerance.   Genitourinary: Negative.  Negative for dysuria, frequency and urgency.   Musculoskeletal:  Negative for back pain, gait problem, myalgias, neck pain and neck stiffness.   Skin: Negative.  Negative for rash.   Allergic/Immunologic: Negative.    Neurological:  Positive for headaches (Migraines 2-3x per week.). Negative for dizziness, tremors, seizures, syncope, facial asymmetry, speech difficulty, weakness, light-headedness and numbness.   Hematological: Negative.  Does not bruise/bleed easily.   Psychiatric/Behavioral: Negative.  Negative for confusion, hallucinations and sleep disturbance.    All other systems reviewed and are negative.
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
## 2024-02-02 NOTE — PATIENT INSTRUCTIONS
- See spine/pain management for your back pain and sciatica  - Continue current dose of topiramate 200 mg BID  - See neurosurgery for spinal stenosis  - Try lidocaine patches  - Try gabapentin 100 mg at bedtime for one week. Then increase to 200 mg at night time for one week. Then increase to 300 mg at night.  - Continue with nurtec as needed for headaches  - Call the office with seizures, worsening of headaches or concerns  - Follow up in 3 months with Dr Rutherford

## 2024-02-06 ENCOUNTER — TELEPHONE (OUTPATIENT)
Dept: NEUROLOGY | Facility: CLINIC | Age: 54
End: 2024-02-06

## 2024-02-06 ENCOUNTER — OFFICE VISIT (OUTPATIENT)
Dept: UROLOGY | Facility: AMBULATORY SURGERY CENTER | Age: 54
End: 2024-02-06
Payer: COMMERCIAL

## 2024-02-06 VITALS
SYSTOLIC BLOOD PRESSURE: 116 MMHG | BODY MASS INDEX: 20.24 KG/M2 | HEART RATE: 54 BPM | DIASTOLIC BLOOD PRESSURE: 80 MMHG | HEIGHT: 62 IN | OXYGEN SATURATION: 100 % | WEIGHT: 110 LBS

## 2024-02-06 DIAGNOSIS — R31.29 MICROSCOPIC HEMATURIA: ICD-10-CM

## 2024-02-06 DIAGNOSIS — R10.9 BILATERAL FLANK PAIN: Primary | ICD-10-CM

## 2024-02-06 DIAGNOSIS — R35.0 URINARY FREQUENCY: ICD-10-CM

## 2024-02-06 LAB
POST-VOID RESIDUAL VOLUME, ML POC: 25 ML
SL AMB  POCT GLUCOSE, UA: ABNORMAL
SL AMB LEUKOCYTE ESTERASE,UA: ABNORMAL
SL AMB POCT BILIRUBIN,UA: ABNORMAL
SL AMB POCT BLOOD,UA: ABNORMAL
SL AMB POCT CLARITY,UA: CLEAR
SL AMB POCT COLOR,UA: YELLOW
SL AMB POCT KETONES,UA: ABNORMAL
SL AMB POCT NITRITE,UA: ABNORMAL
SL AMB POCT PH,UA: 8
SL AMB POCT SPECIFIC GRAVITY,UA: 1
SL AMB POCT URINE PROTEIN: ABNORMAL
SL AMB POCT UROBILINOGEN: 0.2

## 2024-02-06 PROCEDURE — 99213 OFFICE O/P EST LOW 20 MIN: CPT

## 2024-02-06 PROCEDURE — 81002 URINALYSIS NONAUTO W/O SCOPE: CPT

## 2024-02-06 PROCEDURE — 51798 US URINE CAPACITY MEASURE: CPT

## 2024-02-06 NOTE — TELEPHONE ENCOUNTER
Patient came in regarding medication prescribed (Gabapentin) for pain, stated that the medication cause heart rate elevation and palpitations. Would like a call, can you please assist.

## 2024-02-19 ENCOUNTER — TELEPHONE (OUTPATIENT)
Dept: NEUROLOGY | Facility: CLINIC | Age: 54
End: 2024-02-19

## 2024-02-19 NOTE — TELEPHONE ENCOUNTER
"Spoke to patient. Has since stopped medication. Could \"hear heart pounding\" while laying pillow.     Confirmed this is now listed as an allergy due to palpitations.     Offered patient replacement medication, patient stated she is scheduled to see neurosurgery on 2/21/2024 and pain management on 2/23/24, will hold off for now.    Apologized to patient for delay in return call as message was just recently routed to nursing.  "

## 2024-02-19 NOTE — TELEPHONE ENCOUNTER
Lindsey Bennett AS   2/6/24  1:42 PM  Note     Patient came in regarding medication prescribed (Gabapentin) for pain, stated that the medication cause heart rate elevation and palpitations. Would like a call, can you please assist.

## 2024-02-21 ENCOUNTER — CONSULT (OUTPATIENT)
Dept: NEUROSURGERY | Facility: CLINIC | Age: 54
End: 2024-02-21
Payer: COMMERCIAL

## 2024-02-21 VITALS
HEART RATE: 76 BPM | BODY MASS INDEX: 20.24 KG/M2 | WEIGHT: 110 LBS | HEIGHT: 62 IN | OXYGEN SATURATION: 97 % | TEMPERATURE: 98.4 F | DIASTOLIC BLOOD PRESSURE: 84 MMHG | SYSTOLIC BLOOD PRESSURE: 120 MMHG

## 2024-02-21 DIAGNOSIS — M54.30 SCIATICA: ICD-10-CM

## 2024-02-21 DIAGNOSIS — M48.00 SPINAL STENOSIS: ICD-10-CM

## 2024-02-21 PROBLEM — H61.21 IMPACTED CERUMEN OF RIGHT EAR: Status: RESOLVED | Noted: 2024-01-10 | Resolved: 2024-02-21

## 2024-02-21 PROBLEM — J06.9 ACUTE URI: Status: RESOLVED | Noted: 2024-01-10 | Resolved: 2024-02-21

## 2024-02-21 PROCEDURE — 99204 OFFICE O/P NEW MOD 45 MIN: CPT | Performed by: PHYSICIAN ASSISTANT
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
## 2024-02-22 NOTE — TELEPHONE ENCOUNTER
received vm from 2/20 at 3:47pm- Yes, my name is Cinthya ball and i'm a patient of Dr. Vicente barrow. I would like to speak to someone in the nursing, in the nursing department of for Dr. Vicente barrow. I spoke with someone yesterday and so if she could call me back please The person I spoke to yesterday or someone else who's available please, Today. Again, it's Cinthya ball and my number is [phone]. My birthday is [birth year]. Thank you.

## 2024-02-23 ENCOUNTER — CONSULT (OUTPATIENT)
Dept: PAIN MEDICINE | Facility: CLINIC | Age: 54
End: 2024-02-23
Payer: COMMERCIAL

## 2024-02-23 VITALS
SYSTOLIC BLOOD PRESSURE: 128 MMHG | WEIGHT: 112 LBS | HEART RATE: 73 BPM | DIASTOLIC BLOOD PRESSURE: 89 MMHG | HEIGHT: 62 IN | BODY MASS INDEX: 20.61 KG/M2

## 2024-02-23 DIAGNOSIS — M54.40 LOW BACK PAIN WITH SCIATICA, SCIATICA LATERALITY UNSPECIFIED, UNSPECIFIED BACK PAIN LATERALITY, UNSPECIFIED CHRONICITY: ICD-10-CM

## 2024-02-23 DIAGNOSIS — M79.18 MYOFASCIAL PAIN: ICD-10-CM

## 2024-02-23 DIAGNOSIS — M48.061 SPINAL STENOSIS OF LUMBAR REGION, UNSPECIFIED WHETHER NEUROGENIC CLAUDICATION PRESENT: ICD-10-CM

## 2024-02-23 DIAGNOSIS — M54.16 LUMBAR RADICULOPATHY: Primary | ICD-10-CM

## 2024-02-23 PROCEDURE — 99204 OFFICE O/P NEW MOD 45 MIN: CPT | Performed by: ANESTHESIOLOGY

## 2024-02-23 RX ORDER — METHOCARBAMOL 500 MG/1
500 TABLET, FILM COATED ORAL EVERY 8 HOURS PRN
Qty: 90 TABLET | Refills: 1 | Status: SHIPPED | OUTPATIENT
Start: 2024-02-23

## 2024-02-23 NOTE — PROGRESS NOTES
Assessment  1. Lumbar radiculopathy    2. Low back pain with sciatica, sciatica laterality unspecified, unspecified back pain laterality, unspecified chronicity    3. Spinal stenosis of lumbar region, unspecified whether neurogenic claudication present    4. Myofascial pain        Plan  54-year-old female referred by neurology, presenting for initial consultation regarding lumbosacral back pain has been going on for a few years which has significantly worsened over the past few months and is now radiating into the posterior, lateral, and anterior aspect of bilateral lower extremities worse on the left and right with associated numbness in her toes.  She denies any trauma or inciting event.  CT of the lumbar spine demonstrates T12-L1 and L1-2 disc bulges with cranial extension resulting in mild to moderate central stenosis.  Disc bulge at L3-4 resulting in mild to moderate right foraminal stenosis.  Disc bulge at L5-S1 resulting in mild to moderate left foraminal stenosis.  An MRI of the lumbar spine has been ordered by neurosurgery and this is pending approval.  The patient has not done any recent formal physical therapy.  She is taking Tylenol and OTC NSAIDs with some relief.  She was unable to tolerate gabapentin secondary to side effects.  She has been unable to tolerate Flexeril in the past secondary to side effects.  Patient's symptoms are suspicious for lumbar radiculopathy.  Fortunately she is neurologically intact.    1.  Patient will proceed with MRI of the lumbar spine as ordered by neurosurgery  2.  I will prescribe a trial of methocarbamol 500 mg every 8 hours as needed for her myofascial pain  3.  Physical therapy will be prescribed as a feel she may benefit from Seema-based exercises  4.  I will follow-up with the patient in 6 weeks    My impressions and treatment recommendations were discussed in detail with the patient who verbalized understanding and had no further questions.  Discharge  instructions were provided. I personally saw and examined the patient and I agree with the above discussed plan of care.    No orders of the defined types were placed in this encounter.    No orders of the defined types were placed in this encounter.      History of Present Illness    Cinthya Howard is a 54 y.o. female eferred by neurology, presenting for initial consultation regarding lumbosacral back pain has been going on for a few years which has significantly worsened over the past few months and is now radiating into the posterior, lateral, and anterior aspect of bilateral lower extremities worse on the left and right with associated numbness in her toes.  She denies any trauma or inciting event.  He denies any bladder or bowel incontinence or saddle anesthesia.  CT of the lumbar spine demonstrates T12-L1 and L1-2 disc bulges with cranial extension resulting in mild to moderate central stenosis.  Disc bulge at L3-4 resulting in mild to moderate right foraminal stenosis.  Disc bulge at L5-S1 resulting in mild to moderate left foraminal stenosis.  An MRI of the lumbar spine has been ordered by neurosurgery and this is pending approval.  The patient has not done any recent formal physical therapy.  She is taking Tylenol and OTC NSAIDs with some relief.  She was unable to tolerate gabapentin secondary to side effects.  She has been unable to tolerate Flexeril in the past secondary to side effects.  The patient rates her pain a 6-7 out of 10 and the pain is nearly constant.  The pain does not follow any particular pattern throughout the day.  The pain is described as cramping, shooting, sharp, pins-and-needles, pressure-like, throbbing, dull, and aching.  The pain is increased with lying down, standing, bending, sitting, and sneezing.  She does find some relief with heat.    Other than as stated above, the patient denies any interval changes in medications, medical condition, mental condition, symptoms, or  allergies since the last office visit.    I have personally reviewed and/or updated the patient's past medical history, past surgical history, family history, social history, current medications, allergies, and vital signs today.     Review of Systems   Constitutional:  Negative for fever and unexpected weight change.   HENT:  Positive for hearing loss. Negative for trouble swallowing.    Eyes:  Negative for visual disturbance.   Respiratory:  Negative for shortness of breath and wheezing.    Cardiovascular:  Negative for chest pain and palpitations.   Gastrointestinal:  Negative for constipation, diarrhea, nausea and vomiting.   Endocrine: Positive for polydipsia and polyuria. Negative for cold intolerance and heat intolerance.   Genitourinary:  Positive for hematuria. Negative for difficulty urinating and frequency.   Musculoskeletal:  Positive for arthralgias. Negative for gait problem, joint swelling and myalgias.   Skin:  Negative for rash.   Neurological:  Positive for headaches. Negative for dizziness, seizures, syncope and weakness.   Hematological:  Does not bruise/bleed easily.   Psychiatric/Behavioral:  Positive for decreased concentration. Negative for dysphoric mood.    All other systems reviewed and are negative.      Patient Active Problem List   Diagnosis    Hypothyroidism    Migraine headache    Headache    Cervicalgia    Nephrolithiasis    Chronic left lumbar radiculopathy    Seizures (HCC)    Left flank pain    Fatigue    Other hyperlipidemia    Vitamin D deficiency    Frequent urination    Hepatitis C virus infection without hepatic coma    Annual physical exam    Cramps of lower extremity    Screening for HIV (human immunodeficiency virus)    Snoring    Metatarsalgia of left foot    Epigastric pain    High risk social situation    Dysuria    Wrist sprain, right, subsequent encounter    Venous insufficiency    Xiphoidalgia    Bronchitis    Early satiety    Nausea    Situational anxiety     Forgetfulness    Dysphagia    Wellness examination    Nausea and vomiting    Obstructive sleep apnea    Suspected sleep apnea    Needle exposure, initial encounter    Anxiety    Gastroparesis    Cellulitis of left lower extremity    Insect bite of left lower leg    Back pain    Numbness and tingling of both lower extremities    COVID-19    Microscopic hematuria    Internal hemorrhoid    Fish allergy       Past Medical History:   Diagnosis Date    Arthritis     Asthma     Disease of thyroid gland     Gastroparesis     Hematuria     Hypokalemia 09/09/2022    Hypoparathyroidism (HCC)     Nephrolithiasis 07/22/2019    Osteoporosis     Partial symptomatic epilepsy with complex partial seizures, not intractable, without status epilepticus (HCC)     Partial symptompatic epilepsy     Seizure disorder (HCC)        Past Surgical History:   Procedure Laterality Date    APPENDECTOMY      COLONOSCOPY      EGD      FRACTURE SURGERY Right     Knee    HYSTERECTOMY      OTHER SURGICAL HISTORY      left foot, rt knee, tonselectomy    SINUS SURGERY      TONSILLECTOMY         Family History   Problem Relation Age of Onset    Alcohol abuse Mother     RUSSELL disease Mother     Boles's esophagus Mother     Migraines Father     Prostate cancer Father     Cancer Father     Alcohol abuse Maternal Uncle     Seizures Paternal Aunt     Lung cancer Paternal Aunt     Prostate cancer Paternal Uncle     Colon cancer Paternal Uncle        Social History     Occupational History    Not on file   Tobacco Use    Smoking status: Never    Smokeless tobacco: Never   Vaping Use    Vaping status: Never Used   Substance and Sexual Activity    Alcohol use: Not Currently    Drug use: Not Currently     Comment: social    Sexual activity: Not Currently       Current Outpatient Medications on File Prior to Visit   Medication Sig    albuterol (PROVENTIL HFA,VENTOLIN HFA) 90 mcg/act inhaler INHALE 2 PUFFS EVERY 6 HOURS AS NEEDED FOR WHEEZING    Ascorbic Acid  "(Vitamin C) 500 MG CAPS Take 1 each by mouth daily    ECHINACEA PO Take by mouth daily    ELDERBERRY PO Take 1 tablet by mouth daily as needed Daily every morning    famotidine (PEPCID) 20 mg tablet TAKE 1 TABLET BY MOUTH TWICE A DAY    fluticasone (FLONASE) 50 mcg/act nasal spray SPRAY 1 SPRAY INTO EACH NOSTRIL EVERY DAY    gabapentin (Neurontin) 100 mg capsule Take one tablet by mouth at night time for one week. Then increase to 200 mg at night time for one week. Then increase to 300 mg at night. (Patient not taking: Reported on 2/21/2024)    Lidocaine 4 % PTCH Apply 1 patch topically in the morning    ondansetron (ZOFRAN) 4 mg tablet Take 1 tablet (4 mg total) by mouth every 8 (eight) hours as needed for nausea or vomiting    ondansetron (ZOFRAN) 4 mg tablet Take 1 tablet (4 mg total) by mouth every 8 (eight) hours as needed for nausea or vomiting (Patient not taking: Reported on 2/21/2024)    rimegepant sulfate (Nurtec) 75 mg TBDP Take one NURTEC 75 mg at onset under tongue. Limit 1 in 24 hours. 8 a month.    Synthroid 75 MCG tablet Take 75 mcg by mouth daily Except sundays    topiramate (TOPAMAX) 200 MG tablet TAKE 1 TABLET BY MOUTH TWICE A DAY    Vitamin D, Cholecalciferol, 25 MCG (1000 UT) CAPS Take 1 tablet by mouth in the morning    Zinc 50 MG CAPS Take 1 capsule by mouth daily     No current facility-administered medications on file prior to visit.       Allergies   Allergen Reactions    Morphine Other (See Comments)    Codeine Hives    Morphine And Related     Codeine Itching    Gabapentin Palpitations    Sulfa Antibiotics Rash       Physical Exam    /89   Pulse 73   Ht 5' 2\" (1.575 m)   Wt 50.8 kg (112 lb)   BMI 20.49 kg/m²     Constitutional: normal, well developed, well nourished, alert, in no distress and non-toxic and no overt pain behavior.  Eyes: anicteric  HEENT: grossly intact  Neck: supple, symmetric, trachea midline and no masses   Pulmonary:even and unlabored  Cardiovascular:No edema " or pitting edema present  Skin:Normal without rashes or lesions and well hydrated  Psychiatric:Mood and affect appropriate  Neurologic:Cranial Nerves II-XII grossly intact  Musculoskeletal:normal gait.  Bilateral lumbar paraspinals tender to palpation and ropey in texture from L2-S1.  Bilateral patellar and Achilles reflexes were 2-4 and symmetrical.  No clonus was noted bilaterally.  Bilateral lower extremity strength 5 out of 5 in all muscle groups.  Sensation intact to light touch in L3-S1 dermatomes bilaterally.  Negative straight leg raise bilaterally.  Positive Francisco's test on the left.    Imaging    PACS Images     Show images for CT spine lumbar wo contrast  Study Result    Narrative & Impression   [id number]     Imaging    CT spine lumbar wo contrast (Order: [id number]) - 2/5/2024    Order Report     Order Details      CT spine lumbar wo contrast  Order: [id number]  Impression    IMPRESSION:    No acute fracture of lumbar spine.  T12-L1 and L1-L2 disc bulge and central disc protrusion with cranial extension  causing mild to moderate spinal stenosis.  L3-L4 right foraminal disc protrusion with mild to moderate foraminal narrowing.  L5-S1 left foraminal disc protrusion with mild to moderate foraminal narrowing.  Clinical correlation suggested.        CT examination performed with dose lowering protocol in accordance with ALARA.          Workstation:[id number]  Narrative    CT examination of lumbar spine performed in axial image. Reformatted sagittal  and coronal images acquired.    HISTORY: Low back pain, looking for compression fracture.    FINDINGS:    Lumbar lordosis is preserved. Alignment of lumbar spine is within normal limits.  Vertebral body heights are maintained. There is no acute fracture seen of lumbar  spine.    T12-L1: Slight disc space narrowing. Disc bulge with calcification and central  disc protrusion with cranial extension to mid T12 level impressing  ventral  thecal sac. Mild to moderate spinal stenosis.    L1-L2: Slight disc space narrowing. Disc bulge with calcification and central  disc protrusion with slight cranial extension. Mild to moderate spinal canal  narrowing.    L2-L3: Mild disc bulge.    L3-L4: Mild disc bulge. Right foraminal disc protrusion causing mild to moderate  right foraminal narrowing.    L4-L5: Mild disc bulge.    L5-S1: Mild disc bulge. Left foraminal disc protrusion with mild to moderate  left neuroforaminal narrowing.  Exam End: 12/17/23 12:16 PM    Specimen Collected: 12/17/23 12:29 PM Last Resulted: 12/17/23 12:39 PM   Received From: [facility]  Result Received: 12/17/23  5:41 PM

## 2024-02-27 PROBLEM — M54.30 SCIATICA: Status: ACTIVE | Noted: 2024-02-27

## 2024-02-27 PROBLEM — M48.00 SPINAL STENOSIS: Status: ACTIVE | Noted: 2024-02-27

## 2024-02-27 NOTE — ASSESSMENT & PLAN NOTE
Causing significant discomfort at night time. Recommended trial of gabapentin which could be increased to 300 mg HS as tolerated. Recommended following with pain management as well.

## 2024-02-27 NOTE — ASSESSMENT & PLAN NOTE
Patient with well controlled epilepsy, on topiramate 200 mg BID for many years. Continue current dose of topiramate 200 mg BID. If there are episodes concerning for seizure, she will call the office. Follow up in 3 months or sooner if needed.

## 2024-02-27 NOTE — ASSESSMENT & PLAN NOTE
Noted on prior imaging, given referral to neurosurgery. Lidocaine patches in the meantime.    English

## 2024-03-01 ENCOUNTER — TELEPHONE (OUTPATIENT)
Dept: NEUROSURGERY | Facility: CLINIC | Age: 54
End: 2024-03-01

## 2024-03-01 NOTE — TELEPHONE ENCOUNTER
Patient phoned the office to let us know that her imaging test are delayed due to insurance.  Patient has issues with gastroparesis and episodes of incontinence with bowel.  She also states that she has issues with lots of gas that just comes out and she found this too embarrassing to mention at her appointment.  Patient informed that should she become numb in the rectum area and/or vaginal area or incontinent of bowel or bladder that she would need to report to the ER.

## 2024-03-04 NOTE — TELEPHONE ENCOUNTER
Patient phoned to let her know that her MRI was approved by her insurance. Patient is going to call and see if she can get her MRI done sooner as she stated it has been back and forth with approval and not being approved.  If patient gets MRI moved up her plan is to see if we can move up her appointment.  Patient asked if her Physical Therapy was approved by her insurance yet, it appears that this referral is Pending yet.  Patient plans to call PT and to see where things are at with her PT referral approval.  Patient appreciative for assistance.

## 2024-03-08 ENCOUNTER — HOSPITAL ENCOUNTER (OUTPATIENT)
Dept: RADIOLOGY | Facility: HOSPITAL | Age: 54
Discharge: HOME | End: 2024-03-08
Payer: COMMERCIAL

## 2024-03-08 DIAGNOSIS — M48.00 SPINAL STENOSIS: ICD-10-CM

## 2024-03-08 DIAGNOSIS — M54.30 SCIATICA: ICD-10-CM

## 2024-03-08 PROCEDURE — 72148 MRI LUMBAR SPINE W/O DYE: CPT

## 2024-03-08 PROCEDURE — G1004 CDSM NDSC: HCPCS

## 2024-03-15 ENCOUNTER — OFFICE VISIT (OUTPATIENT)
Dept: NEUROSURGERY | Facility: CLINIC | Age: 54
End: 2024-03-15
Payer: COMMERCIAL

## 2024-03-15 VITALS
WEIGHT: 112 LBS | HEIGHT: 62 IN | BODY MASS INDEX: 20.61 KG/M2 | OXYGEN SATURATION: 98 % | TEMPERATURE: 97.9 F | SYSTOLIC BLOOD PRESSURE: 138 MMHG | DIASTOLIC BLOOD PRESSURE: 92 MMHG | HEART RATE: 69 BPM

## 2024-03-15 DIAGNOSIS — M54.10 RADICULAR SYNDROME OF LEFT LEG: ICD-10-CM

## 2024-03-15 DIAGNOSIS — M54.50 LOWER BACK PAIN: Primary | ICD-10-CM

## 2024-03-15 PROCEDURE — 99215 OFFICE O/P EST HI 40 MIN: CPT | Performed by: PHYSICIAN ASSISTANT

## 2024-03-15 NOTE — PROGRESS NOTES
Neurosurgery Office Note  Cinthya Howard 54 y.o. female MRN: [id number]      Assessment/Plan     Patient is a 54 years old pleasant woman here today to discuss her lumbar MRI results.  Image shows new small right foraminal disc herniation at L3-4 abutting the ventral aspect of the exiting nerve.  Central and paramedian disc condition at T12-L1 and L1-L2 with slight superior extrusion only minor canal stenosis without cord or nerve impingement.  Patient reports mild to moderate intermittent lower back pain more on the left leg radicular pain down the right side, occasional intermittent crampy symptoms in her distal leg, otherwise denies any weakness.  No bowel or bladder issues or saddle anesthesia.  Limping gait in the left leg.  She is scheduled to see Dr. Steiner for possible JEFFERSON.  Patient tried Flexeril and gabapentin but difficult to tolerate side effect and she discontinued.  Insurance denies PT authorization, so patient didn't do PT.     Exam-A&OX3, Noah, strength 5/5 bilaterally in both upper and lower extremities, sensation to LT intact bilaterally. DTR 2+, no clonus bilaterally. Limping gait in the left leg . Tenderness on palpation of Lower back pain.    MRI of Lumbar spine New small right foraminal disc herniation at L3-4 abutting the ventral aspect of the exiting nerve.  Central and paramedian disc herniations at T12-L1 and L1-2 with slight superior extrusion. Only minor canal stenosis without cord or nerve impingement.    Hx, Exam, and images reviewed with the patient. Mx plan discussed. Images shows some DJD with disk protrusion , otherwise no severe nerve compression or central canal stenosis. I don't see significant findings that requires surgical intervention at this juncture. I would recommend patient continue conservative Mx with pain Mx service and physical therapy. All questions and concerns were answered to patient's satisfaction. Patient expressed her understandings and agreed with the  "plan.    Plan:  MRI  lumbar spine some degenerative changes and  non-surgical.  Advised to continue with conservative management  Continue follow-up with Dr. Steiner for injection  Ambulatory referral to PT order placed  Call with question or concern  Otherwise follow-up on PRN basis    I have spent a total time of 45 minutes on 03/15/24 in caring for this patient including Diagnostic results, Prognosis, Risks and benefits of tx options, Instructions for management, Patient and family education, Importance of tx compliance, Risk factor reductions, Impressions, Counseling / Coordination of care, Documenting in the medical record, Reviewing / ordering tests, medicine, procedures  , and Obtaining or reviewing history  .        Chief Complaint   Patient presents with    Follow-up     FU AFTER MRI LSPINE         History of Present Illness     C/C: \" 54 y.o. year old female here to discuss the results of Lumbar spine MRI\"    HPI    See detailed discussion above    REVIEW OF SYSTEMS  ROS personally reviewed and updated as follows:  Review of Systems   Constitutional: Negative.    HENT: Negative.     Eyes: Negative.    Respiratory: Negative.     Cardiovascular: Negative.    Gastrointestinal: Negative.    Endocrine: Negative.    Genitourinary:  Positive for frequency.   Musculoskeletal:  Positive for back pain, gait problem (left leg limp) and myalgias (cramping and spasms).        Spinal Stenosis- L/S CT on 12/17/23   B/L Lbp radiates to left buttock and posterior leg into foot x 3 yrs.   Admits to N/W/T on left leg and difficulty walking.   Left leg feels heavy, limp.   Muscle spasm on lower back/  Left leg cramping, occasionally on right leg  Patient states the leg cramping is so bad that she bruises   Prolonged standing/ walking ( < 20min) increase pain   Patient is a  at [employer] x 24 yrs  - usually standing/walking/bending throughout  the day   Patient describes her back/tailbone pain like if she " fell down steps   Pain 7/10, constant, sharp, shooting   Meds: OTC Ibuprofen, Lidocaine Patch   Has tried gabapentin but she discontinued it due to palpitation     PT/Pain Man- sched. w/ Dr Head on 2/23/24 -  3/15 Dr. Steiner: insurance issues, next appt in 4/5/24       H/o Osteoporosis  No Ac/ Non Smoker/ no previous Sx     Skin: Negative.    Allergic/Immunologic: Negative.    Neurological:  Positive for seizures (last episode in 1999- on Topamax), numbness and headaches (H/o Mkigraines- on Nurtec).   Hematological: Negative.    Psychiatric/Behavioral:  Positive for sleep disturbance (due to pain).    All other systems reviewed and are negative.      ROS obtained by MA. Reviewed. See HPI.     Meds/Allergies     Current Outpatient Medications   Medication Sig Dispense Refill    albuterol (PROVENTIL HFA,VENTOLIN HFA) 90 mcg/act inhaler INHALE 2 PUFFS EVERY 6 HOURS AS NEEDED FOR WHEEZING 6.7 g 0    Ascorbic Acid (Vitamin C) 500 MG CAPS Take 1 each by mouth daily      ECHINACEA PO Take by mouth daily      ELDERBERRY PO Take 1 tablet by mouth daily as needed Daily every morning      famotidine (PEPCID) 20 mg tablet TAKE 1 TABLET BY MOUTH TWICE A  tablet 1    fluticasone (FLONASE) 50 mcg/act nasal spray SPRAY 1 SPRAY INTO EACH NOSTRIL EVERY DAY 24 mL 3    Lidocaine 4 % PTCH Apply 1 patch topically in the morning 30 patch 2    ondansetron (ZOFRAN) 4 mg tablet Take 1 tablet (4 mg total) by mouth every 8 (eight) hours as needed for nausea or vomiting 60 tablet 3    rimegepant sulfate (Nurtec) 75 mg TBDP Take one NURTEC 75 mg at onset under tongue. Limit 1 in 24 hours. 8 a month. 8 tablet 3    Synthroid 75 MCG tablet Take 75 mcg by mouth daily Except sundays      topiramate (TOPAMAX) 200 MG tablet TAKE 1 TABLET BY MOUTH TWICE A  tablet 3    Vitamin D, Cholecalciferol, 25 MCG (1000 UT) CAPS Take 1 tablet by mouth in the morning      Zinc 50 MG CAPS Take 1 capsule by mouth daily      methocarbamol (ROBAXIN)  500 mg tablet Take 1 tablet (500 mg total) by mouth every 8 (eight) hours as needed for muscle spasms (Patient not taking: Reported on 3/15/2024) 90 tablet 1    ondansetron (ZOFRAN) 4 mg tablet Take 1 tablet (4 mg total) by mouth every 8 (eight) hours as needed for nausea or vomiting (Patient not taking: Reported on 2/21/2024) 30 tablet 3     No current facility-administered medications for this visit.       Allergies   Allergen Reactions    Morphine Other (See Comments)    Codeine Hives    Morphine And Related     Codeine Itching    Gabapentin Palpitations    Sulfa Antibiotics Rash       PAST HISTORY    Past Medical History:   Diagnosis Date    Arthritis     Asthma     Disease of thyroid gland     Gastroparesis     Hematuria     Hypokalemia 09/09/2022    Hypoparathyroidism (HCC)     Nephrolithiasis 07/22/2019    Osteoporosis     Partial symptomatic epilepsy with complex partial seizures, not intractable, without status epilepticus (HCC)     Partial symptompatic epilepsy     Seizure disorder (HCC)        Past Surgical History:   Procedure Laterality Date    APPENDECTOMY      COLONOSCOPY      EGD      FRACTURE SURGERY Right     Knee    HYSTERECTOMY      OTHER SURGICAL HISTORY      left foot, rt knee, tonselectomy    SINUS SURGERY      TONSILLECTOMY         Social History     Tobacco Use    Smoking status: Never    Smokeless tobacco: Never   Vaping Use    Vaping status: Never Used   Substance Use Topics    Alcohol use: Not Currently    Drug use: Not Currently     Comment: social       Family History   Problem Relation Age of Onset    Alcohol abuse Mother     RUSSELL disease Mother     Boles's esophagus Mother     Migraines Father     Prostate cancer Father     Cancer Father     Alcohol abuse Maternal Uncle     Seizures Paternal Aunt     Lung cancer Paternal Aunt     Prostate cancer Paternal Uncle     Colon cancer Paternal Uncle          Above history personally reviewed.       EXAM    Vitals:Blood pressure 138/92, pulse  "69, temperature 97.9 °F (36.6 °C), temperature source Temporal, height 5' 2\" (1.575 m), weight 50.8 kg (112 lb), SpO2 98%.,Body mass index is 20.49 kg/m².     Physical Exam  Constitutional:       Appearance: Normal appearance.   HENT:      Head: Normocephalic and atraumatic.   Musculoskeletal:         General: Tenderness present.   Neurological:      General: No focal deficit present.      Mental Status: She is alert and oriented to person, place, and time.      Deep Tendon Reflexes:      Reflex Scores:       Tricep reflexes are 2+ on the right side and 2+ on the left side.       Bicep reflexes are 2+ on the right side and 2+ on the left side.       Brachioradialis reflexes are 2+ on the right side and 2+ on the left side.       Patellar reflexes are 2+ on the right side and 2+ on the left side.       Achilles reflexes are 2+ on the right side and 2+ on the left side.  Psychiatric:         Speech: Speech normal.         Neurologic Exam     Mental Status   Oriented to person, place, and time.   Speech: speech is normal   Level of consciousness: alert    Cranial Nerves     CN III, IV, VI   Nystagmus: none     CN XI   CN XI normal.     Motor Exam   Muscle bulk: normal  Overall muscle tone: normal  Right arm tone: normal  Left arm tone: normal  Right arm pronator drift: absent  Left arm pronator drift: absent  Right leg tone: normal  Left leg tone: normal    Sensory Exam   Light touch normal.     Gait, Coordination, and Reflexes     Reflexes   Right brachioradialis: 2+  Left brachioradialis: 2+  Right biceps: 2+  Left biceps: 2+  Right triceps: 2+  Left triceps: 2+  Right patellar: 2+  Left patellar: 2+  Right achilles: 2+  Left achilles: 2+  Right : 2+  Left : 2+  Right Iverson: absent  Left Iverson: absent  Right ankle clonus: absent  Left ankle clonus: absent        MEDICAL DECISION MAKING    Imaging Studies:     MRI lumbar spine without contrast    Result Date: 3/13/2024  Narrative: MRI LUMBAR SPINE WITHOUT " CONTRAST INDICATION: M54.30: Sciatica, unspecified side M48.00: Spinal stenosis, site unspecified. COMPARISON: 8/31/2010 TECHNIQUE:  Multiplanar, multisequence imaging of the lumbar spine was performed. . IMAGE QUALITY:  Diagnostic FINDINGS: VERTEBRAL BODIES:  There are 5 lumbar type vertebral bodies.  Normal alignment of the lumbar spine.  No spondylolysis or spondylolisthesis. No scoliosis.  No compression fracture.    Normal marrow signal is identified within the visualized bony structures.  No discrete marrow lesion. SACRUM:  Normal signal within the sacrum. No evidence of insufficiency or stress fracture. DISTAL CORD AND CONUS:  Normal size and signal within the distal cord and conus. PARASPINAL SOFT TISSUES:  Paraspinal soft tissues are unremarkable. LOWER THORACIC DISC SPACES: At T12-L1 there is annular bulging with a small broad-based central disc herniation with slight superior extrusion. This is slightly larger than the prior examination. Only minimal canal stenosis without cord compression or foraminal narrowing. LUMBAR DISC SPACES: L1-L2: Annular bulging with a small broad-based central and left paramedian disc herniation with slight superior extrusion grossly unchanged in size. Minimal canal stenosis without nerve impingement. L2-L3:  Normal. L3-L4: There is a small right foraminal disc protrusion abutting the ventral aspect of the exiting nerve which is new compared to the prior examination. Correlate for right L3 radiculopathy. L4-L5:  Normal. L5-S1:  Normal. OTHER FINDINGS:  None.     Impression: New small right foraminal disc herniation at L3-4 abutting the ventral aspect of the exiting nerve. Central and paramedian disc herniations at T12-L1 and L1-2 with slight superior extrusion. Only minor canal stenosis without cord or nerve impingement. Workstation performed: [id number]       I have personally reviewed pertinent reports.   and I have personally reviewed pertinent films in PACS

## 2024-03-21 ENCOUNTER — APPOINTMENT (OUTPATIENT)
Dept: LAB | Facility: CLINIC | Age: 54
End: 2024-03-21
Payer: COMMERCIAL

## 2024-03-21 ENCOUNTER — TRANSCRIBE ORDERS (OUTPATIENT)
Dept: LAB | Facility: CLINIC | Age: 54
End: 2024-03-21

## 2024-03-21 DIAGNOSIS — E03.9 HYPOTHYROIDISM, UNSPECIFIED TYPE: Primary | ICD-10-CM

## 2024-03-21 DIAGNOSIS — E03.9 HYPOTHYROIDISM, UNSPECIFIED TYPE: ICD-10-CM

## 2024-03-21 DIAGNOSIS — Z91.013 FISH ALLERGY: ICD-10-CM

## 2024-03-21 LAB
T4 FREE SERPL-MCNC: 0.66 NG/DL (ref 0.61–1.12)
TSH SERPL DL<=0.05 MIU/L-ACNC: 13.7 UIU/ML (ref 0.45–4.5)

## 2024-03-21 PROCEDURE — 84443 ASSAY THYROID STIM HORMONE: CPT

## 2024-03-21 PROCEDURE — 36415 COLL VENOUS BLD VENIPUNCTURE: CPT

## 2024-03-21 PROCEDURE — 84439 ASSAY OF FREE THYROXINE: CPT

## 2024-03-25 ENCOUNTER — TELEPHONE (OUTPATIENT)
Age: 54
End: 2024-03-25

## 2024-03-25 NOTE — TELEPHONE ENCOUNTER
MARILIN Helm stated that it is permissible to use inversion table.  Patient phoned and updated with this information and appreciative for phone call.

## 2024-03-25 NOTE — TELEPHONE ENCOUNTER
Patient phoned and wanted to ask if an inversion table would be beneficial for her to use. This RN to follow up with patient after consult with provider

## 2024-03-25 NOTE — TELEPHONE ENCOUNTER
Caller: Cinthya      Doctor: Kelly     Reason for call: Patient calling asking if she can use an inversion table please advise     Call back#: [phone]

## 2024-03-25 NOTE — TELEPHONE ENCOUNTER
I've never seen this patient before, but I'm sure it'd be fine. Looks like she also has a call to N/S to ask the same.

## 2024-03-28 NOTE — PROGRESS NOTES
Assessment:  1. Lumbar radiculopathy    2. Myofascial pain        Plan:  Patient will be scheduled for a bilateral L3 TFESI to address the inflammatory component of the patient's pain.  Complete risks and benefits including bleeding, infection, tissue reaction, nerve injury and allergic reaction were discussed. The patient was agreeable and verbalized an understanding  Patient may continue ibuprofen and should not exceed more than 2400 mg daily as needed.  A prescription was provided today  Continue with physical therapy as scheduled  Patient may continue Tylenol as needed and should not exceed more than 3000 mg 24 hours  Follow-up after procedure or sooner if needed    History of Present Illness:    The patient is a 54 y.o. female last seen on 02/23/2024  who presents for a follow up office visit in regards to chronic lumbosacral back pain that radiates into the anterior aspect of the bilateral lower extremities with associated pins-and-needles and cramping.  She denies bowel or bladder incontinence or saddle anesthesia.  MRI of the lumbar spine from March 8, 2024 reveals a small central disc protrusion at L1-2 without any canal or nerve impingement.  There is also a disc protrusion at L3-4 abutting the right ventral aspect of the exiting L3 nerve root.  Patient did establish with physical therapy today.  She uses Tylenol and ibuprofen on a rotational basis with some relief.  She did try methocarbamol however it caused significant sedation therefore she discontinued use    She was evaluated by neurosurgery who did not recommend any surgical intervention and recommended continual conservative therapy    The patient rates her pain a 2-3 out of 10 on the numeric pain rating scale.  Pain is constant in the morning and at night and it is described as burning, cramping and pressure-like    I have personally reviewed and/or updated the patient's past medical history, past surgical history, family history, social history,  current medications, allergies, and vital signs today.       Review of Systems:    Review of Systems   Respiratory:  Negative for shortness of breath.    Cardiovascular:  Negative for chest pain.   Gastrointestinal:  Negative for constipation, diarrhea, nausea and vomiting.   Musculoskeletal:  Positive for back pain and gait problem. Negative for arthralgias, joint swelling and myalgias.   Skin:  Negative for rash.   Neurological:  Negative for dizziness, seizures and weakness.   All other systems reviewed and are negative.        Past Medical History:   Diagnosis Date    Arthritis     Asthma     Disease of thyroid gland     Gastroparesis     Hematuria     Hypokalemia 09/09/2022    Hypoparathyroidism (HCC)     Nephrolithiasis 07/22/2019    Osteoporosis     Partial symptomatic epilepsy with complex partial seizures, not intractable, without status epilepticus (HCC)     Partial symptompatic epilepsy     Seizure disorder (HCC)        Past Surgical History:   Procedure Laterality Date    APPENDECTOMY      COLONOSCOPY      EGD      FRACTURE SURGERY Right     Knee    HYSTERECTOMY      OTHER SURGICAL HISTORY      left foot, rt knee, tonselectomy    SINUS SURGERY      TONSILLECTOMY         Family History   Problem Relation Age of Onset    Alcohol abuse Mother     RUSSELL disease Mother     Boles's esophagus Mother     Migraines Father     Prostate cancer Father     Cancer Father     Alcohol abuse Maternal Uncle     Seizures Paternal Aunt     Lung cancer Paternal Aunt     Prostate cancer Paternal Uncle     Colon cancer Paternal Uncle        Social History     Occupational History    Not on file   Tobacco Use    Smoking status: Never    Smokeless tobacco: Never   Vaping Use    Vaping status: Never Used   Substance and Sexual Activity    Alcohol use: Not Currently    Drug use: Not Currently     Comment: social    Sexual activity: Not Currently         Current Outpatient Medications:     Ascorbic Acid (Vitamin C) 500 MG CAPS,  Take 1 each by mouth daily, Disp: , Rfl:     ECHINACEA PO, Take by mouth daily, Disp: , Rfl:     ELDERBERRY PO, Take 1 tablet by mouth daily as needed Daily every morning, Disp: , Rfl:     famotidine (PEPCID) 20 mg tablet, TAKE 1 TABLET BY MOUTH TWICE A DAY, Disp: 180 tablet, Rfl: 1    fluticasone (FLONASE) 50 mcg/act nasal spray, SPRAY 1 SPRAY INTO EACH NOSTRIL EVERY DAY, Disp: 24 mL, Rfl: 3    ibuprofen (MOTRIN) 600 mg tablet, Take 1 tablet (600 mg total) by mouth every 8 (eight) hours as needed for mild pain or moderate pain, Disp: 90 tablet, Rfl: 1    Lidocaine 4 % PTCH, Apply 1 patch topically in the morning, Disp: 30 patch, Rfl: 2    ondansetron (ZOFRAN) 4 mg tablet, Take 1 tablet (4 mg total) by mouth every 8 (eight) hours as needed for nausea or vomiting, Disp: 60 tablet, Rfl: 3    rimegepant sulfate (Nurtec) 75 mg TBDP, Take one NURTEC 75 mg at onset under tongue. Limit 1 in 24 hours. 8 a month., Disp: 8 tablet, Rfl: 3    Synthroid 75 MCG tablet, Take 75 mcg by mouth daily Except sundays, Disp: , Rfl:     topiramate (TOPAMAX) 200 MG tablet, TAKE 1 TABLET BY MOUTH TWICE A DAY, Disp: 180 tablet, Rfl: 3    Vitamin D, Cholecalciferol, 25 MCG (1000 UT) CAPS, Take 1 tablet by mouth in the morning, Disp: , Rfl:     Zinc 50 MG CAPS, Take 1 capsule by mouth daily, Disp: , Rfl:     albuterol (PROVENTIL HFA,VENTOLIN HFA) 90 mcg/act inhaler, INHALE 2 PUFFS EVERY 6 HOURS AS NEEDED FOR WHEEZING, Disp: 6.7 g, Rfl: 0    methocarbamol (ROBAXIN) 500 mg tablet, Take 1 tablet (500 mg total) by mouth every 8 (eight) hours as needed for muscle spasms (Patient not taking: Reported on 3/15/2024), Disp: 90 tablet, Rfl: 1    ondansetron (ZOFRAN) 4 mg tablet, Take 1 tablet (4 mg total) by mouth every 8 (eight) hours as needed for nausea or vomiting (Patient not taking: Reported on 2/21/2024), Disp: 30 tablet, Rfl: 3    Allergies   Allergen Reactions    Morphine Other (See Comments)    Codeine Hives    Morphine And Related     Codeine  "Itching    Gabapentin Palpitations    Sulfa Antibiotics Rash       Physical Exam:    /86   Pulse 72   Ht 5' 2\" (1.575 m)   Wt 51.3 kg (113 lb)   BMI 20.67 kg/m²     Constitutional:normal, well developed, well nourished, alert, in no distress and non-toxic and no overt pain behavior.  Eyes:anicteric  HEENT:grossly intact  Neck:supple, symmetric, trachea midline and no masses   Pulmonary:even and unlabored  Cardiovascular:No edema or pitting edema present  Skin:Normal without rashes or lesions and well hydrated  Psychiatric:Mood and affect appropriate  Neurologic:Cranial Nerves II-XII grossly intact  Musculoskeletal:normal gait      Imaging  FL spine and pain procedure    (Results Pending)     MRI LUMBAR SPINE WITHOUT CONTRAST     INDICATION: M54.30: Sciatica, unspecified side  M48.00: Spinal stenosis, site unspecified.     COMPARISON: 8/31/2010     TECHNIQUE:  Multiplanar, multisequence imaging of the lumbar spine was performed. .        IMAGE QUALITY:  Diagnostic     FINDINGS:     VERTEBRAL BODIES:  There are 5 lumbar type vertebral bodies.  Normal alignment of the lumbar spine.  No spondylolysis or spondylolisthesis. No scoliosis.  No compression fracture.    Normal marrow signal is identified within the visualized bony   structures.  No discrete marrow lesion.     SACRUM:  Normal signal within the sacrum. No evidence of insufficiency or stress fracture.     DISTAL CORD AND CONUS:  Normal size and signal within the distal cord and conus.     PARASPINAL SOFT TISSUES:  Paraspinal soft tissues are unremarkable.     LOWER THORACIC DISC SPACES: At T12-L1 there is annular bulging with a small broad-based central disc herniation with slight superior extrusion. This is slightly larger than the prior examination. Only minimal canal stenosis without cord compression or   foraminal narrowing.     LUMBAR DISC SPACES:     L1-L2: Annular bulging with a small broad-based central and left paramedian disc herniation with " slight superior extrusion grossly unchanged in size. Minimal canal stenosis without nerve impingement.     L2-L3:  Normal.     L3-L4: There is a small right foraminal disc protrusion abutting the ventral aspect of the exiting nerve which is new compared to the prior examination. Correlate for right L3 radiculopathy.     L4-L5:  Normal.     L5-S1:  Normal.     OTHER FINDINGS:  None.     IMPRESSION:     New small right foraminal disc herniation at L3-4 abutting the ventral aspect of the exiting nerve.     Central and paramedian disc herniations at T12-L1 and L1-2 with slight superior extrusion. Only minor canal stenosis without cord or nerve impingement.        Workstation performed: [id number]    Orders Placed This Encounter   Procedures    FL spine and pain procedure

## 2024-03-29 ENCOUNTER — EVALUATION (OUTPATIENT)
Dept: PHYSICAL THERAPY | Age: 54
End: 2024-03-29
Payer: COMMERCIAL

## 2024-03-29 ENCOUNTER — OFFICE VISIT (OUTPATIENT)
Dept: PAIN MEDICINE | Facility: CLINIC | Age: 54
End: 2024-03-29
Payer: COMMERCIAL

## 2024-03-29 VITALS
WEIGHT: 113 LBS | SYSTOLIC BLOOD PRESSURE: 134 MMHG | DIASTOLIC BLOOD PRESSURE: 86 MMHG | HEIGHT: 62 IN | BODY MASS INDEX: 20.8 KG/M2 | HEART RATE: 72 BPM

## 2024-03-29 DIAGNOSIS — M54.10 RADICULAR SYNDROME OF LEFT LEG: ICD-10-CM

## 2024-03-29 DIAGNOSIS — G89.29 CHRONIC BILATERAL LOW BACK PAIN WITH LEFT-SIDED SCIATICA: ICD-10-CM

## 2024-03-29 DIAGNOSIS — M79.18 MYOFASCIAL PAIN: ICD-10-CM

## 2024-03-29 DIAGNOSIS — M54.16 LUMBAR RADICULOPATHY: Primary | ICD-10-CM

## 2024-03-29 DIAGNOSIS — M54.50 LOWER BACK PAIN: Primary | ICD-10-CM

## 2024-03-29 DIAGNOSIS — M54.42 CHRONIC BILATERAL LOW BACK PAIN WITH LEFT-SIDED SCIATICA: ICD-10-CM

## 2024-03-29 PROCEDURE — 99214 OFFICE O/P EST MOD 30 MIN: CPT | Performed by: NURSE PRACTITIONER

## 2024-03-29 PROCEDURE — 97162 PT EVAL MOD COMPLEX 30 MIN: CPT | Performed by: PHYSICAL THERAPIST

## 2024-03-29 RX ORDER — IBUPROFEN 600 MG/1
600 TABLET ORAL EVERY 8 HOURS PRN
Qty: 90 TABLET | Refills: 1 | Status: SHIPPED | OUTPATIENT
Start: 2024-03-29
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
## 2024-04-04 ENCOUNTER — TELEPHONE (OUTPATIENT)
Age: 54
End: 2024-04-04

## 2024-04-04 NOTE — TELEPHONE ENCOUNTER
Caller: branden    Doctor: daniel    Reason for call: pt needs to cx her procedure for now.    Call back#: [phone]

## 2024-04-10 ENCOUNTER — TELEPHONE (OUTPATIENT)
Dept: OTHER | Facility: OTHER | Age: 54
End: 2024-04-10

## 2024-04-10 ENCOUNTER — NURSE TRIAGE (OUTPATIENT)
Age: 54
End: 2024-04-10

## 2024-04-10 ENCOUNTER — TELEPHONE (OUTPATIENT)
Dept: INTERNAL MEDICINE CLINIC | Facility: CLINIC | Age: 54
End: 2024-04-10

## 2024-04-10 ENCOUNTER — TELEPHONE (OUTPATIENT)
Age: 54
End: 2024-04-10

## 2024-04-10 NOTE — TELEPHONE ENCOUNTER
----- Message from Christiana Burgos sent at 4/10/2024  1:17 PM EDT -----  Regarding: FW: Test Results  Contact: [phone]  Was ECG reviewed?  ----- Message -----  From: Cinthya Howard  Sent: 4/10/2024   1:12 PM EDT  To: Christiana Burgos  Subject: Test Results                                     So the ECG was Okay ?? Also ED doctor Choco was to follow up with doctor Evangelista. I assumed it was because of the ECG. If everything is okay. Why would I need to follow up with PCP?    He is busy and it's a waste of my time and money?

## 2024-04-10 NOTE — TELEPHONE ENCOUNTER
"LOV 03/08/23, CT abd/pelv w eo con 12/17/23, EGD 10/02/22, Colonoscopy 12/18/19  F/U 04/23/24      Pt with history of gastroparesis reports recent ED visit for evaluation of nausea, vomiting up to 4x per day and diarrhea. Pt reports diarrhea and vomiting have resolved however nausea/generalized weakness persists. ED notes pt is a   and was exposed to a sick child who was vomiting on flight. Pt reports symptoms began after eating a bean burger on 04/08. Pt admits: \"I know I shouldn't have eaten it\". Pt currently having \"normal\" soft/formed BM daily. Pt denies bloody stool, fever, vomiting. Pt has poor appetite and not drinking as much as she should d/t nausea. Pt advised to drink several sips of room temperature beverage containing electrolytes every 15 minutes and incorporate bland diet into her regimen. Follow up appointment scheduled in urgent slot on 04/23 with .        Reason for Disposition   Unexplained nausea    Answer Assessment - Initial Assessment Questions  1. NAUSEA SEVERITY: \"How bad is the nausea?\" (e.g., mild, moderate, severe; dehydration, weight loss)    - MILD: loss of appetite without change in eating habits    - MODERATE: decreased oral intake without significant weight loss, dehydration, or malnutrition    - SEVERE: inadequate caloric or fluid intake, significant weight loss, symptoms of dehydration      Moderate     2. ONSET: \"When did the nausea begin?\"      04/08    3. VOMITING: \"Any vomiting?\" If Yes, ask: \"How many times today?\"      Currently no.    5. CAUSE: \"What do you think is causing the nausea?\"      Bean burger    Protocols used: Nausea-ADULT-[place]    "

## 2024-04-10 NOTE — TELEPHONE ENCOUNTER
Pt calling stating she went to [facility] on 4/8/24 and is still having symptoms. Pt stating she is weak, light headed, can't eat. I offered pt the only appt available tomorrow w/ Kelly Diaz at 11:30 am and pt refused it. She wants to speak w/ nurse, I warm transfer to Иван  triage nurse.

## 2024-04-10 NOTE — TELEPHONE ENCOUNTER
Notify patient that I am unable to see the actual tracing of her EKG since it was done at [facility].  The digital report says her QT intervals was prolonged.  Sometimes this can be due to medications.  This is non-urgent and can wait until Dr. Naidu returns tomorrow to review it.

## 2024-04-10 NOTE — TELEPHONE ENCOUNTER
Notify patient her labs from 4/8 were reviewed.  Her white blood cell count was mildly elevated.  This elevation was likely reactive in the setting of her acute gastroenteritis.  This also likely caused her potassium level to be slightly low as well.  To help bring this up she can eat high potassium containing foods.  There are otherwise no other significant abnormalities.  Any further questions can be addressed by her PCP when he returns.

## 2024-04-18 ENCOUNTER — APPOINTMENT (OUTPATIENT)
Dept: LAB | Facility: CLINIC | Age: 54
End: 2024-04-18
Payer: COMMERCIAL

## 2024-04-18 ENCOUNTER — OFFICE VISIT (OUTPATIENT)
Dept: INTERNAL MEDICINE CLINIC | Facility: CLINIC | Age: 54
End: 2024-04-18
Payer: COMMERCIAL

## 2024-04-18 VITALS
HEIGHT: 62 IN | DIASTOLIC BLOOD PRESSURE: 72 MMHG | WEIGHT: 113.8 LBS | HEART RATE: 81 BPM | OXYGEN SATURATION: 97 % | SYSTOLIC BLOOD PRESSURE: 118 MMHG | BODY MASS INDEX: 20.94 KG/M2

## 2024-04-18 DIAGNOSIS — K31.84 GASTROPARESIS: ICD-10-CM

## 2024-04-18 DIAGNOSIS — F41.9 ANXIETY: ICD-10-CM

## 2024-04-18 DIAGNOSIS — Z09 FOLLOW-UP EXAMINATION: Primary | ICD-10-CM

## 2024-04-18 LAB
25(OH)D3 SERPL-MCNC: 42.1 NG/ML (ref 30–100)
ALBUMIN SERPL BCP-MCNC: 4.3 G/DL (ref 3.5–5)
ALP SERPL-CCNC: 50 U/L (ref 34–104)
ALT SERPL W P-5'-P-CCNC: 13 U/L (ref 7–52)
ANION GAP SERPL CALCULATED.3IONS-SCNC: 10 MMOL/L (ref 4–13)
AST SERPL W P-5'-P-CCNC: 16 U/L (ref 13–39)
BILIRUB SERPL-MCNC: 0.66 MG/DL (ref 0.2–1)
BUN SERPL-MCNC: 12 MG/DL (ref 5–25)
CALCIUM SERPL-MCNC: 8.9 MG/DL (ref 8.4–10.2)
CHLORIDE SERPL-SCNC: 107 MMOL/L (ref 96–108)
CO2 SERPL-SCNC: 21 MMOL/L (ref 21–32)
CREAT SERPL-MCNC: 0.72 MG/DL (ref 0.6–1.3)
GFR SERPL CREATININE-BSD FRML MDRD: 96 ML/MIN/1.73SQ M
GLUCOSE SERPL-MCNC: 77 MG/DL (ref 65–140)
MAGNESIUM SERPL-MCNC: 2.2 MG/DL (ref 1.9–2.7)
PHOSPHATE SERPL-MCNC: 2.7 MG/DL (ref 2.7–4.5)
POTASSIUM SERPL-SCNC: 4 MMOL/L (ref 3.5–5.3)
PROT SERPL-MCNC: 6.8 G/DL (ref 6.4–8.4)
SODIUM SERPL-SCNC: 138 MMOL/L (ref 135–147)
VIT B12 SERPL-MCNC: 366 PG/ML (ref 180–914)

## 2024-04-18 PROCEDURE — 84630 ASSAY OF ZINC: CPT

## 2024-04-18 PROCEDURE — 99213 OFFICE O/P EST LOW 20 MIN: CPT

## 2024-04-18 PROCEDURE — 36415 COLL VENOUS BLD VENIPUNCTURE: CPT

## 2024-04-18 PROCEDURE — 83735 ASSAY OF MAGNESIUM: CPT

## 2024-04-18 PROCEDURE — 80053 COMPREHEN METABOLIC PANEL: CPT

## 2024-04-18 PROCEDURE — 82306 VITAMIN D 25 HYDROXY: CPT

## 2024-04-18 PROCEDURE — 82607 VITAMIN B-12: CPT

## 2024-04-18 PROCEDURE — 84207 ASSAY OF VITAMIN B-6: CPT

## 2024-04-18 PROCEDURE — 84100 ASSAY OF PHOSPHORUS: CPT

## 2024-04-18 NOTE — ASSESSMENT & PLAN NOTE
Patient presents to the office after ER visit on 4/8/2024 for nausea, vomiting  found to have hypokalemia secondary to nausea and vomiting and EKG shows QT prolongation.  Patient reports that she took Zofran prior to ER visit and was given 20 mg of Zofran in the ER  QT prolongation secondary to hypokalemia from GI loss and medication induced with Zofran  Patient will need a repeat EKG to monitor QT .     Plan:  CMP to monitor electrolytes  Advised patient to avoid Zofran usage

## 2024-04-18 NOTE — ASSESSMENT & PLAN NOTE
History of idiopathic gastroparesis   Follows Dr. Feng outpatient   Gastric emptying study 2022 was evident for severe gastroparesis  Patient reports limited food in her diet to prevent recurrent episodes  Reports fatigue during the day but states that it has been is better with increased dose of levothyroxine  Suspect recurrent episode of vomiting and restriction may be affecting vitamin     Plan:  Workup for vitamin deficiency with vitamin B12, B6, vitamin D, zinc, medium and phosphorus  CMP

## 2024-04-18 NOTE — ASSESSMENT & PLAN NOTE
Patient expressed concern and emotion about her current health status affecting her daily activity  Reports stress about having bad breath while talking to others. Discussed with patient that Halitosis  may be secondary to decreased gastric emptying.  Concern about the different foods that may trigger her symptoms.   Patient worries about the  next time she will have another gastroparesis  episode   Discussed possibly starting medication to help with her anxiety but she denied   Patient reports that she is working on limiting people who trigger her anxiety. One example was her daughter who she reports that she has limit the amount of time she speaks with her.   Patient denied suicidal ideation reports that she listens to [employer] on the radio to help with her anxiety    Plan:  Next visit- Re-visit topic about starting medication for anxiety if no improvement or worsen

## 2024-04-18 NOTE — PROGRESS NOTES
Name: Cinthya Howard      : 1970      MRN: [id number]  Encounter Provider: Klaudia Boland MD  Encounter Date: 2024   Enc[facility]    Assessment & Plan     1. Follow-up examination  Assessment & Plan:  Patient presents to the office after ER visit on 2024 for nausea, vomiting  found to have hypokalemia secondary to nausea and vomiting and EKG shows QT prolongation.  Patient reports that she took Zofran prior to ER visit and was given 20 mg of Zofran in the ER  QT prolongation secondary to hypokalemia from GI loss and medication induced with Zofran  Patient will need a repeat EKG to monitor QT .     Plan:  CMP to monitor electrolytes  Advised patient to avoid Zofran usage        Orders:  -     ECG 12 lead; Future    2. Gastroparesis  Assessment & Plan:  History of idiopathic gastroparesis   Follows Dr. Feng outpatient   Gastric emptying study  was evident for severe gastroparesis  Patient reports limited food in her diet to prevent recurrent episodes  Reports fatigue during the day but states that it has been is better with increased dose of levothyroxine  Suspect recurrent episode of vomiting and restriction may be affecting vitamin     Plan:  Workup for vitamin deficiency with vitamin B12, B6, vitamin D, zinc, medium and phosphorus  CMP        Orders:  -     Comprehensive metabolic panel; Future  -     Phosphorus; Future  -     Vitamin B12; Future  -     Vitamin D 25 hydroxy; Future  -     Zinc; Future  -     Vitamin B6; Future  -     Magnesium; Future    3. Anxiety  Assessment & Plan:  Patient expressed concern and emotion about her current health status affecting her daily activity  Reports stress about having bad breath while talking to others. Discussed with patient that Halitosis  may be secondary to decreased gastric emptying.  Concern about the different foods that may trigger her symptoms.   Patient worries about the  next time she will  have another gastroparesis  episode   Discussed possibly starting medication to help with her anxiety but she denied   Patient reports that she is working on limiting people who trigger her anxiety. One example was her daughter who she reports that she has limit the amount of time she speaks with her.   Patient denied suicidal ideation reports that she listens to [employer] on the radio to help with her anxiety    Plan:  Next visit- Re-visit topic about starting medication for anxiety if no improvement or worsen                    Subjective      Ms. Howard is a 54-year-old female with past medical history of gastroparesis, and hypothyroidism who presents to the office for follow-up after an  ER visit.  Patient reports that she was going through many life stressor and used food to help relieve her stress.  On the day of her ER visit, patient endorsed eating foods that she was aware that might trigger her gastroparesis.  After consumption, she immediately began to have symptoms and took Zofran before coming to ER.  During her ER visit, she was given Zofran and was treated for hypokalemia.  She reports increase fatigue recently that has been affecting her life.  Denies any recent fever, chill, recent travel, and recent illness.  Patient is tearful during office visit but denies suicidal ideation.  Discussed with patient about starting medication for anxiety and possibly speaking with someone about her stress.  Patient refused at this time and states that she uses music to with her anxiety.      Review of Systems   Constitutional:  Negative for chills and fever.   HENT:  Negative for ear pain and sore throat.    Eyes:  Negative for pain and visual disturbance.   Respiratory:  Negative for cough and shortness of breath.    Cardiovascular:  Negative for chest pain and palpitations.   Gastrointestinal:  Positive for abdominal distention and abdominal pain. Negative for vomiting.   Genitourinary:  Negative for dysuria and  "hematuria.   Musculoskeletal:  Negative for arthralgias and back pain.   Skin:  Negative for color change and rash.   Neurological:  Negative for seizures and syncope.   All other systems reviewed and are negative.      Current Outpatient Medications on File Prior to Visit   Medication Sig    albuterol (PROVENTIL HFA,VENTOLIN HFA) 90 mcg/act inhaler INHALE 2 PUFFS EVERY 6 HOURS AS NEEDED FOR WHEEZING    Ascorbic Acid (Vitamin C) 500 MG CAPS Take 1 each by mouth daily    ECHINACEA PO Take by mouth daily    ELDERBERRY PO Take 1 tablet by mouth daily as needed Daily every morning    famotidine (PEPCID) 20 mg tablet TAKE 1 TABLET BY MOUTH TWICE A DAY    fluticasone (FLONASE) 50 mcg/act nasal spray SPRAY 1 SPRAY INTO EACH NOSTRIL EVERY DAY    ibuprofen (MOTRIN) 600 mg tablet Take 1 tablet (600 mg total) by mouth every 8 (eight) hours as needed for mild pain or moderate pain    Lidocaine 4 % PTCH Apply 1 patch topically in the morning    ondansetron (ZOFRAN) 4 mg tablet Take 1 tablet (4 mg total) by mouth every 8 (eight) hours as needed for nausea or vomiting    rimegepant sulfate (Nurtec) 75 mg TBDP Take one NURTEC 75 mg at onset under tongue. Limit 1 in 24 hours. 8 a month.    Synthroid 75 MCG tablet Take 75 mcg by mouth daily Except sundays    topiramate (TOPAMAX) 200 MG tablet TAKE 1 TABLET BY MOUTH TWICE A DAY    Vitamin D, Cholecalciferol, 25 MCG (1000 UT) CAPS Take 1 tablet by mouth in the morning    methocarbamol (ROBAXIN) 500 mg tablet Take 1 tablet (500 mg total) by mouth every 8 (eight) hours as needed for muscle spasms (Patient not taking: Reported on 3/15/2024)    ondansetron (ZOFRAN) 4 mg tablet Take 1 tablet (4 mg total) by mouth every 8 (eight) hours as needed for nausea or vomiting (Patient not taking: Reported on 2/21/2024)    Zinc 50 MG CAPS Take 1 capsule by mouth daily       Objective     /72 (BP Location: Left arm, Patient Position: Sitting, Cuff Size: Standard)   Pulse 81   Ht 5' 2\" (1.575 " m)   Wt 51.6 kg (113 lb 12.8 oz)   SpO2 97%   BMI 20.81 kg/m²     Physical Exam  HENT:      Head: Normocephalic and atraumatic.      Mouth/Throat:      Mouth: Mucous membranes are moist.   Cardiovascular:      Rate and Rhythm: Normal rate and regular rhythm.   Pulmonary:      Effort: Pulmonary effort is normal.      Breath sounds: Normal breath sounds.   Abdominal:      General: Abdomen is flat. There is distension.      Palpations: Abdomen is soft.   Neurological:      Mental Status: She is alert and oriented to person, place, and time.   Psychiatric:         Mood and Affect: Mood is anxious. Affect is tearful.         Speech: Speech normal.         Behavior: Behavior normal. Behavior is cooperative.     Klaudia Boland MD

## 2024-04-22 LAB — VIT B6 SERPL-MCNC: 9.2 UG/L (ref 3.4–65.2)

## 2024-04-23 ENCOUNTER — OFFICE VISIT (OUTPATIENT)
Dept: GASTROENTEROLOGY | Facility: AMBULARY SURGERY CENTER | Age: 54
End: 2024-04-23
Payer: COMMERCIAL

## 2024-04-23 VITALS
HEIGHT: 62 IN | HEART RATE: 82 BPM | SYSTOLIC BLOOD PRESSURE: 130 MMHG | OXYGEN SATURATION: 99 % | BODY MASS INDEX: 20.65 KG/M2 | WEIGHT: 112.2 LBS | DIASTOLIC BLOOD PRESSURE: 90 MMHG

## 2024-04-23 DIAGNOSIS — K31.84 GASTROPARESIS: ICD-10-CM

## 2024-04-23 DIAGNOSIS — R11.2 NAUSEA AND VOMITING, UNSPECIFIED VOMITING TYPE: Primary | ICD-10-CM

## 2024-04-23 PROCEDURE — 99214 OFFICE O/P EST MOD 30 MIN: CPT | Performed by: INTERNAL MEDICINE

## 2024-04-23 RX ORDER — PANTOPRAZOLE SODIUM 40 MG/1
40 TABLET, DELAYED RELEASE ORAL DAILY
Qty: 30 TABLET | Refills: 3 | Status: SHIPPED | OUTPATIENT
Start: 2024-04-23

## 2024-04-23 NOTE — PROGRESS NOTES
[facility]  Cinthya Howard 54 y.o. female MRN: [id number]            Assessment & Plan:  Pleasant 54-year-old female here for follow-up of severe gastroparesis, constipation.    1.  Gastroparesis: Most likely precipitate by COVID infection  -Overall patient has been able to manage her symptoms with diet alone  -Continue with low residue diet, small frequent meals  -We will repeat gastric emptying scan given the severity of her gastroparesis in the past  -We can consider EGD with Botox if not improved  -Otherwise try to continue to monitor and manage with diet alone as this has been fairly effective  -She has had some increased abdominal bloating and poor oral intake, we will try PPI therapy  -Advised that she can try small amounts of high residue foods to see how she tolerates this maybe once per week    2.:  Severe constipation: Worsening symptoms recently  -Recommended taking 1 capful of MiraLAX, titrate as needed  -We can consider Motegrity in the future  -Will see her back in a few months time, reevaluate her symptoms, patient's last colonoscopy approximately 5 years ago and was recommended to have repeat colonoscopy in 5 years which we can discuss at her next visit      Cinthya was seen today for follow-up.    Diagnoses and all orders for this visit:    Nausea and vomiting, unspecified vomiting type  -     NM gastric emptying; Future  -     pantoprazole (PROTONIX) 40 mg tablet; Take 1 tablet (40 mg total) by mouth daily    Gastroparesis  -     NM gastric emptying; Future  -     pantoprazole (PROTONIX) 40 mg tablet; Take 1 tablet (40 mg total) by mouth daily            _____________________________________________________________        CC: Follow-up    HPI:  Cinthya Howard is a 54 y.o.female who is here for follow-up of severe gastroparesis.  This is a pleasant 54-year-old female who had acute onset of nausea vomiting diagnosed with severe gastroparesis most likely precipitated by COVID  infection.  She has been doing relatively well, unfortunately patient cannot take Reglan.  Has had intermittent bouts of nausea and vomiting, recent went to the emergency room after having a stressful day, had a bean burger which she made and then immediately went to the ER with nausea and vomiting.  Laboratory studies were normal.  Patient reports that since then she has had difficulty with liquids.  Her weight has been stable.  Appetite, she has been trying to eat and drink protein shakes and foods that she knows that she can tolerate.  Unfortunately the foods that she really enjoys such as salad she has not been able to eat because of her gastroparesis.  She has had worsening constipation, has a bowel movement about twice per week.  She has tried MiraLAX intermittently in the past but has fallen off from taking it regularly.  Denies any melena or rectal bleeding.    She does report some left lower quadrant discomfort more recently.      ROS:  The remainder of the ROS was negative except for the pertinent positives mentioned in HPI.      Allergies: Morphine, Codeine, Morphine and related, Codeine, Gabapentin, and Sulfa antibiotics    Medications:   Current Outpatient Medications:     albuterol (PROVENTIL HFA,VENTOLIN HFA) 90 mcg/act inhaler, INHALE 2 PUFFS EVERY 6 HOURS AS NEEDED FOR WHEEZING, Disp: 6.7 g, Rfl: 0    ELDERBERRY PO, Take 1 tablet by mouth daily as needed Daily every morning, Disp: , Rfl:     famotidine (PEPCID) 20 mg tablet, TAKE 1 TABLET BY MOUTH TWICE A DAY, Disp: 180 tablet, Rfl: 1    fluticasone (FLONASE) 50 mcg/act nasal spray, SPRAY 1 SPRAY INTO EACH NOSTRIL EVERY DAY, Disp: 24 mL, Rfl: 3    ibuprofen (MOTRIN) 600 mg tablet, Take 1 tablet (600 mg total) by mouth every 8 (eight) hours as needed for mild pain or moderate pain, Disp: 90 tablet, Rfl: 1    Lidocaine 4 % PTCH, Apply 1 patch topically in the morning, Disp: 30 patch, Rfl: 2    methocarbamol (ROBAXIN) 500 mg tablet, Take 1 tablet (500  mg total) by mouth every 8 (eight) hours as needed for muscle spasms, Disp: 90 tablet, Rfl: 1    ondansetron (ZOFRAN) 4 mg tablet, Take 1 tablet (4 mg total) by mouth every 8 (eight) hours as needed for nausea or vomiting, Disp: 30 tablet, Rfl: 3    pantoprazole (PROTONIX) 40 mg tablet, Take 1 tablet (40 mg total) by mouth daily, Disp: 30 tablet, Rfl: 3    rimegepant sulfate (Nurtec) 75 mg TBDP, Take one NURTEC 75 mg at onset under tongue. Limit 1 in 24 hours. 8 a month., Disp: 8 tablet, Rfl: 3    Synthroid 75 MCG tablet, Take 75 mcg by mouth daily Except sundays, Disp: , Rfl:     topiramate (TOPAMAX) 200 MG tablet, TAKE 1 TABLET BY MOUTH TWICE A DAY, Disp: 180 tablet, Rfl: 3    Vitamin D, Cholecalciferol, 25 MCG (1000 UT) CAPS, Take 1 tablet by mouth in the morning, Disp: , Rfl:     Zinc 50 MG CAPS, Take 1 capsule by mouth daily, Disp: , Rfl:     Ascorbic Acid (Vitamin C) 500 MG CAPS, Take 1 each by mouth daily (Patient not taking: Reported on 4/23/2024), Disp: , Rfl:     ECHINACEA PO, Take by mouth daily (Patient not taking: Reported on 4/23/2024), Disp: , Rfl:     ondansetron (ZOFRAN) 4 mg tablet, Take 1 tablet (4 mg total) by mouth every 8 (eight) hours as needed for nausea or vomiting (Patient not taking: Reported on 4/23/2024), Disp: 60 tablet, Rfl: 3    Past Medical History:   Diagnosis Date    Arthritis     Asthma     Disease of thyroid gland     Gastroparesis     Hematuria     Hypokalemia 09/09/2022    Hypoparathyroidism (HCC)     Nephrolithiasis 07/22/2019    Osteoporosis     Partial symptomatic epilepsy with complex partial seizures, not intractable, without status epilepticus (HCC)     Partial symptompatic epilepsy     Seizure disorder (HCC)        Past Surgical History:   Procedure Laterality Date    APPENDECTOMY      COLONOSCOPY      EGD      FRACTURE SURGERY Right     Knee    HYSTERECTOMY      OTHER SURGICAL HISTORY      left foot, rt knee, tonselectomy    SINUS SURGERY      TONSILLECTOMY    "      Family History   Problem Relation Age of Onset    Alcohol abuse Mother     RUSSELL disease Mother     Boles's esophagus Mother     Migraines Father     Prostate cancer Father     Cancer Father     Alcohol abuse Maternal Uncle     Seizures Paternal Aunt     Lung cancer Paternal Aunt     Prostate cancer Paternal Uncle     Colon cancer Paternal Uncle         reports that she has never smoked. She has never used smokeless tobacco. She reports that she does not currently use alcohol. She reports that she does not currently use drugs.      Physical Exam:    /90 (BP Location: Left arm, Patient Position: Sitting, Cuff Size: Standard)   Pulse 82   Ht 5' 2\" (1.575 m)   Wt 50.9 kg (112 lb 3.2 oz)   SpO2 99%   BMI 20.52 kg/m²     Gen: wn/wd, NAD, healthy-appearing female  HEENT: anicteric, MMM, no cervical LAD  CVS: RRR, no m/r/g  CHEST: CTA b/l  ABD: +BS, soft, NT,ND, no hepatosplenomegaly  EXT: no c/c/e  NEURO: aaox3  SKIN: NO rashes    "

## 2024-04-24 LAB — ZINC SERPL-MCNC: 97 UG/DL (ref 44–115)

## 2024-04-25 ENCOUNTER — TELEPHONE (OUTPATIENT)
Age: 54
End: 2024-04-25

## 2024-04-25 NOTE — TELEPHONE ENCOUNTER
Cinthya Krishna    Is the patient supposed to take both : Protonix & Famotidine    Can patient take the Zofran with the both medication below.    Please call patient @ :  [phone]

## 2024-04-25 NOTE — TELEPHONE ENCOUNTER
Called and spoke to patient she wanted to know how to take her medications    Is she is to take the pantoprazole and stop taking the famotidine     Is she to take the pantoprazole in the morning and take one famotidine at night     Or continue taking the famotidine twice a day along with the pantoprazole in the morning      Please advise thank you !

## 2024-04-27 ENCOUNTER — NURSE TRIAGE (OUTPATIENT)
Dept: OTHER | Facility: OTHER | Age: 54
End: 2024-04-27

## 2024-04-27 NOTE — TELEPHONE ENCOUNTER
"Reason for Disposition  • Patient sounds very sick or weak to the triager    Answer Assessment - Initial Assessment Questions  1. LOCATION: \"Where does it hurt?\"       Left upper abdominal pian    2. RADIATION: \"Does the pain shoot anywhere else?\" (e.g., chest, back)      Denies    3. ONSET: \"When did the pain begin?\" (e.g., minutes, hours or days ago)       Today around 12:30    4. SUDDEN: \"Gradual or sudden onset?\"      Sudden    5. PATTERN \"Does the pain come and go, or is it constant?\"     - If constant: \"Is it getting better, staying the same, or worsening?\"       (Note: Constant means the pain never goes away completely; most serious pain is constant and it progresses)      - If intermittent: \"How long does it last?\" \"Do you have pain now?\"      (Note: Intermittent means the pain goes away completely between bouts)      Constant    6. SEVERITY: \"How bad is the pain?\"  (e.g., Scale 1-10; mild, moderate, or severe)    - MILD (1-3): doesn't interfere with normal activities, abdomen soft and not tender to touch     - MODERATE (4-7): interferes with normal activities or awakens from sleep, tender to touch     - SEVERE (8-10): excruciating pain, doubled over, unable to do any normal activities       Moderate 7/10 cramping and sharp pains    7. RECURRENT SYMPTOM: \"Have you ever had this type of stomach pain before?\" If Yes, ask: \"When was the last time?\" and \"What happened that time?\"       Pt had this happen twice before. Diagnosed with gastroparesis.    8. CAUSE: \"What do you think is causing the stomach pain?\"      Gastroparesis    9. RELIEVING/AGGRAVATING FACTORS: \"What makes it better or worse?\" (e.g., movement, antacids, bowel movement)      Denies    10. OTHER SYMPTOMS: \"Has there been any vomiting, diarrhea, constipation, or urine problems?\"        Nausea, vomiting, weakness, fatigue    Protocols used: Abdominal Pain - Female-ADULT-[facility]    "

## 2024-04-27 NOTE — TELEPHONE ENCOUNTER
"Regarding: stomach pain/throwing up  ----- Message from Breana Coffey sent at 4/27/2024 12:47 PM EDT -----  Pt states \"My stomach hurts really bad and I ave been throwing up a lot today\"    "

## 2024-04-29 DIAGNOSIS — R11.2 NAUSEA AND VOMITING, UNSPECIFIED VOMITING TYPE: ICD-10-CM

## 2024-04-29 DIAGNOSIS — R10.13 EPIGASTRIC PAIN: Primary | ICD-10-CM

## 2024-04-29 NOTE — TELEPHONE ENCOUNTER
Please let patient know that it looks like Dr. Feng would like the gastric emptying scan repeated first as we have concerns for this causing her symptoms more than a gallbladder problem.  However, please let her know I did order her a regular ultrasound to assess for gallstones. She can call  to set this up..  If this is normal I would wait for gastric emptying scan to be resulted prior to thinking about further testing of the gallbladder.  Thank you

## 2024-04-29 NOTE — TELEPHONE ENCOUNTER
Called and relayed the medication directions     Patient wanted to inform that on Saturday she had three pieces of deli ham and  ten minutes later started vomiting multiple times with intense abdominal pain       She would like to have a scan or testing down to check the gallbladder that was discuss

## 2024-04-30 NOTE — TELEPHONE ENCOUNTER
Called and relayed the recommendations to patient she has the GES scheduled for June and will call the CS to have the US done sooner       She stated she does not want to take the pantoprazole and just wants to take the famotidine

## 2024-05-02 ENCOUNTER — APPOINTMENT (OUTPATIENT)
Dept: LAB | Facility: CLINIC | Age: 54
End: 2024-05-02
Payer: COMMERCIAL

## 2024-05-02 DIAGNOSIS — E03.9 HYPOTHYROIDISM, UNSPECIFIED TYPE: ICD-10-CM

## 2024-05-02 LAB — TSH SERPL DL<=0.05 MIU/L-ACNC: 0.81 UIU/ML (ref 0.45–4.5)

## 2024-05-02 PROCEDURE — 36415 COLL VENOUS BLD VENIPUNCTURE: CPT

## 2024-05-02 PROCEDURE — 84443 ASSAY THYROID STIM HORMONE: CPT

## 2024-05-04 ENCOUNTER — HOSPITAL ENCOUNTER (OUTPATIENT)
Dept: RADIOLOGY | Facility: HOSPITAL | Age: 54
Discharge: HOME | End: 2024-05-04
Payer: COMMERCIAL

## 2024-05-04 DIAGNOSIS — R10.13 EPIGASTRIC PAIN: ICD-10-CM

## 2024-05-04 DIAGNOSIS — R11.2 NAUSEA AND VOMITING, UNSPECIFIED VOMITING TYPE: ICD-10-CM

## 2024-05-04 PROCEDURE — 76705 ECHO EXAM OF ABDOMEN: CPT

## 2024-05-08 ENCOUNTER — NURSE TRIAGE (OUTPATIENT)
Age: 54
End: 2024-05-08

## 2024-05-08 NOTE — TELEPHONE ENCOUNTER
"Pt calling in to see if US results were in, I advised they are still pending.     Pt reports yesterday ate a kit toni bar which she never had a problem with in the past but it caused her to have severe abdominal pain in epigastric region, could hardly breath and was going to go back the ED. She reports she took Pepcid and went to bed and pain resolved.     I explained GERD diet to pain and foods to avoid. She understood. Pt had questions if h pylori treatment would be beneficial for her- I explain it is a bacteria and last EGD in 2022 she was negative for h pylori. She understood.     Pt will await US results/   Reason for Disposition   Information only question and nurse able to answer    Answer Assessment - Initial Assessment Questions  1. REASON FOR CALL or QUESTION: \"What is your reason for calling today?\" or \"How can I best help you?\" or \"What question do you have that I can help answer?\"      See notes    Protocols used: Information Only Call - No Triage-ADULT-[place]    "

## 2024-05-08 NOTE — PROGRESS NOTES
Assessment/Plan:    Microscopic hematuria  The patient has mild microscopic hematuria that is likely clinically significant but we cannot know for sure as we were not able to perform cystoscopy today because the patient had a tight perhaps angled urethra which would not accommodate the scope.      We discussed options which include reattempting under sedation operating room where urethral dilatation can be performed.  Alternatively we can forego cystoscopy at this time and closely monitor her urinalyses assuming that her cytology returns normal.  If she continues to have microscopic hematuria then we may more strongly consider moving forward with cystoscopy with urethral dilation in the operating room    The patient prefers to hold off on going to the operating room at this point and so we will plan for a repeat urinalysis in 3 months and follow-up with our clinic then.    Congenital urethral stenosis  The patient has urethral stenosis as we could not easily intubate the meatus with a cystoscope.  She also has likely angling of the urethra as we were not able to get a catheter into the bladder even though it was able to intubate the meatus.  She does not appear to be symptomatic from this as she denies voiding issues and is emptying her bladder well based on a bladder scan a few months ago.          Subjective:      Patient ID: Cinthya Howard is a 54 y.o. female.    HPI    Patient is a 54-year-old female with a longstanding history of flank pain and more recently found to have microhematuria.      She was last in the office in October 2021 for evaluation of flank pain.  She has had numerous abdominal imaging studies that did not demonstrate hydronephrosis or nephrolithiasis.      She was then seen for microscopic hematuria.  In December 2023 she had a urinalysis demonstrating 3-5 RBCs seen on microscopy.  She denies ever seeing gross hematuria.  She denies a smoking history.  She works as a .  No  "known  malignancy.  She states that her mother does have microscopic hematuria and has had previous cystoscopy patient does state that she has increased frequency but states that she drinks a significant amount of water.        She had a CTA of the abdomen pelvis that in December 2023 performed outside institution which demonstrated \" a few scattered predominantly subcentimeter hypodense renal lesions-probable cysts the largest measuring 1 cm\".      Cystoscopy today was unsuccessful as the patient had a tight abnormally angled urethra which we could not intubate with the scope or even a speedicath.  Of note she denies obstructive voiding issues and PVR earlier this year was 25 cc.  Urine was sent for cytology    Past Surgical History:   Procedure Laterality Date    APPENDECTOMY      COLONOSCOPY      EGD      FRACTURE SURGERY Right     Knee    HYSTERECTOMY      OTHER SURGICAL HISTORY      left foot, rt knee, tonselectomy    SINUS SURGERY      TONSILLECTOMY          Past Medical History:   Diagnosis Date    Arthritis     Asthma     Disease of thyroid gland     Gastroparesis     Hematuria     Hypokalemia 09/09/2022    Hypoparathyroidism (HCC)     Nephrolithiasis 07/22/2019    Osteoporosis     Partial symptomatic epilepsy with complex partial seizures, not intractable, without status epilepticus (HCC)     Partial symptompatic epilepsy     Seizure disorder (HCC)              Review of Systems   Constitutional:  Negative for chills and fever.   HENT:  Negative for ear pain and sore throat.    Eyes:  Negative for pain and visual disturbance.   Respiratory:  Negative for cough and shortness of breath.    Cardiovascular:  Negative for chest pain and palpitations.   Gastrointestinal:  Negative for abdominal pain and vomiting.   Genitourinary:  Negative for dysuria and hematuria.   Musculoskeletal:  Negative for arthralgias and back pain.   Skin:  Negative for color change and rash.   Neurological:  Negative for seizures and " "syncope.   All other systems reviewed and are negative.        Objective:      /90 (BP Location: Left arm, Patient Position: Sitting, Cuff Size: Adult)   Pulse 74   Wt 51.3 kg (113 lb)   SpO2 99%   BMI 20.67 kg/m²     No results found for: \"PSA\"       Physical Exam  Vitals reviewed.   Constitutional:       General: She is not in acute distress.     Appearance: Normal appearance. She is not ill-appearing, toxic-appearing or diaphoretic.   HENT:      Head: Normocephalic and atraumatic.   Eyes:      Extraocular Movements: Extraocular movements intact.      Pupils: Pupils are equal, round, and reactive to light.   Pulmonary:      Effort: Pulmonary effort is normal.   Abdominal:      General: Abdomen is flat. There is no distension.      Palpations: Abdomen is soft. There is no mass.      Tenderness: There is no abdominal tenderness. There is no guarding or rebound.      Hernia: No hernia is present.   Genitourinary:     Comments: The patient's urethra is visually somewhat stenotic.  Was able to intubate with a 16 Italian lubricated CIC catheter could not get the catheter into the bladder as it met resistance approximately 1 to 2 cm in.  Skin:     General: Skin is warm.   Neurological:      General: No focal deficit present.      Mental Status: She is alert and oriented to person, place, and time. Mental status is at baseline.   Psychiatric:         Mood and Affect: Mood normal.         Behavior: Behavior normal.         Thought Content: Thought content normal.            Cystoscopy     Date/Time  5/10/2024 10:00 AM     Performed by  Aj Packer MD   Authorized by  Aj Packer MD     Universal Protocol:  Consent: Written consent obtained.  Risks and benefits: risks, benefits and alternatives were discussed  Consent given by: patient  Time out: Immediately prior to procedure a \"time out\" was called to verify the correct patient, procedure, equipment, support staff and site/side marked as required.  Patient " understanding: patient states understanding of the procedure being performed  Patient consent: the patient's understanding of the procedure matches consent given  Procedure consent: procedure consent matches procedure scheduled  Patient identity confirmed: verbally with patient      Procedure Details:  Procedure type: cystoscopy    Patient tolerance: Patient tolerated the procedure well with no immediate complications    Additional Procedure Details: A female MA was in the room and served as a chaperone and assistant    The patient's external genitals were sterilely prepped and draped.  The urethra did appear to be tighter than is normal  A flexible cystoscope was attempted to be introduced into the urethra but the urethra was tight and the scope would not slide in.  We attempted to use the lidocaine gel introducer to intubate the urethra given its conical shape but this also had a hard time intubating the urethra.  We therefore attempted a 16 [place] PD cath and this was able to intubate the urethra after a few attempts but not able to advance beyond 1 to 2 cm as resistance was felt and the patient felt uncomfortable          Orders  Orders Placed This Encounter   Procedures    Cystoscopy     This order was created via procedure documentation    Urinalysis with microscopic     Standing Status:   Future     Standing Expiration Date:   5/10/2025    POCT urine dip

## 2024-05-09 NOTE — ADDENDUM NOTE
Addended by: Jeanne Odonnell on: 7/10/2023 10:55 AM     Modules accepted: Orders
Detail Level: Zone
Initiate Treatment: 5FU bid x 2 weeks

## 2024-05-10 ENCOUNTER — PROCEDURE VISIT (OUTPATIENT)
Dept: UROLOGY | Facility: AMBULATORY SURGERY CENTER | Age: 54
End: 2024-05-10
Payer: COMMERCIAL

## 2024-05-10 VITALS
OXYGEN SATURATION: 99 % | DIASTOLIC BLOOD PRESSURE: 90 MMHG | WEIGHT: 113 LBS | HEART RATE: 74 BPM | SYSTOLIC BLOOD PRESSURE: 140 MMHG | BODY MASS INDEX: 20.67 KG/M2

## 2024-05-10 DIAGNOSIS — R31.29 MICROSCOPIC HEMATURIA: Primary | ICD-10-CM

## 2024-05-10 DIAGNOSIS — Q64.32 CONGENITAL URETHRAL STENOSIS: ICD-10-CM

## 2024-05-10 DIAGNOSIS — R35.0 URINARY FREQUENCY: ICD-10-CM

## 2024-05-10 LAB
SL AMB  POCT GLUCOSE, UA: ABNORMAL
SL AMB LEUKOCYTE ESTERASE,UA: ABNORMAL
SL AMB POCT BILIRUBIN,UA: ABNORMAL
SL AMB POCT BLOOD,UA: ABNORMAL
SL AMB POCT CLARITY,UA: CLEAR
SL AMB POCT COLOR,UA: ABNORMAL
SL AMB POCT KETONES,UA: ABNORMAL
SL AMB POCT NITRITE,UA: ABNORMAL
SL AMB POCT PH,UA: 6
SL AMB POCT SPECIFIC GRAVITY,UA: 1.02
SL AMB POCT URINE PROTEIN: ABNORMAL
SL AMB POCT UROBILINOGEN: 0.2

## 2024-05-10 PROCEDURE — 88112 CYTOPATH CELL ENHANCE TECH: CPT | Performed by: SPECIALIST

## 2024-05-10 PROCEDURE — 81002 URINALYSIS NONAUTO W/O SCOPE: CPT | Performed by: UROLOGY

## 2024-05-10 PROCEDURE — 99214 OFFICE O/P EST MOD 30 MIN: CPT | Performed by: UROLOGY

## 2024-05-10 PROCEDURE — 52000 CYSTOURETHROSCOPY: CPT | Performed by: UROLOGY

## 2024-05-10 RX ORDER — CEFTRIAXONE 1 G/1
1000 INJECTION, POWDER, FOR SOLUTION INTRAMUSCULAR; INTRAVENOUS ONCE
Status: DISCONTINUED | OUTPATIENT
Start: 2024-05-10 | End: 2024-05-10

## 2024-05-10 NOTE — ASSESSMENT & PLAN NOTE
The patient has mild microscopic hematuria that is likely clinically significant but we cannot know for sure as we were not able to perform cystoscopy today because the patient had a tight perhaps angled urethra which would not accommodate the scope.      We discussed options which include reattempting under sedation operating room where urethral dilatation can be performed.  Alternatively we can forego cystoscopy at this time and closely monitor her urinalyses assuming that her cytology returns normal.  If she continues to have microscopic hematuria then we may more strongly consider moving forward with cystoscopy with urethral dilation in the operating room    The patient prefers to hold off on going to the operating room at this point and so we will plan for a repeat urinalysis in 3 months and follow-up with our clinic then.

## 2024-05-10 NOTE — ASSESSMENT & PLAN NOTE
The patient has urethral stenosis as we could not easily intubate the meatus with a cystoscope.  She also has likely angling of the urethra as we were not able to get a catheter into the bladder even though it was able to intubate the meatus.  She does not appear to be symptomatic from this as she denies voiding issues and is emptying her bladder well based on a bladder scan a few months ago.

## 2024-05-13 ENCOUNTER — NURSE TRIAGE (OUTPATIENT)
Age: 54
End: 2024-05-13

## 2024-05-13 NOTE — TELEPHONE ENCOUNTER
"Patient called states she hasn't been seen by OBGYN in about 3 years. [facility] she has been seeing Urologist for a few years regarding blood in urine. On Friday 5/10, patient went for procedure to have bladder scope and [facility] provider unable to perform procedure due to being unable to enter urethra. [facility] provider also tried to enter vaginal area/canal and unable. Patient states she has pain/discomfort upon examination. States in 2021 she was prescribed vaginal cream she was unable to finish due to discomfort. Patient verbalized concern and states hasn't had partner since 2010. States she also has urinary frequency and denies fever, flank/back pain, or pain with urination.    Advised patient will need to be seen for exam for further evaluation. Scheduled appointment 5/24 with provider due to patient's work schedule. Patient declined earlier appointments. Advised patient can take Tylenol/Ibuprofen prior to exam and will reach out to provider for additional recommendation. Care advice reviewed. Patient verbalized understanding. No further questions at this time.     Reason for Disposition   Urinating more frequently than usual (i.e., frequency)    Answer Assessment - Initial Assessment Questions  1. SYMPTOM: \"What's the main symptom you're concerned about?\" (e.g., frequency, incontinence)      Blood in urine  2. ONSET: \"When did the  symptoms  start?\"      Years   3. PAIN: \"Is there any pain?\" If Yes, ask: \"How bad is it?\" (Scale: 1-10; mild, moderate, severe)      Denies. Discomfort from when provider was trying to do scope  4. CAUSE: \"What do you think is causing the symptoms?\"      na  5. OTHER SYMPTOMS: \"Do you have any other symptoms?\" (e.g., fever, flank pain, blood in urine, pain with urination)      Blood in urine  6. PREGNANCY: \"Is there any chance you are pregnant?\" \"When was your last menstrual period?\"      Post menopausal    Protocols used: Urinary Symptoms-ADULT-[place]    "

## 2024-05-14 NOTE — TELEPHONE ENCOUNTER
Pt called in to follow up on her urine results that were ordered by urology. Advised that she contact urology to discuss since they were the ordering providers. Pt states she with all her urinary problems, she thinks she may be developing a yeast infection. She is irritated and slightly itchy. Advised she can try OTC monistat 7 prior to her appointment next week. She verbalized understanding and is thankful.

## 2024-05-17 DIAGNOSIS — R10.13 EPIGASTRIC PAIN: ICD-10-CM

## 2024-05-17 DIAGNOSIS — R10.13 DYSPEPSIA: ICD-10-CM

## 2024-05-17 RX ORDER — FAMOTIDINE 20 MG/1
20 TABLET, FILM COATED ORAL 2 TIMES DAILY
Qty: 180 TABLET | Refills: 1 | Status: SHIPPED | OUTPATIENT
Start: 2024-05-17

## 2024-05-22 ENCOUNTER — NURSE TRIAGE (OUTPATIENT)
Age: 54
End: 2024-05-22

## 2024-05-22 DIAGNOSIS — R10.9 BILATERAL FLANK PAIN: ICD-10-CM

## 2024-05-22 DIAGNOSIS — R82.90 MALODOROUS URINE: ICD-10-CM

## 2024-05-22 DIAGNOSIS — R35.0 URINARY FREQUENCY: Primary | ICD-10-CM

## 2024-05-22 DIAGNOSIS — Q64.32 CONGENITAL URETHRAL STENOSIS: ICD-10-CM

## 2024-05-22 NOTE — TELEPHONE ENCOUNTER
Ever since she was in the office and they tried to scope/put catheter in, she feels she may have an infection as she is extremely susceptible to them.   Denies dysuria however feels a weird sensation.   Leaving for [place] on the 27th. Urine testing ordered.   Additional Information  • Negative: The patient has severe uncontrolled pain  • Negative: The patient has a fever of 101 or higher  • Negative: The patient has persistent vomiting    Answer Questions - Initial Assessment   When did this start: Ongoing since procedure    Are you voiding normal amounts or small amounts: spraying stream, not sure    Have you seen blood in your urine:no    Do you have any other urinary symptoms such as incontinence, nocturia, weak stream or dysuria: yes    Do you have a fever of 101 or higher: no    Patient called in believes she has an infection.   Symptoms of ongoing right back/ flank pain, frequency, slight odor.    Protocols used: Urinary Frequency

## 2024-05-22 NOTE — TELEPHONE ENCOUNTER
Can you please call the patient and let her know that the symptoms that she is experiencing is most likely related to attempting to pass a cystoscopy.  This was not able to be done in the office and Dr. Packer attempted multiple times.  She may still have some swelling and discomfort in that area.  She can take Tylenol and ibuprofen to help with the symptoms.  Certainly she can have urine testing done and we can monitor those results closely and treat accordingly if it is positive for bacteria.

## 2024-05-23 ENCOUNTER — APPOINTMENT (OUTPATIENT)
Dept: LAB | Facility: CLINIC | Age: 54
End: 2024-05-23
Payer: COMMERCIAL

## 2024-05-23 DIAGNOSIS — Q64.32 CONGENITAL URETHRAL STENOSIS: ICD-10-CM

## 2024-05-23 DIAGNOSIS — R10.9 BILATERAL FLANK PAIN: ICD-10-CM

## 2024-05-23 DIAGNOSIS — R35.0 URINARY FREQUENCY: ICD-10-CM

## 2024-05-23 DIAGNOSIS — R82.90 MALODOROUS URINE: ICD-10-CM

## 2024-05-23 LAB
AMORPH URATE CRY URNS QL MICRO: ABNORMAL
BACTERIA UR QL AUTO: ABNORMAL /HPF
BILIRUB UR QL STRIP: NEGATIVE
CLARITY UR: ABNORMAL
COLOR UR: COLORLESS
GLUCOSE UR STRIP-MCNC: NEGATIVE MG/DL
HGB UR QL STRIP.AUTO: NEGATIVE
KETONES UR STRIP-MCNC: NEGATIVE MG/DL
LEUKOCYTE ESTERASE UR QL STRIP: NEGATIVE
MUCOUS THREADS UR QL AUTO: ABNORMAL
NITRITE UR QL STRIP: NEGATIVE
NON-SQ EPI CELLS URNS QL MICRO: ABNORMAL /HPF
PH UR STRIP.AUTO: 7.5 [PH]
PROT UR STRIP-MCNC: ABNORMAL MG/DL
RBC #/AREA URNS AUTO: ABNORMAL /HPF
SP GR UR STRIP.AUTO: 1.01 (ref 1–1.03)
UROBILINOGEN UR STRIP-ACNC: <2 MG/DL
WBC #/AREA URNS AUTO: ABNORMAL /HPF

## 2024-05-23 PROCEDURE — 87086 URINE CULTURE/COLONY COUNT: CPT

## 2024-05-23 PROCEDURE — 81001 URINALYSIS AUTO W/SCOPE: CPT

## 2024-05-23 NOTE — TELEPHONE ENCOUNTER
Patient calling in to see if we can review her urine testing done today. She is concerned that she has a uti. She will be leaving this weekend for a trip and would like to know if anything can be sent over for her while UC is in process. She will be gone till 6/8/24 and does not want to wait that long to be treated.     CB: [phone]

## 2024-05-24 LAB — BACTERIA UR CULT: NORMAL

## 2024-06-13 ENCOUNTER — TELEPHONE (OUTPATIENT)
Dept: GASTROENTEROLOGY | Facility: CLINIC | Age: 54
End: 2024-06-13

## 2024-06-13 NOTE — TELEPHONE ENCOUNTER
Patients GI provider:  Dr. Feng    Number to return call: [phone]    Reason for call: Pt calling to speak to Laurie. Pt stated that she would rather Laurie not send the [facility] paperwork in and that she will pick them up in the [place] office tomorrow.     Scheduled procedure/appointment date if applicable: Apt 11/15

## 2024-06-14 ENCOUNTER — OFFICE VISIT (OUTPATIENT)
Dept: OBGYN CLINIC | Facility: CLINIC | Age: 54
End: 2024-06-14
Payer: COMMERCIAL

## 2024-06-14 VITALS — BODY MASS INDEX: 20.23 KG/M2 | DIASTOLIC BLOOD PRESSURE: 32 MMHG | WEIGHT: 110.6 LBS | SYSTOLIC BLOOD PRESSURE: 128 MMHG

## 2024-06-14 DIAGNOSIS — N90.5 VULVAR ATROPHY: Primary | ICD-10-CM

## 2024-06-14 PROCEDURE — 99203 OFFICE O/P NEW LOW 30 MIN: CPT | Performed by: PHYSICIAN ASSISTANT

## 2024-06-14 RX ORDER — ESTRADIOL 0.1 MG/G
CREAM VAGINAL
Qty: 42.5 G | Refills: 0 | Status: SHIPPED | OUTPATIENT
Start: 2024-06-14

## 2024-06-15 NOTE — PROGRESS NOTES
Assessment & Plan     1. Vulvar atrophy  - Advised trial of topical estrace cream. She was unable to tolerate applicator and vaginal insertion of estrace cream due to pain in 2021. Advised to start with external application with index finger. Advised she apply a pea sized amount to introitus and internally daily for two weeks then decrease use to three times weekly. Follow up at annual exam. If tolerating well can then have pt start to trial intravaginal use with applicator  - estradiol (ESTRACE VAGINAL) 0.1 mg/g vaginal cream; Apply pea sized amount to vaginal opening and internally daily for two weeks. Then decrease use to three times weekly.  Dispense: 42.5 g; Refill: 0      Subjective   Patient ID: Cinthya Howard is a 54 y.o. female.    HPI    Pt presents to the office today for a new patient visit. She was evaluated in the office in the past in 2021 for vulvovaginal atrophy. She has a hx of hysterectomy. She made the appt today because she is currently following with urology for chronic microscopic hematuria. A cystoscopy was attempted by urology on 5/10/2024 however this procedure was unable to be performed as patient was found to have an angled urethra which could not accommodate the scope. For now they have elected to monitor her closely with regular urinalysis. If needed they will attempt cystoscopy under anesthesia in the OR at which time urethral dilation could be performed.    She presents to the office today to discuss vaginal atrophy and pain that she states was noted at the time of her cystoscopy. She states her urologist had to perform a bimanual exam at the time of cystoscopy attempt. She noted significant pain during this and after the procedure. She states she was told her vaginal canal was narrowing. She does recall similar symptoms in the past when she was evaluated in our office in 2021. She was given estrace cream for these symptoms in 2021. She states she could not tolerate inserting the  applicator vaginally to apply the cream so she did not use the cream and stopped following up. She is willing to have exam today to assess degree of atrophy. She is open to trial of estrace cream again    The following portions of the patient's history were reviewed and updated as appropriate: allergies, current medications, past family history, past medical history, past social history, past surgical history, and problem list.    Review of Systems    Objective     Vitals:    06/14/24 1359   BP: (!) 128/32       Physical Exam  Constitutional:       Appearance: Normal appearance.   Genitourinary:      Urethral meatus normal.      Genitourinary Comments: Significant atrophy noted. Tissue surrounding introitus is pale in appearance. Labia with atrophic appearance. Narrowing of vaginal canal evident. Pt only able to tolerate small peds speculum with exam      Vaginal cuff intact.     Severe vaginal atrophy present.       Right Adnexa: not palpable.     Left Adnexa: not palpable.     Cervix is not absent.      Uterus is not absent.  HENT:      Head: Normocephalic and atraumatic.   Pulmonary:      Effort: Pulmonary effort is normal.   Abdominal:      General: Abdomen is flat. There is no distension.   Musculoskeletal:      Right lower leg: No edema.      Left lower leg: No edema.   Neurological:      General: No focal deficit present.      Mental Status: She is alert. Mental status is at baseline.   Skin:     General: Skin is warm and dry.   Psychiatric:         Mood and Affect: Mood normal.         Behavior: Behavior normal.         Thought Content: Thought content normal.         Judgment: Judgment normal.   Vitals reviewed.

## 2024-06-17 DIAGNOSIS — M54.16 LUMBAR RADICULOPATHY: Primary | ICD-10-CM

## 2024-06-20 NOTE — TELEPHONE ENCOUNTER
Called patient and inform her FMLA form was received at the [facility] and is available for  on patient's vm

## 2024-06-21 ENCOUNTER — TELEPHONE (OUTPATIENT)
Dept: NEUROLOGY | Facility: CLINIC | Age: 54
End: 2024-06-21

## 2024-06-21 NOTE — TELEPHONE ENCOUNTER
received vm from 6/21/24 at 1:42pm-   Yes, my name is Cinthya ball l [facility]. My phone number is [phone]. And my address is [address] [place] [address], in [place], [place]. I'm a patient of Dr. Vicente rutherford. I am calling cause I need to reschedule an appointment with Dr. Rutherford. I need a refill of my nurtec for my migraine And I also want to check and see when my FMLA paperwork runs out. i had to use that today for work. I have a very bad migraine today. So if someone could call me back at their earliest convenience, please. Again, I need to reschedule an appointment with Dr. Rutherford. I need a refill of my nurtec for my migraines. And I'm checking the status on when my FMLA paperwork expires because i may need new paperwork. Again, it's Cinthya joinernbphillip. My phone number is [phone]. My birthday is January, 13th, 1970. Thank you.  --------------------------------------------------  Nurtec last sent to pharm on 2/2 with 3 refills,  it does not look like pt used all of these refills    I only see FMLA forms completed by GI provider recently.  I don't see FMLA from our office     Called pt. Made her aware of above.  She will get new FMLA for our office from her employer and she    will call back later to reschedule her appt from 5/8 that she had to cancel.

## 2024-07-01 ENCOUNTER — TELEPHONE (OUTPATIENT)
Age: 54
End: 2024-07-01

## 2024-07-25 ENCOUNTER — TELEPHONE (OUTPATIENT)
Dept: RADIOLOGY | Facility: CLINIC | Age: 54
End: 2024-07-25

## 2024-07-25 NOTE — TELEPHONE ENCOUNTER
LENORA Heller; [facility] Clerical  Please schedule patient for OV since she has not been seen since March.          Previous Messages       ----- Message -----  From: Dhara PHILIPPE  Sent: 7/25/2024  10:11 AM EDT  To: LENORA Heller  Subject: FW: P2P                                          Please see below message-patients procedure is being denied- they would like to know if you want to do a P2P    This was ordered on 3/29 (FYI)    Please let me know if this is being completed :)  Thanks!  Dhara  ----- Message -----  From: Milana Sullivan MA  Sent: 7/25/2024   8:15 AM EDT  To: *  Subject: P2P                                              PER EVICORE -  Coverage for this service has been denied for the following reason(s):  Your doctor told us that you have nerve pain coming from your low back. A shot to treat this was  requested. We cannot approve this request. The notes sent to us do not show:  There is confirmed pain, numbness, tingling and/or weakness that travels down the arm(s) or  leg(s) that limits daily activity.

## 2024-07-25 NOTE — TELEPHONE ENCOUNTER
Called patient made her aware of insurance denial. Offered to schedule a follow up appt.   Patient is a  in [place] and leave for work at 1am and doesn't get home til 5pm  She does not have the time to take off for an office visit and proc.   Patient became upset due to having mult doctor appts and just all the time off she is having to take.   Patient unable to schedule at this time. Advised to call us back if she has some time off and we can assist in scheduling

## 2024-08-02 ENCOUNTER — ANNUAL EXAM (OUTPATIENT)
Dept: OBGYN CLINIC | Facility: CLINIC | Age: 54
End: 2024-08-02
Payer: COMMERCIAL

## 2024-08-02 VITALS
WEIGHT: 111.6 LBS | BODY MASS INDEX: 19.77 KG/M2 | SYSTOLIC BLOOD PRESSURE: 140 MMHG | HEIGHT: 63 IN | DIASTOLIC BLOOD PRESSURE: 90 MMHG

## 2024-08-02 DIAGNOSIS — Z12.11 COLON CANCER SCREENING: ICD-10-CM

## 2024-08-02 DIAGNOSIS — N90.5 VULVAR ATROPHY: ICD-10-CM

## 2024-08-02 DIAGNOSIS — Z12.31 ENCOUNTER FOR SCREENING MAMMOGRAM FOR MALIGNANT NEOPLASM OF BREAST: ICD-10-CM

## 2024-08-02 PROCEDURE — 99396 PREV VISIT EST AGE 40-64: CPT | Performed by: PHYSICIAN ASSISTANT

## 2024-08-02 RX ORDER — ESTRADIOL 0.1 MG/G
CREAM VAGINAL
Qty: 42.5 G | Refills: 2 | Status: SHIPPED | OUTPATIENT
Start: 2024-08-02

## 2024-08-02 NOTE — PROGRESS NOTES
Assessment   54 y.o.  presenting for annual exam.     Plan   Diagnoses and all orders for this visit:    Encounter for screening mammogram for malignant neoplasm of breast  -     Mammo screening bilateral w 3d & cad; Future    Colon cancer screening  -     Cancel: Ambulatory referral to Gastroenterology; Future    Vulvar atrophy  -     estradiol (ESTRACE VAGINAL) 0.1 mg/g vaginal cream; Apply pea sized amount to vaginal opening and 1g intravaginally three times weekly.        Pap no longer indicated  Mammo slip given    Colonoscopy due    Lateral breast pain-present intermittently.Discussed tracking pain to see if related to exercise or physical activity.  Also recommended she get resized for bra she has not been adequately measured or sized for bra since her breast augmentation in .  If pain persist consider imaging   Vulvovaginal atrophy-severe; tolerated exam better today after 1 month of Estrace cream.  Advised to continue to use 3 times weekly.  She will start to try to apply more cream intravaginally with applicator-recommend 1 g; if unable to use applicator she will continue to use her finger.  Refill sent    Perineal hygiene reviewed. Weight bearing exercises minium of 150 mins/weekly advised. Kegel exercises recommended.   SBE encouraged, A yearly mammogram is recommended for breast cancer screening starting at age 40. ASCCP guidelines reviewed. Condoms encouraged with all sexual activity to prevent STI's. Gardisil vaccines recommended up to age 45. Calcium/ Vit D dietary requirements discussed.   Advised to call with any issues, all concerns & questions addressed.   See provided information in your after visit summary     F/U Annually and PRN    Results will be released to [facility], if abnormal will call or message to review and discuss treatment plan.     __________________________________________________________________    Subjective     Cinthya Howard is a 54 y.o.  presenting for  annual exam.     S/p hysterectomy for fibroids and endometriosis. Hx of severe vulvovaginal atrophy.  She was evaluated in the office in  and started on a course of Estrace cream.  She cannot tolerate using applicator of Estrace cream so she was advised to apply a pea-sized amount with her finger externally and intravaginally.  This is working well for patient.  Will try to increase the amount of cream applied intravaginally either with applicator or finger over the upcoming months    She follows with urology for chronic microscopic hematuria. A cystoscopy was attempted by urology on 5/10/2024 however this procedure was unable to be performed as patient was found to have an angled urethra which could not accommodate the scope. For now they have elected to monitor her closely with regular urinalysis. If needed they will attempt cystoscopy under anesthesia in the OR at which time urethral dilation could be performed.  She has an upcoming appointment scheduled with urology.    SCREENING  Last Pap: s/p hysterectomy   Last Mammo: 2022 birads 1  Last Colonoscopy: 2019 5 year recall      GYN  Sexually active: No    Denies vaginal discharge, itching, odor, dyspareunia, pelvic pain and vulvar/vaginal symptoms      OB           Complaints: denies   Denies urgency, frequency, hematuria, leakage / change in stream, difficulty urinating.       BREAST  Complaints: Reports some occasional lateral breast pain.  Feels like a muscle pulling.  This is only present every once in a while.  No pain today.  Denies: breast lump, breast tenderness, nipple discharge, skin color change, and skin lesion(s)  Personal hx: breast augmentation      Pertinent Family Hx:   Family hx of breast cancer: no  Family hx of ovarian cancer: no  Family hx of colon cancer: paternal uncle       GENERAL  PMH reviewed/updated and is as below.   Patient does follow with a PCP.    SOCIAL  Smoking: no  Alcohol:no  Drug: no  Occupation:   [employer]       Past Medical History:   Diagnosis Date    Arthritis     Asthma     Disease of thyroid gland     Gastroparesis     Hematuria     Hypokalemia 09/09/2022    Hypoparathyroidism (HCC)     Nephrolithiasis 07/22/2019    Osteoporosis     Partial symptomatic epilepsy with complex partial seizures, not intractable, without status epilepticus (HCC)     Partial symptompatic epilepsy     Seizure disorder (formerly [facility])        Past Surgical History:   Procedure Laterality Date    APPENDECTOMY      COLONOSCOPY      EGD      FRACTURE SURGERY Right     Knee    HYSTERECTOMY      OTHER SURGICAL HISTORY      left foot, rt knee, tonselectomy    SINUS SURGERY      TONSILLECTOMY           Current Outpatient Medications:     albuterol (PROVENTIL HFA,VENTOLIN HFA) 90 mcg/act inhaler, INHALE 2 PUFFS EVERY 6 HOURS AS NEEDED FOR WHEEZING, Disp: 6.7 g, Rfl: 0    ECHINACEA PO, Take by mouth daily, Disp: , Rfl:     ELDERBERRY PO, Take 1 tablet by mouth daily as needed Daily every morning, Disp: , Rfl:     estradiol (ESTRACE VAGINAL) 0.1 mg/g vaginal cream, Apply pea sized amount to vaginal opening and internally daily for two weeks. Then decrease use to three times weekly., Disp: 42.5 g, Rfl: 0    famotidine (PEPCID) 20 mg tablet, TAKE ONE TABLET BY MOUTH TWICE DAILY, Disp: 180 tablet, Rfl: 1    fluticasone (FLONASE) 50 mcg/act nasal spray, SPRAY 1 SPRAY INTO EACH NOSTRIL EVERY DAY, Disp: 24 mL, Rfl: 3    ibuprofen (MOTRIN) 600 mg tablet, Take 1 tablet (600 mg total) by mouth every 8 (eight) hours as needed for mild pain or moderate pain, Disp: 90 tablet, Rfl: 1    Lidocaine 4 % PTCH, Apply 1 patch topically in the morning, Disp: 30 patch, Rfl: 2    methocarbamol (ROBAXIN) 500 mg tablet, Take 1 tablet (500 mg total) by mouth every 8 (eight) hours as needed for muscle spasms, Disp: 90 tablet, Rfl: 1    ondansetron (ZOFRAN) 4 mg tablet, Take 1 tablet (4 mg total) by mouth every 8 (eight) hours as needed for nausea or  vomiting, Disp: 60 tablet, Rfl: 3    rimegepant sulfate (Nurtec) 75 mg TBDP, Take one NURTEC 75 mg at onset under tongue. Limit 1 in 24 hours. 8 a month., Disp: 8 tablet, Rfl: 3    Synthroid 75 MCG tablet, Take 75 mcg by mouth daily Except sundays, Disp: , Rfl:     topiramate (TOPAMAX) 200 MG tablet, TAKE 1 TABLET BY MOUTH TWICE A DAY, Disp: 180 tablet, Rfl: 3    Vitamin D, Cholecalciferol, 25 MCG (1000 UT) CAPS, Take 1 tablet by mouth in the morning, Disp: , Rfl:     Zinc 50 MG CAPS, Take 1 capsule by mouth daily, Disp: , Rfl:     Ascorbic Acid (Vitamin C) 500 MG CAPS, Take 1 each by mouth daily (Patient not taking: Reported on 8/2/2024), Disp: , Rfl:     ondansetron (ZOFRAN) 4 mg tablet, Take 1 tablet (4 mg total) by mouth every 8 (eight) hours as needed for nausea or vomiting (Patient not taking: Reported on 8/2/2024), Disp: 30 tablet, Rfl: 3    pantoprazole (PROTONIX) 40 mg tablet, Take 1 tablet (40 mg total) by mouth daily (Patient not taking: Reported on 6/14/2024), Disp: 30 tablet, Rfl: 3    Allergies   Allergen Reactions    Morphine Other (See Comments)    Codeine Hives    Morphine And Codeine     Codeine Itching    Gabapentin Palpitations    Sulfa Antibiotics Rash       Social History     Socioeconomic History    Marital status: Single     Spouse name: Not on file    Number of children: Not on file    Years of education: Not on file    Highest education level: Not on file   Occupational History    Not on file   Tobacco Use    Smoking status: Never    Smokeless tobacco: Never   Vaping Use    Vaping status: Never Used   Substance and Sexual Activity    Alcohol use: Not Currently    Drug use: Not Currently     Comment: social    Sexual activity: Not Currently   Other Topics Concern    Not on file   Social History Narrative    ** Merged History Encounter **          Social Determinants of Health     Financial Resource Strain: Not on file   Food Insecurity: No Food Insecurity (11/8/2021)    Hunger Vital Sign      "Worried About Running Out of Food in the Last Year: Never true     Ran Out of Food in the Last Year: Never true   Transportation Needs: No Transportation Needs (11/8/2021)    PRAPARE - Transportation     Lack of Transportation (Medical): No     Lack of Transportation (Non-Medical): No   Physical Activity: Not on file   Stress: Not on file   Social Connections: Not on file   Intimate Partner Violence: Not on file   Housing Stability: Not on file       Review of Systems     ROS:  Constitutional: Negative for fatigue and unexpected weight change.   Respiratory: Negative for cough and shortness of breath.    Cardiovascular: Negative for chest pain and palpitations.   Gastrointestinal: Negative for abdominal pain and change in bowel habits  Breasts:  Negative, other than as noted above.   Genitourinary: Negative, other than as noted above.   Psychiatric: Negative for mood difficulties.      Objective      /90 (BP Location: Left arm, Patient Position: Sitting, Cuff Size: Standard)   Ht 5' 2.5\" (1.588 m)   Wt 50.6 kg (111 lb 9.6 oz)   BMI 20.09 kg/m²     Physical Examination:    Patient appears well and is not in distress  Neck is supple without masses, no cervical or supraclavicular lymphadenopathy  Cardiovascular: regular rate and rhythm; no murmurs  Lungs: clear to auscultation bilaterally; no wheezes  Breasts are symmetrical without mass, tenderness, nipple discharge, skin changes or adenopathy.   Abdomen is soft and nontender without masses.   External genitals are normal without lesions or rashes.  Urethral meatus and urethra are normal  Bladder is normal to palpation  Vagina is normal without discharge or bleeding. Severe atrophy noted  Cervix is surgically absent   Uterus is surgically absent   Adnexa are not palpable                 "

## 2024-08-06 ENCOUNTER — NURSE TRIAGE (OUTPATIENT)
Age: 54
End: 2024-08-06

## 2024-08-06 ENCOUNTER — PATIENT MESSAGE (OUTPATIENT)
Age: 54
End: 2024-08-06

## 2024-08-06 DIAGNOSIS — R35.0 URINARY FREQUENCY: Primary | ICD-10-CM

## 2024-08-06 DIAGNOSIS — N89.8 VAGINA ITCHING: Primary | ICD-10-CM

## 2024-08-06 DIAGNOSIS — N89.8 VAGINA ITCHING: ICD-10-CM

## 2024-08-06 RX ORDER — FLUCONAZOLE 150 MG/1
150 TABLET ORAL ONCE
Qty: 1 TABLET | Refills: 0 | Status: SHIPPED | OUTPATIENT
Start: 2024-08-06 | End: 2024-08-06 | Stop reason: SDUPTHER

## 2024-08-06 RX ORDER — FLUCONAZOLE 150 MG/1
150 TABLET ORAL ONCE
Qty: 1 TABLET | Refills: 0 | Status: SHIPPED | OUTPATIENT
Start: 2024-08-06 | End: 2024-08-06

## 2024-08-06 NOTE — TELEPHONE ENCOUNTER
"LOV 04/23/24    Pt reports sudden onset nausea, vomiting and diarrhea today. Pt states she took a Zofran tablet 30 minutes ago and vomited. Pt unable to see if Zofran came up. Pt reports 3 episodes of emesis and 1 episode of loose diarrhea within 24 hours. Lower abdominal cramping. Pt has Tylenol however has not taken yet. Pt sounds very weak during this encounter and is pausing speech for extended periods during this encounter. Denies fever, hematochezia, hematemesis, sick contacts.      Advised pt to seek evaluation with ED due to symptoms/audible presentation. Pt declines evaluation due to having diarrhea and vomiting. Pt has liquid IV. Advised to take small sips every 10-15 minutes. Wait 1 hour and take 4 mg dose of Zofran. Informed pt I would forward this encounter to the provider.     Reason for Disposition   Patient sounds very sick or weak to the triager    Answer Assessment - Initial Assessment Questions  1. VOMITING SEVERITY: \"How many times have you vomited in the past 24 hours?\"      - MILD:  1 - 2 times/day     - MODERATE: 3 - 5 times/day, decreased oral intake without significant weight loss or symptoms of dehydration     - SEVERE: 6 or more times/day, vomits everything or nearly everything, with significant weight loss, symptoms of dehydration       3 episodes   2. ONSET: \"When did the vomiting begin?\"       today  3. FLUIDS: \"What fluids or food have you vomited up today?\" \"Have you been able to keep any fluids down?\"      liquid  4. ABDOMINAL PAIN: \"Are your having any abdominal pain?\" If yes : \"How bad is it and what does it feel like?\" (e.g., crampy, dull, intermittent, constant)       Lower abdominal  5. DIARRHEA: \"Is there any diarrhea?\" If Yes, ask: \"How many times today?\"       Diarrhea x 1  6. CONTACTS: \"Is there anyone else in the family with the same symptoms?\"       Denies   7. CAUSE: \"What do you think is causing your vomiting?\"      unsure  8. HYDRATION STATUS: \"Any signs of " "dehydration?\" (e.g., dry mouth [not only dry lips], too weak to stand) \"When did you last urinate?\"      -  9. OTHER SYMPTOMS: \"Do you have any other symptoms?\" (e.g., fever, headache, vertigo, vomiting blood or coffee grounds, recent head injury)      Nausea   10. PREGNANCY: \"Is there any chance you are pregnant?\" \"When was your last menstrual period?\"        N/a    Protocols used: Vomiting-ADULT-[place]    "

## 2024-08-06 NOTE — TELEPHONE ENCOUNTER
I put orders in for urine testing to be obtained.  She should try to obtain this either today or tomorrow so that we have the results back in time for her appointment on 8/9

## 2024-08-06 NOTE — TELEPHONE ENCOUNTER
Patient calling to report increased frequency;   left sided back pain / hip pain (unsure if from overuse);  taking amoxicillin for dental infection for 4 more days. ER precautions reviewed.   Has upcoming urology appointment on Friday 8/9/24.  Visit order for Urinalysis in chart. Any other recommendations?    Additional Information   Negative: The patient has severe uncontrolled pain   Negative: The patient has a fever of 101 or higher   Negative: The patient has persistent vomiting    Answer Questions - Initial Assessment   When did this start: frequency started 4 days ago    Are you voiding normal amounts or small amounts: normal amounts    Have you seen blood in your urine:no    Do you have any other urinary symptoms such as incontinence, nocturia, weak stream or dysuria: no    Do you have a fever of 101 or higher: no    Have you taken any cold or allergy medication: no    Are you taking a diuretic:no    Have you had a recent urologic surgery or procedure:no    Are you diabetic:no    Protocols used: Urinary Frequency

## 2024-08-06 NOTE — TELEPHONE ENCOUNTER
"Patient calling in stating that she's taking amoxicillin for a tooth infection. Patient reporting that she has a yeast infection from the antibiotics. Pt reporting having itching and denies discharge. Pt reporting that she would like further recommendations from Sujey Cole as she can't insert medication for a yeast infection into the vagina due to the atrophy. Pt reporting her itching is 6/10. Patient denies vaginal discharge at this time.           Reason for Disposition   MODERATE-SEVERE itching (i.e., interferes with school, work, or sleep)    Answer Assessment - Initial Assessment Questions  1. SYMPTOM: \"What's the main symptom you're concerned about?\" (e.g., pain, itching, dryness)      Pt reporting having itching and odor   2. LOCATION: \"Where is the  itching  located?\" (e.g., inside/outside, left/right)      Pt reporting itching internally or externally   3. ONSET: \"When did the  ithcing   start?\"      Pt reporting Sunday night   4. PAIN: \"Is there any pain?\" If Yes, ask: \"How bad is it?\" (Scale: 1-10; mild, moderate, severe)      Pt denies   5. ITCHING: \"Is there any itching?\" If Yes, ask: \"How bad is it?\" (Scale: 1-10; mild, moderate, severe)      Pt reporting 6/10   6. CAUSE: \"What do you think is causing the discharge?\" \"Have you had the same problem before? What happened then?\"      Pt stating that she's on abx   7. OTHER SYMPTOMS: \"Do you have any other symptoms?\" (e.g., fever, itching, vaginal bleeding, pain with urination, injury to genital area, vaginal foreign body)      Pt c/o itching, pt denies other symptoms   8. PREGNANCY: \"Is there any chance you are pregnant?\" \"When was your last menstrual period?\"      Post menopausal.    Protocols used: Vaginal Symptoms-ADULT-[place]    "

## 2024-08-07 ENCOUNTER — APPOINTMENT (OUTPATIENT)
Dept: LAB | Facility: CLINIC | Age: 54
End: 2024-08-07
Payer: COMMERCIAL

## 2024-08-07 DIAGNOSIS — R11.2 NAUSEA AND VOMITING, UNSPECIFIED VOMITING TYPE: Primary | ICD-10-CM

## 2024-08-07 DIAGNOSIS — R35.0 URINARY FREQUENCY: ICD-10-CM

## 2024-08-07 LAB
BACTERIA UR QL AUTO: NORMAL /HPF
BILIRUB UR QL STRIP: NEGATIVE
CLARITY UR: CLEAR
COLOR UR: NORMAL
GLUCOSE UR STRIP-MCNC: NEGATIVE MG/DL
HGB UR QL STRIP.AUTO: NEGATIVE
KETONES UR STRIP-MCNC: NEGATIVE MG/DL
LEUKOCYTE ESTERASE UR QL STRIP: NEGATIVE
NITRITE UR QL STRIP: NEGATIVE
NON-SQ EPI CELLS URNS QL MICRO: NORMAL /HPF
PH UR STRIP.AUTO: 7 [PH]
PROT UR STRIP-MCNC: NEGATIVE MG/DL
RBC #/AREA URNS AUTO: NORMAL /HPF
SP GR UR STRIP.AUTO: 1.01 (ref 1–1.03)
UROBILINOGEN UR STRIP-ACNC: <2 MG/DL
WBC #/AREA URNS AUTO: NORMAL /HPF

## 2024-08-07 PROCEDURE — 87086 URINE CULTURE/COLONY COUNT: CPT

## 2024-08-07 PROCEDURE — 81001 URINALYSIS AUTO W/SCOPE: CPT

## 2024-08-07 RX ORDER — ONDANSETRON 4 MG/1
4 TABLET, ORALLY DISINTEGRATING ORAL EVERY 6 HOURS SCHEDULED
Qty: 20 TABLET | Refills: 0 | Status: SHIPPED | OUTPATIENT
Start: 2024-08-07

## 2024-08-07 NOTE — TELEPHONE ENCOUNTER
I called and spoke to patient. She was very grateful for the call and is aware the dispersible Zofran was called in.

## 2024-08-07 NOTE — TELEPHONE ENCOUNTER
Called and spoke with Cinthya to make her aware orders for urine testing were placed in her chart. She will have urine testing completed today and follow up as scheduled on Friday.

## 2024-08-07 NOTE — TELEPHONE ENCOUNTER
I called and spoke to patient she was very grateful for the call, and is aware the dispersible tablets of Zofran was called in.

## 2024-08-08 LAB
BACTERIA UR CULT: NORMAL
CLAM IGE QN: <0.1 KUA/L
CRAB IGE QN: <0.1 KUA/L
LOBSTER IGE QN: <0.1 KUA/L
OYSTER IGE QN: <0.1 KUA/L
SCALLOP IGE QN: <0.1 KUA/L
SHRIMP IGE QN: <0.1 KUA/L
TOTAL IGE SMQN RAST: 5.52 KU/L (ref 0–113)

## 2024-09-06 ENCOUNTER — HOSPITAL ENCOUNTER (EMERGENCY)
Facility: HOSPITAL | Age: 54
Discharge: HOME | End: 2024-09-06
Attending: EMERGENCY MEDICINE
Payer: COMMERCIAL

## 2024-09-06 ENCOUNTER — APPOINTMENT (EMERGENCY)
Dept: RADIOLOGY | Facility: HOSPITAL | Age: 54
End: 2024-09-06
Payer: COMMERCIAL

## 2024-09-06 ENCOUNTER — TELEPHONE (OUTPATIENT)
Age: 54
End: 2024-09-06

## 2024-09-06 VITALS
RESPIRATION RATE: 14 BRPM | SYSTOLIC BLOOD PRESSURE: 175 MMHG | TEMPERATURE: 98.4 F | OXYGEN SATURATION: 99 % | HEART RATE: 76 BPM | DIASTOLIC BLOOD PRESSURE: 106 MMHG

## 2024-09-06 DIAGNOSIS — K08.89 PAIN, DENTAL: Primary | ICD-10-CM

## 2024-09-06 DIAGNOSIS — K04.7 TOOTH ABSCESS: Primary | ICD-10-CM

## 2024-09-06 LAB
ANION GAP SERPL CALCULATED.3IONS-SCNC: 4 MMOL/L (ref 4–13)
BASOPHILS # BLD AUTO: 0.06 THOUSANDS/ÂΜL (ref 0–0.1)
BASOPHILS NFR BLD AUTO: 1 % (ref 0–1)
BUN SERPL-MCNC: 13 MG/DL (ref 5–25)
CALCIUM SERPL-MCNC: 8.3 MG/DL (ref 8.4–10.2)
CHLORIDE SERPL-SCNC: 110 MMOL/L (ref 96–108)
CO2 SERPL-SCNC: 25 MMOL/L (ref 21–32)
CREAT SERPL-MCNC: 1.29 MG/DL (ref 0.6–1.3)
EOSINOPHIL # BLD AUTO: 0.1 THOUSAND/ÂΜL (ref 0–0.61)
EOSINOPHIL NFR BLD AUTO: 2 % (ref 0–6)
ERYTHROCYTE [DISTWIDTH] IN BLOOD BY AUTOMATED COUNT: 13.2 % (ref 11.6–15.1)
GFR SERPL CREATININE-BSD FRML MDRD: 47 ML/MIN/1.73SQ M
GLUCOSE SERPL-MCNC: 81 MG/DL (ref 65–140)
HCT VFR BLD AUTO: 40.2 % (ref 34.8–46.1)
HGB BLD-MCNC: 13.5 G/DL (ref 11.5–15.4)
IMM GRANULOCYTES # BLD AUTO: 0.02 THOUSAND/UL (ref 0–0.2)
IMM GRANULOCYTES NFR BLD AUTO: 0 % (ref 0–2)
LYMPHOCYTES # BLD AUTO: 2.05 THOUSANDS/ÂΜL (ref 0.6–4.47)
LYMPHOCYTES NFR BLD AUTO: 40 % (ref 14–44)
MCH RBC QN AUTO: 29.8 PG (ref 26.8–34.3)
MCHC RBC AUTO-ENTMCNC: 33.6 G/DL (ref 31.4–37.4)
MCV RBC AUTO: 89 FL (ref 82–98)
MONOCYTES # BLD AUTO: 0.76 THOUSAND/ÂΜL (ref 0.17–1.22)
MONOCYTES NFR BLD AUTO: 15 % (ref 4–12)
NEUTROPHILS # BLD AUTO: 2.12 THOUSANDS/ÂΜL (ref 1.85–7.62)
NEUTS SEG NFR BLD AUTO: 42 % (ref 43–75)
NRBC BLD AUTO-RTO: 0 /100 WBCS
PLATELET # BLD AUTO: 251 THOUSANDS/UL (ref 149–390)
PMV BLD AUTO: 9.3 FL (ref 8.9–12.7)
POTASSIUM SERPL-SCNC: 3.7 MMOL/L (ref 3.5–5.3)
RBC # BLD AUTO: 4.53 MILLION/UL (ref 3.81–5.12)
SODIUM SERPL-SCNC: 139 MMOL/L (ref 135–147)
WBC # BLD AUTO: 5.11 THOUSAND/UL (ref 4.31–10.16)

## 2024-09-06 PROCEDURE — 99284 EMERGENCY DEPT VISIT MOD MDM: CPT

## 2024-09-06 PROCEDURE — 70450 CT HEAD/BRAIN W/O DYE: CPT

## 2024-09-06 PROCEDURE — 99284 EMERGENCY DEPT VISIT MOD MDM: CPT | Performed by: EMERGENCY MEDICINE

## 2024-09-06 PROCEDURE — 80048 BASIC METABOLIC PNL TOTAL CA: CPT

## 2024-09-06 PROCEDURE — 85025 COMPLETE CBC W/AUTO DIFF WBC: CPT

## 2024-09-06 PROCEDURE — 96374 THER/PROPH/DIAG INJ IV PUSH: CPT

## 2024-09-06 PROCEDURE — 36415 COLL VENOUS BLD VENIPUNCTURE: CPT

## 2024-09-06 PROCEDURE — 70491 CT SOFT TISSUE NECK W/DYE: CPT

## 2024-09-06 RX ORDER — KETOROLAC TROMETHAMINE 30 MG/ML
15 INJECTION, SOLUTION INTRAMUSCULAR; INTRAVENOUS ONCE
Status: COMPLETED | OUTPATIENT
Start: 2024-09-06 | End: 2024-09-06

## 2024-09-06 RX ADMIN — KETOROLAC TROMETHAMINE 15 MG: 30 INJECTION, SOLUTION INTRAMUSCULAR; INTRAVENOUS at 18:34

## 2024-09-06 RX ADMIN — IOHEXOL 65 ML: 350 INJECTION, SOLUTION INTRAVENOUS at 19:44

## 2024-09-06 NOTE — ED PROVIDER NOTES
History  Chief Complaint   Patient presents with    Dental Pain     Patient is coming in for tooth abscess that she has on her lower left jaw. Patient is on her second round of Clindamycin from her dentist but is now having back pain, forgetfulness, neck pain, and nausea. Patient did try Zofran for the nausea but it only helps for a little. Patient has had chills but no fever. Patient took 600 mg of Ibuprofen around 9:30am.      54-year-old female with past medical history of asthma, seizure, hypoparathyroidism, osteoporosis presents for worsening dental pain on the left lower jaw..  She states she has had a dental abscess since July.  She has been on 2 rounds of clindamycin with no improvement.  She discussed this with her primary care doctor who stated she should come in due to new neck pain and headaches that started this week.  She has been taking Tylenol.  She has been treating her nausea with Zofran.  She denies any trauma to her neck.  She has appointment on Thursday with her dentist to discuss the ongoing tooth pain.  Denies difficulty swallowing, shortness of breath, weakness, numbness, tingling, visual disturbances.      Dental Pain  Associated symptoms: headaches and neck pain    Associated symptoms: no drooling, no facial swelling and no fever        Prior to Admission Medications   Prescriptions Last Dose Informant Patient Reported? Taking?   Ascorbic Acid (Vitamin C) 500 MG CAPS  Self Yes No   Sig: Take 1 each by mouth daily   Patient not taking: Reported on 8/2/2024   ECHINACEA PO  Self Yes No   Sig: Take by mouth daily   ELDERBERRY PO  Self Yes No   Sig: Take 1 tablet by mouth daily as needed Daily every morning   Lidocaine 4 % PTCH  Self No No   Sig: Apply 1 patch topically in the morning   Synthroid 75 MCG tablet  Self Yes No   Sig: Take 75 mcg by mouth daily Except sundays   Vitamin D, Cholecalciferol, 25 MCG (1000 UT) CAPS  Self Yes No   Sig: Take 1 tablet by mouth in the morning   Zinc 50 MG  CAPS  Self Yes No   Sig: Take 1 capsule by mouth daily   albuterol (PROVENTIL HFA,VENTOLIN HFA) 90 mcg/act inhaler  Self No No   Sig: INHALE 2 PUFFS EVERY 6 HOURS AS NEEDED FOR WHEEZING   amoxicillin (AMOXIL) 500 mg capsule   Yes No   estradiol (ESTRACE VAGINAL) 0.1 mg/g vaginal cream   No No   Sig: Apply pea sized amount to vaginal opening and 1g intravaginally three times weekly.   famotidine (PEPCID) 20 mg tablet   No No   Sig: TAKE ONE TABLET BY MOUTH TWICE DAILY   fluticasone (FLONASE) 50 mcg/act nasal spray  Self No No   Sig: SPRAY 1 SPRAY INTO EACH NOSTRIL EVERY DAY   ibuprofen (MOTRIN) 600 mg tablet  Self No No   Sig: Take 1 tablet (600 mg total) by mouth every 8 (eight) hours as needed for mild pain or moderate pain   methocarbamol (ROBAXIN) 500 mg tablet  Self No No   Sig: Take 1 tablet (500 mg total) by mouth every 8 (eight) hours as needed for muscle spasms   ondansetron (ZOFRAN) 4 mg tablet  Self No No   Sig: Take 1 tablet (4 mg total) by mouth every 8 (eight) hours as needed for nausea or vomiting   ondansetron (ZOFRAN) 4 mg tablet  Self No No   Sig: Take 1 tablet (4 mg total) by mouth every 8 (eight) hours as needed for nausea or vomiting   Patient not taking: Reported on 8/2/2024   ondansetron (ZOFRAN-ODT) 4 mg disintegrating tablet   No No   Sig: Take 1 tablet (4 mg total) by mouth every 6 (six) hours   pantoprazole (PROTONIX) 40 mg tablet  Self No No   Sig: Take 1 tablet (40 mg total) by mouth daily   Patient not taking: Reported on 6/14/2024   rimegepant sulfate (Nurtec) 75 mg TBDP  Self No No   Sig: Take one NURTEC 75 mg at onset under tongue. Limit 1 in 24 hours. 8 a month.   topiramate (TOPAMAX) 200 MG tablet  Self No No   Sig: TAKE 1 TABLET BY MOUTH TWICE A DAY      Facility-Administered Medications: None       Past Medical History:   Diagnosis Date    Arthritis     Asthma     Disease of thyroid gland     Gastroparesis     Hematuria     Hypokalemia 09/09/2022    Hypoparathyroidism (HCC)      Nephrolithiasis 07/22/2019    Osteoporosis     Partial symptomatic epilepsy with complex partial seizures, not intractable, without status epilepticus (HCC)     Partial symptompatic epilepsy     Seizure disorder (HCC)        Past Surgical History:   Procedure Laterality Date    APPENDECTOMY      COLONOSCOPY      EGD      FRACTURE SURGERY Right     Knee    HYSTERECTOMY      OTHER SURGICAL HISTORY      left foot, rt knee, tonselectomy    SINUS SURGERY      TONSILLECTOMY         Family History   Problem Relation Age of Onset    Alcohol abuse Mother     RUSSELL disease Mother     Boles's esophagus Mother     Migraines Father     Prostate cancer Father     Cancer Father     Alcohol abuse Maternal Uncle     Seizures Paternal Aunt     Lung cancer Paternal Aunt     Prostate cancer Paternal Uncle     Colon cancer Paternal Uncle     Ovarian cancer Neg Hx     Uterine cancer Neg Hx     Breast cancer Neg Hx     Cervical cancer Neg Hx      I have reviewed and agree with the history as documented.    E-Cigarette/Vaping    E-Cigarette Use Never User      E-Cigarette/Vaping Substances    Nicotine No     THC No     CBD No     Flavoring No     Other No     Unknown No      Social History     Tobacco Use    Smoking status: Never    Smokeless tobacco: Never   Vaping Use    Vaping status: Never Used   Substance Use Topics    Alcohol use: Not Currently    Drug use: Not Currently     Comment: social        Review of Systems   Constitutional:  Negative for chills and fever.   HENT:  Positive for dental problem. Negative for drooling, ear pain, facial swelling, sinus pressure, sinus pain, sore throat, trouble swallowing and voice change.    Eyes:  Negative for pain and visual disturbance.   Respiratory:  Negative for cough and shortness of breath.    Cardiovascular:  Negative for chest pain and palpitations.   Gastrointestinal:  Positive for nausea. Negative for abdominal pain and vomiting.   Genitourinary:  Negative for dysuria and hematuria.    Musculoskeletal:  Positive for neck pain. Negative for arthralgias and back pain.   Skin:  Negative for color change and rash.   Neurological:  Positive for headaches. Negative for dizziness, seizures, syncope, weakness and numbness.   All other systems reviewed and are negative.      Physical Exam  ED Triage Vitals [09/06/24 1746]   Temperature Pulse Respirations Blood Pressure SpO2   98.4 °F (36.9 °C) 76 14 (!) 175/106 99 %      Temp Source Heart Rate Source Patient Position - Orthostatic VS BP Location FiO2 (%)   Temporal Monitor Sitting Left arm --      Pain Score       7             Orthostatic Vital Signs  Vitals:    09/06/24 1746   BP: (!) 175/106   Pulse: 76   Patient Position - Orthostatic VS: Sitting       Physical Exam  Vitals and nursing note reviewed.   Constitutional:       General: She is not in acute distress.     Appearance: She is well-developed.   HENT:      Head: Normocephalic and atraumatic.      Mouth/Throat:      Lips: Pink.      Mouth: Mucous membranes are moist.      Dentition: Dental caries present. No dental tenderness, gingival swelling, dental abscesses or gum lesions.      Tongue: No lesions.      Pharynx: Oropharynx is clear. Uvula midline. No pharyngeal swelling, oropharyngeal exudate, posterior oropharyngeal erythema or uvula swelling.      Tonsils: No tonsillar exudate.      Comments: No evidence of abscess or tenderness on exam; no sublingual tenderness or submental tenderness  Eyes:      Conjunctiva/sclera: Conjunctivae normal.   Cardiovascular:      Rate and Rhythm: Normal rate and regular rhythm.      Heart sounds: Normal heart sounds. No murmur heard.  Pulmonary:      Effort: Pulmonary effort is normal. No respiratory distress.      Breath sounds: Normal breath sounds. No stridor, decreased air movement or transmitted upper airway sounds.   Abdominal:      Palpations: Abdomen is soft.      Tenderness: There is no abdominal tenderness.   Musculoskeletal:         General: No  swelling.      Cervical back: Neck supple. Muscular tenderness present. No spinous process tenderness.   Skin:     General: Skin is warm and dry.      Capillary Refill: Capillary refill takes less than 2 seconds.   Neurological:      General: No focal deficit present.      Mental Status: She is alert.      Cranial Nerves: Cranial nerves 2-12 are intact.      Sensory: Sensation is intact.      Motor: Motor function is intact.   Psychiatric:         Mood and Affect: Mood normal.         ED Medications  Medications   ketorolac (TORADOL) injection 15 mg (15 mg Intravenous Given 9/6/24 1834)   iohexol (OMNIPAQUE) 350 MG/ML injection (MULTI-DOSE) 65 mL (65 mL Intravenous Given 9/6/24 1944)       Diagnostic Studies  Results Reviewed       Procedure Component Value Units Date/Time    Basic metabolic panel [[id number]]  (Abnormal) Collected: 09/06/24 1830    Lab Status: Final result Specimen: Blood from Arm, Right Updated: 09/06/24 1859     Sodium 139 mmol/L      Potassium 3.7 mmol/L      Chloride 110 mmol/L      CO2 25 mmol/L      ANION GAP 4 mmol/L      BUN 13 mg/dL      Creatinine 1.29 mg/dL      Glucose 81 mg/dL      Calcium 8.3 mg/dL      eGFR 47 ml/min/1.73sq m     Narrative:      National Kidney Disease Foundation guidelines for Chronic Kidney Disease (CKD):     Stage 1 with normal or high GFR (GFR > 90 mL/min/1.73 square meters)    Stage 2 Mild CKD (GFR = 60-89 mL/min/1.73 square meters)    Stage 3A Moderate CKD (GFR = 45-59 mL/min/1.73 square meters)    Stage 3B Moderate CKD (GFR = 30-44 mL/min/1.73 square meters)    Stage 4 Severe CKD (GFR = 15-29 mL/min/1.73 square meters)    Stage 5 End Stage CKD (GFR <15 mL/min/1.73 square meters)  Note: GFR calculation is accurate only with a steady state creatinine    CBC and differential [[id number]]  (Abnormal) Collected: 09/06/24 1830    Lab Status: Final result Specimen: Blood from Arm, Right Updated: 09/06/24 1840     WBC 5.11 Thousand/uL      RBC 4.53 Million/uL       Hemoglobin 13.5 g/dL      Hematocrit 40.2 %      MCV 89 fL      MCH 29.8 pg      MCHC 33.6 g/dL      RDW 13.2 %      MPV 9.3 fL      Platelets 251 Thousands/uL      nRBC 0 /100 WBCs      Segmented % 42 %      Immature Grans % 0 %      Lymphocytes % 40 %      Monocytes % 15 %      Eosinophils Relative 2 %      Basophils Relative 1 %      Absolute Neutrophils 2.12 Thousands/µL      Absolute Immature Grans 0.02 Thousand/uL      Absolute Lymphocytes 2.05 Thousands/µL      Absolute Monocytes 0.76 Thousand/µL      Eosinophils Absolute 0.10 Thousand/µL      Basophils Absolute 0.06 Thousands/µL                    CT head without contrast   Final Result by Jaswinder Pena DO (09/06 2002)      No acute intracranial abnormality. No change.                  Workstation performed: [id number]         CT soft tissue neck   Final Result by Jaswinder Pena DO (09/06 2005)      Normal CT of the neck.            Workstation performed: [id number]               Procedures  Procedures      ED Course                             SBIRT 22yo+      Flowsheet Row Most Recent Value   Initial Alcohol Screen: US AUDIT-C     1. How often do you have a drink containing alcohol? 0 Filed at: 09/06/2024 1748   2. How many drinks containing alcohol do you have on a typical day you are drinking?  0 Filed at: 09/06/2024 1748   3a. Male UNDER 65: How often do you have five or more drinks on one occasion? 0 Filed at: 09/06/2024 1748   3b. FEMALE Any Age, or MALE 65+: How often do you have 4 or more drinks on one occassion? 0 Filed at: 09/06/2024 1748   Audit-C Score 0 Filed at: 09/06/2024 1748   SONALI: How many times in the past year have you...    Used an illegal drug or used a prescription medication for non-medical reasons? Never Filed at: 09/06/2024 1748                  Medical Decision Making  Patient is a 54 y.o. female with PMH of seizure disorder and gastroparesis who presents to the ED with dental pain, headache, neck  "pain.    Vital signs hypertensive 175/106 otherwise unremarkable afebrile. On exam no evidence of abscess or swelling or tenderness on dental exam, nonfocal neuroexam, muscular tenderness.    History and physical exam most consistent with musculoskeletal pain and. However, differential diagnosis included but not limited to dental abscess, Kylee's, peritonsillar abscess, deep space infection.  Unlikely given benign physical exam and negative imaging    Plan: BMP, CT head and soft tissues    CT imaging was unremarkable for any acute findings or evidence of infection or mass    View ED course above for further discussion on patient workup.     All labs reviewed and utilized in the medical decision making process  All radiology studies independently viewed by me and interpreted by the radiologist.  I reviewed all testing with the patient.     Upon re-evaluation patient feels improved with Toradol.  Negative CT findings and discussed the need for follow-up with dentist next week at her previously scheduled appointment.  Discussed need to follow-up with primary care doctor to discuss headaches and neck pain.  No acute findings on today's workup.    Disposition: Stable discharge with dentist follow-up and primary care    Portions of the record may have been created with voice recognition software. Occasional wrong word or \"sound a like\" substitutions may have occurred due to the inherent limitations of voice recognition software. Read the chart carefully and recognize, using context, where substitutions have occurred.      Amount and/or Complexity of Data Reviewed  Labs: ordered.  Radiology: ordered.    Risk  Prescription drug management.    Seizure disorder, gastroparesis      Disposition  Final diagnoses:   Pain, dental     Time reflects when diagnosis was documented in both MDM as applicable and the Disposition within this note       Time User Action Codes Description Comment    9/6/2024  8:08 PM Mercedes Weaver Add " [K08.89] Pain, dental           ED Disposition       ED Disposition   Discharge    Condition   Stable    Date/Time   Fri Sep 6, 2024 2010    Comment   Cinthya NEERU Howard discharge to home/self care.                   Follow-up Information       Follow up With Specialties Details Why Contact Info Additional Information    Tony Naidu, DO Internal Medicine Call in 1 day  [address] [id number] [phone]       [facility] Emergency Department Emergency Medicine Go to  If symptoms worsen [address] [facility] [address] [phone] [facility] Emergency Department, [address] [place], [place], [address] [phone]            Discharge Medication List as of 9/6/2024  8:10 PM        CONTINUE these medications which have NOT CHANGED    Details   albuterol (PROVENTIL HFA,VENTOLIN HFA) 90 mcg/act inhaler INHALE 2 PUFFS EVERY 6 HOURS AS NEEDED FOR WHEEZING, Normal      amoxicillin (AMOXIL) 500 mg capsule Historical Med      Ascorbic Acid (Vitamin C) 500 MG CAPS Take 1 each by mouth daily, Historical Med      ECHINACEA PO Take by mouth daily, Historical Med      ELDERBERRY PO Take 1 tablet by mouth daily as needed Daily every morning, Historical Med      estradiol (ESTRACE VAGINAL) 0.1 mg/g vaginal cream Apply pea sized amount to vaginal opening and 1g intravaginally three times weekly., Normal      famotidine (PEPCID) 20 mg tablet TAKE ONE TABLET BY MOUTH TWICE DAILY, Starting Fri 5/17/2024, Normal      fluticasone (FLONASE) 50 mcg/act nasal spray SPRAY 1 SPRAY INTO EACH NOSTRIL EVERY DAY, Normal      ibuprofen (MOTRIN) 600 mg tablet Take 1 tablet (600 mg total) by mouth every 8 (eight) hours as needed for mild pain or moderate pain, Starting Fri 3/29/2024, Normal      Lidocaine 4 % PTCH Apply 1 patch topically in the morning, Starting Fri 2/2/2024, Normal      methocarbamol (ROBAXIN) 500 mg tablet Take 1 tablet (500 mg total) by  mouth every 8 (eight) hours as needed for muscle spasms, Starting Fri 2/23/2024, Normal      !! ondansetron (ZOFRAN) 4 mg tablet Take 1 tablet (4 mg total) by mouth every 8 (eight) hours as needed for nausea or vomiting, Starting Thu 12/29/2022, Normal      !! ondansetron (ZOFRAN) 4 mg tablet Take 1 tablet (4 mg total) by mouth every 8 (eight) hours as needed for nausea or vomiting, Starting Wed 3/8/2023, Normal      ondansetron (ZOFRAN-ODT) 4 mg disintegrating tablet Take 1 tablet (4 mg total) by mouth every 6 (six) hours, Starting Wed 8/7/2024, Normal      pantoprazole (PROTONIX) 40 mg tablet Take 1 tablet (40 mg total) by mouth daily, Starting Tue 4/23/2024, Normal      rimegepant sulfate (Nurtec) 75 mg TBDP Take one NURTEC 75 mg at onset under tongue. Limit 1 in 24 hours. 8 a month., Normal      Synthroid 75 MCG tablet Take 75 mcg by mouth daily Except sundays, Starting Fri 7/29/2022, Historical Med      topiramate (TOPAMAX) 200 MG tablet TAKE 1 TABLET BY MOUTH TWICE A DAY, Normal      Vitamin D, Cholecalciferol, 25 MCG (1000 UT) CAPS Take 1 tablet by mouth in the morning, Historical Med      Zinc 50 MG CAPS Take 1 capsule by mouth daily, Historical Med       !! - Potential duplicate medications found. Please discuss with provider.        No discharge procedures on file.    PDMP Review       None             ED Provider  Attending physically available and evaluated Cinthya NEERU Howard. I managed the patient along with the ED Attending.    Electronically Signed by           Mercedes Weaver DO  09/06/24 0564

## 2024-09-06 NOTE — TELEPHONE ENCOUNTER
Pt called in stating that she has been struggling with a tooth abscess since July. She is currently on her third round of antibiotic and that past few days she has not been feeling well. States that she has been nausea, headache (has taken nurtec) anxiety and low back pain today. Pt denies any other symptoms. States that she is scheduled for tooth abstraction next Thursday. Pt would like pcp to review her symptoms.   
Awake/Alert

## 2024-09-06 NOTE — Clinical Note
Cinthya Howard was seen and treated in our emergency department on 9/6/2024.                Diagnosis:     Cinthya  .    She may return on this date:     Cinthya was seen in the emergency department on September 6.  Please excuse her from work.     If you have any questions or concerns, please don't hesitate to call.      Mercedes Weaver, DO    ______________________________           _______________          _______________  Hospital Representative                              Date                                Time

## 2024-09-06 NOTE — ED ATTENDING ATTESTATION
9/6/2024  I, Cristina Driver MD, saw and evaluated the patient. I have discussed the patient with the resident/non-physician practitioner and agree with the resident's/non-physician practitioner's findings, Plan of Care, and MDM as documented in the resident's/non-physician practitioner's note, except where noted. All available labs and Radiology studies were reviewed.  I was present for key portions of any procedure(s) performed by the resident/non-physician practitioner and I was immediately available to provide assistance.       At this point I agree with the current assessment done in the Emergency Department.  I have conducted an independent evaluation of this patient a history and physical is as follows:    ED Course         Critical Care Time  Procedures    53 yo female with hx of seizures, asthma, here for ongoing dental abscess in July, been on clinda twice and seeing dentist Thursday.  Pt with left lower jaw pain. Pt called pcp and said she is having fever and ongoing pain.  Pt with neck pain and nausea despite zofran for one week.  No trauma.  Pt with mild headache.  No abdominal pain, no cp, no sob, no numbness, tingling, weakness.  Vss, afebrile, lungs cta, rrr, abdomen soft nontender, no neuro deficits, no trismus, no facial swelling, no abscess.  Labs, ct soft tissue neck, ct head, pain meds.

## 2024-09-07 NOTE — DISCHARGE INSTRUCTIONS
You were seen in the emergency department for dental pain, neck pain, headache.  A CT of your head and neck did not show any acute findings or evidence of infection.  It is important to follow-up with your dentist at your previously scheduled appointment.    For pain, you can take both Tylenol 650 mg and ibuprofen 600 mg every 6 hours as needed.  You can ice your jaw for up to 10 minutes at a time to help with swelling.    Immediately return to the emergency department if you develop difficulty swallowing, shortness of breath, inability to open your mouth, or you develop any new or concerning symptoms.

## 2024-09-09 ENCOUNTER — PATIENT MESSAGE (OUTPATIENT)
Dept: INTERNAL MEDICINE CLINIC | Facility: CLINIC | Age: 54
End: 2024-09-09

## 2024-09-09 ENCOUNTER — TELEPHONE (OUTPATIENT)
Age: 54
End: 2024-09-09

## 2024-09-09 DIAGNOSIS — Q64.32 CONGENITAL URETHRAL STENOSIS: Primary | ICD-10-CM

## 2024-09-09 NOTE — TELEPHONE ENCOUNTER
Agree with reaching out to PCP regarding her blood work.  Please ask her if she is urinating okay or if she has had a decreased urinary output.  Also ask her if she has been drinking an adequate amount of water as dehydration can contribute to worsening kidney function.  Also ask her if she is having any flank pain or discomfort.  I placed an order for a kidney and bladder ultrasound which she can have obtained to further assess for any underlying kidney or bladder issues.

## 2024-09-09 NOTE — TELEPHONE ENCOUNTER
Patient called stating her GFR has dropped significantly and just wanted our office to be aware. She is currently in contact with her PCP about her blood work results. Please advise for any further recommendations.      eGFR  ml/min/1.73sq m 47 96 74 R 93 R 100 R 88 97

## 2024-09-10 NOTE — TELEPHONE ENCOUNTER
LM for agree with getting blood wok with PCP - she should hydrate with water has dehydration can contribute to worsening kidney function.  U/s has been placed - she can [place] [phone] to scheduled.  If she has any other concerns can call us back

## 2024-09-12 DIAGNOSIS — R94.4 DECREASED GFR: Primary | ICD-10-CM

## 2024-09-12 NOTE — TELEPHONE ENCOUNTER
Patient called to seek clarification of why she is coming in Oct. Relayed providers note from May:  The patient prefers to hold off on going to the operating room at this point and so we will plan for a repeat urinalysis in 3 months and follow-up with our clinic then.   Patient verbalized understanding and has no questions or concerns at this time.

## 2024-09-12 NOTE — TELEPHONE ENCOUNTER
Patient calling back about imaging.  She said that central scheduling asked if she was also receiving a doppler with her kidney/bladder ultrasound. She wanted to verify that it is not.  Patient has test scheduled for tomorrow.

## 2024-09-13 ENCOUNTER — HOSPITAL ENCOUNTER (OUTPATIENT)
Dept: ULTRASOUND IMAGING | Facility: HOSPITAL | Age: 54
Discharge: HOME | End: 2024-09-13
Payer: COMMERCIAL

## 2024-09-13 DIAGNOSIS — Q64.32 CONGENITAL URETHRAL STENOSIS: ICD-10-CM

## 2024-09-13 PROCEDURE — 76770 US EXAM ABDO BACK WALL COMP: CPT

## 2024-10-16 ENCOUNTER — NURSE TRIAGE (OUTPATIENT)
Age: 54
End: 2024-10-16

## 2024-10-16 NOTE — TELEPHONE ENCOUNTER
"SPOKE WITH PT, HX GASTROPARESIS, LAST OV 4/23/24. PT REPORTS GASTROPARESIS FLARE LAST NIGHT DUE TO EATING TUNA ON [place] BREAD, 2 HOURS LATER PT WITH 6 + EPISODES VOMITING, WITH DIARRHEA ALL NIGHT, ABDOMINAL CRAMPING, CALF AND TOE CRAMPING, CHILLS. PT FEELS BETTER TODAY, HYDRATING, DRINKING PEDIALYTE AND BROTH, ZOFRAN PRN. URINATING. REVIEWED GASTROPARESIS DIET/MEASURES. ANY ADDITIONAL RECOMMENDATIONS?          Answer Questions - Initial Assessment Questions  1. Have you been diagnosed with gastroparesis?   YES  2. Have you ever had tests or treatments for the symptoms? If yes,   YES  3. How long have you had gastroparesis symptoms?   YEARS  4. SEVERITY: \"How bad is the pain?\"  (e.g., Scale 1-10; mild, moderate, or severe) Describe the pain (dull, sharp, cramping, shooting, stabbing).      - MILD (1-3): does not interfere with normal activities, abdomen soft and not tender to touch      - MODERATE (4-7): interferes with normal activities or awakens from sleep, tender to touch      - SEVERE (8-10): excruciating pain, doubled over, unable to do any normal activities     7/10  5. Are you following a gastroparesis diet?   YES  6. What are you eating and drinking; how much and how often?   HYDRATING  9. What medications are you taking?   ZOFRAN PRN  10. Have you changed or stopped any medications recently?   DENIES    Protocols used: GI-Gastroparesis-ADULT-[place]    "

## 2024-11-21 ENCOUNTER — TRANSCRIBE ORDERS (OUTPATIENT)
Dept: LAB | Facility: CLINIC | Age: 54
End: 2024-11-21

## 2024-11-21 ENCOUNTER — RESULTS FOLLOW-UP (OUTPATIENT)
Dept: INTERNAL MEDICINE CLINIC | Facility: CLINIC | Age: 54
End: 2024-11-21

## 2024-11-21 ENCOUNTER — APPOINTMENT (OUTPATIENT)
Dept: LAB | Facility: CLINIC | Age: 54
End: 2024-11-21
Payer: COMMERCIAL

## 2024-11-21 DIAGNOSIS — R94.4 DECREASED GFR: ICD-10-CM

## 2024-11-21 DIAGNOSIS — E03.9 HYPOTHYROIDISM, UNSPECIFIED TYPE: Primary | ICD-10-CM

## 2024-11-21 DIAGNOSIS — E03.9 HYPOTHYROIDISM, UNSPECIFIED TYPE: ICD-10-CM

## 2024-11-21 LAB
ANION GAP SERPL CALCULATED.3IONS-SCNC: 8 MMOL/L (ref 4–13)
BUN SERPL-MCNC: 10 MG/DL (ref 5–25)
CALCIUM SERPL-MCNC: 9 MG/DL (ref 8.4–10.2)
CHLORIDE SERPL-SCNC: 107 MMOL/L (ref 96–108)
CO2 SERPL-SCNC: 24 MMOL/L (ref 21–32)
CREAT SERPL-MCNC: 0.76 MG/DL (ref 0.6–1.3)
GFR SERPL CREATININE-BSD FRML MDRD: 90 ML/MIN/1.73SQ M
GLUCOSE P FAST SERPL-MCNC: 89 MG/DL (ref 65–99)
POTASSIUM SERPL-SCNC: 3.8 MMOL/L (ref 3.5–5.3)
SODIUM SERPL-SCNC: 139 MMOL/L (ref 135–147)
TSH SERPL DL<=0.05 MIU/L-ACNC: 0.51 UIU/ML (ref 0.45–4.5)

## 2024-11-21 PROCEDURE — 36415 COLL VENOUS BLD VENIPUNCTURE: CPT

## 2024-11-21 PROCEDURE — 80048 BASIC METABOLIC PNL TOTAL CA: CPT

## 2024-11-21 PROCEDURE — 84443 ASSAY THYROID STIM HORMONE: CPT

## 2024-12-06 ENCOUNTER — PATIENT MESSAGE (OUTPATIENT)
Dept: INTERNAL MEDICINE CLINIC | Facility: CLINIC | Age: 54
End: 2024-12-06

## 2024-12-06 ENCOUNTER — NURSE TRIAGE (OUTPATIENT)
Age: 54
End: 2024-12-06

## 2024-12-06 NOTE — TELEPHONE ENCOUNTER
"Inbound call received from patient.    Pt reported a migraine, upset stomach, and some drainage from her nose x 2 days. Pt reported she tried to lay down and rest but just felt off. Pt reported periods of confusion, thinking she saw something on the TV but then searched and could not find it.     Pt reported that she did miss 3 doses of Topamax over the past 2 weeks but did not experience any symptoms until today. Reported a decrease in fluids but denies any UTI symptoms.     Stated she took Flonase last night and Methocarbamol and took took Nurtec today for the HA.     Advised pt to go to the nearest  for further evaluation to rule out possibly infection and to contact PCP to advise on current symptoms. Pt verbalized understanding and stated she will contact her PCP first.     LOV 2/2/24 Jeanne MAY 12/18/24 Dr. Sagneeta Rutherford: YANETH       Reason for Disposition   Brief confusion (now gone)    Answer Assessment - Initial Assessment Questions  1. LEVEL OF CONSCIOUSNESS: \"How are they (the patient) acting right now?\" (e.g., alert-oriented, confused, lethargic, stuporous, comatose)      Confusion, difficulty with forming sentences     2. ONSET: \"When did the confusion start?\"  (e.g., minutes, hours, days)      Today    3. PATTERN: \"Does this come and go, or has it been constant since it started?\"  \"Is it present now?\"      Comes and goes. Intermittent     4. ALCOHOL or DRUGS: \"Have they been drinking alcohol or taking any drugs?\"       Denies     5. NARCOTIC MEDICINES: \"Have they been receiving any narcotic medications?\" (e.g., morphine, Vicodin)      Denies     6. CAUSE: \"What do you think is causing the confusion?\"       Not sure     7. OTHER SYMPTOMS: \"Are there any other symptoms?\" (e.g., difficulty breathing, fever, headache, weakness)      Headache, tired    Protocols used: Confusion - Delirium-Adult-[place]    "

## 2024-12-09 ENCOUNTER — PATIENT MESSAGE (OUTPATIENT)
Dept: INTERNAL MEDICINE CLINIC | Facility: CLINIC | Age: 54
End: 2024-12-09

## 2024-12-09 NOTE — TELEPHONE ENCOUNTER
Outgoing call to patient. Patient stated she had a migraine yesterday but it has resolved. Patient did not go to her PCP but did message them.  Patient stated she would like to set up a general appointment to see her PCP to discuss RA, and have labs done. C/O swelling in her hand, and stiffness in her neck and knees. Encouraged patient to call PCP to set up and appointment.    C/O increase frequency of migraines. Patient will discuss this with Dr. Rutherford at her office visit on 12-18-24.    Dr. Bolanos, please advise. Thank you!

## 2024-12-09 NOTE — TELEPHONE ENCOUNTER
Is she still having a migraine?  Had she take dexamethasone before for intractable migraine attack?

## 2024-12-10 NOTE — PATIENT COMMUNICATION
Pt call back to make an appt for worsening migraines. She would like b/w ordered as well for RA. Pt also has a bump in her left middle finger knuckle feels like it bends it it feels like it will split the skin. There are no available appts within the next two weeks that I can take with Dr Naidu or Dr Naidu/resident. Please call the pt schedule as soon as possible.

## 2024-12-18 ENCOUNTER — OFFICE VISIT (OUTPATIENT)
Dept: NEUROLOGY | Facility: CLINIC | Age: 54
End: 2024-12-18
Payer: COMMERCIAL

## 2024-12-18 VITALS
TEMPERATURE: 98 F | HEART RATE: 75 BPM | WEIGHT: 113 LBS | HEIGHT: 63 IN | BODY MASS INDEX: 20.02 KG/M2 | RESPIRATION RATE: 18 BRPM | SYSTOLIC BLOOD PRESSURE: 144 MMHG | OXYGEN SATURATION: 94 % | DIASTOLIC BLOOD PRESSURE: 90 MMHG

## 2024-12-18 DIAGNOSIS — G40.209 PARTIAL SYMPTOMATIC EPILEPSY WITH COMPLEX PARTIAL SEIZURES, NOT INTRACTABLE, WITHOUT STATUS EPILEPTICUS (HCC): ICD-10-CM

## 2024-12-18 DIAGNOSIS — G43.909 MIGRAINE WITHOUT STATUS MIGRAINOSUS, NOT INTRACTABLE, UNSPECIFIED MIGRAINE TYPE: ICD-10-CM

## 2024-12-18 PROCEDURE — 99215 OFFICE O/P EST HI 40 MIN: CPT | Performed by: PSYCHIATRY & NEUROLOGY

## 2024-12-18 RX ORDER — RIMEGEPANT SULFATE 75 MG/75MG
TABLET, ORALLY DISINTEGRATING ORAL
Qty: 8 TABLET | Refills: 3 | Status: SHIPPED | OUTPATIENT
Start: 2024-12-18

## 2024-12-18 RX ORDER — TOPIRAMATE 200 MG/1
TABLET, FILM COATED ORAL
Qty: 180 TABLET | Refills: 3 | Status: SHIPPED | OUTPATIENT
Start: 2024-12-18

## 2024-12-18 NOTE — PROGRESS NOTES
Review of Systems   Constitutional:  Negative for appetite change, fatigue and fever.   HENT: Negative.  Negative for hearing loss, tinnitus, trouble swallowing and voice change.    Eyes: Negative.  Negative for photophobia, pain and visual disturbance.   Respiratory: Negative.  Negative for shortness of breath.    Cardiovascular: Negative.  Negative for palpitations.   Gastrointestinal: Negative.  Negative for nausea and vomiting.   Endocrine: Negative.  Negative for cold intolerance.   Genitourinary: Negative.  Negative for dysuria, frequency and urgency.   Musculoskeletal:  Negative for back pain, gait problem, myalgias, neck pain and neck stiffness.   Skin: Negative.  Negative for rash.   Allergic/Immunologic: Negative.    Neurological:  Positive for headaches. Negative for dizziness, tremors, seizures, syncope, facial asymmetry, speech difficulty, weakness, light-headedness and numbness.   Hematological: Negative.  Does not bruise/bleed easily.   Psychiatric/Behavioral:  Positive for confusion. Negative for hallucinations and sleep disturbance.

## 2024-12-18 NOTE — ASSESSMENT & PLAN NOTE
Increased frequency lately in the setting of increased stress, burden of care giving for mother. Nurtec remains effective, but she may need to sleep it off. Continue topiramate and Nurtec prn. She may require time off work when she has a headache.     Orders:    topiramate (TOPAMAX) 200 MG tablet; TAKE 1 TABLET BY MOUTH TWICE A DAY    rimegepant sulfate (Nurtec) 75 mg TBDP; Take one NURTEC 75 mg at onset under tongue. Limit 1 in 24 hours. 8 a month.

## 2024-12-18 NOTE — ASSESSMENT & PLAN NOTE
Seizures well controlled since 1999, but there were a couple possible auras (FAS) in Oct/Nov. She can't recall this happening since 1999. They did occur during a very high stress time. Discussed possible increase in topiramate today, but decided to hold off and monitor for now. Will escalate therapy if there are additional concerning events. Updating EEG to evaluate seizure risk.     Orders:    topiramate (TOPAMAX) 200 MG tablet; TAKE 1 TABLET BY MOUTH TWICE A DAY    EEG Awake and asleep; Future

## 2024-12-18 NOTE — PATIENT INSTRUCTIONS
Continue topiramate 200 mg twice daily.   Continue Nurtec as needed.  Schedule EEG.   Let us know if there are more auras (possible focal aware seizure).   Let us know if headaches worsen.   Return in about 6 months.

## 2024-12-18 NOTE — PROGRESS NOTES
[facility]  Follow Up Visit    Impression/Plan        Assessment & Plan  Migraine without status migrainosus, not intractable, unspecified migraine type  Increased frequency lately in the setting of increased stress, burden of care giving for mother. Nurtec remains effective, but she may need to sleep it off. Continue topiramate and Nurtec prn. She may require time off work when she has a headache.     Orders:    topiramate (TOPAMAX) 200 MG tablet; TAKE 1 TABLET BY MOUTH TWICE A DAY    rimegepant sulfate (Nurtec) 75 mg TBDP; Take one NURTEC 75 mg at onset under tongue. Limit 1 in 24 hours. 8 a month.    Partial symptomatic epilepsy with complex partial seizures, not intractable, without status epilepticus (HCC)  Seizures well controlled since 1999, but there were a couple possible auras (FAS) in Oct/Nov. She can't recall this happening since 1999. They did occur during a very high stress time. Discussed possible increase in topiramate today, but decided to hold off and monitor for now. Will escalate therapy if there are additional concerning events. Updating EEG to evaluate seizure risk.     Orders:    topiramate (TOPAMAX) 200 MG tablet; TAKE 1 TABLET BY MOUTH TWICE A DAY    EEG Awake and asleep; Future         Patient Instructions   Continue topiramate 200 mg twice daily.   Continue Nurtec as needed.  Schedule EEG.   Let us know if there are more auras (possible focal aware seizure).   Let us know if headaches worsen.   Return in about 6 months.         Subjective    Cinthya Howard is returning to the [facility] for follow up.     Interval Events:   Seizures since last visit: None    Had 8 headaches in October. November improved some, took 3 Nurtec. Had severe headache last week, it did improve with Nurtec and then she slept all day.     Possible auras. Ringing in left ear, hearing gradually becomes impaired on left, hot back of neck. Lasts  "seconds. Feels just like her prior aura. Happened 11/18. It was a particularly stressful time. Might have had a few mild episodes at work prior to that. Last events were back in 1999.         Current AEDs:  Topiramate 200 mg bid  Medication side effects: None  Medication adherence: Yes    Event/Seizure semiology:  Episodes of heat behind neck and ringing in left ear. A minute to two later she would pass out and then have apparent convulsion. They were happening a few times per month. Occurred from 1994 until 1999 when she started topiramate.      Special Features  Status epilepticus: no  Self Injury Seizures: no  Precipitating Factors: none    History Reviewed:   The following were reviewed and updated as appropriate: allergies, current medications, past family history, past medical history, past social history, past surgical history and problem list    There was likely nephrolithiasis in 2/2019. Previously we discussed risk of nephrolithiasis related to topiramate.  She has had excellent seizure control on topiramate and it has also helped her migraine headaches.  She understands the risk of additional kidney stones and has wished to continue on topiramate unchanged.      Psychiatric History:   Past traumatic events. Did EMDR in the past. Was on SSRI in the distant past.     Social:   for [employer]       Objective    /90 (BP Location: Left arm, Patient Position: Sitting, Cuff Size: Large)   Pulse 75   Temp 98 °F (36.7 °C) (Temporal)   Resp 18   Ht 5' 2.5\" (1.588 m)   Wt 51.3 kg (113 lb)   SpO2 94%   BMI 20.34 kg/m²      General Exam  Tearful at times when discussing mother's health situation.     Neurologic Exam  Mental Status:  Alert and oriented x 3.  Language: normal fluency and comprehension.  Cranial Nerves:  Face symmetric. No dysarthria.  Gait: Normal casual gait.       I have spent a total time of 45 minutes in caring for this patient on the day of the visit/encounter including " Diagnostic results, Prognosis, Risks and benefits of tx options, Instructions for management, Patient and family education, Importance of tx compliance, Risk factor reductions, Impressions, Counseling / Coordination of care, Documenting in the medical record, Reviewing / ordering tests, medicine, procedures  , and Obtaining or reviewing history  .

## 2024-12-24 ENCOUNTER — OFFICE VISIT (OUTPATIENT)
Dept: INTERNAL MEDICINE CLINIC | Facility: CLINIC | Age: 54
End: 2024-12-24
Payer: COMMERCIAL

## 2024-12-24 VITALS
BODY MASS INDEX: 20.02 KG/M2 | DIASTOLIC BLOOD PRESSURE: 68 MMHG | SYSTOLIC BLOOD PRESSURE: 114 MMHG | HEART RATE: 62 BPM | HEIGHT: 63 IN | WEIGHT: 113 LBS | TEMPERATURE: 98 F | OXYGEN SATURATION: 99 %

## 2024-12-24 DIAGNOSIS — J32.9 OTHER SINUSITIS, UNSPECIFIED CHRONICITY: Primary | ICD-10-CM

## 2024-12-24 PROCEDURE — 99213 OFFICE O/P EST LOW 20 MIN: CPT | Performed by: GENERAL ACUTE CARE HOSPITAL

## 2024-12-24 NOTE — PROGRESS NOTES
"Name: Cinthya Howard      : 1970      MRN: [id number]  Encounter Provider: Marcie Hester MD  Encounter Date: 2024   [facility]  :  Assessment & Plan           History of Present Illness     HPI        Nasal congestion, post nasal drip, sore throat, started more than a week ago.   Works as flight attendent. When working on Sat felt left ear pop and some possible drainage.   Took some amoxicillin she had left at home felt better but once she finished worse again.         Review of Systems   Constitutional:  Negative for chills, fatigue and fever.   HENT:  Negative for congestion, nosebleeds, postnasal drip, sneezing, sore throat and trouble swallowing.    Eyes:  Negative for pain.   Respiratory:  Negative for cough, chest tightness, shortness of breath and wheezing.    Cardiovascular:  Negative for chest pain, palpitations and leg swelling.   Gastrointestinal:  Negative for abdominal pain, constipation, diarrhea, nausea and vomiting.   Endocrine: Negative for cold intolerance, heat intolerance, polydipsia and polyuria.   Genitourinary:  Negative for difficulty urinating, dysuria, flank pain and hematuria.   Musculoskeletal:  Negative for arthralgias and myalgias.   Skin:  Negative for rash.   Neurological:  Negative for dizziness, tremors, weakness and headaches.   Hematological:  Does not bruise/bleed easily.   Psychiatric/Behavioral:  Negative for confusion and dysphoric mood. The patient is not nervous/anxious.        Objective   /68 (BP Location: Left arm, Patient Position: Sitting, Cuff Size: Standard)   Pulse 62   Temp 98 °F (36.7 °C)   Ht 5' 2.5\" (1.588 m)   Wt 51.3 kg (113 lb)   SpO2 99%   BMI 20.34 kg/m²      Physical Exam  Constitutional:       General: She is not in acute distress.     Appearance: She is well-developed.   HENT:      Head: Normocephalic and atraumatic.      Right Ear: External ear normal.      Left Ear: External ear " normal.   Eyes:      General: No scleral icterus.     Conjunctiva/sclera: Conjunctivae normal.   Neck:      Thyroid: No thyromegaly.      Trachea: No tracheal deviation.   Cardiovascular:      Rate and Rhythm: Normal rate and regular rhythm.      Heart sounds: Normal heart sounds.   Pulmonary:      Effort: Pulmonary effort is normal. No respiratory distress.      Breath sounds: Normal breath sounds. No wheezing or rales.   Abdominal:      General: Bowel sounds are normal.      Palpations: Abdomen is soft.      Tenderness: There is no abdominal tenderness. There is no guarding or rebound.   Musculoskeletal:      Cervical back: Normal range of motion and neck supple.   Lymphadenopathy:      Cervical: No cervical adenopathy.   Neurological:      Mental Status: She is alert and oriented to person, place, and time.   Psychiatric:         Behavior: Behavior normal.         Thought Content: Thought content normal.         Judgment: Judgment normal.

## 2025-01-16 ENCOUNTER — TELEPHONE (OUTPATIENT)
Age: 55
End: 2025-01-16

## 2025-01-16 NOTE — TELEPHONE ENCOUNTER
Patient called for appointment with PCP Declined to schedule with other provider  Was disappointed in the wait for PCP Wanted appt to be tested for RA NFA

## 2025-01-23 PROBLEM — J32.9 SINUSITIS: Status: RESOLVED | Noted: 2024-12-24 | Resolved: 2025-01-23

## 2025-01-26 DIAGNOSIS — Z00.00 HEALTHCARE MAINTENANCE: Primary | ICD-10-CM

## 2025-01-27 ENCOUNTER — NURSE TRIAGE (OUTPATIENT)
Age: 55
End: 2025-01-27

## 2025-01-27 ENCOUNTER — OFFICE VISIT (OUTPATIENT)
Dept: UROLOGY | Facility: AMBULATORY SURGERY CENTER | Age: 55
End: 2025-01-27
Payer: COMMERCIAL

## 2025-01-27 ENCOUNTER — HOSPITAL ENCOUNTER (OUTPATIENT)
Dept: NEUROLOGY | Facility: CLINIC | Age: 55
Discharge: HOME | End: 2025-01-27
Payer: COMMERCIAL

## 2025-01-27 VITALS
DIASTOLIC BLOOD PRESSURE: 84 MMHG | HEART RATE: 79 BPM | OXYGEN SATURATION: 97 % | BODY MASS INDEX: 20.8 KG/M2 | SYSTOLIC BLOOD PRESSURE: 124 MMHG | HEIGHT: 62 IN | WEIGHT: 113 LBS

## 2025-01-27 DIAGNOSIS — N95.2 VAGINAL ATROPHY: ICD-10-CM

## 2025-01-27 DIAGNOSIS — G40.209 PARTIAL SYMPTOMATIC EPILEPSY WITH COMPLEX PARTIAL SEIZURES, NOT INTRACTABLE, WITHOUT STATUS EPILEPTICUS (HCC): ICD-10-CM

## 2025-01-27 DIAGNOSIS — R35.0 URINARY FREQUENCY: Primary | ICD-10-CM

## 2025-01-27 DIAGNOSIS — R31.29 MICROSCOPIC HEMATURIA: ICD-10-CM

## 2025-01-27 LAB
SL AMB  POCT GLUCOSE, UA: NORMAL
SL AMB LEUKOCYTE ESTERASE,UA: NORMAL
SL AMB POCT BILIRUBIN,UA: NORMAL
SL AMB POCT BLOOD,UA: NORMAL
SL AMB POCT CLARITY,UA: CLEAR
SL AMB POCT COLOR,UA: YELLOW
SL AMB POCT KETONES,UA: NORMAL
SL AMB POCT NITRITE,UA: NORMAL
SL AMB POCT PH,UA: 8
SL AMB POCT SPECIFIC GRAVITY,UA: 1.01
SL AMB POCT URINE PROTEIN: NORMAL
SL AMB POCT UROBILINOGEN: 0.2

## 2025-01-27 PROCEDURE — 95819 EEG AWAKE AND ASLEEP: CPT | Performed by: STUDENT IN AN ORGANIZED HEALTH CARE EDUCATION/TRAINING PROGRAM

## 2025-01-27 PROCEDURE — 99213 OFFICE O/P EST LOW 20 MIN: CPT

## 2025-01-27 PROCEDURE — 81001 URINALYSIS AUTO W/SCOPE: CPT

## 2025-01-27 PROCEDURE — 95819 EEG AWAKE AND ASLEEP: CPT

## 2025-01-27 PROCEDURE — 81002 URINALYSIS NONAUTO W/O SCOPE: CPT

## 2025-01-27 NOTE — PROGRESS NOTES
Name: Cinthya Howard      : 1970      MRN: [id number]  Encounter Provider: LENORA Camargo  Encounter Date: 2025   Encounter department: [facility] [place]  :  Assessment & Plan  Microscopic hematuria  Patient with history of microscopic hematuria.  Dr. Packer did attempt cystoscopy in May 2024 but was unable to enter the bladder due to urethral stenosis and an angle at the urethra.  He did recommend the patient go to the operating room for cystoscopy and urethral dilation but she wished to defer at that time.      She has had multiple abdominal images done.  Most recently she had a kidney and bladder ultrasound in 2024 which demonstrated bilateral simple renal cyst.  There is a Bosniak 2 renal cyst in her left kidney.  I did discuss with her that these simple renal cyst do not require any additional workup or follow-up.    UA today in the office does not demonstrate any blood within the urine.  Will send for formal microscopic urinalysis to assess for any underlying clinically significant red blood cells.    We will plan to follow-up in 6 months with a repeat urinalysis to assess for any microscopic hematuria.  She has not had any gross hematuria.  We did discuss that if there ever is any gross hematuria would recommend proceeding to the operating room for cystoscopy with urethral dilation.  She is understanding of this.  She wishes to defer any additional intervention at this time.         Vaginal atrophy  Patient is currently using Estrace cream.  She states that she does not use it consistently.  I recommend that she use it at least once weekly at nighttime before bed.  We discussed that the Estrace cream can help with vaginal atrophy symptoms such as dysuria.  Also can help with prevention of urinary tract infections.  I also recommended that she use organic coconut oil in conjunction to help with the dryness.           History of Present Illness   Cinthya SIDDIQI  "Anita is a 55 y.o. female who presents today to the office for follow-up.  She was last seen by Dr. Packer in May 2024.    She has a longstanding history of flank pain and most recently was found to have microscopic hematuria.  At her last office visit in May 2024 Dr. 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  Dr. Packer did discuss with the patient about proceeding with cystoscopy in the operating room and undergoing urethral dilation.  Patient wishes to hold off on any additional intervention.  Microscopic urinalysis from May 2024 demonstrating 2-4 RBCs.  Most recently in August 2024 no RBCs were seen.    In December 2023 she had a urinalysis demonstrating 3-5 RBCs seen on microscopy.  She denies ever seeing gross hematuria.  She denies a smoking history.  She works as a .  No known  malignancy.  She states that her mother does have microscopic hematuria and has had previous cystoscopy patient does state that she has increased frequency but states that she drinks a significant amount of water.       She had a CTA of the abdomen pelvis that in December 2023 performed outside institution which demonstrated \" a few scattered predominantly subcentimeter hypodense renal lesions-probable cysts the largest measuring 1 cm\".    Today in the office she states she is overall doing very well.  She denies any urinary/urological complaints.  She states she does stay well-hydrated with water.  She also states that she recently switched to decaffeinated beverages and she noticed an improvement in her urinary symptoms.  She denies any gross hematuria.    Review of Systems   Constitutional:  Negative for chills and fever.   Respiratory: Negative.  Negative for cough and shortness of breath.    Cardiovascular:  Negative for chest pain and leg swelling.   Genitourinary:  Negative for dyspareunia, dysuria, flank pain, frequency, " "hematuria, menstrual problem, pelvic pain, urgency, vaginal bleeding, vaginal discharge and vaginal pain.   Skin:  Negative for rash.   Neurological: Negative.    Hematological:  Negative for adenopathy. Does not bruise/bleed easily.          Objective   /84 (BP Location: Left arm, Patient Position: Sitting, Cuff Size: Standard)   Pulse 79   Ht 5' 2\" (1.575 m)   Wt 51.3 kg (113 lb)   SpO2 97%   BMI 20.67 kg/m²     Physical Exam  Vitals reviewed.   Constitutional:       Appearance: Normal appearance.   HENT:      Head: Normocephalic and atraumatic.   Eyes:      Pupils: Pupils are equal, round, and reactive to light.   Cardiovascular:      Rate and Rhythm: Normal rate.   Pulmonary:      Effort: Pulmonary effort is normal.   Musculoskeletal:      Cervical back: Normal range of motion.   Skin:     General: Skin is warm and dry.   Neurological:      General: No focal deficit present.      Mental Status: She is alert and oriented to person, place, and time. Mental status is at baseline.   Psychiatric:         Mood and Affect: Mood normal.         Behavior: Behavior normal.         Thought Content: Thought content normal.         Judgment: Judgment normal.          Results  No results found for: \"PSA\"  Lab Results   Component Value Date    CALCIUM 9.0 11/21/2024    K 3.8 11/21/2024    CO2 24 11/21/2024     11/21/2024    BUN 10 11/21/2024    CREATININE 0.76 11/21/2024     Lab Results   Component Value Date    WBC 5.11 09/06/2024    HGB 13.5 09/06/2024    HCT 40.2 09/06/2024    MCV 89 09/06/2024     09/06/2024       Office Urine Dip  No results found for this or any previous visit (from the past hour).]      "

## 2025-01-27 NOTE — ASSESSMENT & PLAN NOTE
Patient with history of microscopic hematuria.  Dr. Packer did attempt cystoscopy in May 2024 but was unable to enter the bladder due to urethral stenosis and an angle at the urethra.  He did recommend the patient go to the operating room for cystoscopy and urethral dilation but she wished to defer at that time.      She has had multiple abdominal images done.  Most recently she had a kidney and bladder ultrasound in September 2024 which demonstrated bilateral simple renal cyst.  There is a Bosniak 2 renal cyst in her left kidney.  I did discuss with her that these simple renal cyst do not require any additional workup or follow-up.    UA today in the office does not demonstrate any blood within the urine.  Will send for formal microscopic urinalysis to assess for any underlying clinically significant red blood cells.    We will plan to follow-up in 6 months with a repeat urinalysis to assess for any microscopic hematuria.  She has not had any gross hematuria.  We did discuss that if there ever is any gross hematuria would recommend proceeding to the operating room for cystoscopy with urethral dilation.  She is understanding of this.  She wishes to defer any additional intervention at this time.

## 2025-01-27 NOTE — ASSESSMENT & PLAN NOTE
Patient is currently using Estrace cream.  She states that she does not use it consistently.  I recommend that she use it at least once weekly at nighttime before bed.  We discussed that the Estrace cream can help with vaginal atrophy symptoms such as dysuria.  Also can help with prevention of urinary tract infections.  I also recommended that she use organic coconut oil in conjunction to help with the dryness.

## 2025-01-27 NOTE — TELEPHONE ENCOUNTER
"Inbound call received from patient.     Patient stated she had a migraine this AM and then went to her EEG appt at 1230. Stated that her migraine worsened after EEG was done. Pt took a Nurtec around 2 PM and went home ate lunch and laid down. Pt reported that her migraine is still present as well as feeling nauseated and having some hot flashes. Pt reported that she just feels off.  Pt denies, fever, any type of cold-like symptoms, ear pain, changes in vision.     Pt reported that she never felt like this with prior EEGs that pt had done. Stated that she is unsure if the EEG potentially caused her migraine to worsen after it was done and also the neck pain/stiffness that was present when pt woke up that then turned into a migraine for pt.     Stated that pt's may experience a HA or nausea after an EEG and that some side effects from Nurtec can be flushing, nausea, and sense of general \"ill feeling.\"Advised pt to drink plenty of fluids, rest, and do not take any more migraine medications at this time. Advised pt to go to the nearest ED for further evaluation for onset of new symptoms or worsening of current symptoms.     LOV 12/18/24    Dr. Rutherford: please review and advise.      Reason for Disposition   MILD to MODERATE headache    Answer Assessment - Initial Assessment Questions  When did migraine start? This AM before EEG at 1230    Associated symptoms:nausea, neck stiffness    Precipitating factors: pt believes the EEG or neck stiffness/pain     Alleviating factors:  nothing is helping     What medications have you tried for this migraine headache? lying in a darkened room, prescription medications: Nurtec, Topiramate, and standing up     Current migraine medications are confirmed as:  -Topiramate 200 mg BID  - Nurtec 75 mg     Medications tried in the past? Dexamethasone yes it helped.    Protocols used: Headache-Adult-OH    "

## 2025-01-28 ENCOUNTER — RESULTS FOLLOW-UP (OUTPATIENT)
Dept: UROLOGY | Facility: AMBULATORY SURGERY CENTER | Age: 55
End: 2025-01-28

## 2025-01-28 LAB
BACTERIA UR QL AUTO: ABNORMAL /HPF
BILIRUB UR QL STRIP: NEGATIVE
CLARITY UR: CLEAR
COLOR UR: ABNORMAL
GLUCOSE UR STRIP-MCNC: NEGATIVE MG/DL
HGB UR QL STRIP.AUTO: NEGATIVE
KETONES UR STRIP-MCNC: NEGATIVE MG/DL
LEUKOCYTE ESTERASE UR QL STRIP: NEGATIVE
MUCOUS THREADS UR QL AUTO: ABNORMAL
NITRITE UR QL STRIP: NEGATIVE
NON-SQ EPI CELLS URNS QL MICRO: ABNORMAL /HPF
PH UR STRIP.AUTO: 7.5 [PH]
PROT UR STRIP-MCNC: ABNORMAL MG/DL
RBC #/AREA URNS AUTO: ABNORMAL /HPF
SP GR UR STRIP.AUTO: 1.01 (ref 1–1.03)
UROBILINOGEN UR STRIP-ACNC: <2 MG/DL
WBC #/AREA URNS AUTO: ABNORMAL /HPF

## 2025-01-28 NOTE — TELEPHONE ENCOUNTER
Spoke directly to this pt and relayed this message. Pt stated she understood the message as relayed. If this pt calls back please relay these messages again. Thank you              ----- Message from LENORA Camargo sent at 1/28/2025  7:36 AM EST -----  Please call Cinthya and let her know that her urinalysis looks good.  There is no blood seen so we can plan to follow-up in approximately 6 months.

## 2025-01-28 NOTE — PROGRESS NOTES
[place] [address] [facility]  Follow Up Visit    Impression/Plan    Ms Laymon Schaumann is a 52 y o  female with well controlled focal epilepsy  Her mild/moderate headaches have improved  Exam is normal      There was likely nephrolithiasis in 2/2019  Previously we discussed risk of nephrolithiasis related to topiramate  She has had excellent seizure control on topiramate and it has also helped her migraine headaches  She understands the risk of additional kidney stones and wishes to continue on topiramate unchanged  Patient Instructions   1  Continue topiramate 200 mg twice daily  2  Schedule physical therapy  Talk to your Primary Care Provider if your back or hip pain worsens  3  Return in about one year  Diagnoses and all orders for this visit:    Partial symptomatic epilepsy with complex partial seizures, not intractable, without status epilepticus (HCC)  -     topiramate (TOPAMAX) 200 MG tablet; Take 1 tablet (200 mg total) by mouth 2 (two) times a day    Migraine without status migrainosus, not intractable, unspecified migraine type  -     topiramate (TOPAMAX) 200 MG tablet; Take 1 tablet (200 mg total) by mouth 2 (two) times a day    Chronic left lumbar radiculopathy  -     Ambulatory referral to Physical Therapy; Future        Subjective    Danielle Vicente is returning to the [place] [address] [facility] for follow up regarding headaches  She was previously followed by Dr Miguel Orr  See his 10/1/2018 note for additional history  Interval Events:   Seizures since last visit: None    Headaches are improved from where they were  Can be triggered by stress  There is low back pain  Current Medications include:  Topiramate 200 mg bid  Sumatriptan 100 mg prn (half pill)  Medication side effects: None  Medication adherence: Yes    Event/Seizure semiology:  Episodes of heat behind neck and ringing in left ear   A minute to two later she would pass out and then have apparent convulsion  They were happening a few times per month  Occurred from 14 until 1999 when she started topiramate  Special Features  Status epilepticus: no  Self Injury Seizures: no  Precipitating Factors: none    Prior Evaluation:  MRI brain w/ and wo 10/17/2018: normal    History Reviewed: The following were reviewed and updated as appropriate: allergies, current medications, past family history, past medical history, past social history, past surgical history and problem list    Psychiatric History:  None    Social History:     ROS:  Constitutional: Positive for fatigue  Negative for appetite change and fever  HENT: Negative  Negative for hearing loss, tinnitus, trouble swallowing and voice change  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    No seizures   Eyes: Negative  Negative for photophobia and pain  Respiratory: Negative  Negative for shortness of breath  Cardiovascular: Negative  Negative for palpitations  Gastrointestinal: Negative  Negative for nausea and vomiting  Endocrine: Negative  Negative for cold intolerance and heat intolerance  Genitourinary: Negative  Negative for dysuria, frequency and urgency  Musculoskeletal: Negative  Negative for myalgias and neck pain  Skin: Negative  Negative for rash  Neurological: Negative  Negative for dizziness, tremors, seizures, syncope, facial asymmetry, speech difficulty, weakness, light-headedness, numbness and headaches  Hematological: Negative  Does not bruise/bleed easily  Psychiatric/Behavioral: Positive for sleep disturbance  Negative for confusion and hallucinations  The patient is nervous/anxious  ROS reviewed and updated as appropriate  Objective    /76 (BP Location: Left arm, Patient Position: Sitting, Cuff Size: Adult)   Pulse 66   Ht 5' 2" (1 575 m)   Wt 47 kg (103 lb 9 6 oz)   BMI 18 95 kg/m²      General Exam  No acute distress      Neurologic Exam  Mental Status:  Alert and oriented x 4  Language: normal fluency and comprehension  Cranial Nerves: Face symmetric  No dysarthria  Motor: 5/5 throughout all 4 ext proximally and distally  Reflexes: normal and symmetric biceps, patella, achilles  Gait: Normal casual gait  No

## 2025-01-29 ENCOUNTER — TELEPHONE (OUTPATIENT)
Dept: NEUROLOGY | Facility: CLINIC | Age: 55
End: 2025-01-29

## 2025-01-29 NOTE — TELEPHONE ENCOUNTER
Vicente Rutherford MD to Me  RAMU Hensley RN Haylee Harrington, MA       1/29/25 11:56 AM   I sent the completed FMLA form to [employer] Monday at 5:11 pm by email

## 2025-01-29 NOTE — TELEPHONE ENCOUNTER
June Smith MD to Neurology [facility] Noam Rutherford MD       1/27/25  5:40 PM   Thanks Radha, I am covering for Dr. Rutherford. I would say that EEG could absolutely make a migraine worse. She should just rest and hydrate like you said. She can try naproxen or ibuprofen. Hopefully it should go away in the next few days.

## 2025-01-29 NOTE — TELEPHONE ENCOUNTER
Pt called and advised of the below. She verbalized understanding. Pt states that she hydrate and took Nurtec the day of it and the migraine was gone the next day. She did not go to work that day.    Pt is asking if Dr Rutherford got the chance to review her EEG.   Final result is avail. Pls review    Also, pt expressed her frustration about not being able to get a copy of her fmla. She handed the form to Dr Rutherford the day of her appt, 12/18/24. She needs a copy of the completed form or take a pic of it so she can upload it to help hub. This is how they submit fmla to her employer. Pt states that she came in to the office to get a copy but the lady at the  refused to give her a copy or even take a pic of the completed form. The lady at the  said that she may change things on the form. She was also told that they don't have the paperwork there and that she will message Dr Rutherford as he may have the form. The  lady called the  (blonde hair) who said same thing.  Pt states that few years ago, [employer] office lost her fmla form. This happened twice already where they refused to give her a copy of her completed fmla. States that  [employer] office is the only office who gives her a hard time. Pt states that she is thinking of filing a complaint. I apologized to pt about the inconvenience. Advised pt that I will message Dr Rutherford to f/u on this form and also send a message to our supervisor to look into this issue.     I don't see any fmla form scanned in media. Dr Rutherford, do you have the form?    Raul or Ingrid,  Are we not allowed to give a copy of the completed fmla form to the pt?

## 2025-01-29 NOTE — TELEPHONE ENCOUNTER
Lmom for pt to obtain fax number for fmla form as we dont have the [place] fax number. Waiting for a call back. Informed provider of such.

## 2025-01-29 NOTE — TELEPHONE ENCOUNTER
Chart reviewed  It looks like [employer] just sent a copy of the fmla via [email or web address] today at 12:14    Dr Rutherford, pls see below. Pt is asking for eeg results    thanks

## 2025-01-30 NOTE — TELEPHONE ENCOUNTER
EEG Results 1/27/25:  Pt would also like to know if Dr. Rutherford reviewed pt's EEG test from 1/27/25. Informed pt that a message was sent to Dr. Rutherford to review.         [facility] Documents:  FYI: Pt was at the [place] office today requesting the original [facility] papers be given back to her. Office did not have the original documents (per pt). Stated that pt's employer will not accept copies of the documents.  Pt kept sating that the original copy was given to Dr. Rutherford on 12/18. Management is aware that pt is upset. (Discussed in other encounter)

## 2025-01-30 NOTE — TELEPHONE ENCOUNTER
Inbound call received from patient.    Original FMLA Forms:  Patient reported that she is currently at the Neurology [place] office requesting her original FMLA documents in December and is upset because the office is not giving the pt the original FMLA forms. Pt is upset with a  staff member upfront stating the person is not helping her and it is taking so long. While on the phone with pt, staff at the office was speaking to pt about the original forms, etc.     Pt stated that her original forms were given to Dr. Rutherford on 12/18/24 and would like to know where the original documents are. Stated that her employer will not accept copied versions of the original FMLA forms.     Complaint:  Pt stated that she is very upset and does not want to go to the [place] office again. Stated she was going to file a complaint and wanted to speak with management. Secure chat message sent to management and chart also fwd'd to review and call patient. Pt requesting a call from management.     New Appt:  Pt also requested that her parveen scheduled on 7/7/25 be R/S'd to see Dr. Rutherford only in the [place] office. RN rescheduled pt to see Dr. Rutherford on 8/1/25 at 0930 at the [facility]. Pt was thankful.

## 2025-01-30 NOTE — TELEPHONE ENCOUNTER
Phone call to patient regarding the EEG results. Reviewed the results and patient voiced clear understanding.     Advised Dr. Rutherford will try to locate her original FMLA paperwork tomorrow. Patient was very upset about having to go to the [place] office because of  the way the  staff handled patient's paperwork both now and in the past. Patient stated she experiences serious anxiety driving but patient drives to [place] to avoid going into the [place] office. Patient stated one member of the front office staff has been giving the patient issues and patient would rather not deal with this member. Patient stated she does not want to go in the [place] office tomorrow to  her original [place] paperwork but knows she has to.     Patient is concerned she could lose her job if she does not follow her employer's strict policy and protocols and provide them with the original paperwork they require.. Patient became tearful and said maybe she should go to [facility]. I apologized and provided empathy for what she has been going through.      Secure chat sent to Srinivasa.       Dr. Rutherford,   Patient asked if her EEG could be documented on her FMLA paperwork.

## 2025-01-30 NOTE — TELEPHONE ENCOUNTER
The EEG shows evidence that she has a tendency to have seizures (epilepsy). The occasional sharp waves do not mean that we need to change her treatment, but may mean that removing seizure medication may be higher risk if that were ever to be considered.     I believe the original [facility] paper work is available in my office. I can likely locate it tomorrow if it is needed.

## 2025-01-31 ENCOUNTER — TELEPHONE (OUTPATIENT)
Dept: NEUROLOGY | Facility: CLINIC | Age: 55
End: 2025-01-31

## 2025-01-31 NOTE — TELEPHONE ENCOUNTER
I updated the original FMLA form. I contacted the patient. I apologized for the delay and difficulty with the form. She reports that it would be fine for her to  the completed form on Monday at the [address] office.     Office staff: Please contact me directly via [employer] secure chat when she arrives at [address] so that I can personally bring her the form and make sure I have completed it with the required information.

## 2025-02-10 ENCOUNTER — TELEPHONE (OUTPATIENT)
Age: 55
End: 2025-02-10

## 2025-02-10 DIAGNOSIS — R11.2 NAUSEA AND VOMITING, UNSPECIFIED VOMITING TYPE: ICD-10-CM

## 2025-02-10 DIAGNOSIS — K31.84 GASTROPARESIS: ICD-10-CM

## 2025-02-10 RX ORDER — ONDANSETRON 4 MG/1
4 TABLET, FILM COATED ORAL EVERY 8 HOURS PRN
Qty: 60 TABLET | Refills: 3 | Status: SHIPPED | OUTPATIENT
Start: 2025-02-10

## 2025-02-10 NOTE — TELEPHONE ENCOUNTER
Patient calling to follow up post ER visit 2/8/25 for a gastroparesis episode while patient was out of state visiting her daughter. Reports severe episodes of vomiting and diarrhea that led to patient passing out while in the ER. Reports she is unable to cross legs because of bruising from the cramping in her legs. Patient reports she is still not feeling well and nauseous. Is tolerating liquids and trying to stay hydrated. Reports she did tolerate a biscuit yesterday. Is taking her oral Zofran. Patient reached out to GI doctor (Dr. Feng. The soonest patient can be seen is 4/8/25 Patient reports she also requested a refill on her zofran. Patient is wondering if provider has any other recommendations? Is willing to come in for OV this week.  Please reach out to patient with provider response.

## 2025-02-10 NOTE — TELEPHONE ENCOUNTER
Patient calling for follow up appt after being in ED in [place] over the weekend.  Patient had gastroparesis episode.  Vomited 7 times along with diarrhea.  Had syncopal episode with incontinence while in ED.  Patient continues with nausea and not feeling well.  Would like script for Willie VILLALOBOS sent to [facility], [address], [place].  Appt made for 4/8/25.

## 2025-02-11 ENCOUNTER — TELEPHONE (OUTPATIENT)
Age: 55
End: 2025-02-11

## 2025-02-11 ENCOUNTER — OFFICE VISIT (OUTPATIENT)
Dept: INTERNAL MEDICINE CLINIC | Facility: CLINIC | Age: 55
End: 2025-02-11
Payer: COMMERCIAL

## 2025-02-11 VITALS
WEIGHT: 111.8 LBS | BODY MASS INDEX: 20.57 KG/M2 | SYSTOLIC BLOOD PRESSURE: 120 MMHG | TEMPERATURE: 98.3 F | HEART RATE: 71 BPM | HEIGHT: 62 IN | OXYGEN SATURATION: 99 % | DIASTOLIC BLOOD PRESSURE: 76 MMHG

## 2025-02-11 DIAGNOSIS — R89.9 ABNORMAL LABORATORY TEST: ICD-10-CM

## 2025-02-11 DIAGNOSIS — R94.31 SHORTENED PR INTERVAL: ICD-10-CM

## 2025-02-11 DIAGNOSIS — K31.84 GASTROPARESIS: ICD-10-CM

## 2025-02-11 DIAGNOSIS — R00.2 POUNDING HEARTBEAT: ICD-10-CM

## 2025-02-11 DIAGNOSIS — R10.32 LEFT LOWER QUADRANT ABDOMINAL PAIN: Primary | ICD-10-CM

## 2025-02-11 PROCEDURE — 93000 ELECTROCARDIOGRAM COMPLETE: CPT | Performed by: INTERNAL MEDICINE

## 2025-02-11 PROCEDURE — 99214 OFFICE O/P EST MOD 30 MIN: CPT | Performed by: INTERNAL MEDICINE

## 2025-02-11 RX ORDER — ONDANSETRON 4 MG/1
4 TABLET, ORALLY DISINTEGRATING ORAL EVERY 8 HOURS PRN
Qty: 60 TABLET | Refills: 3 | Status: SHIPPED | OUTPATIENT
Start: 2025-02-11

## 2025-02-11 NOTE — TELEPHONE ENCOUNTER
I contacted patient to inform her of Zofran order. She states she requires the ODT vs tabs.     She notes bruising both legs and spinal pain, questioning if related to fall at hospital. She will be following up with Dr. Naidu. She is not pleased that she has to wait until April for an urgent visit in regards to gastroparesis flare. I did apologize and reviewed she is on wait list for earlier.    I have added new order, please sign off if approved.

## 2025-02-11 NOTE — LETTER
February 18, 2025     Patient: Cinthya Howard  YOB: 1970  Date of Visit: 2/11/2025      To Whom it May Concern:    Cinthya Howard is under my professional care. Cinthya was seen in my office on 2/11/2025. Cinthya may return to work on 2/26/25 .    If you have any questions or concerns, please don't hesitate to call.         Sincerely,          Tony Naidu, DO        CC: No Recipients

## 2025-02-11 NOTE — PROGRESS NOTES
Name: Cinthya Howard      : 1970      MRN: [id number]  Encounter Provider: Tony Naidu DO  Encounter Date: 2025   [facility] department: [facility]  :  Assessment & Plan  Left lower quadrant abdominal pain  Left lower quadrant abdominal tenderness on examination left mid abdominal tenderness on examination no guarding rebound rigidity patient had episode of nausea and vomiting we will check CT abdomen pelvis we will consult GI Dr. Dickerson for second opinion  Orders:  •  Ambulatory Referral to Gastroenterology; Future  •  CT abdomen pelvis wo contrast; Future    Gastroparesis  GP diet recommended we will get second opinion GI   Orders:  •  Ambulatory Referral to Gastroenterology; Future    Pounding heartbeat  Abnormal EKG Short PA interval patient has been experiencing intermittent heart pounding.  Stop Zofran check echocardiogram will consult cardiology EP specialist Dr. Villavicencio to rule out short PA syndrome  Orders:  •  POCT ECG  •  Holter monitor; Future  •  Echo complete w/ contrast if indicated; Future  •  Ambulatory Referral to Cardiology; Future    Abnormal laboratory test  Mildly elevated AST recheck AST  Orders:  •  AST; Future    Shortened PA interval    Orders:  •  Ambulatory Referral to Cardiology; Future  I have spent a total time of 30 minutes in caring for this patient on the day of the visit/encounter including Diagnostic results, Instructions for management, Importance of tx compliance, Risk factor reductions, Impressions, Counseling / Coordination of care, Documenting in the medical record, Reviewing / ordering tests, medicine, procedures  , and Obtaining or reviewing history  .   RTO in 4 weeks call if any change worse or symptoms not [place] work note provided May remain out of work over the next 2 weeks       History of Present Illness   HPI 55-year old female coming in for a follow up office visit regarding left lower quadrant abdominal pain,  "gastroparesis, heart pounding, abnormal laboratory test/elevated AST and abnormal EKG; patient recently had a ER visit to develop severe vomiting then diarrhea not able to keep fluids down.  Vomiting , then diarrhea went to [facility] er syncopized leg cramping potatoes with skins diarrhea reports breakthrough seizure since heart pounding patient is working with neurology regarding breakthrough seizures patient did syncopized in the ER.  Review of Systems   Constitutional:  Negative for activity change, appetite change and unexpected weight change.   HENT:  Negative for congestion and postnasal drip.    Eyes:  Negative for visual disturbance.   Respiratory:  Negative for cough and shortness of breath.    Cardiovascular:  Negative for chest pain.        Heart pounding   Gastrointestinal:  Negative for abdominal pain, diarrhea, nausea and vomiting.   Neurological:  Negative for dizziness, light-headedness and headaches.   Hematological:  Negative for adenopathy.       Objective   /76 (BP Location: Left arm, Patient Position: Sitting, Cuff Size: Standard)   Pulse 71   Temp 98.3 °F (36.8 °C) (Probe)   Ht 5' 2\" (1.575 m)   Wt 50.7 kg (111 lb 12.8 oz)   SpO2 99%   BMI 20.45 kg/m²      Physical Exam  Constitutional:       Appearance: She is well-developed.   HENT:      Head: Normocephalic and atraumatic.      Right Ear: External ear normal.      Left Ear: External ear normal.      Nose: Nose normal.   Eyes:      Pupils: Pupils are equal, round, and reactive to light.   Cardiovascular:      Rate and Rhythm: Normal rate and regular rhythm.      Heart sounds: Normal heart sounds. No murmur heard.  Pulmonary:      Effort: Pulmonary effort is normal.      Breath sounds: Normal breath sounds.   Abdominal:      General: There is no distension.      Palpations: Abdomen is soft.      Tenderness: There is abdominal tenderness. There is no guarding.      Comments: Left lower quadrant abdominal tenderness         "

## 2025-02-11 NOTE — ASSESSMENT & PLAN NOTE
GP diet recommended we will get second opinion GI DrSuraj  Orders:  •  Ambulatory Referral to Gastroenterology; Future

## 2025-02-11 NOTE — PROGRESS NOTES
Name: Cinthya Howard      : 1970      MRN: [id number]  Encounter Provider: Tony Naidu DO  Encounter Date: 2025   [facility] department: [facility]  :  Assessment & Plan  Left lower quadrant abdominal pain  Left lower quadrant abdominal tenderness on examination left mid abdominal tenderness on examination no guarding rebound rigidity patient had episode of nausea and vomiting we will check CT abdomen pelvis we will consult GI Dr. Dickerson for second opinion  Orders:  •  Ambulatory Referral to Gastroenterology; Future  •  CT abdomen pelvis wo contrast; Future    Gastroparesis  GP diet recommended we will get second opinion GI   Orders:  •  Ambulatory Referral to Gastroenterology; Future    Pounding heartbeat  Abnormal EKG Short VT interval patient has been experiencing intermittent heart pounding.  Stop Zofran check echocardiogram will consult cardiology EP specialist Dr. Villavicencio to rule out short VT syndrome  Orders:  •  POCT ECG  •  Holter monitor; Future  •  Echo complete w/ contrast if indicated; Future  •  Ambulatory Referral to Cardiology; Future    Abnormal laboratory test  Mildly elevated AST recheck AST  Orders:  •  AST; Future    Shortened VT interval    Orders:  •  Ambulatory Referral to Cardiology; Future  I have spent a total time of 30 minutes in caring for this patient on the day of the visit/encounter including Diagnostic results, Instructions for management, Importance of tx compliance, Risk factor reductions, Impressions, Counseling / Coordination of care, Documenting in the medical record, Reviewing / ordering tests, medicine, procedures  , and Obtaining or reviewing history  .   RTO in 4 weeks call if any change worse or symptoms not [employer] work note provided May remain out of work over the next 2 weeks       History of Present Illness   HPI 55-year old female coming in for a follow up office visit regarding left lower quadrant abdominal pain,  "gastroparesis, heart pounding, abnormal laboratory test/elevated AST and abnormal EKG; patient recently had a ER visit to develop severe vomiting then diarrhea not able to keep fluids down.  Vomiting , then diarrhea went to [facility] er syncopized leg cramping potatoes with skins diarrhea reports breakthrough seizure since heart pounding patient is working with neurology regarding breakthrough seizures patient did syncopized in the ER.  Review of Systems   Constitutional:  Negative for activity change, appetite change and unexpected weight change.   HENT:  Negative for congestion and postnasal drip.    Eyes:  Negative for visual disturbance.   Respiratory:  Negative for cough and shortness of breath.    Cardiovascular:  Negative for chest pain.        Heart pounding   Gastrointestinal:  Negative for abdominal pain, diarrhea, nausea and vomiting.   Neurological:  Negative for dizziness, light-headedness and headaches.   Hematological:  Negative for adenopathy.       Objective   /76 (BP Location: Left arm, Patient Position: Sitting, Cuff Size: Standard)   Pulse 71   Temp 98.3 °F (36.8 °C) (Probe)   Ht 5' 2\" (1.575 m)   Wt 50.7 kg (111 lb 12.8 oz)   SpO2 99%   BMI 20.45 kg/m²      Physical Exam  Constitutional:       Appearance: She is well-developed.   HENT:      Head: Normocephalic and atraumatic.      Right Ear: External ear normal.      Left Ear: External ear normal.      Nose: Nose normal.   Eyes:      Pupils: Pupils are equal, round, and reactive to light.   Cardiovascular:      Rate and Rhythm: Normal rate and regular rhythm.      Heart sounds: Normal heart sounds. No murmur heard.  Pulmonary:      Effort: Pulmonary effort is normal.      Breath sounds: Normal breath sounds.   Abdominal:      General: There is no distension.      Palpations: Abdomen is soft.      Tenderness: There is abdominal tenderness. There is no guarding.      Comments: Left lower quadrant abdominal tenderness       "

## 2025-02-11 NOTE — TELEPHONE ENCOUNTER
Please advise  Patient called, was just seen today and forgot to ask about work. Is suppose to go back the 13th. Does Dr Naidu want her to stay out longer? Will need an updated note .

## 2025-02-13 ENCOUNTER — HOSPITAL ENCOUNTER (OUTPATIENT)
Dept: NON INVASIVE DIAGNOSTICS | Facility: CLINIC | Age: 55
Discharge: HOME | End: 2025-02-13
Payer: COMMERCIAL

## 2025-02-13 VITALS
DIASTOLIC BLOOD PRESSURE: 76 MMHG | SYSTOLIC BLOOD PRESSURE: 120 MMHG | BODY MASS INDEX: 20.43 KG/M2 | HEIGHT: 62 IN | HEART RATE: 71 BPM | WEIGHT: 111 LBS

## 2025-02-13 DIAGNOSIS — R00.2 POUNDING HEARTBEAT: ICD-10-CM

## 2025-02-13 LAB
AORTIC ROOT: 3.1 CM
ASCENDING AORTA: 3 CM
BSA FOR ECHO PROCEDURE: 1.49 M2
E WAVE DECELERATION TIME: 237 MS
E/A RATIO: 1
FRACTIONAL SHORTENING: 32 (ref 28–44)
INTERVENTRICULAR SEPTUM IN DIASTOLE (PARASTERNAL SHORT AXIS VIEW): 0.8 CM
INTERVENTRICULAR SEPTUM: 0.8 CM (ref 0.6–1.1)
LAAS-AP2: 13.5 CM2
LAAS-AP4: 11.6 CM2
LEFT ATRIUM SIZE: 2.8 CM
LEFT ATRIUM VOLUME (MOD BIPLANE): 31 ML
LEFT ATRIUM VOLUME INDEX (MOD BIPLANE): 20.8 ML/M2
LEFT INTERNAL DIMENSION IN SYSTOLE: 2.8 CM (ref 2.1–4)
LEFT VENTRICULAR INTERNAL DIMENSION IN DIASTOLE: 4.1 CM (ref 3.5–6)
LEFT VENTRICULAR POSTERIOR WALL IN END DIASTOLE: 0.7 CM
LEFT VENTRICULAR STROKE VOLUME: 44 ML
LV EF US.2D.A4C+ESTIMATED: 58 %
LVSV (TEICH): 44 ML
MV E'TISSUE VEL-LAT: 9 CM/S
MV E'TISSUE VEL-SEP: 8 CM/S
MV PEAK A VEL: 0.54 M/S
MV PEAK E VEL: 54 CM/S
MV STENOSIS PRESSURE HALF TIME: 69 MS
MV VALVE AREA P 1/2 METHOD: 3.19
RIGHT ATRIAL 2D VOLUME: 18 ML
RIGHT ATRIUM AREA SYSTOLE A4C: 9.3 CM2
RIGHT VENTRICLE ID DIMENSION: 2.8 CM
SL CV LEFT ATRIUM LENGTH A2C: 4.6 CM
SL CV LV EF: 60
SL CV PED ECHO LEFT VENTRICLE DIASTOLIC VOLUME (MOD BIPLANE) 2D: 74 ML
SL CV PED ECHO LEFT VENTRICLE SYSTOLIC VOLUME (MOD BIPLANE) 2D: 30 ML
TR MAX PG: 0 MMHG
TR PEAK VELOCITY: 0.1 M/S
TRICUSPID ANNULAR PLANE SYSTOLIC EXCURSION: 1.2 CM
TRICUSPID VALVE PEAK REGURGITATION VELOCITY: 0.08 M/S

## 2025-02-13 PROCEDURE — 93306 TTE W/DOPPLER COMPLETE: CPT | Performed by: INTERNAL MEDICINE

## 2025-02-13 PROCEDURE — 93225 XTRNL ECG REC<48 HRS REC: CPT

## 2025-02-13 PROCEDURE — 93306 TTE W/DOPPLER COMPLETE: CPT

## 2025-02-13 PROCEDURE — 93226 XTRNL ECG REC<48 HR SCAN A/R: CPT

## 2025-02-13 NOTE — ASSESSMENT & PLAN NOTE
GP diet recommended we will get second opinion GI DrSuraj  Orders:    Ambulatory Referral to Gastroenterology; Future

## 2025-02-14 ENCOUNTER — RESULTS FOLLOW-UP (OUTPATIENT)
Dept: INTERNAL MEDICINE CLINIC | Facility: CLINIC | Age: 55
End: 2025-02-14

## 2025-02-14 NOTE — TELEPHONE ENCOUNTER
Orders in chart      LM on VM
Patient advised  Will  hemoccult cards tomorrow   Patient will complete and bring back to office to test 
Patient already sees Dr Shelley Felt  Please put in orders for stool testing and we will notify the patient 
Patient called with c/o of blood in stool  Patient states that she has been following a strict GERD diet and has cut out a lot of foods  She notes that 3 days ago she put some red beets in a salad and noticed the day after that she was having some red blood in her stool  She has had another episode this morning and wants to know if the beets have something to do with the blood or color in her stool  She is not having any other symptoms  Please advise 
Patient is asking for her thyroid levels to be checked 
Patient picked up hemoccult cards today  She also asked if you would put in lab orders for her to check her thyroid?  She feels like it is off again and she is breaking out on her chin, seems to be hormonal 
Please see patient message  She is requesting orders for labs  Please advise and put in for patient 
Spoke to patient  She is okay to wait until Dr Barbie Reeves returns to address additional labs 
no

## 2025-02-17 ENCOUNTER — TELEPHONE (OUTPATIENT)
Age: 55
End: 2025-02-17

## 2025-02-17 NOTE — TELEPHONE ENCOUNTER
"Hello,    The following message was sent via e-mail to the leadership team:     Please advise if you can help facilitate the following overbook request:    Patient Name: Cinthya Nazario    Patient MRN: [id number]     Call back #: [phone]     Insurance: [facility]     Department:Cardiology    Speciality: General Cardiology    Reason for overbook request: PATIENT REQUEST    Comments (Write \"N/a\" if no comments): Patient is scheduled for 4/3/2025. Dr Naidu revised the referral to ASAP for Pounding heartbeat and Shortened AR interval     Requested doctor and location: Any Dr/[place]    Date of current appointment: 4/3/2025      Thank you.      "

## 2025-02-17 NOTE — TELEPHONE ENCOUNTER
Patients GI provider:  Dr. Feng    Number to return call: [phone]    Reason for call: Pt calling to speak with Dr. Feng for confirmation on date of diagnosis for short term disability. Pt would like a call back asap, she is looking to have form submitted by tomorrow.    Scheduled procedure/appointment date if applicable: N/A

## 2025-02-18 ENCOUNTER — TELEPHONE (OUTPATIENT)
Age: 55
End: 2025-02-18

## 2025-02-18 ENCOUNTER — NURSE TRIAGE (OUTPATIENT)
Age: 55
End: 2025-02-18

## 2025-02-18 DIAGNOSIS — R11.2 NAUSEA AND VOMITING, UNSPECIFIED VOMITING TYPE: Primary | ICD-10-CM

## 2025-02-18 DIAGNOSIS — R55 SYNCOPE, UNSPECIFIED SYNCOPE TYPE: Primary | ICD-10-CM

## 2025-02-18 DIAGNOSIS — K31.84 GASTROPARESIS: ICD-10-CM

## 2025-02-18 PROCEDURE — 93227 XTRNL ECG REC<48 HR R&I: CPT | Performed by: INTERNAL MEDICINE

## 2025-02-18 RX ORDER — SCOPOLAMINE 1 MG/3D
1 PATCH, EXTENDED RELEASE TRANSDERMAL
Qty: 10 PATCH | Refills: 0 | Status: SHIPPED | OUTPATIENT
Start: 2025-02-18

## 2025-02-18 NOTE — TELEPHONE ENCOUNTER
" SPOKE WITH PT, HX GASTROPARESIS, LAST OV 4/23/24. PT SEEN IN ER (OUT OF STATE) FOR GASTROPARESIS FLARE, SYNCOPAL EPISODE. PT F/U WITH PCP, FOUND TO HAVE ABNORMAL EKG, SCHEDULED TO SEE CARDIOLOGY. PT WAS INSTRUCTED TO STOP TAKING ZOFRAN BY PCP, WANTS TO KNOW WHAT YOU RECOMMEND SHE TAKE FOR NAUSEA. PT WITH POOR PO INTAKE, HYDRATING WITH GATORADE/PEDIALYTE.       Answer Assessment - Initial Assessment Questions  1. REASON FOR CALL: \"What is the main reason for your call?\" or \"How can I best help you?\"      SPOKE WITH PT, HX GASTROPARESIS, LAST OV 4/23/24. PT SEEN IN ER (OUT OF STATE) FOR GASTROPARESIS FLARE, SYNCOPAL EPISODE. PT F/U WITH PCP, FOUND TO HAVE ABNORMAL EKG, SCHEDULED TO SEE CARDIOLOGY. PT WAS INSTRUCTED TO STOP TAKING ZOFRAN BY PCP, WANTS TO KNOW WHAT YOU RECOMMEND SHE TAKE FOR NAUSEA. PT WITH POOR PO INTAKE, HYDRATING WITH GATORADE/PEDIALYTE.    Protocols used: Information Only Call - No Triage-Adult-[place]    "

## 2025-02-18 NOTE — TELEPHONE ENCOUNTER
Called and spoke to patient she was looking for the date she was DX with the Gastroparesis on the GES that was done 12/13/2022    Relayed this to patient; she will reach out to the office to discuss if anything further is needed

## 2025-02-18 NOTE — TELEPHONE ENCOUNTER
Pt called stating that she has been having nausea however she has not seen cardiology as of yet.     Warmed transferred call to Anitha from GI.

## 2025-02-18 NOTE — TELEPHONE ENCOUNTER
Spoke with patient and reviewed recommendations from RODRICK COOLEY, she verbalized understanding.

## 2025-02-19 ENCOUNTER — TELEPHONE (OUTPATIENT)
Age: 55
End: 2025-02-19

## 2025-02-19 ENCOUNTER — TELEPHONE (OUTPATIENT)
Dept: GASTROENTEROLOGY | Facility: AMBULARY SURGERY CENTER | Age: 55
End: 2025-02-19

## 2025-02-19 DIAGNOSIS — K31.84 GASTROPARESIS: Primary | ICD-10-CM

## 2025-02-19 RX ORDER — METOCLOPRAMIDE 5 MG/1
10 TABLET ORAL
Qty: 30 TABLET | Refills: 2 | Status: SHIPPED | OUTPATIENT
Start: 2025-02-19 | End: 2025-02-19

## 2025-02-19 NOTE — TELEPHONE ENCOUNTER
I reviewed the hospital notes and the PCP note. It's not entirely clear to me what signs/symptoms were concerning for seizure. I see an EEG was ordered, but she just had an EEG about 3 weeks ago and another routine EEG is unlikely to add anything. I did just message Dr. Naidu to see if he is okay cancelling the study. Would she be okay seeing LENORA Stack or Kitty Simms PA-C? Can she explain more about the signs/symptoms that suggest there was a seizure? She might require EMU admission to confirm her diagnosis and characterize events if events are occurring frequently.

## 2025-02-19 NOTE — TELEPHONE ENCOUNTER
Recd call from patient. Patient stated that she was in [place] on 2/8/25 and had a really bad gastroparesis episode. Reported that she followed up with PCP on 2/11/25 (had EKG, ref to cardiology, Rx for EEG). Pt not sure what could be causing the episodes she is having.     8/1/25 FU Dr. Rutherford: pt would like to be seen sooner. No appts available. Please advise if pt can be seen sooner. Pt agreeable to see provider in [place] or [place] offices.     2/27/25 EEG     Dr. Rutherford: please review and advise. Thank you

## 2025-02-19 NOTE — TELEPHONE ENCOUNTER
Spoke to patient.  She had a flareup of gastroparesis while away.  Had an episode of severe vomiting followed by diarrhea, while in the ER she had syncopized at outside hospital, currently undergoing neurologic and cardiology workup.    She has had about 1 episode of gastroparesis flareup this year about 2 or 3 last year.  Her episodes are very far and infrequent in between.    Unclear what precipitates these episodes except possible dietary indiscretions.  When she sticks to her gastroparesis diet she has she does very well.  Her weight has been stable.    In review of some of her imaging, there is also possible component of severe constipation possibly this may be precipitating her flareups of nausea and vomiting.    Will evaluate her upcoming CAT scan, if significant fecal loading, would recommend aggressive bowel regimen with MiraLAX and can consider Motegrity in the future as well.

## 2025-02-20 NOTE — TELEPHONE ENCOUNTER
Patient called in informing she received a text msg (not a my chart msg)  from Dr Naidu's office informing her to call or go on my chart and carlos a Neurosurgical appt. Pt informed  per her recent episode she has on 2/8/25 in [place]. Pt did not know what she had to see neurosurgical, but called to make that appt.    I informed pt I will call Dr Naidu's office directly to clarify appt request.    I spoke to Shahana GUALLPA from Dr Naidu's office per above and to get clarification if Dr Naidu's wants pt to be seen by Neurosurgical or just Neurology as I seen his recent referral for Neurology.  Shahana spoke directly with Dr Naidu and he advised he wants pt to be seen Neurology, not Neurosurgical.    I called pt and left her a detailed msg to please call our office and speak to a nurse. I read Dr Hernandez's note from yesterday and informed when she calls back we can speak further about the note and then schedule her with either Milana COOLEY or Kitty GASTON if agreeable.    32.4

## 2025-02-20 NOTE — TELEPHONE ENCOUNTER
I spoke to patient and provided up per my previous msg I left for her. I reviewed Dr Hernandez's note as well.     Patient agreeable to see either Kitty GASTON or Milana COOLEY to further discuss and get recommendations.  The first available appt was with Kitty GASTON today and Friday. However, pt could not do either of those days or times that were available.     The next available was with Kitty GASTON  Monday 2/24/25 at 830 am [facility] in [place], pt agreed and scheduled that appt . Pt has seen Kitty in April 2023    Pt will review her symptoms and concerns at that appt, and is interested in discussing if the EEG that is currently carlos for 2/27/25 is still needed and or  (ordered by her PCP, but pt just had a recent EEG 3 weeks ago) to discuss EMU Admission , as she is willing to have an EMU if able.

## 2025-02-21 ENCOUNTER — HOSPITAL ENCOUNTER (OUTPATIENT)
Dept: CT IMAGING | Facility: HOSPITAL | Age: 55
Discharge: HOME | End: 2025-02-21
Payer: COMMERCIAL

## 2025-02-21 DIAGNOSIS — R10.32 LEFT LOWER QUADRANT ABDOMINAL PAIN: ICD-10-CM

## 2025-02-21 PROCEDURE — 74176 CT ABD & PELVIS W/O CONTRAST: CPT

## 2025-02-21 NOTE — TELEPHONE ENCOUNTER
Received call from pt asking for an update on a sooner appt. She saw her pcp on 2/11 who placed an ASAP referral for pt. Per chart review, pt is scheduled for 3/18 with Dr. Villavicencio and 4/21 with LENORA Davis. An overbook request was placed above. Please review and advise.

## 2025-02-24 ENCOUNTER — OFFICE VISIT (OUTPATIENT)
Dept: NEUROLOGY | Facility: CLINIC | Age: 55
End: 2025-02-24
Payer: COMMERCIAL

## 2025-02-24 ENCOUNTER — OFFICE VISIT (OUTPATIENT)
Dept: INTERNAL MEDICINE CLINIC | Facility: CLINIC | Age: 55
End: 2025-02-24
Payer: COMMERCIAL

## 2025-02-24 VITALS
SYSTOLIC BLOOD PRESSURE: 110 MMHG | WEIGHT: 108 LBS | BODY MASS INDEX: 19.75 KG/M2 | HEART RATE: 78 BPM | DIASTOLIC BLOOD PRESSURE: 80 MMHG

## 2025-02-24 VITALS
BODY MASS INDEX: 21.02 KG/M2 | TEMPERATURE: 98.2 F | SYSTOLIC BLOOD PRESSURE: 120 MMHG | HEIGHT: 62 IN | WEIGHT: 114.2 LBS | DIASTOLIC BLOOD PRESSURE: 80 MMHG | HEART RATE: 72 BPM | RESPIRATION RATE: 18 BRPM | OXYGEN SATURATION: 98 %

## 2025-02-24 DIAGNOSIS — G43.909 MIGRAINE WITHOUT STATUS MIGRAINOSUS, NOT INTRACTABLE, UNSPECIFIED MIGRAINE TYPE: ICD-10-CM

## 2025-02-24 DIAGNOSIS — G40.209 PARTIAL SYMPTOMATIC EPILEPSY WITH COMPLEX PARTIAL SEIZURES, NOT INTRACTABLE, WITHOUT STATUS EPILEPTICUS (HCC): Primary | ICD-10-CM

## 2025-02-24 DIAGNOSIS — K31.84 GASTROPARESIS: ICD-10-CM

## 2025-02-24 DIAGNOSIS — R55 SYNCOPE: ICD-10-CM

## 2025-02-24 DIAGNOSIS — Z12.4 SCREENING FOR CERVICAL CANCER: ICD-10-CM

## 2025-02-24 DIAGNOSIS — R93.89 ABNORMAL CT SCAN: Primary | ICD-10-CM

## 2025-02-24 PROCEDURE — 99214 OFFICE O/P EST MOD 30 MIN: CPT

## 2025-02-24 PROCEDURE — 99214 OFFICE O/P EST MOD 30 MIN: CPT | Performed by: PHYSICIAN ASSISTANT

## 2025-02-24 RX ORDER — RIMEGEPANT SULFATE 75 MG/75MG
TABLET, ORALLY DISINTEGRATING ORAL
Qty: 8 TABLET | Refills: 3 | Status: SHIPPED | OUTPATIENT
Start: 2025-02-24

## 2025-02-24 NOTE — ASSESSMENT & PLAN NOTE
History of gastroparesis, seizure,and hypoparathyroidism  Recently had a vasovagal episode in the setting of vomiting and diarrhea.  Reported feeling hot, sweaty and lightheaded occasionally  Underwent a CAT scan of the abdomen/pelvis which showed 1.3 x 1.0 cm fluid density focus in the left mid abdomen nonspecific   Currently no abdominal pain or vomiting ;however reports constant nausea from foul-smelling flatulence  Also reports decrease in appetite due to fear of recurrent gastroparesis flare.  Endorsed  consumption  of 1 cup of  yogurt before appointment today.  Has seen nutritionist in the past  but endorse no improvement of symptoms  Follows  with gastroenterologist outpatient and currently on Pepcid  Discussed with patient about importance of balanced meals.  Discussed with patient that she may benefit from revisiting a nutritionist if symptoms are not improving  Has upcoming appointment with gastroenterologist.  Suspect fluid density seen on CAT scan may be related to recent inflammatory process with the gastroparesis flare  Unknown etiology for fluid seen on CAT scan however, concern for possible SIBO.  Discussed the possibility of having hydrogen breath test done with her gastroenterologist  Due to recent symptoms, patient required a brief time away from work; however symptoms are improving. Discussed with patient about returning back to work FMLA form modified for return to work  for February 26 rather than March.       Plan:  Follow-up with gastroenterologist for Possible SIBO outpatient  May require nutritional counseling to help with  Encourage increased p.o. intake and fluid  Okay to return to work

## 2025-02-24 NOTE — ASSESSMENT & PLAN NOTE
Migraines were worse in the Fall and have been less frequent as of late.  Topiramate acts as her preventative medication and she is using Nurtec for abortive therapy.    Orders:    rimegepant sulfate (Nurtec) 75 mg TBDP; Take one NURTEC 75 mg at onset under tongue. Limit 1 in 24 hours. 8 a month.

## 2025-02-24 NOTE — ASSESSMENT & PLAN NOTE
Patient with well controlled epilepsy, on topiramate 200mg BID.  There have not been any recognized seizures since around 1999.  She was seen by Dr. Rutherford about 2 months ago and reported some possible auras in the Fall, which occurred in a very high stress time.      Routine EEG was updated and showed epileptogenic tendency from the posterior quadrant (spike and wave).  No seizures were identified.    She recently had an event of likely vasovagal syncope in the setting of multiple episodes of vomiting and diarrhea during a gastroparesis flare.  We discussed vasovagal syncope in detail today.  The event was not likely a seizure.  Her PCP ordered a routine EEG, presumably due to this, but she just had an EEG less than a month ago, and repeating EEG will not likely add anything.  Will have her cancel EEG.  There have not been any other events concerning for seizure.  She has had some events where she feels hot over her whole body and then irritable, without any loss of awareness or confusion.  Question low blood sugar episodes.  She will discuss with PCP.  She is also having some cardiac issues with concern for dysrhythmia, and will be seeing cardiology shortly to discuss.  At this point, I do not feel any of her current symptoms are concerning for seizure and no utility for EMU admission.  If there are unexplained events concerning for seizure, could consider EMU admission.    Plan:  - continue topiramate 200mg BID  - continue to follow with PCP, cardiology and GI for recent non-neurologic symptoms  - contact the office if any events concerning for seizure

## 2025-02-24 NOTE — PROGRESS NOTES
Name: Cinthya Howard      : 1970      MRN: [id number]  Encounter Provider: Kitty Simms PA-C  Encounter Date: 2025   Encounter department: [facility] [place]  :  Assessment & Plan  Partial symptomatic epilepsy with complex partial seizures, not intractable, without status epilepticus (HCC)  Patient with well controlled epilepsy, on topiramate 200mg BID.  There have not been any recognized seizures since around .  She was seen by Dr. Rutherford about 2 months ago and reported some possible auras in the Fall, which occurred in a very high stress time.      Routine EEG was updated and showed epileptogenic tendency from the posterior quadrant (spike and wave).  No seizures were identified.    She recently had an event of likely vasovagal syncope in the setting of multiple episodes of vomiting and diarrhea during a gastroparesis flare.  We discussed vasovagal syncope in detail today.  The event was not likely a seizure.  Her PCP ordered a routine EEG, presumably due to this, but she just had an EEG less than a month ago, and repeating EEG will not likely add anything.  Will have her cancel EEG.  There have not been any other events concerning for seizure.  She has had some events where she feels hot over her whole body and then irritable, without any loss of awareness or confusion.  Question low blood sugar episodes.  She will discuss with PCP.  She is also having some cardiac issues with concern for dysrhythmia, and will be seeing cardiology shortly to discuss.  At this point, I do not feel any of her current symptoms are concerning for seizure and no utility for EMU admission.  If there are unexplained events concerning for seizure, could consider EMU admission.    Plan:  - continue topiramate 200mg BID  - continue to follow with PCP, cardiology and GI for recent non-neurologic symptoms  - contact the office if any events concerning for seizure       Migraine without status  migrainosus, not intractable, unspecified migraine type  Migraines were worse in the Fall and have been less frequent as of late.  Topiramate acts as her preventative medication and she is using Nurtec for abortive therapy.    Orders:    rimegepant sulfate (Nurtec) 75 mg TBDP; Take one NURTEC 75 mg at onset under tongue. Limit 1 in 24 hours. 8 a month.    Syncope  As above, episode on 2/8/25 occurred in the ED in [place] in the setting of multiple episodes of vomiting and diarrhea with abdominal pain, associated with gastroparesis flare.  Likely vasovagal. No other episodes of LOC have occurred.              History of Present Illness   HPI   Cinthya Howard is returning to the [facility] for a fit in visit.      Interval Events:   Seizures since last visit: None  Hospitalizations since last visit:  Yes, was seen in ED in [place] on 2/8/25 for flare up of gastroparesis and apparently had event of syncope     Patient here for a fit in visit today.  She contacted the office on 2/19/25 reporting that she had been in the ED in [place] on 2/8/25 for a gastroparesis episode and saw her PCP in follow up   who ordered additional testing including EEG, EKG, ECHO Holter, referral to cardiology and GI.  Patient was asking for sooner appt here.  Dr. Rutherford reviewed chart, including ED eval from [place] which was in EPIC and was unsure what signs/symptoms were concerning for seizure activity and why PCP ordered EEG.  Patient had just had an EEG about 3 weeks prior, ordered by Dr. Rutherford, so unclear why another was needed.       On review of ED visit 2/8/25 [place], [place]:  55-year-old female presenting to the ED with nausea vomiting diarrhea and abdominal pain. Started acutely this evening. Took some Zofran at home without relief. Has history of gastroparesis. Says she is from [place], in town visiting her daughter. Says this is a typical gastroparesis flare for her. No new or worrisome symptoms.  Nonbloody nonbilious. Afebrile. Generalized pain. Says her flares are typically exacerbated by something she ate, tonight she thinks she ate potatoes with the skins on them which caused the flare.  Some generalized abdominal pain without guarding or rebound. Less likely surgical abnormality like cholecystitis, appendicitis. She was given IV fluids, milligram of Dilaudid, 4 mg Zofran. States she is allergic to Reglan and Phenergan. Says she is allergic to morphine. Provided some mild relief. She was given another liter of fluids, 15 mg of Toradol, 4 mg of Zofran. After second round of medications, she is feeling much better, tolerating p.o., feels comfortable with discharge home. Urinalysis without infection. UDS negative, less likely cannabinoid hyperemesis. Mild hyponatremia, hypochloremia. Reassuring LFTs, renal function, glucose. hCG negative. No leukocytosis or anemia. She voices understanding of strict return precautions. Has home meds. Will follow-up when she gets back to [place].    PCP note following ED visit and return to [place] states that patient had a syncopal episode while in ED in the setting of vomiting.  She reported pounding heart and apparently had short FL interval on EKG in office.  PCP ordered EEG, ECHO, Holter, and referred her to see her cardiologist and GI specialist.      Patient just had a routine EEG on 1/27/25 which was suggestive of posterior quadrant epileptogenicity.    Patient reports she has not had any events concerning for seizure.  While in the ED on 2/8 in the midst of abdominal pain and multiple episodes of vomiting and diarrhea, she passed out.  This was not mentioned in the ED note.  She said she was having cramping of her leg at the time as well.  She says her PCP ordered the EEG due to this, as well as all the other issues she is having with her heart.  She is getting a thumping in her chest at times.  Feels like someone punches her in the heart.  She is also concerned that  her FMLA was filled out inappropriately by our office and did not submit this to her employer.  It is for intermittent FMLA and says she can be absent in case of breakthrough seizures or migraines.  She says this implies she is having breakthrough seizures and is worried she will lose her job.  She says her job is aware she has epilepsy.  Per patient, PCP also thought this meant she was having breakthrough seizures and another reason why EEG was ordered.  I reviewed in her chart that FMLA paperwork was filled out with identical statements in 2022 by our office (she thought prior paperwork never had that verbiage).      Migraines were worse in October and have been better as of late.  She is taking her topiramate and using her Nurtec at onset of migraine.    She sometimes still has episodes where she feels really hot and irritable.  Can be situational with stress.  Reports complete hysterectomy in the past.  Unsure if any blood sugar issues, but admits eating is difficult with gastroparesis.       Current AEDs:  Topiramate 200 mg bid  Medication side effects: None  Medication adherence: Yes     Event/Seizure semiology:  Episodes of heat behind neck and ringing in left ear. A minute to two later she would pass out and then have apparent convulsion. They were happening a few times per month. Occurred from 1994 until 1999 when she started topiramate.      Special Features  Status epilepticus: no  Self Injury Seizures: no  Precipitating Factors: none     History Reviewed:   The following were reviewed and updated as appropriate: allergies, current medications, past family history, past medical history, past social history, past surgical history and problem list     There was likely nephrolithiasis in 2/2019. Previously we discussed risk of nephrolithiasis related to topiramate.  She has had excellent seizure control on topiramate and it has also helped her migraine headaches.  She understands the risk of additional  kidney stones and has wished to continue on topiramate unchanged.       Psychiatric History:   Past traumatic events. Did EMDR in the past. Was on SSRI in the distant past.      Social:   for [employer]     Review of Systems   Constitutional: Negative.  Negative for chills and fever.   HENT: Negative.  Negative for ear pain and sore throat.    Eyes: Negative.  Negative for pain and visual disturbance.   Respiratory: Negative.  Negative for cough and shortness of breath.    Cardiovascular: Negative.  Negative for chest pain and palpitations.   Gastrointestinal: Negative.  Negative for abdominal pain and vomiting.   Endocrine: Negative.    Genitourinary: Negative.  Negative for dysuria and hematuria.   Musculoskeletal: Negative.  Negative for arthralgias and back pain.   Skin: Negative.  Negative for color change and rash.   Allergic/Immunologic: Negative.    Neurological:  Positive for syncope and weakness (cramping in feet). Negative for seizures.   Hematological:  Bruises/bleeds easily.   Psychiatric/Behavioral:  Positive for confusion (increase in word search/thought finding). The patient is nervous/anxious.    All other systems reviewed and are negative.   I have personally reviewed the MA's review of systems and made changes as necessary.    Current Outpatient Medications on File Prior to Visit   Medication Sig Dispense Refill    albuterol (PROVENTIL HFA,VENTOLIN HFA) 90 mcg/act inhaler INHALE 2 PUFFS EVERY 6 HOURS AS NEEDED FOR WHEEZING 6.7 g 0    ECHINACEA PO Take by mouth daily      ELDERBERRY PO Take 1 tablet by mouth daily as needed Daily every morning      estradiol (ESTRACE VAGINAL) 0.1 mg/g vaginal cream Apply pea sized amount to vaginal opening and 1g intravaginally three times weekly. 42.5 g 2    famotidine (PEPCID) 20 mg tablet TAKE ONE TABLET BY MOUTH TWICE DAILY 180 tablet 1    fluticasone (FLONASE) 50 mcg/act nasal spray SPRAY 1 SPRAY INTO EACH NOSTRIL EVERY DAY 24 mL 3    ibuprofen  "(MOTRIN) 600 mg tablet Take 1 tablet (600 mg total) by mouth every 8 (eight) hours as needed for mild pain or moderate pain 90 tablet 1    Lidocaine 4 % PTCH Apply 1 patch topically in the morning 30 patch 2    methocarbamol (ROBAXIN) 500 mg tablet Take 1 tablet (500 mg total) by mouth every 8 (eight) hours as needed for muscle spasms 90 tablet 1    ondansetron (ZOFRAN) 4 mg tablet Take 1 tablet (4 mg total) by mouth every 8 (eight) hours as needed for nausea or vomiting 60 tablet 3    ondansetron (ZOFRAN-ODT) 4 mg disintegrating tablet Take 1 tablet (4 mg total) by mouth every 8 (eight) hours as needed for nausea or vomiting 60 tablet 3    scopolamine (TRANSDERM-SCOP) 1 mg/3 days TD 72 hr patch Place 1 patch on the skin over 72 hours every third day 10 patch 0    Synthroid 75 MCG tablet Take 75 mcg by mouth daily Except sundays      topiramate (TOPAMAX) 200 MG tablet TAKE 1 TABLET BY MOUTH TWICE A  tablet 3    Vitamin D, Cholecalciferol, 25 MCG (1000 UT) CAPS Take 1 tablet by mouth in the morning      Zinc 50 MG CAPS Take 1 capsule by mouth daily      [DISCONTINUED] rimegepant sulfate (Nurtec) 75 mg TBDP Take one NURTEC 75 mg at onset under tongue. Limit 1 in 24 hours. 8 a month. 8 tablet 3    Ascorbic Acid (Vitamin C) 500 MG CAPS Take 1 each by mouth daily (Patient not taking: Reported on 8/2/2024)      ondansetron (ZOFRAN) 4 mg tablet Take 1 tablet (4 mg total) by mouth every 8 (eight) hours as needed for nausea or vomiting (Patient not taking: Reported on 8/2/2024) 30 tablet 3    pantoprazole (PROTONIX) 40 mg tablet Take 1 tablet (40 mg total) by mouth daily (Patient not taking: Reported on 2/11/2025) 30 tablet 3     No current facility-administered medications on file prior to visit.         Objective   /80 (BP Location: Left arm, Patient Position: Sitting, Cuff Size: Adult)   Pulse 72   Temp 98.2 °F (36.8 °C) (Temporal)   Resp 18   Ht 5' 2\" (1.575 m)   Wt 51.8 kg (114 lb 3.2 oz)   SpO2 98%  "  BMI 20.89 kg/m²     Physical Exam  Constitutional:       Appearance: Normal appearance.   Eyes:      Extraocular Movements: EOM normal.      Pupils: Pupils are equal, round, and reactive to light.   Neurological:      Mental Status: She is alert.      Motor: Motor strength is normal.  Psychiatric:         Mood and Affect: Mood normal.         Speech: Speech normal.         Behavior: Behavior normal.       Neurological Exam  Mental Status  Alert. Oriented to person, place, time and situation. Recent and remote memory are intact. Speech is normal. Language is fluent with no aphasia. Attention and concentration are normal.    Cranial Nerves  CN II: Visual fields full to confrontation.  CN III, IV, VI: Extraocular movements intact bilaterally. Pupils equal round and reactive to light bilaterally.  CN V: Facial sensation is normal.  CN VII: Full and symmetric facial movement.  CN VIII: Hearing is normal.  CN IX, X: Palate elevates symmetrically  CN XI: Shoulder shrug strength is normal.  CN XII: Tongue midline without atrophy or fasciculations.    Motor   Normal muscle tone. Strength is 5/5 throughout all four extremities.    Sensory  Light touch is normal in upper and lower extremities.     Coordination  Right: Finger-to-nose normal.Left: Finger-to-nose normal.    Gait  Casual gait is normal including stance, stride, and arm swing.

## 2025-02-24 NOTE — TELEPHONE ENCOUNTER
Patient returned call and Same Day OV scheduled with Dr. Naidu and Klaudia Boland MD for 3 PM this afternoon to discuss CT abdomen results. Patient is also post neuro OV and wants to discuss with PCP.

## 2025-02-24 NOTE — PATIENT INSTRUCTIONS
Continue to follow with cardiology for abnormal EKG and GI for gastroparesis  Ok to cancel upcoming EEG  Let us know if there are any events concerning for seizure  Continue topiramate and Nurtec the same.  Call if any increase in migraines  Follow up in August with Dr. Rutherford or sooner if needed

## 2025-02-24 NOTE — ASSESSMENT & PLAN NOTE
As above, episode on 2/8/25 occurred in the ED in [place] in the setting of multiple episodes of vomiting and diarrhea with abdominal pain, associated with gastroparesis flare.  Likely vasovagal. No other episodes of LOC have occurred.

## 2025-02-24 NOTE — PROGRESS NOTES
Name: Cinthya Howard      : 1970      MRN: [id number]  Encounter Provider: Klaudia Boland MD  Encounter Date: 2025   Encounter department: [facility]  :  Assessment & Plan  Abnormal CT scan  History of gastroparesis, seizure,and hypoparathyroidism  Recently had a vasovagal episode in the setting of vomiting and diarrhea.  Reported feeling hot, sweaty and lightheaded occasionally  Underwent a CAT scan of the abdomen/pelvis which showed 1.3 x 1.0 cm fluid density focus in the left mid abdomen nonspecific   Currently no abdominal pain or vomiting ;however reports constant nausea from foul-smelling flatulence  Also reports decrease in appetite due to fear of recurrent gastroparesis flare.  Endorsed  consumption  of 1 cup of  yogurt before appointment today.  Has seen nutritionist in the past  but endorse no improvement of symptoms  Follows  with gastroenterologist outpatient and currently on Pepcid  Discussed with patient about importance of balanced meals.  Discussed with patient that she may benefit from revisiting a nutritionist if symptoms are not improving  Has upcoming appointment with gastroenterologist.  Suspect fluid density seen on CAT scan may be related to recent inflammatory process with the gastroparesis flare  Unknown etiology for fluid seen on CAT scan however, concern for possible SIBO.  Discussed the possibility of having hydrogen breath test done with her gastroenterologist  Due to recent symptoms, patient required a brief time away from work; however symptoms are improving. Discussed with patient about returning back to work FMLA form modified for return to work  for  rather than March.       Plan:  Follow-up with gastroenterologist for Possible SIBO outpatient  May require nutritional counseling to help with  Encourage increased p.o. intake and fluid  Okay to return to work        Gastroparesis  Patient with history of idiopathic  gastroparesis  Decreased  appetite  due to fear of recurrence of gastroparesis flare.  Discussed with patient possibly seeing a nutritionist but declined at this moment feels that she may have been better off controlling her symptoms    Plan:  Follow-up with gastroenterologist                History of Present Illness   Ms. Howard is a 55-year-old female with past medical history of gastroparesis, migraine, and asthma who presents to the office for a follow-up visit.  Patient had a recent CAT scan of the abdomen pelvis which showed small fluid collection in the left mid abdomen.  Patient also had recent gastroparesis flare that has improved since her ED visit.  She reports foul smelling stomach gas which causes her to be nauseous.  Patient also reports extreme tiredness.  She endorses decrease in appetite due to fear of causing flareup of gastroparesis.  She denies fever, chills, abdominal pain, blood in the stool or urine.      Review of Systems   Constitutional:  Positive for appetite change. Negative for chills and fever.   HENT:  Negative for ear pain and sore throat.    Eyes:  Negative for pain and visual disturbance.   Respiratory:  Negative for cough and shortness of breath.    Cardiovascular:  Negative for chest pain and palpitations.   Gastrointestinal:  Positive for nausea. Negative for abdominal pain and vomiting.   Genitourinary:  Negative for dysuria and hematuria.   Musculoskeletal:  Negative for arthralgias and back pain.   Skin:  Negative for color change and rash.   Neurological:  Positive for light-headedness and headaches. Negative for seizures and syncope.   All other systems reviewed and are negative.      Objective   /80   Pulse 78   Wt 49 kg (108 lb)   BMI 19.75 kg/m²      Physical Exam  Vitals and nursing note reviewed.   Constitutional:       General: She is not in acute distress.     Appearance: Normal appearance. She is well-developed.   HENT:      Head: Normocephalic and  atraumatic.      Mouth/Throat:      Mouth: Mucous membranes are dry.   Eyes:      Conjunctiva/sclera: Conjunctivae normal.   Cardiovascular:      Rate and Rhythm: Normal rate and regular rhythm.      Heart sounds: No murmur heard.  Pulmonary:      Effort: Pulmonary effort is normal. No respiratory distress.      Breath sounds: Normal breath sounds.   Abdominal:      Palpations: Abdomen is soft.      Tenderness: There is no abdominal tenderness.   Musculoskeletal:         General: No swelling.      Cervical back: Neck supple.      Right lower leg: No edema.      Left lower leg: No edema.   Skin:     General: Skin is warm and dry.      Capillary Refill: Capillary refill takes less than 2 seconds.   Neurological:      Mental Status: She is alert and oriented to person, place, and time.   Psychiatric:         Mood and Affect: Mood normal.

## 2025-02-25 NOTE — ASSESSMENT & PLAN NOTE
Patient with history of idiopathic gastroparesis  Decreased  appetite  due to fear of recurrence of gastroparesis flare.  Discussed with patient possibly seeing a nutritionist but declined at this moment feels that she may have been better off controlling her symptoms    Plan:  Follow-up with gastroenterologist

## 2025-02-25 NOTE — TELEPHONE ENCOUNTER
Received call from pt stating she received a notification on her mychart that a sooner appt is available. She however saw that it was already taken. She stated she will wait to see if another sooner appt comes available if possible. No further questions.

## 2025-02-25 NOTE — TELEPHONE ENCOUNTER
Pt calling to ask if Dr. Feng can call her today or tomorrow about CT.  States she met with PCP yesterday and they were to send Dr. Feng a message also.

## 2025-02-26 ENCOUNTER — HOSPITAL ENCOUNTER (OUTPATIENT)
Dept: RADIOLOGY | Age: 55
Discharge: HOME | End: 2025-02-26
Payer: COMMERCIAL

## 2025-02-26 VITALS — WEIGHT: 108 LBS | BODY MASS INDEX: 19.88 KG/M2 | HEIGHT: 62 IN

## 2025-02-26 DIAGNOSIS — R93.89 ABNORMAL CT SCAN: Primary | ICD-10-CM

## 2025-02-26 DIAGNOSIS — Z12.31 ENCOUNTER FOR SCREENING MAMMOGRAM FOR MALIGNANT NEOPLASM OF BREAST: ICD-10-CM

## 2025-02-26 PROCEDURE — 77067 SCR MAMMO BI INCL CAD: CPT

## 2025-02-26 PROCEDURE — 77063 BREAST TOMOSYNTHESIS BI: CPT

## 2025-02-26 NOTE — PROGRESS NOTES
Cardiology Follow Up    Cinthya Howard  1970  [id number]  [facility] [place]  [address]  [facility] [place] [id number] [phone]      Assessment & Plan  Shortened MT interval  EKG NSR with borderline MT interval that is not significant.  This was discussed with Dr VERONICA Tucker.    Pounding heartbeat  48 Hour Holter monitor showed rare ectopy and 2 short runs of atrial tachycardia.  No treatment needed.   Pericardial effusion  TTE done on 2/13/25 showed trivial pericardial effusion along the right atrial free wall.  On review of previous TTE not pericardial effusion seen. I would suggest follow up TTE in 3 moths evaluate pericardial effusion.           Interval History:   Ms Cinthya Gtz presents to our office for a follow up visit.  She was advised by her PCP due to short MT interval.  TTE and 48 Hour Holter monitor ordered.   Cinthya presents to our office for a follow up visit.  She appears upset because of her significant symptoms of gastroparesis.  I have reviewed the results of the TTE and 48 Hour Holter monitor during the office visit.      2/13/25 TTE LVEF 60%, no sig valvular disease, trivial pericardial effusion along the right atrial free wall.      2/13/25 48 Hour Holter monitor showed   IMPRESSION:     The patient was in sinus rhythm throughout the duration of the study.  The average heart rate was 66 bpm.   Rare ectopy including 2 short runs of atrial tachycardia. However, these did not correspond to reported symptoms.  Normal sinus rhythm without ectopy during all symptoms logged.          Medical History   Primary Cardiologist Dr Soria  Chest Discomfort  Gastroparesis  Vaso vagel syncope in the setting of vominitng and diarrhea  Family HX of CAD in brother, mother with HTN and HLD    Patient Active Problem List   Diagnosis    Hypothyroidism    Migraine headache    Partial symptomatic epilepsy with complex partial  seizures, not intractable, without status epilepticus (HCC)    Headache    Cervicalgia    Nephrolithiasis    Lumbar radiculopathy    Left flank pain    Fatigue    Other hyperlipidemia    Vitamin D deficiency    Frequent urination    Hepatitis C virus infection without hepatic coma    Annual physical exam    Cramps of lower extremity    Screening for HIV (human immunodeficiency virus)    Snoring    Metatarsalgia of left foot    Epigastric pain    High risk social situation    Dysuria    Wrist sprain, right, subsequent encounter    Venous insufficiency    Xiphoidalgia    Bronchitis    Early satiety    Nausea    Situational anxiety    Forgetfulness    Dysphagia    Wellness examination    Nausea and vomiting    Suspected sleep apnea    Needle exposure, initial encounter    Anxiety    Gastroparesis    Cellulitis of left lower extremity    Insect bite of left lower leg    Back pain    Numbness and tingling of both lower extremities    COVID-19    Microscopic hematuria    Internal hemorrhoid    Fish allergy    Spinal stenosis of lumbar region    Myofascial pain    Sciatica    Spinal stenosis    Follow-up examination    Congenital urethral stenosis    Vaginal atrophy    Syncope    Abnormal CT scan     Past Medical History:   Diagnosis Date    Arthritis     Asthma     Disease of thyroid gland     Gastroparesis     Hematuria     Hypokalemia 09/09/2022    Hypoparathyroidism (HCC)     Nephrolithiasis 07/22/2019    Osteoporosis     Partial symptomatic epilepsy with complex partial seizures, not intractable, without status epilepticus (HCC) 10/8/2014    Partial symptompatic epilepsy     Seizure disorder (HCC)      Social History     Socioeconomic History    Marital status: Single     Spouse name: Not on file    Number of children: Not on file    Years of education: Not on file    Highest education level: Not on file   Occupational History    Not on file   Tobacco Use    Smoking status: Never    Smokeless tobacco: Never   Vaping  Use    Vaping status: Never Used   Substance and Sexual Activity    Alcohol use: Not Currently    Drug use: Not Currently     Comment: social    Sexual activity: Not Currently   Other Topics Concern    Not on file   Social History Narrative    ** Merged History Encounter **          Social Drivers of Health     Financial Resource Strain: Not on file   Food Insecurity: No Food Insecurity (11/8/2021)    Hunger Vital Sign     Worried About Running Out of Food in the Last Year: Never true     Ran Out of Food in the Last Year: Never true   Transportation Needs: No Transportation Needs (11/8/2021)    PRAPARE - Transportation     Lack of Transportation (Medical): No     Lack of Transportation (Non-Medical): No   Physical Activity: Not on file   Stress: Not on file   Social Connections: Not on file   Intimate Partner Violence: Not At Risk (2/8/2025)    Received from [employer] [facility]     Over the last 12 months how often did your partner physically hurt you?: Never     Over the last 12 months how often did your partner insult you or talk down to you?: Never     Over the last 12 months how often did your partner threaten you with physical harm?: Never     Over the last 12 months how often did your partner scream or curse at you?: Never   Housing Stability: Not on file      Family History   Problem Relation Age of Onset    Alcohol abuse Mother     RUSSELL disease Mother     Boles's esophagus Mother     Migraines Father     Prostate cancer Father     Cancer Father     Alcohol abuse Maternal Uncle     Seizures Paternal Aunt     Lung cancer Paternal Aunt     Prostate cancer Paternal Uncle     Colon cancer Paternal Uncle     Ovarian cancer Neg Hx     Uterine cancer Neg Hx     Breast cancer Neg Hx     Cervical cancer Neg Hx      Past Surgical History:   Procedure Laterality Date    APPENDECTOMY      COLONOSCOPY      EGD      FRACTURE SURGERY Right     Knee    HYSTERECTOMY      OTHER SURGICAL HISTORY      left foot, rt knee,  tonselectomy    SINUS SURGERY      TONSILLECTOMY         Current Outpatient Medications:     albuterol (PROVENTIL HFA,VENTOLIN HFA) 90 mcg/act inhaler, INHALE 2 PUFFS EVERY 6 HOURS AS NEEDED FOR WHEEZING (Patient not taking: Reported on 2/24/2025), Disp: 6.7 g, Rfl: 0    Ascorbic Acid (Vitamin C) 500 MG CAPS, Take 1 each by mouth daily (Patient not taking: Reported on 8/2/2024), Disp: , Rfl:     ECHINACEA PO, Take by mouth daily, Disp: , Rfl:     ELDERBERRY PO, Take 1 tablet by mouth daily as needed Daily every morning, Disp: , Rfl:     estradiol (ESTRACE VAGINAL) 0.1 mg/g vaginal cream, Apply pea sized amount to vaginal opening and 1g intravaginally three times weekly. (Patient not taking: Reported on 2/24/2025), Disp: 42.5 g, Rfl: 2    famotidine (PEPCID) 20 mg tablet, TAKE ONE TABLET BY MOUTH TWICE DAILY, Disp: 180 tablet, Rfl: 1    fluticasone (FLONASE) 50 mcg/act nasal spray, SPRAY 1 SPRAY INTO EACH NOSTRIL EVERY DAY, Disp: 24 mL, Rfl: 3    ibuprofen (MOTRIN) 600 mg tablet, Take 1 tablet (600 mg total) by mouth every 8 (eight) hours as needed for mild pain or moderate pain, Disp: 90 tablet, Rfl: 1    Lidocaine 4 % PTCH, Apply 1 patch topically in the morning, Disp: 30 patch, Rfl: 2    methocarbamol (ROBAXIN) 500 mg tablet, Take 1 tablet (500 mg total) by mouth every 8 (eight) hours as needed for muscle spasms (Patient not taking: Reported on 2/24/2025), Disp: 90 tablet, Rfl: 1    ondansetron (ZOFRAN) 4 mg tablet, Take 1 tablet (4 mg total) by mouth every 8 (eight) hours as needed for nausea or vomiting (Patient not taking: Reported on 8/2/2024), Disp: 30 tablet, Rfl: 3    ondansetron (ZOFRAN) 4 mg tablet, Take 1 tablet (4 mg total) by mouth every 8 (eight) hours as needed for nausea or vomiting, Disp: 60 tablet, Rfl: 3    ondansetron (ZOFRAN-ODT) 4 mg disintegrating tablet, Take 1 tablet (4 mg total) by mouth every 8 (eight) hours as needed for nausea or vomiting, Disp: 60 tablet, Rfl: 3    pantoprazole  (PROTONIX) 40 mg tablet, Take 1 tablet (40 mg total) by mouth daily (Patient not taking: Reported on 2/11/2025), Disp: 30 tablet, Rfl: 3    rimegepant sulfate (Nurtec) 75 mg TBDP, Take one NURTEC 75 mg at onset under tongue. Limit 1 in 24 hours. 8 a month., Disp: 8 tablet, Rfl: 3    scopolamine (TRANSDERM-SCOP) 1 mg/3 days TD 72 hr patch, Place 1 patch on the skin over 72 hours every third day, Disp: 10 patch, Rfl: 0    Synthroid 75 MCG tablet, Take 75 mcg by mouth daily Except sundays, Disp: , Rfl:     topiramate (TOPAMAX) 200 MG tablet, TAKE 1 TABLET BY MOUTH TWICE A DAY, Disp: 180 tablet, Rfl: 3    Vitamin D, Cholecalciferol, 25 MCG (1000 UT) CAPS, Take 1 tablet by mouth in the morning, Disp: , Rfl:     Zinc 50 MG CAPS, Take 1 capsule by mouth daily (Patient not taking: Reported on 2/24/2025), Disp: , Rfl:   Allergies   Allergen Reactions    Morphine Other (See Comments) and Hives    Codeine Itching and Hives    Codeine Hives    Morphine And Codeine     Gabapentin Palpitations    Sulfa Antibiotics Rash       Labs:  Hospital Outpatient Visit on 02/13/2025   Component Date Value    BSA 02/13/2025 1.49     A4C EF 02/13/2025 58     LVIDd 02/13/2025 4.10     LVIDS 02/13/2025 2.80     IVSd 02/13/2025 0.80     LVPWd 02/13/2025 0.70     FS 02/13/2025 32     MV E' Tissue Velocity Se* 02/13/2025 8     MV E' Tissue Velocity La* 02/13/2025 9     LA Volume Index (BP) 02/13/2025 20.8     E/A ratio 02/13/2025 1.00     E wave deceleration time 02/13/2025 237     MV Peak E Ayaan 02/13/2025 54     MV Peak A Ayaan 02/13/2025 0.54     RVID d 02/13/2025 2.8     Tricuspid annular plane * 02/13/2025 1.20     LA size 02/13/2025 2.8     LA length (A2C) 02/13/2025 4.60     LA volume (BP) 02/13/2025 31     RA 2D Volume 02/13/2025 18.0     RAA A4C 02/13/2025 9.3     MV stenosis pressure 1/2* 02/13/2025 69     MV valve area p 1/2 meth* 02/13/2025 3.19     TR Peak Ayaan 02/13/2025 0.1     Triscuspid Valve Regurgi* 02/13/2025 0.0     Ao root  02/13/2025 3.10     Asc Ao 02/13/2025 3     Tricuspid valve peak reg* 02/13/2025 0.08     Left ventricular stroke * 02/13/2025 44.00     IVS 02/13/2025 0.8     LEFT VENTRICLE SYSTOLIC * 02/13/2025 30     LV DIASTOLIC VOLUME (MOD* 02/13/2025 74     Left Atrium Area-systoli* 02/13/2025 11.6     Left Atrium Area-systoli* 02/13/2025 13.5     LVSV, 2D 02/13/2025 44     LV EF 02/13/2025 60    Office Visit on 01/27/2025   Component Date Value    LEUKOCYTE ESTERASE,UA 01/27/2025 -     NITRITE,UA 01/27/2025 -     SL AMB POCT UROBILINOGEN 01/27/2025 0.2     POCT URINE PROTEIN 01/27/2025 -      PH,UA 01/27/2025 8.0     BLOOD,UA 01/27/2025 -     SPECIFIC GRAVITY,UA 01/27/2025 1.010     KETONES,UA 01/27/2025 -     BILIRUBIN,UA 01/27/2025 -     GLUCOSE, UA 01/27/2025 -      COLOR,UA 01/27/2025 yellow     CLARITY,UA 01/27/2025 clear     Color, UA 01/27/2025 Light Yellow     Clarity, UA 01/27/2025 Clear     Specific Gravity, UA 01/27/2025 1.015     pH, UA 01/27/2025 7.5     Leukocytes, UA 01/27/2025 Negative     Nitrite, UA 01/27/2025 Negative     Protein, UA 01/27/2025 Trace (A)     Glucose, UA 01/27/2025 Negative     Ketones, UA 01/27/2025 Negative     Urobilinogen, UA 01/27/2025 <2.0     Bilirubin, UA 01/27/2025 Negative     Occult Blood, UA 01/27/2025 Negative     RBC, UA 01/27/2025 None Seen     WBC, UA 01/27/2025 None Seen     Epithelial Cells 01/27/2025 Occasional     Bacteria, UA 01/27/2025 None Seen     MUCUS THREADS 01/27/2025 Occasional (A)      Imaging: CT abdomen pelvis wo contrast  Result Date: 2/21/2025  Narrative: CT ABDOMEN AND PELVIS WITHOUT IV CONTRAST INDICATION: R10.32: Left lower quadrant pain. COMPARISON: CT abdomen pelvis 10/18/2021 TECHNIQUE: CT examination of the abdomen and pelvis was performed without intravenous contrast. Multiplanar 2D reformatted images were created from the source data. This examination, like all CT scans performed in the [facility], was performed utilizing  techniques to minimize radiation dose exposure, including the use of iterative reconstruction and automated exposure control. Radiation dose length product (DLP) for this visit: 294 mGy-cm Enteric Contrast: Not administered. FINDINGS: ABDOMEN LOWER CHEST: No clinically significant abnormality in the visualized lower chest. LIVER/BILIARY TREE: Unremarkable. GALLBLADDER: No calcified gallstones. No pericholecystic inflammatory change. SPLEEN: Unremarkable. PANCREAS: Unremarkable. ADRENAL GLANDS: Unremarkable. KIDNEYS/URETERS: Unremarkable. No hydronephrosis. STOMACH AND BOWEL: Unremarkable. APPENDIX: No findings to suggest appendicitis. ABDOMINOPELVIC CAVITY: No pneumoperitoneum. 1.3 x 1.0 cm fluid density focus in the left mid abdomen, nonspecific.. VESSELS: Atherosclerosis without abdominal aortic aneurysm. PELVIS REPRODUCTIVE ORGANS: Post hysterectomy. URINARY BLADDER: Unremarkable. ABDOMINAL WALL/INGUINAL REGIONS: Bilateral breast implants. BONES: No acute fracture or suspicious osseous lesion.     Impression: 1.  No hydronephrosis or nephrolithiasis. 2.  1.3 x 1.0 cm fluid density focus in the left mid abdomen, nonspecific. Workstation performed: [id number]     Holter monitor  Result Date: 2/18/2025  Narrative: PATIENT NAME:  Cinthya Howard AGE:  55 y.o.       Sex:  female MRN:  [id number] PROCEDURE: Holter monitor - 48 hour INDICATIONS: heart pounding HOLTER FINDINGS: The patient was monitored for 48 continuous hours.  This revealed the patient to be in sinus rhythm with an average rate of 66 BPM; a minimum rate of 42 BPM; and a maximum rate of 146 BPM. There were a total of 257 ventricular ectopic beats (0.1% of all monitored beats). There were no ventricular runs. There were a total of 93 supraventricular ectopic beats (0% of all monitored beats).  There were rate rate atrial runs (2 total) up to 12-beats.  There was no evidence of atrial fibrillation or atrial flutter.  There was approximately 23 hr 3 min of  bradycardia. There was no evidence of heart block, and no significant pauses were seen.  There was approximately 3 hr 16 min of tachycardia. This was all sinus tachycardia. The symptoms diary was reviewed and the reported events did not correspond with any clinically significant findings.     Impression: The patient was in sinus rhythm throughout the duration of the study. The average heart rate was 66 bpm. Rare ectopy including 2 short runs of atrial tachycardia. However, these did not correspond to reported symptoms. Normal sinus rhythm without ectopy during all symptoms logged. Jose Daniels MD    Echo complete w/ contrast if indicated  Result Date: 2025  Narrative:   Left Ventricle: Left ventricular cavity size is normal. Wall thickness is normal. The left ventricular ejection fraction is 60%. Systolic function is normal. Wall motion is normal. Diastolic function is normal.   Right Ventricle: Right ventricular cavity size is normal. Systolic function is normal.   Pericardium: There is a trivial pericardial effusion along the right atrial free wall. There is no echocardiographic evidence of tamponade.   No significant valvular disease.     EEG Awake and asleep  Result Date: 2025  Narrative: Table formatting from the original result was not included. Images from the original result were not included. Routine EEG Patient Name:  Cinthya Howard  MRN: [id number] :  1970 File #: [id number] Age: 55 y.o. Ordering Provider: Vicente Rutherford MD  Study Start: 25 13:01 Study End: 25 13:33            Study type: Routine EEG ICD 10 diagnosis: Other Epilepsy G40.909 Patient History: Patient is 55 y.o. female with history of seizure disorder for whom EEG was ordered for the evaluation of recent auras. Medications include: Prior to Admission medications  Medication Sig Start Date Authorizing Provider albuterol (PROVENTIL HFA,VENTOLIN HFA) 90 mcg/act inhaler INHALE 2 PUFFS EVERY 6 HOURS AS  NEEDED FOR WHEEZING 8/9/23 Raul Sorenson MD Ascorbic Acid (Vitamin C) 500 MG CAPS Take 1 each by mouth daily Patient not taking: Reported on 8/2/2024  Historical Provider, MD ECHINACEA PO Take by mouth daily  Historical Provider, MD ELDERBERRY PO Take 1 tablet by mouth daily as needed Daily every morning  Historical Provider, MD estradiol (ESTRACE VAGINAL) 0.1 mg/g vaginal cream Apply pea sized amount to vaginal opening and 1g intravaginally three times weekly. 8/2/24 Sujey Cole PA-C famotidine (PEPCID) 20 mg tablet TAKE ONE TABLET BY MOUTH TWICE DAILY 5/17/24 Char Mendoza PA-C fluticasone (FLONASE) 50 mcg/act nasal spray SPRAY 1 SPRAY INTO EACH NOSTRIL EVERY DAY 4/5/23 Tony Naidu DO ibuprofen (MOTRIN) 600 mg tablet Take 1 tablet (600 mg total) by mouth every 8 (eight) hours as needed for mild pain or moderate pain 3/29/24 LENORA Heller Lidocaine 4 % PTCH Apply 1 patch topically in the morning 2/2/24 LENORA Sheth methocarbamol (ROBAXIN) 500 mg tablet Take 1 tablet (500 mg total) by mouth every 8 (eight) hours as needed for muscle spasms 2/23/24 Jules Steiner DO ondansetron (ZOFRAN) 4 mg tablet Take 1 tablet (4 mg total) by mouth every 8 (eight) hours as needed for nausea or vomiting Patient not taking: Reported on 1/27/2025 12/29/22 Abhilash Feng MD ondansetron (ZOFRAN) 4 mg tablet Take 1 tablet (4 mg total) by mouth every 8 (eight) hours as needed for nausea or vomiting Patient not taking: Reported on 8/2/2024 3/8/23 Abhilash Feng MD ondansetron (ZOFRAN-ODT) 4 mg disintegrating tablet Take 1 tablet (4 mg total) by mouth every 6 (six) hours 8/7/24 Marixa Cabello PA-C pantoprazole (PROTONIX) 40 mg tablet Take 1 tablet (40 mg total) by mouth daily 4/23/24 Abhilash Feng MD rimegepant sulfate (Nurtec) 75 mg TBDP Take one NURTEC 75 mg at onset under tongue. Limit 1 in 24 hours. 8 a month. 12/18/24 Vicente Rutherford MD Synthroid 75 MCG tablet Take 75 mcg by mouth daily  Except sundays 7/29/22 Historical Provider, MD topiramate (TOPAMAX) 200 MG tablet TAKE 1 TABLET BY MOUTH TWICE A DAY 12/18/24 Vicente Rutherford MD Vitamin D, Cholecalciferol, 25 MCG (1000 UT) CAPS Take 1 tablet by mouth in the morning  Historical Provider, MD Zinc 50 MG CAPS Take 1 capsule by mouth daily  Historical Provider, MD  Description of Procedure: 32 channel digital recording with electrodes placed according to the International 10-20 system with additional T1/T2 electrodes, EOG, EKG, and simultaneous video. The recording was technically satisfactory. Terminology Key Abnormal I - mild encephalopathy Abnormal II- moderate encephalopathy or mild/moderate focal cerebral dysfunction Abnormal III- severe encephalopathy, epileptiform activity, or severe focal cerebral dysfunction EEG Findings: During wakefulness, the background is symmetric and continuous.  The predominant activity is anterior beta and posterior alpha. There is a 10 Hz posterior dominant rhythm that attenuates with eye opening. Drowsiness but no stage II sleep was captured. Abnormal findings: Spikes, Posterior Distribution Details: O1>O2, with broad field Comments: Pancho followed by slow wave is seen once during record. EEG Finding Bifrontal Comments: Rhythmic delta (FIRDA) is seen during hyperventilation, with low-amplitude sharp transients preceding some delta waves, causing a notched appearance. This could be a normal finding. Activating Procedures: 3-4 minutes of hyperventilation was performed. Intermittent generalized delta slowing was observed on EEG. Photic stimulation was performed. Photic driving was observed. Other findings: The single lead ECG shows a regular rate and rhythm around 60bpm. EEG Impression: Abnormal III State of Consciousness: Awake and Drowsy - Pancho and wave, Posterior - EEG Finding, Bifrontal - PDR 10Hz Interpretation: This routine awake and drowsy EEG is suggestive of posterior quadrant epileptogenicity. No  seizures or lateralizing signs were recorded. June Smith MD Attending Epileptologist/ Neurologist [facility]       Review of Systems:  Review of Systems   Constitutional:  Positive for fatigue.   Gastrointestinal:  Positive for diarrhea, nausea and vomiting.   All other systems reviewed and are negative.      Physical Exam:  Physical Exam  Vitals reviewed.   Constitutional:       Appearance: Normal appearance.   HENT:      Head: Normocephalic.   Cardiovascular:      Rate and Rhythm: Normal rate and regular rhythm.      Pulses: Normal pulses.      Heart sounds: Normal heart sounds.   Pulmonary:      Effort: Pulmonary effort is normal.      Breath sounds: Normal breath sounds.   Musculoskeletal:         General: Normal range of motion.      Right lower leg: No edema.      Left lower leg: No edema.   Skin:     General: Skin is warm and dry.      Capillary Refill: Capillary refill takes less than 2 seconds.   Neurological:      General: No focal deficit present.      Mental Status: She is alert and oriented to person, place, and time.   Psychiatric:         Mood and Affect: Mood normal.         Behavior: Behavior normal.

## 2025-02-27 ENCOUNTER — TELEPHONE (OUTPATIENT)
Dept: CARDIOLOGY CLINIC | Facility: CLINIC | Age: 55
End: 2025-02-27

## 2025-02-27 ENCOUNTER — OFFICE VISIT (OUTPATIENT)
Dept: CARDIOLOGY CLINIC | Facility: CLINIC | Age: 55
End: 2025-02-27
Payer: COMMERCIAL

## 2025-02-27 VITALS
DIASTOLIC BLOOD PRESSURE: 86 MMHG | SYSTOLIC BLOOD PRESSURE: 128 MMHG | HEART RATE: 72 BPM | HEIGHT: 62 IN | BODY MASS INDEX: 20.44 KG/M2 | OXYGEN SATURATION: 98 % | WEIGHT: 111.1 LBS

## 2025-02-27 DIAGNOSIS — I31.39 PERICARDIAL EFFUSION: Primary | ICD-10-CM

## 2025-02-27 DIAGNOSIS — R00.2 POUNDING HEARTBEAT: ICD-10-CM

## 2025-02-27 DIAGNOSIS — R94.31 SHORTENED PR INTERVAL: ICD-10-CM

## 2025-02-27 DIAGNOSIS — K59.09 CHRONIC CONSTIPATION: Primary | ICD-10-CM

## 2025-02-27 PROCEDURE — 93000 ELECTROCARDIOGRAM COMPLETE: CPT | Performed by: NURSE PRACTITIONER

## 2025-02-27 PROCEDURE — 99214 OFFICE O/P EST MOD 30 MIN: CPT | Performed by: NURSE PRACTITIONER

## 2025-02-27 RX ORDER — LINACLOTIDE 145 UG/1
145 CAPSULE, GELATIN COATED ORAL DAILY
Qty: 30 CAPSULE | Refills: 3 | Status: SHIPPED | OUTPATIENT
Start: 2025-02-27

## 2025-02-27 NOTE — TELEPHONE ENCOUNTER
Patient calling back to confirm that she does not need to follow up with Dr. Villavicencio as her appointment with him was cancelled. Please review and advise.

## 2025-02-27 NOTE — TELEPHONE ENCOUNTER
I called the patient regarding the limited echo to be done in 3 months. Central scheduling number provided to patient. She will call to schedule.

## 2025-02-27 NOTE — TELEPHONE ENCOUNTER
----- Message from LENORA Davis sent at 2/27/2025 12:25 PM EST -----  Regarding: limited 2 D Echocaridogram  Panfilo Landrum and you please call Cinthya and tell her I want her to undergo a limited TTE in 3 months to evaluate the pericaridal effusion, make sure is has resolved or is no worse.  Thank you Cass

## 2025-03-04 ENCOUNTER — RESULTS FOLLOW-UP (OUTPATIENT)
Dept: OBGYN CLINIC | Facility: CLINIC | Age: 55
End: 2025-03-04

## 2025-03-10 ENCOUNTER — RA CDI HCC (OUTPATIENT)
Dept: OTHER | Facility: HOSPITAL | Age: 55
End: 2025-03-10

## 2025-03-14 ENCOUNTER — OFFICE VISIT (OUTPATIENT)
Dept: INTERNAL MEDICINE CLINIC | Facility: CLINIC | Age: 55
End: 2025-03-14
Payer: COMMERCIAL

## 2025-03-14 VITALS
DIASTOLIC BLOOD PRESSURE: 77 MMHG | HEART RATE: 66 BPM | SYSTOLIC BLOOD PRESSURE: 122 MMHG | WEIGHT: 110 LBS | BODY MASS INDEX: 20.12 KG/M2 | OXYGEN SATURATION: 98 %

## 2025-03-14 DIAGNOSIS — R55 SYNCOPE, UNSPECIFIED SYNCOPE TYPE: ICD-10-CM

## 2025-03-14 DIAGNOSIS — M19.90 ARTHRITIS: Primary | ICD-10-CM

## 2025-03-14 DIAGNOSIS — G40.209 PARTIAL SYMPTOMATIC EPILEPSY WITH COMPLEX PARTIAL SEIZURES, NOT INTRACTABLE, WITHOUT STATUS EPILEPTICUS (HCC): ICD-10-CM

## 2025-03-14 DIAGNOSIS — R93.89 ABNORMAL CAT SCAN: ICD-10-CM

## 2025-03-14 DIAGNOSIS — K31.84 GASTROPARESIS: ICD-10-CM

## 2025-03-14 PROCEDURE — 99214 OFFICE O/P EST MOD 30 MIN: CPT | Performed by: INTERNAL MEDICINE

## 2025-03-14 NOTE — PROGRESS NOTES
Name: Cinthya Howard      : 1970      MRN: [id number]  Encounter Provider: Tony Naidu DO  Encounter Date: 3/14/2025   Enc[facility] department: [facility]  :  Assessment & Plan  Arthritis  Patient does describe arthritis/arthralgias of the interphalangeal joints of the left hand rule out rheumatoid arthritis versus osteoarthritis does have significant morning stiffness will check sed rate, C-reactive protein, CCP antibody, MAMADOU and x-ray left hand  Orders:  •  Sedimentation rate, automated; Future  •  C-reactive protein; Future  •  Cyclic citrul peptide antibody, IgG; Future  •  MAMADOU Screen w/Reflex Cascade; Future  •  XR hand 3+ vw left; Future    Gastroparesis  Doing much better since going back on the gastroparesis diet she will continue usual follow-up with GI Dr. Feng will be working with complementary        Partial symptomatic epilepsy with complex partial seizures, not intractable, without status epilepticus (HCC)  Per neurology currently on Topamax 200 mg twice daily       Syncope, unspecified syncope type  Likely vasovagal secondary to GP EKG did show short ID cardiology did see patient felt normal variant not significant minimal pericardial effusion repeating echo no further symptoms patient has been able to return to work       Abnormal CAT scan  Abnormal CAT scan findings showing a 1.3 x 1.0 cm fluid density focus in the left mid abdomen nonspecific currently patient is asymptomatic patient will be seeing GI repeat CAT scan for resolution and surveillance monitoring ordered in 3 months     RTO in 4 months call if any problems    History of gastroparesis, seizure,and hypoparathyroidism  Recently had a vasovagal episode in the setting of vomiting and diarrhea.  Reported feeling hot, sweaty and lightheaded occasionally  Underwent a CAT scan of the abdomen/pelvis which showed 1.3 x 1.0 cm fluid density focus in the left mid abdomen nonspecific   Currently no  abdominal pain or vomiting ;however reports constant nausea from foul-smelling flatulence  Also reports decrease in appetite due to fear of recurrent gastroparesis flare.  Endorsed  consumption  of 1 cup of  yogurt before appointment today.  Has seen nutritionist in the past  but endorse no improvement of symptoms  Reports cramps in the feet  Follows  with gastroenterologist outpatient and currently on Pepcid  Discussed with patient about importance of balanced meals.  Discussed with patient that she may benefit from revisiting a nutritionist if symptoms are not improving     History of Present Illness   HPI 55-year old female coming in for a follow up office visit regarding arthritis left hand, gastroparesis, partial complex seizure, syncopal episode and abnormal CAT scan abdomen; the patient reports me compliant taking medications without untoward side effects the.  The patient is here to review his medical condition, update me on the medical condition and the patient reports me no hospitalizations and no ER visits.  No further syncopal episodes no seizure gastroparesis doing better she is going back on the gastroparesis diet which has been very helpful.  She has been able to return back to work.  We did review the CAT scan in detail showing a 1 cm fluid density; nonspecific no abdominal pain no further  sx sticking to the diet eliminated peas , green beens, potatoes, hamburger , chiken going to see Dr Row[facility] left hand stiffness and difficulty with bending hours morning stiffness  Review of Systems   Constitutional:  Negative for activity change, appetite change and unexpected weight change.   HENT:  Negative for congestion and postnasal drip.    Eyes:  Negative for visual disturbance.   Respiratory:  Negative for cough and shortness of breath.    Cardiovascular:  Negative for chest pain.   Gastrointestinal:  Negative for abdominal pain, diarrhea, nausea and vomiting.   Neurological:  Negative for  dizziness, light-headedness and headaches.   Hematological:  Negative for adenopathy.   Arthralgia left hand morning stiffness    Objective   /77   Pulse 66   Wt 49.9 kg (110 lb)   SpO2 98%   BMI 20.12 kg/m²   Full range of motion of the fingers no synovitis left hand Limited flexion  Physical Exam  Constitutional:       Appearance: She is well-developed.   HENT:      Head: Normocephalic and atraumatic.      Right Ear: External ear normal.      Left Ear: External ear normal.      Nose: Nose normal.   Eyes:      Pupils: Pupils are equal, round, and reactive to light.   Cardiovascular:      Rate and Rhythm: Normal rate and regular rhythm.      Heart sounds: Normal heart sounds. No murmur heard.  Pulmonary:      Effort: Pulmonary effort is normal.      Breath sounds: Normal breath sounds.   Abdominal:      General: There is no distension.      Palpations: Abdomen is soft.      Tenderness: There is no abdominal tenderness. There is no guarding.       Administrative Statements   I have spent a total time of 30 minutes in caring for this patient on the day of the visit/encounter including Diagnostic results, Instructions for management, Impressions, Counseling / Coordination of care, Documenting in the medical record, Reviewing/placing orders in the medical record (including tests, medications, and/or procedures), and Obtaining or reviewing history  .

## 2025-03-15 ENCOUNTER — APPOINTMENT (OUTPATIENT)
Dept: LAB | Age: 55
End: 2025-03-15
Payer: COMMERCIAL

## 2025-03-15 DIAGNOSIS — Z00.00 HEALTHCARE MAINTENANCE: ICD-10-CM

## 2025-03-15 DIAGNOSIS — R89.9 ABNORMAL LABORATORY TEST: ICD-10-CM

## 2025-03-15 DIAGNOSIS — M19.90 ARTHRITIS: ICD-10-CM

## 2025-03-15 LAB
ABO GROUP BLD: NORMAL
AST SERPL W P-5'-P-CCNC: 15 U/L (ref 13–39)
CRP SERPL QL: <1 MG/L
ERYTHROCYTE [SEDIMENTATION RATE] IN BLOOD: 4 MM/HOUR (ref 0–29)
RH BLD: POSITIVE

## 2025-03-15 PROCEDURE — 86140 C-REACTIVE PROTEIN: CPT

## 2025-03-15 PROCEDURE — 86900 BLOOD TYPING SEROLOGIC ABO: CPT

## 2025-03-15 PROCEDURE — 86038 ANTINUCLEAR ANTIBODIES: CPT

## 2025-03-15 PROCEDURE — 84450 TRANSFERASE (AST) (SGOT): CPT

## 2025-03-15 PROCEDURE — 85652 RBC SED RATE AUTOMATED: CPT

## 2025-03-15 PROCEDURE — 86901 BLOOD TYPING SEROLOGIC RH(D): CPT

## 2025-03-15 PROCEDURE — 86225 DNA ANTIBODY NATIVE: CPT

## 2025-03-15 PROCEDURE — 86200 CCP ANTIBODY: CPT

## 2025-03-15 PROCEDURE — 36415 COLL VENOUS BLD VENIPUNCTURE: CPT

## 2025-03-16 NOTE — ASSESSMENT & PLAN NOTE
Abnormal CAT scan findings showing a 1.3 x 1.0 cm fluid density focus in the left mid abdomen nonspecific currently patient is asymptomatic patient will be seeing GI repeat CAT scan for resolution and surveillance monitoring ordered in 3 months     RTO in 4 months call if any problems

## 2025-03-16 NOTE — ASSESSMENT & PLAN NOTE
Doing much better since going back on the gastroparesis diet she will continue usual follow-up with GI Dr. Feng will be working with complementary

## 2025-03-16 NOTE — ASSESSMENT & PLAN NOTE
Likely vasovagal secondary to GP EKG did show short MA cardiology did see patient felt normal variant not significant minimal pericardial effusion repeating echo no further symptoms patient has been able to return to work

## 2025-03-18 ENCOUNTER — TELEPHONE (OUTPATIENT)
Dept: INTERNAL MEDICINE CLINIC | Facility: CLINIC | Age: 55
End: 2025-03-18

## 2025-03-18 NOTE — TELEPHONE ENCOUNTER
LM to Cb to reschedule 8/4 appointment due to Dr not being in I do see availability the following week 8/11 @ 10:30

## 2025-03-20 LAB
CCP AB SER IA-ACNC: 1 (ref ?–10)
DSDNA IGG SERPL IA-ACNC: 2.1 IU/ML (ref ?–15)
NUCLEAR IGG SER IA-RTO: 0.2 RATIO (ref ?–1)

## 2025-03-31 DIAGNOSIS — M54.16 LUMBAR RADICULOPATHY: ICD-10-CM

## 2025-04-17 DIAGNOSIS — R11.2 NAUSEA AND VOMITING, UNSPECIFIED VOMITING TYPE: ICD-10-CM

## 2025-04-17 DIAGNOSIS — K31.84 GASTROPARESIS: ICD-10-CM

## 2025-04-17 RX ORDER — ONDANSETRON 4 MG/1
4 TABLET, ORALLY DISINTEGRATING ORAL EVERY 8 HOURS PRN
Status: CANCELLED | OUTPATIENT
Start: 2025-04-17

## 2025-04-17 RX ORDER — ONDANSETRON 4 MG/1
4 TABLET, ORALLY DISINTEGRATING ORAL EVERY 8 HOURS PRN
Qty: 20 TABLET | Refills: 0 | Status: SHIPPED | OUTPATIENT
Start: 2025-04-17

## 2025-05-12 ENCOUNTER — HOSPITAL ENCOUNTER (OUTPATIENT)
Dept: NON INVASIVE DIAGNOSTICS | Facility: CLINIC | Age: 55
Discharge: HOME | End: 2025-05-12
Attending: NURSE PRACTITIONER
Payer: COMMERCIAL

## 2025-05-12 VITALS
WEIGHT: 110.01 LBS | HEIGHT: 62 IN | DIASTOLIC BLOOD PRESSURE: 77 MMHG | SYSTOLIC BLOOD PRESSURE: 122 MMHG | HEART RATE: 66 BPM | BODY MASS INDEX: 20.24 KG/M2

## 2025-05-12 DIAGNOSIS — I31.39 PERICARDIAL EFFUSION: ICD-10-CM

## 2025-05-12 LAB
AORTIC ROOT: 2.8 CM
ASCENDING AORTA: 3.1 CM
BSA FOR ECHO PROCEDURE: 1.48 M2
FRACTIONAL SHORTENING: 39 (ref 28–44)
INTERVENTRICULAR SEPTUM IN DIASTOLE (PARASTERNAL SHORT AXIS VIEW): 0.9 CM
INTERVENTRICULAR SEPTUM: 0.9 CM (ref 0.6–1.1)
LEFT ATRIUM SIZE: 2.8 CM
LEFT INTERNAL DIMENSION IN SYSTOLE: 2.5 CM (ref 2.1–4)
LEFT VENTRICULAR INTERNAL DIMENSION IN DIASTOLE: 4.1 CM (ref 3.5–6)
LEFT VENTRICULAR POSTERIOR WALL IN END DIASTOLE: 0.7 CM
LEFT VENTRICULAR STROKE VOLUME: 52 ML
LV EF US.2D.A4C+ESTIMATED: 73 %
LVSV (TEICH): 52 ML
SL CV LV EF: 55
SL CV PED ECHO LEFT VENTRICLE DIASTOLIC VOLUME (MOD BIPLANE) 2D: 75 ML
SL CV PED ECHO LEFT VENTRICLE SYSTOLIC VOLUME (MOD BIPLANE) 2D: 23 ML

## 2025-05-12 PROCEDURE — 93325 DOPPLER ECHO COLOR FLOW MAPG: CPT | Performed by: INTERNAL MEDICINE

## 2025-05-12 PROCEDURE — 93321 DOPPLER ECHO F-UP/LMTD STD: CPT | Performed by: INTERNAL MEDICINE

## 2025-05-12 PROCEDURE — 93321 DOPPLER ECHO F-UP/LMTD STD: CPT

## 2025-05-12 PROCEDURE — 93308 TTE F-UP OR LMTD: CPT | Performed by: INTERNAL MEDICINE

## 2025-05-12 PROCEDURE — 93325 DOPPLER ECHO COLOR FLOW MAPG: CPT

## 2025-05-12 PROCEDURE — 93308 TTE F-UP OR LMTD: CPT

## 2025-05-13 ENCOUNTER — RESULTS FOLLOW-UP (OUTPATIENT)
Dept: CARDIOLOGY CLINIC | Facility: CLINIC | Age: 55
End: 2025-05-13

## 2025-05-19 ENCOUNTER — OFFICE VISIT (OUTPATIENT)
Dept: INTERNAL MEDICINE CLINIC | Facility: CLINIC | Age: 55
End: 2025-05-19
Payer: COMMERCIAL

## 2025-05-19 ENCOUNTER — TELEPHONE (OUTPATIENT)
Age: 55
End: 2025-05-19

## 2025-05-19 VITALS
DIASTOLIC BLOOD PRESSURE: 88 MMHG | OXYGEN SATURATION: 97 % | SYSTOLIC BLOOD PRESSURE: 130 MMHG | HEART RATE: 92 BPM | WEIGHT: 113.8 LBS | HEIGHT: 62 IN | BODY MASS INDEX: 20.94 KG/M2

## 2025-05-19 DIAGNOSIS — J02.9 SORE THROAT: Primary | ICD-10-CM

## 2025-05-19 LAB
S PYO AG THROAT QL: NEGATIVE
SARS-COV-2 AG UPPER RESP QL IA: NEGATIVE
SL AMB POCT RAPID FLU A: NORMAL
SL AMB POCT RAPID FLU B: NORMAL
VALID CONTROL: NORMAL

## 2025-05-19 PROCEDURE — 87811 SARS-COV-2 COVID19 W/OPTIC: CPT

## 2025-05-19 PROCEDURE — 99213 OFFICE O/P EST LOW 20 MIN: CPT

## 2025-05-19 PROCEDURE — 87804 INFLUENZA ASSAY W/OPTIC: CPT

## 2025-05-19 PROCEDURE — 87880 STREP A ASSAY W/OPTIC: CPT

## 2025-05-19 NOTE — PROGRESS NOTES
Name: Cinthya Howard      : 1970      MRN: [id number]  Encounter Provider: Chalo Linares MD  Encounter Date: 2025   Enc[facility] department: [facility]  :  Assessment & Plan  Sore throat  Patient presents to the clinic due to 2 days of ongoing sore throat, chills, sinus pressure and mild headache.  She reports working as a  with frequent travels.  Otherwise denies having any chest pain, shortness of breath, fevers, nausea/vomiting, abdominal pain, diarrhea/constipation.  Will check for flu/COVID/strep  Continue supportive care  Recommend using Flonase/nasal irrigation  Antipyretics as needed  Antihistamines as needed patient also has a history of seasonal allergies  Will monitor off antibiotics likely viral URI  Advised the patient to reach back if symptoms worsen, fever, spit phlegm production                History of Present Illness   HPI    Patient presents to the clinic due to 2 days of ongoing URI like symptoms      Review of Systems   Constitutional:  Positive for chills. Negative for fever.   HENT:  Positive for rhinorrhea, sinus pressure, sinus pain and sore throat. Negative for ear pain.    Eyes:  Negative for pain and visual disturbance.   Respiratory:  Negative for cough and shortness of breath.    Cardiovascular:  Negative for chest pain and palpitations.   Gastrointestinal:  Negative for abdominal pain and vomiting.   Genitourinary:  Negative for dysuria and hematuria.   Musculoskeletal:  Negative for arthralgias and back pain.   Skin:  Negative for color change and rash.   Neurological:  Negative for seizures and syncope.   All other systems reviewed and are negative.      Objective   There were no vitals taken for this visit.     Physical Exam  Vitals and nursing note reviewed.   Constitutional:       General: She is not in acute distress.     Appearance: She is well-developed.   HENT:      Head: Normocephalic and atraumatic.      Nose: Congestion and  rhinorrhea present.      Mouth/Throat:      Pharynx: No posterior oropharyngeal erythema.     Eyes:      Conjunctiva/sclera: Conjunctivae normal.       Cardiovascular:      Rate and Rhythm: Normal rate and regular rhythm.      Heart sounds: No murmur heard.  Pulmonary:      Effort: Pulmonary effort is normal. No respiratory distress.      Breath sounds: Normal breath sounds.   Abdominal:      Palpations: Abdomen is soft.      Tenderness: There is no abdominal tenderness.     Musculoskeletal:         General: No swelling.      Cervical back: Neck supple.     Skin:     General: Skin is warm and dry.     Neurological:      Mental Status: She is alert.     Psychiatric:         Mood and Affect: Mood normal.

## 2025-05-19 NOTE — TELEPHONE ENCOUNTER
Patient calling stating that she is not feeling good and is requesting an antibiotic to be sent to local pharmacy. No appointment avail today. Tomorrow she is unavail for appointment however she is avail for the 21. Please ask if one of the doctors could do a virtual today maybe.  Karuna Multani

## 2025-05-19 NOTE — ASSESSMENT & PLAN NOTE
Patient presents to the clinic due to 2 days of ongoing sore throat, chills, sinus pressure and mild headache.  She reports working as a  with frequent travels.  Otherwise denies having any chest pain, shortness of breath, fevers, nausea/vomiting, abdominal pain, diarrhea/constipation.  Will check for flu/COVID/strep  Continue supportive care  Recommend using Flonase/nasal irrigation  Antipyretics as needed  Antihistamines as needed patient also has a history of seasonal allergies  Will monitor off antibiotics likely viral URI  Advised the patient to reach back if symptoms worsen, fever, spit phlegm production

## 2025-05-30 RX ORDER — IBUPROFEN 600 MG/1
600 TABLET, FILM COATED ORAL EVERY 8 HOURS PRN
Qty: 90 TABLET | Refills: 0 | OUTPATIENT
Start: 2025-05-30

## 2025-07-08 ENCOUNTER — TELEPHONE (OUTPATIENT)
Dept: NEUROLOGY | Facility: CLINIC | Age: 55
End: 2025-07-08

## 2025-07-08 NOTE — TELEPHONE ENCOUNTER
Attempted to call pt to move 8/1 appt with Dr. Rutherford to anytime in the afternoon. Pt didn't answer the phone, will try to call again later.

## 2025-07-09 NOTE — TELEPHONE ENCOUNTER
Attempted to call pt to move appt with Dr. Rutherford to anytime in the afternoon. Pt didn't answer the phone and there was no option to leave a voicemail.

## 2025-07-10 ENCOUNTER — TELEPHONE (OUTPATIENT)
Age: 55
End: 2025-07-10

## 2025-07-10 ENCOUNTER — TELEPHONE (OUTPATIENT)
Dept: NEUROLOGY | Facility: CLINIC | Age: 55
End: 2025-07-10

## 2025-07-10 NOTE — TELEPHONE ENCOUNTER
Pt called requesting appt for the 5th of August be switched to the 4th if possible. Pt wants to still see Sujey Cole per pt due to past Trauma is more comfortable with Sujey. Pt is a  and already has appts for the 1st and 4th scheduled and states she will not be able to get the 5th off too. Pt is requesting a call back to see if she is able to switch appt date. I checked and we have nothing available that date.

## 2025-07-10 NOTE — TELEPHONE ENCOUNTER
Pt call was transferred to my line in order to confirm appt time for 8/1 appt with Dr. Rutherford. Pt confirmed she was ok with Dr. Del Rio being late.

## 2025-07-10 NOTE — TELEPHONE ENCOUNTER
Pt called back regarding leteer she received to r/s appt due tot the provider coming in late on 8/1/25 .   Warm transferred to Art Richter MA  for assistance.     I tried to r/s appt but was not able to locate any other times.

## 2025-07-10 NOTE — TELEPHONE ENCOUNTER
LM letting her know Sujey is off on Monday's, would not be able to accommodate an appt with her one 8/4. There is one spot with Maninder if she would like or if she just wants to reschedule with Sujey to another day we can do that. Provided number to call back

## 2025-07-18 ENCOUNTER — TELEPHONE (OUTPATIENT)
Age: 55
End: 2025-07-18

## 2025-07-18 NOTE — TELEPHONE ENCOUNTER
Caller: Cinthya    Doctor: Dr. Steiner    Reason for call: make an appointment    Call : warm transfer to [facility]

## 2025-07-24 ENCOUNTER — NURSE TRIAGE (OUTPATIENT)
Age: 55
End: 2025-07-24

## 2025-07-24 ENCOUNTER — TELEPHONE (OUTPATIENT)
Dept: INTERNAL MEDICINE CLINIC | Facility: CLINIC | Age: 55
End: 2025-07-24

## 2025-07-24 NOTE — TELEPHONE ENCOUNTER
"REASON FOR CONVERSATION: Nausea    SYMPTOMS: C/o nausea and not eating much for last two weeks.  Has been under a great deal of stress.  Stomach nauseated and uncomfortable after eating so not really eating.  Taking Zofran which is helping.  Ate a few frozen french fries is all she ate.  Doing plain noodles and grated cheese as well.  No problem with drinking liquids.  Denies constipation or diarrhea.  Unknown weight loss. Belching as well.      OTHER HEALTH INFORMATION:  Tried gaviscon, pepto once and no relief as well as tums. Stopped pepcid 20 mg twice daily.   Does use miralax if constipated.      PROTOCOL DISPOSITION: See Within 2 Weeks in Office    CARE ADVICE PROVIDED: none.  OV made for September.  No recommendations or advise.  Patient has not been seen in over a year.     PRACTICE FOLLOW-UP: [facility] paperwork expires in August 9th - needs redone.  Recommendations?         Reason for Disposition   Nausea is a chronic symptom (recurrent or ongoing AND present > 4 weeks)    Answer Assessment - Initial Assessment Questions  1. NAUSEA SEVERITY: \"How bad is the nausea?\" (e.g., mild, moderate, severe; dehydration, weight loss)      moderate  2. ONSET: \"When did the nausea begin?\"      2 weeks ago increased  3. VOMITING: \"Any vomiting?\" If Yes, ask: \"How many times today?\"      Denies takes Zofran  4. RECURRENT SYMPTOM: \"Have you had nausea before?\" If Yes, ask: \"When was the last time?\" \"What happened that time?\"      intermittent  5. CAUSE: \"What do you think is causing the nausea?\"      Unsure - stress    Protocols used: Nausea-Adult-[place]    "

## 2025-07-25 NOTE — TELEPHONE ENCOUNTER
I looked at Dr. Feng's schedule. He has nothing in the next couple of weeks. My soonest would be possibly the week before she is scheduled. The nurse moved her from November to September yesterday to his first urgent available. She was put on the cancellation list, so I made it a high priority. Hopefully she will get a call to come in sooner.

## 2025-08-01 ENCOUNTER — OFFICE VISIT (OUTPATIENT)
Dept: NEUROLOGY | Facility: CLINIC | Age: 55
End: 2025-08-01
Payer: COMMERCIAL

## 2025-08-01 VITALS
WEIGHT: 112.2 LBS | HEART RATE: 82 BPM | HEIGHT: 62 IN | SYSTOLIC BLOOD PRESSURE: 138 MMHG | OXYGEN SATURATION: 99 % | TEMPERATURE: 97.1 F | DIASTOLIC BLOOD PRESSURE: 88 MMHG | BODY MASS INDEX: 20.65 KG/M2

## 2025-08-01 DIAGNOSIS — G43.909 MIGRAINE WITHOUT STATUS MIGRAINOSUS, NOT INTRACTABLE, UNSPECIFIED MIGRAINE TYPE: ICD-10-CM

## 2025-08-01 DIAGNOSIS — G40.209 PARTIAL SYMPTOMATIC EPILEPSY WITH COMPLEX PARTIAL SEIZURES, NOT INTRACTABLE, WITHOUT STATUS EPILEPTICUS (HCC): Primary | ICD-10-CM

## 2025-08-01 DIAGNOSIS — R26.81 UNSTEADY GAIT: ICD-10-CM

## 2025-08-01 PROCEDURE — 99214 OFFICE O/P EST MOD 30 MIN: CPT | Performed by: PSYCHIATRY & NEUROLOGY

## 2025-08-01 RX ORDER — RIMEGEPANT SULFATE 75 MG/75MG
TABLET, ORALLY DISINTEGRATING ORAL
Qty: 8 TABLET | Refills: 11 | Status: SHIPPED | OUTPATIENT
Start: 2025-08-01

## 2025-08-01 RX ORDER — TOPIRAMATE 200 MG/1
TABLET, FILM COATED ORAL
Qty: 180 TABLET | Refills: 3 | Status: SHIPPED | OUTPATIENT
Start: 2025-08-01

## 2025-08-01 RX ORDER — CALCIUM CARBONATE 500 MG/1
1 TABLET, CHEWABLE ORAL DAILY
COMMUNITY

## 2025-08-01 RX ORDER — METAXALONE 800 MG/1
TABLET ORAL
COMMUNITY

## 2025-08-01 NOTE — TELEPHONE ENCOUNTER
"Pt calling to see if provider responded to question from the other day about sooner appt.  Currently scheduled on 9/9/25.    Pt reports having severe nausea even after taking Zofran and Gaviscon. She ate 2 pieces of rotisserie chicken for supper and yogurt at breakfast yesterday and it causes increased GI symptoms and pt feels terrible.  Today she had a small piece of a muffin and some coffee and feels \"sick.\"  Pt states she feels like she has a \"soccer ball\" in her stomach and at bra line area.      Advised Dr. Feng said pt could be seen in next 2 weeks, but no avail appts and will reach out to provider again, since staff were unable to find an open appt but added pt to wait list.      Pt states she cannot be on a cancellation list because she is a  and only has certain dates she can come in for appt. Pt voiced frustration that no one asked her what her availability was initially.  Explained that we typically don't do this, because provider has to see where he can squeeze pt in as well. Apologized that she was not asked about her availability.    Dates pt is available-  8/4- (only certain times, due to other appts for pt and her mom).   8/5, 8/8, 8/12, 8/14, 8/20,8/21, 8/25, 8/29/25. If the 4th is the only option, she would work other appts around GI.      Please advise.          "

## 2025-08-05 ENCOUNTER — TELEPHONE (OUTPATIENT)
Dept: NEUROLOGY | Facility: CLINIC | Age: 55
End: 2025-08-05

## 2025-08-07 ENCOUNTER — TELEPHONE (OUTPATIENT)
Dept: NEUROLOGY | Facility: CLINIC | Age: 55
End: 2025-08-07